# Patient Record
Sex: FEMALE | Race: WHITE | Employment: OTHER | URBAN - NONMETROPOLITAN AREA
[De-identification: names, ages, dates, MRNs, and addresses within clinical notes are randomized per-mention and may not be internally consistent; named-entity substitution may affect disease eponyms.]

---

## 2017-01-11 ENCOUNTER — TELEPHONE (OUTPATIENT)
Dept: INTERNAL MEDICINE | Age: 82
End: 2017-01-11

## 2017-03-07 RX ORDER — EZETIMIBE 10 MG/1
10 TABLET ORAL DAILY
Qty: 30 TABLET | Refills: 11 | Status: SHIPPED | OUTPATIENT
Start: 2017-03-07 | End: 2018-02-21 | Stop reason: SDUPTHER

## 2017-04-18 RX ORDER — HYDROCHLOROTHIAZIDE 25 MG/1
TABLET ORAL
Qty: 30 TABLET | Refills: 11 | Status: SHIPPED | OUTPATIENT
Start: 2017-04-18 | End: 2017-09-18 | Stop reason: ALTCHOICE

## 2017-05-26 ENCOUNTER — OFFICE VISIT (OUTPATIENT)
Dept: INTERNAL MEDICINE | Age: 82
End: 2017-05-26
Payer: MEDICARE

## 2017-05-26 VITALS
DIASTOLIC BLOOD PRESSURE: 60 MMHG | RESPIRATION RATE: 16 BRPM | SYSTOLIC BLOOD PRESSURE: 116 MMHG | HEIGHT: 59 IN | BODY MASS INDEX: 34.68 KG/M2 | WEIGHT: 172 LBS | HEART RATE: 68 BPM

## 2017-05-26 DIAGNOSIS — I10 ESSENTIAL HYPERTENSION: ICD-10-CM

## 2017-05-26 DIAGNOSIS — N18.30 CHRONIC KIDNEY DISEASE, STAGE III (MODERATE) (HCC): ICD-10-CM

## 2017-05-26 DIAGNOSIS — E11.22 CONTROLLED TYPE 2 DIABETES MELLITUS WITH STAGE 3 CHRONIC KIDNEY DISEASE, WITHOUT LONG-TERM CURRENT USE OF INSULIN (HCC): Primary | ICD-10-CM

## 2017-05-26 DIAGNOSIS — E78.2 MIXED HYPERLIPIDEMIA: ICD-10-CM

## 2017-05-26 DIAGNOSIS — L72.0 EPIDERMAL CYST OF NECK: ICD-10-CM

## 2017-05-26 DIAGNOSIS — F34.1 DYSTHYMIA: ICD-10-CM

## 2017-05-26 DIAGNOSIS — E03.4 HYPOTHYROIDISM DUE TO ACQUIRED ATROPHY OF THYROID: ICD-10-CM

## 2017-05-26 DIAGNOSIS — N18.30 CONTROLLED TYPE 2 DIABETES MELLITUS WITH STAGE 3 CHRONIC KIDNEY DISEASE, WITHOUT LONG-TERM CURRENT USE OF INSULIN (HCC): Primary | ICD-10-CM

## 2017-05-26 DIAGNOSIS — E66.9 NON MORBID OBESITY, UNSPECIFIED OBESITY TYPE: ICD-10-CM

## 2017-05-26 PROCEDURE — G8417 CALC BMI ABV UP PARAM F/U: HCPCS | Performed by: INTERNAL MEDICINE

## 2017-05-26 PROCEDURE — G8427 DOCREV CUR MEDS BY ELIG CLIN: HCPCS | Performed by: INTERNAL MEDICINE

## 2017-05-26 PROCEDURE — 1090F PRES/ABSN URINE INCON ASSESS: CPT | Performed by: INTERNAL MEDICINE

## 2017-05-26 PROCEDURE — G8399 PT W/DXA RESULTS DOCUMENT: HCPCS | Performed by: INTERNAL MEDICINE

## 2017-05-26 PROCEDURE — 4040F PNEUMOC VAC/ADMIN/RCVD: CPT | Performed by: INTERNAL MEDICINE

## 2017-05-26 PROCEDURE — 1036F TOBACCO NON-USER: CPT | Performed by: INTERNAL MEDICINE

## 2017-05-26 PROCEDURE — 99214 OFFICE O/P EST MOD 30 MIN: CPT | Performed by: INTERNAL MEDICINE

## 2017-05-26 PROCEDURE — 1123F ACP DISCUSS/DSCN MKR DOCD: CPT | Performed by: INTERNAL MEDICINE

## 2017-05-31 ENCOUNTER — TELEPHONE (OUTPATIENT)
Dept: INTERNAL MEDICINE | Age: 82
End: 2017-05-31

## 2017-06-01 ENCOUNTER — TELEPHONE (OUTPATIENT)
Dept: SURGERY | Age: 82
End: 2017-06-01

## 2017-06-06 ENCOUNTER — TELEPHONE (OUTPATIENT)
Dept: SURGERY | Age: 82
End: 2017-06-06

## 2017-06-22 ENCOUNTER — PROCEDURE VISIT (OUTPATIENT)
Dept: SURGERY | Age: 82
End: 2017-06-22
Payer: MEDICARE

## 2017-06-22 ENCOUNTER — HOSPITAL ENCOUNTER (OUTPATIENT)
Age: 82
Setting detail: SPECIMEN
Discharge: HOME | End: 2017-06-22
Payer: MEDICARE

## 2017-06-22 VITALS
DIASTOLIC BLOOD PRESSURE: 80 MMHG | HEIGHT: 59 IN | SYSTOLIC BLOOD PRESSURE: 120 MMHG | WEIGHT: 169 LBS | BODY MASS INDEX: 34.07 KG/M2 | TEMPERATURE: 98.1 F | HEART RATE: 60 BPM

## 2017-06-22 DIAGNOSIS — C44.40 CANCER OF SKIN OF NECK: Primary | ICD-10-CM

## 2017-06-22 PROCEDURE — 1036F TOBACCO NON-USER: CPT | Performed by: SURGERY

## 2017-06-22 PROCEDURE — 11624 EXC S/N/H/F/G MAL+MRG 3.1-4: CPT | Performed by: SURGERY

## 2017-06-26 LAB — DERMATOLOGY PATHOLOGY REPORT: NORMAL

## 2017-06-28 ENCOUNTER — NURSE ONLY (OUTPATIENT)
Dept: SURGERY | Age: 82
End: 2017-06-28

## 2017-06-28 DIAGNOSIS — C44.40 CANCER OF SKIN OF NECK: Primary | ICD-10-CM

## 2017-07-20 RX ORDER — DIPHENOXYLATE HYDROCHLORIDE AND ATROPINE SULFATE 2.5; .025 MG/1; MG/1
TABLET ORAL
Qty: 60 TABLET | Refills: 0 | Status: SHIPPED | OUTPATIENT
Start: 2017-07-20 | End: 2017-09-06 | Stop reason: SDUPTHER

## 2017-08-17 ENCOUNTER — HOSPITAL ENCOUNTER (OUTPATIENT)
Dept: LAB | Age: 82
Discharge: HOME | End: 2017-08-17
Payer: MEDICARE

## 2017-08-17 DIAGNOSIS — E78.2 MIXED HYPERLIPIDEMIA: ICD-10-CM

## 2017-08-17 DIAGNOSIS — I10 ESSENTIAL HYPERTENSION: ICD-10-CM

## 2017-08-17 DIAGNOSIS — N18.30 CONTROLLED TYPE 2 DIABETES MELLITUS WITH STAGE 3 CHRONIC KIDNEY DISEASE, WITHOUT LONG-TERM CURRENT USE OF INSULIN (HCC): ICD-10-CM

## 2017-08-17 DIAGNOSIS — E11.22 CONTROLLED TYPE 2 DIABETES MELLITUS WITH STAGE 3 CHRONIC KIDNEY DISEASE, WITHOUT LONG-TERM CURRENT USE OF INSULIN (HCC): ICD-10-CM

## 2017-08-17 DIAGNOSIS — E03.4 HYPOTHYROIDISM DUE TO ACQUIRED ATROPHY OF THYROID: ICD-10-CM

## 2017-08-17 LAB
-: ABNORMAL
ABSOLUTE EOS #: 0.2 K/UL (ref 0–0.4)
ABSOLUTE LYMPH #: 1.6 K/UL (ref 1–4.8)
ABSOLUTE MONO #: 0.6 K/UL (ref 0.1–1.2)
ALBUMIN SERPL-MCNC: 3.8 G/DL (ref 3.5–5.2)
ALBUMIN/GLOBULIN RATIO: 1.7 (ref 1–2.5)
ALP BLD-CCNC: 123 U/L (ref 35–104)
ALT SERPL-CCNC: 14 U/L (ref 5–33)
AMORPHOUS: ABNORMAL
ANION GAP SERPL CALCULATED.3IONS-SCNC: 14 MMOL/L (ref 9–17)
AST SERPL-CCNC: 16 U/L
BACTERIA: ABNORMAL
BASOPHILS # BLD: 1 %
BASOPHILS ABSOLUTE: 0.1 K/UL (ref 0–0.2)
BILIRUB SERPL-MCNC: 0.22 MG/DL (ref 0.3–1.2)
BILIRUBIN URINE: NEGATIVE
BUN BLDV-MCNC: 41 MG/DL (ref 8–23)
BUN/CREAT BLD: 35 (ref 9–20)
CALCIUM SERPL-MCNC: 9.3 MG/DL (ref 8.6–10.4)
CASTS UA: ABNORMAL /LPF (ref 0–2)
CHLORIDE BLD-SCNC: 107 MMOL/L (ref 98–107)
CHOLESTEROL/HDL RATIO: 3.2
CHOLESTEROL: 131 MG/DL
CO2: 19 MMOL/L (ref 20–31)
COLOR: ABNORMAL
COMMENT UA: ABNORMAL
CREAT SERPL-MCNC: 1.18 MG/DL (ref 0.5–0.9)
CREATININE URINE: 80.4 MG/DL (ref 28–217)
CRYSTALS, UA: ABNORMAL /HPF
DIFFERENTIAL TYPE: ABNORMAL
EOSINOPHILS RELATIVE PERCENT: 3 %
EPITHELIAL CELLS UA: ABNORMAL /HPF (ref 0–5)
ESTIMATED AVERAGE GLUCOSE: 114 MG/DL
GFR AFRICAN AMERICAN: 53 ML/MIN
GFR NON-AFRICAN AMERICAN: 44 ML/MIN
GFR SERPL CREATININE-BSD FRML MDRD: ABNORMAL ML/MIN/{1.73_M2}
GFR SERPL CREATININE-BSD FRML MDRD: ABNORMAL ML/MIN/{1.73_M2}
GLUCOSE BLD-MCNC: 132 MG/DL (ref 70–99)
GLUCOSE URINE: NEGATIVE
HBA1C MFR BLD: 5.6 % (ref 4.8–5.9)
HCT VFR BLD CALC: 33.9 % (ref 36–46)
HDLC SERPL-MCNC: 41 MG/DL
HEMOGLOBIN: 10.9 G/DL (ref 12–16)
KETONES, URINE: NEGATIVE
LDL CHOLESTEROL: 68 MG/DL (ref 0–130)
LEUKOCYTE ESTERASE, URINE: ABNORMAL
LYMPHOCYTES # BLD: 26 %
MCH RBC QN AUTO: 31.7 PG (ref 26–34)
MCHC RBC AUTO-ENTMCNC: 32.2 G/DL (ref 31–37)
MCV RBC AUTO: 98.4 FL (ref 80–100)
MICROALBUMIN/CREAT 24H UR: 69 MG/L
MICROALBUMIN/CREAT UR-RTO: 86 MCG/MG CREAT
MONOCYTES # BLD: 9 %
MUCUS: ABNORMAL
NITRITE, URINE: POSITIVE
OTHER OBSERVATIONS UA: ABNORMAL
PDW BLD-RTO: 13.3 % (ref 11–14.5)
PH UA: 5.5 (ref 5–6)
PLATELET # BLD: 224 K/UL (ref 140–450)
PLATELET ESTIMATE: ABNORMAL
PMV BLD AUTO: 9.4 FL (ref 6–12)
POTASSIUM SERPL-SCNC: 4.2 MMOL/L (ref 3.7–5.3)
PROTEIN UA: NEGATIVE
RBC # BLD: 3.45 M/UL (ref 4–5.2)
RBC # BLD: ABNORMAL 10*6/UL
RBC UA: ABNORMAL /HPF (ref 0–4)
RENAL EPITHELIAL, UA: ABNORMAL /HPF
SEG NEUTROPHILS: 61 %
SEGMENTED NEUTROPHILS ABSOLUTE COUNT: 3.8 K/UL (ref 1.8–7.7)
SODIUM BLD-SCNC: 140 MMOL/L (ref 135–144)
SPECIFIC GRAVITY UA: 1.02 (ref 1.01–1.02)
THYROXINE, FREE: 0.95 NG/DL (ref 0.93–1.7)
TOTAL PROTEIN: 6.1 G/DL (ref 6.4–8.3)
TRICHOMONAS: ABNORMAL
TRIGL SERPL-MCNC: 108 MG/DL
TSH SERPL DL<=0.05 MIU/L-ACNC: 5.86 MIU/L (ref 0.3–5)
TURBIDITY: ABNORMAL
URINE HGB: ABNORMAL
UROBILINOGEN, URINE: NORMAL
VLDLC SERPL CALC-MCNC: NORMAL MG/DL (ref 1–30)
WBC # BLD: 6.2 K/UL (ref 3.5–11)
WBC # BLD: ABNORMAL 10*3/UL
WBC UA: >50 /HPF (ref 0–4)
YEAST: ABNORMAL

## 2017-08-17 PROCEDURE — 83036 HEMOGLOBIN GLYCOSYLATED A1C: CPT

## 2017-08-17 PROCEDURE — 87186 SC STD MICRODIL/AGAR DIL: CPT

## 2017-08-17 PROCEDURE — 80053 COMPREHEN METABOLIC PANEL: CPT

## 2017-08-17 PROCEDURE — 84439 ASSAY OF FREE THYROXINE: CPT

## 2017-08-17 PROCEDURE — 87086 URINE CULTURE/COLONY COUNT: CPT

## 2017-08-17 PROCEDURE — 80061 LIPID PANEL: CPT

## 2017-08-17 PROCEDURE — 82043 UR ALBUMIN QUANTITATIVE: CPT

## 2017-08-17 PROCEDURE — 84443 ASSAY THYROID STIM HORMONE: CPT

## 2017-08-17 PROCEDURE — 87077 CULTURE AEROBIC IDENTIFY: CPT

## 2017-08-17 PROCEDURE — 36415 COLL VENOUS BLD VENIPUNCTURE: CPT

## 2017-08-17 PROCEDURE — 85025 COMPLETE CBC W/AUTO DIFF WBC: CPT

## 2017-08-17 PROCEDURE — 81001 URINALYSIS AUTO W/SCOPE: CPT

## 2017-08-17 PROCEDURE — 82570 ASSAY OF URINE CREATININE: CPT

## 2017-08-18 ENCOUNTER — TELEPHONE (OUTPATIENT)
Dept: INTERNAL MEDICINE | Age: 82
End: 2017-08-18

## 2017-08-18 RX ORDER — CIPROFLOXACIN 250 MG/1
250 TABLET, FILM COATED ORAL 2 TIMES DAILY
Qty: 20 TABLET | Refills: 0 | Status: SHIPPED | OUTPATIENT
Start: 2017-08-18 | End: 2017-08-28

## 2017-08-19 LAB
CULTURE: ABNORMAL
CULTURE: ABNORMAL
Lab: ABNORMAL
ORGANISM: ABNORMAL
SPECIMEN DESCRIPTION: ABNORMAL
SPECIMEN DESCRIPTION: ABNORMAL
STATUS: ABNORMAL

## 2017-08-25 ENCOUNTER — OFFICE VISIT (OUTPATIENT)
Dept: INTERNAL MEDICINE | Age: 82
End: 2017-08-25
Payer: MEDICARE

## 2017-08-25 VITALS
RESPIRATION RATE: 16 BRPM | HEIGHT: 59 IN | DIASTOLIC BLOOD PRESSURE: 58 MMHG | SYSTOLIC BLOOD PRESSURE: 104 MMHG | WEIGHT: 167.2 LBS | HEART RATE: 76 BPM | BODY MASS INDEX: 33.71 KG/M2

## 2017-08-25 DIAGNOSIS — N18.30 CHRONIC KIDNEY DISEASE, STAGE III (MODERATE) (HCC): ICD-10-CM

## 2017-08-25 DIAGNOSIS — N18.30 CONTROLLED TYPE 2 DIABETES MELLITUS WITH STAGE 3 CHRONIC KIDNEY DISEASE, WITHOUT LONG-TERM CURRENT USE OF INSULIN (HCC): Primary | ICD-10-CM

## 2017-08-25 DIAGNOSIS — E11.22 CONTROLLED TYPE 2 DIABETES MELLITUS WITH STAGE 3 CHRONIC KIDNEY DISEASE, WITHOUT LONG-TERM CURRENT USE OF INSULIN (HCC): Primary | ICD-10-CM

## 2017-08-25 DIAGNOSIS — E78.2 MIXED HYPERLIPIDEMIA: ICD-10-CM

## 2017-08-25 DIAGNOSIS — E66.9 NON MORBID OBESITY, UNSPECIFIED OBESITY TYPE: ICD-10-CM

## 2017-08-25 DIAGNOSIS — E03.4 HYPOTHYROIDISM DUE TO ACQUIRED ATROPHY OF THYROID: ICD-10-CM

## 2017-08-25 DIAGNOSIS — I10 ESSENTIAL HYPERTENSION: ICD-10-CM

## 2017-08-25 DIAGNOSIS — H35.30 MACULAR DEGENERATION: ICD-10-CM

## 2017-08-25 DIAGNOSIS — F34.1 DYSTHYMIA: ICD-10-CM

## 2017-08-25 PROCEDURE — 1090F PRES/ABSN URINE INCON ASSESS: CPT | Performed by: INTERNAL MEDICINE

## 2017-08-25 PROCEDURE — G8417 CALC BMI ABV UP PARAM F/U: HCPCS | Performed by: INTERNAL MEDICINE

## 2017-08-25 PROCEDURE — G8399 PT W/DXA RESULTS DOCUMENT: HCPCS | Performed by: INTERNAL MEDICINE

## 2017-08-25 PROCEDURE — G8427 DOCREV CUR MEDS BY ELIG CLIN: HCPCS | Performed by: INTERNAL MEDICINE

## 2017-08-25 PROCEDURE — 1123F ACP DISCUSS/DSCN MKR DOCD: CPT | Performed by: INTERNAL MEDICINE

## 2017-08-25 PROCEDURE — 1036F TOBACCO NON-USER: CPT | Performed by: INTERNAL MEDICINE

## 2017-08-25 PROCEDURE — 99214 OFFICE O/P EST MOD 30 MIN: CPT | Performed by: INTERNAL MEDICINE

## 2017-08-25 PROCEDURE — 4040F PNEUMOC VAC/ADMIN/RCVD: CPT | Performed by: INTERNAL MEDICINE

## 2017-08-25 RX ORDER — LEVOTHYROXINE SODIUM 0.07 MG/1
75 TABLET ORAL DAILY
Qty: 30 TABLET | Refills: 11 | Status: SHIPPED | OUTPATIENT
Start: 2017-08-25 | End: 2017-12-21 | Stop reason: DRUGHIGH

## 2017-09-06 RX ORDER — DIPHENOXYLATE HYDROCHLORIDE AND ATROPINE SULFATE 2.5; .025 MG/1; MG/1
1 TABLET ORAL 2 TIMES DAILY PRN
Qty: 60 TABLET | Refills: 0 | Status: ON HOLD | OUTPATIENT
Start: 2017-09-06 | End: 2018-01-11 | Stop reason: SDUPTHER

## 2017-09-18 ENCOUNTER — TELEPHONE (OUTPATIENT)
Dept: INTERNAL MEDICINE | Age: 82
End: 2017-09-18

## 2017-09-18 DIAGNOSIS — N30.00 ACUTE CYSTITIS WITHOUT HEMATURIA: Primary | ICD-10-CM

## 2017-09-19 ENCOUNTER — HOSPITAL ENCOUNTER (OUTPATIENT)
Dept: LAB | Age: 82
Setting detail: SPECIMEN
Discharge: HOME | End: 2017-09-19
Payer: MEDICARE

## 2017-09-19 DIAGNOSIS — N30.00 ACUTE CYSTITIS WITHOUT HEMATURIA: ICD-10-CM

## 2017-09-19 LAB
-: ABNORMAL
AMORPHOUS: ABNORMAL
BACTERIA: ABNORMAL
BILIRUBIN URINE: NEGATIVE
CASTS UA: ABNORMAL /LPF (ref 0–2)
COLOR: ABNORMAL
COMMENT UA: ABNORMAL
CRYSTALS, UA: ABNORMAL /HPF
EPITHELIAL CELLS UA: ABNORMAL /HPF (ref 0–5)
GLUCOSE URINE: NEGATIVE
KETONES, URINE: NEGATIVE
LEUKOCYTE ESTERASE, URINE: NEGATIVE
MUCUS: ABNORMAL
NITRITE, URINE: NEGATIVE
OTHER OBSERVATIONS UA: ABNORMAL
PH UA: 5.5 (ref 5–6)
PROTEIN UA: NEGATIVE
RBC UA: ABNORMAL /HPF (ref 0–4)
RENAL EPITHELIAL, UA: ABNORMAL /HPF
SPECIFIC GRAVITY UA: 1.02 (ref 1.01–1.02)
TRICHOMONAS: ABNORMAL
TURBIDITY: ABNORMAL
URINE HGB: NEGATIVE
UROBILINOGEN, URINE: NORMAL
WBC UA: ABNORMAL /HPF (ref 0–4)
YEAST: ABNORMAL

## 2017-09-19 PROCEDURE — 81001 URINALYSIS AUTO W/SCOPE: CPT

## 2017-10-18 ENCOUNTER — OFFICE VISIT (OUTPATIENT)
Dept: INTERNAL MEDICINE | Age: 82
End: 2017-10-18
Payer: MEDICARE

## 2017-10-18 VITALS
SYSTOLIC BLOOD PRESSURE: 128 MMHG | HEIGHT: 60 IN | WEIGHT: 167.11 LBS | DIASTOLIC BLOOD PRESSURE: 62 MMHG | RESPIRATION RATE: 16 BRPM | HEART RATE: 72 BPM | BODY MASS INDEX: 32.81 KG/M2

## 2017-10-18 DIAGNOSIS — H25.9 SENILE CATARACT OF LEFT EYE, UNSPECIFIED AGE-RELATED CATARACT TYPE: Primary | ICD-10-CM

## 2017-10-18 DIAGNOSIS — Z23 INFLUENZA VACCINE NEEDED: ICD-10-CM

## 2017-10-18 PROBLEM — H35.3131 EARLY STAGE NONEXUDATIVE AGE-RELATED MACULAR DEGENERATION OF BOTH EYES: Status: ACTIVE | Noted: 2017-10-16

## 2017-10-18 PROCEDURE — G8484 FLU IMMUNIZE NO ADMIN: HCPCS | Performed by: INTERNAL MEDICINE

## 2017-10-18 PROCEDURE — 1090F PRES/ABSN URINE INCON ASSESS: CPT | Performed by: INTERNAL MEDICINE

## 2017-10-18 PROCEDURE — G8417 CALC BMI ABV UP PARAM F/U: HCPCS | Performed by: INTERNAL MEDICINE

## 2017-10-18 PROCEDURE — 1036F TOBACCO NON-USER: CPT | Performed by: INTERNAL MEDICINE

## 2017-10-18 PROCEDURE — G8427 DOCREV CUR MEDS BY ELIG CLIN: HCPCS | Performed by: INTERNAL MEDICINE

## 2017-10-18 PROCEDURE — 4040F PNEUMOC VAC/ADMIN/RCVD: CPT | Performed by: INTERNAL MEDICINE

## 2017-10-18 PROCEDURE — G8399 PT W/DXA RESULTS DOCUMENT: HCPCS | Performed by: INTERNAL MEDICINE

## 2017-10-18 PROCEDURE — 99213 OFFICE O/P EST LOW 20 MIN: CPT | Performed by: INTERNAL MEDICINE

## 2017-10-18 PROCEDURE — 90662 IIV NO PRSV INCREASED AG IM: CPT | Performed by: INTERNAL MEDICINE

## 2017-10-18 PROCEDURE — 1123F ACP DISCUSS/DSCN MKR DOCD: CPT | Performed by: INTERNAL MEDICINE

## 2017-10-18 PROCEDURE — G0008 ADMIN INFLUENZA VIRUS VAC: HCPCS | Performed by: INTERNAL MEDICINE

## 2017-10-18 RX ORDER — YOHIMBE BARK 500 MG
CAPSULE ORAL
COMMUNITY
End: 2017-12-21 | Stop reason: SDUPTHER

## 2017-10-18 RX ORDER — ASCORBIC ACID 500 MG
1 TABLET ORAL
Status: ON HOLD | COMMUNITY
End: 2018-01-14 | Stop reason: HOSPADM

## 2017-10-18 NOTE — PROGRESS NOTES
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

## 2017-10-18 NOTE — PROGRESS NOTES
DR. Cullen Dunbar - PROGRESS NOTE    CHIEF COMPLAINT/HISTORY OF CHIEF COMPLAINT: This 80 y.o.  female comes in today for preoperative clearance for left cataract surgery to be done by Dr. Shana Gonzalez on November 2, 2017. She is doing well otherwise and denies any other complaints. She would like to get her flu shot today. ALLERGIES/INTOLERANCES:   Allergies   Allergen Reactions    Allopurinol      Patient unsure of reaction    Percocet [Oxycodone-Acetaminophen]      Hallucinations. ...sensitive to narcotics    Phenergan [Promethazine Hcl]      Very sensitive. ..given small doses    Polycitra-K [Potassium Citrate]      Patient unsure of reaction    Levaquin [Levofloxacin] Anxiety     Not able to sleep    Sulfa Antibiotics Rash    Tessalon [Benzonatate] Anxiety     Not able to sleep       MEDICATIONS:   Outpatient Prescriptions Marked as Taking for the 10/18/17 encounter (Office Visit) with Whit Whittington, DO   Medication Sig Dispense Refill    diphenoxylate-atropine (LOMOTIL) 2.5-0.025 MG per tablet Take 1 tablet by mouth 2 times daily as needed for Diarrhea 60 tablet 0    levothyroxine (SYNTHROID) 75 MCG tablet Take 1 tablet by mouth Daily 30 tablet 11    sertraline (ZOLOFT) 50 MG tablet TAKE 1 TABLET DAILY 90 tablet 3    ezetimibe (ZETIA) 10 MG tablet Take 1 tablet by mouth daily 30 tablet 11    lisinopril (PRINIVIL;ZESTRIL) 40 MG tablet TAKE 1 TABLET BY MOUTH DAILY 90 tablet 3    oxybutynin (DITROPAN XL) 5 MG CR tablet Take 1 tablet by mouth daily 30 tablet 11    Multiple Vitamins-Minerals (OCUVITE PRESERVISION PO) Take 1 tablet by mouth daily      Probiotic Product (PROBIOTIC DAILY PO) Take by mouth daily      guaiFENesin-codeine (CHERATUSSIN AC) 100-10 MG/5ML SYRP 1 to 2 teaspoons every 6 hours as needed for cough. 250 mL 0    cetirizine (ZYRTEC) 10 MG tablet Take 0.5 tablets by mouth daily as needed (sinus congestion).  30 tablet 0    aspirin 81 MG tablet Take 81 mg by mouth daily.      latanoprost (XALATAN) 0.005 % ophthalmic solution Place 1 drop into both eyes nightly.  timolol (TIMOPTIC) 0.5 % ophthalmic solution Place 1 drop into both eyes daily.  Ascorbic Acid (VITAMIN C) 500 MG tablet Take 500 mg by mouth 2 times daily.  Multiple Vitamins-Minerals (MULTIVITAMIN PO) Take  by mouth daily. IMMUNIZATIONS: Reviewed for influenza and pneumococcal status as indicated in electronic record. REVIEW OF SYSTEMS:     Please see history of chief complaint above; otherwise no new problems with respect to General, HEENT, Cardiovascular, Respiratory, Gastrointestinal, Genitourinary, Endocrinologic, Musculoskeletal, or Neuropsychiatric complaints. PHYSICAL EXAMINATION:    Wt Readings from Last 2 Encounters:   10/18/17 167 lb 1.7 oz (75.8 kg)   08/25/17 167 lb 3.2 oz (75.8 kg)       Vitals: /62 (Site: Right Arm, Position: Sitting, Cuff Size: Large Adult)   Pulse 72   Resp 16   Ht 4' 11.84\" (1.52 m)   Wt 167 lb 1.7 oz (75.8 kg)   BMI 32.81 kg/m²   General: This is a 80 y.o.  female who is alert and oriented to person, place and time. She appears to be her stated age and does not appear to be in any acute distress. HEENT/Neck: essentially unremarkable  Lungs: Normal - CTA without rales, rhonchi, or wheezing. Heart: regular rate and rhythm, S1, S2 normal, no murmur, click, rub or gallop No S3 or S4. Extremities: There is no clubbing, cyanosis, edema    ASSESSMENT/PLAN:    1. Senile cataract of left eye, unspecified age-related cataract type  - She is clear for surgery at the moderate risk level secondary to her chronic comorbid health conditions. We will let Dr. Malgorzata Veras office know    2. Influenza vaccine needed  - We gave her the flu shot that she requested.    - INFLUENZA, HIGH DOSE, 65 YRS +, IM, PF, PREFILL SYR, 0.5ML (FLUZONE HD)      Orders Placed This Encounter   Procedures    INFLUENZA, HIGH DOSE, 65 YRS +, IM, PF, PREFILL SYR, 0.5ML

## 2017-11-28 ENCOUNTER — HOSPITAL ENCOUNTER (OUTPATIENT)
Dept: LAB | Age: 82
Setting detail: SPECIMEN
Discharge: HOME | End: 2017-11-28
Payer: MEDICARE

## 2017-11-28 DIAGNOSIS — N18.30 CONTROLLED TYPE 2 DIABETES MELLITUS WITH STAGE 3 CHRONIC KIDNEY DISEASE, WITHOUT LONG-TERM CURRENT USE OF INSULIN (HCC): ICD-10-CM

## 2017-11-28 DIAGNOSIS — E03.4 HYPOTHYROIDISM DUE TO ACQUIRED ATROPHY OF THYROID: ICD-10-CM

## 2017-11-28 DIAGNOSIS — E11.22 CONTROLLED TYPE 2 DIABETES MELLITUS WITH STAGE 3 CHRONIC KIDNEY DISEASE, WITHOUT LONG-TERM CURRENT USE OF INSULIN (HCC): ICD-10-CM

## 2017-11-28 LAB
ANION GAP SERPL CALCULATED.3IONS-SCNC: 12 MMOL/L (ref 9–17)
BUN BLDV-MCNC: 35 MG/DL (ref 8–23)
BUN/CREAT BLD: 27 (ref 9–20)
CALCIUM SERPL-MCNC: 9.5 MG/DL (ref 8.6–10.4)
CHLORIDE BLD-SCNC: 108 MMOL/L (ref 98–107)
CO2: 24 MMOL/L (ref 20–31)
CREAT SERPL-MCNC: 1.31 MG/DL (ref 0.5–0.9)
ESTIMATED AVERAGE GLUCOSE: 120 MG/DL
GFR AFRICAN AMERICAN: 47 ML/MIN
GFR NON-AFRICAN AMERICAN: 39 ML/MIN
GFR SERPL CREATININE-BSD FRML MDRD: ABNORMAL ML/MIN/{1.73_M2}
GFR SERPL CREATININE-BSD FRML MDRD: ABNORMAL ML/MIN/{1.73_M2}
GLUCOSE BLD-MCNC: 146 MG/DL (ref 70–99)
HBA1C MFR BLD: 5.8 % (ref 4.8–5.9)
POTASSIUM SERPL-SCNC: 4.7 MMOL/L (ref 3.7–5.3)
SODIUM BLD-SCNC: 144 MMOL/L (ref 135–144)
THYROXINE, FREE: 1.01 NG/DL (ref 0.93–1.7)
TSH SERPL DL<=0.05 MIU/L-ACNC: 5.21 MIU/L (ref 0.3–5)

## 2017-11-28 PROCEDURE — 80048 BASIC METABOLIC PNL TOTAL CA: CPT

## 2017-11-28 PROCEDURE — 84439 ASSAY OF FREE THYROXINE: CPT

## 2017-11-28 PROCEDURE — 83036 HEMOGLOBIN GLYCOSYLATED A1C: CPT

## 2017-11-28 PROCEDURE — 36415 COLL VENOUS BLD VENIPUNCTURE: CPT

## 2017-11-28 PROCEDURE — 84443 ASSAY THYROID STIM HORMONE: CPT

## 2017-11-29 RX ORDER — LISINOPRIL 40 MG/1
TABLET ORAL
Qty: 90 TABLET | Refills: 3 | Status: SHIPPED | OUTPATIENT
Start: 2017-11-29 | End: 2018-09-26 | Stop reason: DRUGHIGH

## 2017-12-08 ENCOUNTER — TELEPHONE (OUTPATIENT)
Dept: INTERNAL MEDICINE | Age: 82
End: 2017-12-08

## 2017-12-08 ENCOUNTER — HOSPITAL ENCOUNTER (OUTPATIENT)
Dept: LAB | Age: 82
Setting detail: SPECIMEN
Discharge: HOME | End: 2017-12-08
Payer: MEDICARE

## 2017-12-08 DIAGNOSIS — N39.0 URINARY TRACT INFECTION WITHOUT HEMATURIA, SITE UNSPECIFIED: Primary | ICD-10-CM

## 2017-12-08 DIAGNOSIS — N39.0 URINARY TRACT INFECTION WITHOUT HEMATURIA, SITE UNSPECIFIED: ICD-10-CM

## 2017-12-08 LAB
-: ABNORMAL
AMORPHOUS: ABNORMAL
BACTERIA: ABNORMAL
BILIRUBIN URINE: NEGATIVE
CASTS UA: ABNORMAL /LPF (ref 0–2)
COLOR: ABNORMAL
COMMENT UA: ABNORMAL
CRYSTALS, UA: ABNORMAL /HPF
EPITHELIAL CELLS UA: ABNORMAL /HPF (ref 0–5)
GLUCOSE URINE: NEGATIVE
KETONES, URINE: NEGATIVE
LEUKOCYTE ESTERASE, URINE: ABNORMAL
MUCUS: ABNORMAL
NITRITE, URINE: POSITIVE
OTHER OBSERVATIONS UA: ABNORMAL
PH UA: 5 (ref 5–6)
PROTEIN UA: ABNORMAL
RBC UA: ABNORMAL /HPF (ref 0–4)
RENAL EPITHELIAL, UA: ABNORMAL /HPF
SPECIFIC GRAVITY UA: 1.02 (ref 1.01–1.02)
TRICHOMONAS: ABNORMAL
TURBIDITY: ABNORMAL
URINE HGB: ABNORMAL
UROBILINOGEN, URINE: NORMAL
WBC UA: ABNORMAL /HPF (ref 0–4)
YEAST: ABNORMAL

## 2017-12-08 PROCEDURE — 87086 URINE CULTURE/COLONY COUNT: CPT

## 2017-12-08 PROCEDURE — 87077 CULTURE AEROBIC IDENTIFY: CPT

## 2017-12-08 PROCEDURE — 81001 URINALYSIS AUTO W/SCOPE: CPT

## 2017-12-08 PROCEDURE — 87186 SC STD MICRODIL/AGAR DIL: CPT

## 2017-12-08 RX ORDER — NITROFURANTOIN 25; 75 MG/1; MG/1
100 CAPSULE ORAL 2 TIMES DAILY
Qty: 20 CAPSULE | Refills: 0 | Status: SHIPPED | OUTPATIENT
Start: 2017-12-08 | End: 2017-12-18

## 2017-12-11 LAB
CULTURE: ABNORMAL
Lab: ABNORMAL
ORGANISM: ABNORMAL
ORGANISM: ABNORMAL
SPECIMEN DESCRIPTION: ABNORMAL
SPECIMEN DESCRIPTION: ABNORMAL
STATUS: ABNORMAL

## 2017-12-21 ENCOUNTER — OFFICE VISIT (OUTPATIENT)
Dept: INTERNAL MEDICINE | Age: 82
End: 2017-12-21
Payer: MEDICARE

## 2017-12-21 VITALS
HEART RATE: 68 BPM | BODY MASS INDEX: 30.29 KG/M2 | DIASTOLIC BLOOD PRESSURE: 60 MMHG | HEIGHT: 61 IN | WEIGHT: 160.4 LBS | TEMPERATURE: 98.6 F | RESPIRATION RATE: 16 BRPM | SYSTOLIC BLOOD PRESSURE: 102 MMHG | OXYGEN SATURATION: 95 %

## 2017-12-21 DIAGNOSIS — E11.22 CONTROLLED TYPE 2 DIABETES MELLITUS WITH STAGE 3 CHRONIC KIDNEY DISEASE, WITHOUT LONG-TERM CURRENT USE OF INSULIN (HCC): Primary | ICD-10-CM

## 2017-12-21 DIAGNOSIS — H40.9 GLAUCOMA OF BOTH EYES, UNSPECIFIED GLAUCOMA TYPE: ICD-10-CM

## 2017-12-21 DIAGNOSIS — E66.9 CLASS 1 OBESITY WITHOUT SERIOUS COMORBIDITY WITH BODY MASS INDEX (BMI) OF 30.0 TO 30.9 IN ADULT, UNSPECIFIED OBESITY TYPE: ICD-10-CM

## 2017-12-21 DIAGNOSIS — Z13.1 ENCOUNTER FOR SCREENING FOR DIABETES MELLITUS: ICD-10-CM

## 2017-12-21 DIAGNOSIS — H35.3131 EARLY STAGE NONEXUDATIVE AGE-RELATED MACULAR DEGENERATION OF BOTH EYES: ICD-10-CM

## 2017-12-21 DIAGNOSIS — N18.30 CHRONIC KIDNEY DISEASE, STAGE III (MODERATE) (HCC): ICD-10-CM

## 2017-12-21 DIAGNOSIS — E03.4 HYPOTHYROIDISM DUE TO ACQUIRED ATROPHY OF THYROID: ICD-10-CM

## 2017-12-21 DIAGNOSIS — E78.2 MIXED HYPERLIPIDEMIA: ICD-10-CM

## 2017-12-21 DIAGNOSIS — N18.30 CONTROLLED TYPE 2 DIABETES MELLITUS WITH STAGE 3 CHRONIC KIDNEY DISEASE, WITHOUT LONG-TERM CURRENT USE OF INSULIN (HCC): Primary | ICD-10-CM

## 2017-12-21 DIAGNOSIS — I10 ESSENTIAL HYPERTENSION: ICD-10-CM

## 2017-12-21 PROCEDURE — G8399 PT W/DXA RESULTS DOCUMENT: HCPCS | Performed by: INTERNAL MEDICINE

## 2017-12-21 PROCEDURE — 1036F TOBACCO NON-USER: CPT | Performed by: INTERNAL MEDICINE

## 2017-12-21 PROCEDURE — 4040F PNEUMOC VAC/ADMIN/RCVD: CPT | Performed by: INTERNAL MEDICINE

## 2017-12-21 PROCEDURE — G8427 DOCREV CUR MEDS BY ELIG CLIN: HCPCS | Performed by: INTERNAL MEDICINE

## 2017-12-21 PROCEDURE — G8484 FLU IMMUNIZE NO ADMIN: HCPCS | Performed by: INTERNAL MEDICINE

## 2017-12-21 PROCEDURE — 1123F ACP DISCUSS/DSCN MKR DOCD: CPT | Performed by: INTERNAL MEDICINE

## 2017-12-21 PROCEDURE — 99214 OFFICE O/P EST MOD 30 MIN: CPT | Performed by: INTERNAL MEDICINE

## 2017-12-21 PROCEDURE — G8417 CALC BMI ABV UP PARAM F/U: HCPCS | Performed by: INTERNAL MEDICINE

## 2017-12-21 PROCEDURE — 1090F PRES/ABSN URINE INCON ASSESS: CPT | Performed by: INTERNAL MEDICINE

## 2017-12-21 RX ORDER — HYDROCHLOROTHIAZIDE 25 MG/1
25 TABLET ORAL DAILY
COMMUNITY
End: 2018-04-21 | Stop reason: SDUPTHER

## 2017-12-21 RX ORDER — LEVOTHYROXINE SODIUM 0.1 MG/1
100 TABLET ORAL DAILY
Qty: 30 TABLET | Refills: 11 | Status: SHIPPED | OUTPATIENT
Start: 2017-12-21 | End: 2018-03-23 | Stop reason: DRUGHIGH

## 2017-12-21 ASSESSMENT — PATIENT HEALTH QUESTIONNAIRE - PHQ9
1. LITTLE INTEREST OR PLEASURE IN DOING THINGS: 0
SUM OF ALL RESPONSES TO PHQ9 QUESTIONS 1 & 2: 0
2. FEELING DOWN, DEPRESSED OR HOPELESS: 0
SUM OF ALL RESPONSES TO PHQ QUESTIONS 1-9: 0

## 2017-12-21 NOTE — PATIENT INSTRUCTIONS
Patient Education        Learning About Diabetes Food Guidelines  Your Care Instructions    Meal planning is important to manage diabetes. It helps keep your blood sugar at a target level (which you set with your doctor). You don't have to eat special foods. You can eat what your family eats, including sweets once in a while. But you do have to pay attention to how often you eat and how much you eat of certain foods. You may want to work with a dietitian or a certified diabetes educator (CDE) to help you plan meals and snacks. A dietitian or CDE can also help you lose weight if that is one of your goals. What should you know about eating carbs? Managing the amount of carbohydrate (carbs) you eat is an important part of healthy meals when you have diabetes. Carbohydrate is found in many foods. · Learn which foods have carbs. And learn the amounts of carbs in different foods. ¨ Bread, cereal, pasta, and rice have about 15 grams of carbs in a serving. A serving is 1 slice of bread (1 ounce), ½ cup of cooked cereal, or 1/3 cup of cooked pasta or rice. ¨ Fruits have 15 grams of carbs in a serving. A serving is 1 small fresh fruit, such as an apple or orange; ½ of a banana; ½ cup of cooked or canned fruit; ½ cup of fruit juice; 1 cup of melon or raspberries; or 2 tablespoons of dried fruit. ¨ Milk and no-sugar-added yogurt have 15 grams of carbs in a serving. A serving is 1 cup of milk or 2/3 cup of no-sugar-added yogurt. ¨ Starchy vegetables have 15 grams of carbs in a serving. A serving is ½ cup of mashed potatoes or sweet potato; 1 cup winter squash; ½ of a small baked potato; ½ cup of cooked beans; or ½ cup cooked corn or green peas. · Learn how much carbs to eat each day and at each meal. A dietitian or CDE can teach you how to keep track of the amount of carbs you eat. This is called carbohydrate counting. · If you are not sure how to count carbohydrate grams, use the Plate Method to plan meals.  It is a good, quick way to make sure that you have a balanced meal. It also helps you spread carbs throughout the day. ¨ Divide your plate by types of foods. Put non-starchy vegetables on half the plate, meat or other protein food on one-quarter of the plate, and a grain or starchy vegetable in the final quarter of the plate. To this you can add a small piece of fruit and 1 cup of milk or yogurt, depending on how many carbs you are supposed to eat at a meal.  · Try to eat about the same amount of carbs at each meal. Do not \"save up\" your daily allowance of carbs to eat at one meal.  · Proteins have very little or no carbs per serving. Examples of proteins are beef, chicken, turkey, fish, eggs, tofu, cheese, cottage cheese, and peanut butter. A serving size of meat is 3 ounces, which is about the size of a deck of cards. Examples of meat substitute serving sizes (equal to 1 ounce of meat) are 1/4 cup of cottage cheese, 1 egg, 1 tablespoon of peanut butter, and ½ cup of tofu. How can you eat out and still eat healthy? · Learn to estimate the serving sizes of foods that have carbohydrate. If you measure food at home, it will be easier to estimate the amount in a serving of restaurant food. · If the meal you order has too much carbohydrate (such as potatoes, corn, or baked beans), ask to have a low-carbohydrate food instead. Ask for a salad or green vegetables. · If you use insulin, check your blood sugar before and after eating out to help you plan how much to eat in the future. · If you eat more carbohydrate at a meal than you had planned, take a walk or do other exercise. This will help lower your blood sugar. What else should you know? · Limit saturated fat, such as the fat from meat and dairy products. This is a healthy choice because people who have diabetes are at higher risk of heart disease. So choose lean cuts of meat and nonfat or low-fat dairy products.  Use olive or canola oil instead of butter or shortening when cooking. · Don't skip meals. Your blood sugar may drop too low if you skip meals and take insulin or certain medicines for diabetes. · Check with your doctor before you drink alcohol. Alcohol can cause your blood sugar to drop too low. Alcohol can also cause a bad reaction if you take certain diabetes medicines. Follow-up care is a key part of your treatment and safety. Be sure to make and go to all appointments, and call your doctor if you are having problems. It's also a good idea to know your test results and keep a list of the medicines you take. Where can you learn more? Go to [email or web address].[email or web address] org and sign in to your Andigilog account. Enter I594 in the Fision box to learn more about \"Learning About Diabetes Food Guidelines. \"     If you do not have an account, please click on the \"Sign Up Now\" link. Current as of: March 13, 2017  Content Version: 11.4  © 6575-6213 Healthwise, Incorporated. Care instructions adapted under license by [facility] ([facility]). If you have questions about a medical condition or this instruction, always ask your healthcare professional. [place] [address] any warranty or liability for your use of this information.

## 2017-12-21 NOTE — PROGRESS NOTES
Kathy Bean received counseling on the following healthy behaviors: nutrition  Reviewed prior labs and health maintenance  Continue current medications, diet and exercise. Discussed use, benefit, and side effects of prescribed medications. Barriers to medication compliance addressed. Patient given educational materials - see patient instructions  Was a self-tracking handout given in paper form or via [email or web address]? Yes    Requested Prescriptions     Signed Prescriptions Disp Refills    levothyroxine (SYNTHROID) 100 MCG tablet 30 tablet 11     Sig: Take 1 tablet by mouth Daily       All patient questions answered. Patient voiced understanding. Quality Measures    Body mass index is 30.68 kg/m². Elevated. Weight control plan discussed: Healthy diet and regular exercise. BP: 102/60. Blood pressure is normal. Treatment plan: See main progress note    Fall Risk 12/21/2017 12/12/2016 8/3/2015 5/23/2014   2 or more falls in past year? no no no no   Fall with injury in past year? no no yes no     The patient has a history of falls. I did not - not indicated , complete a risk assessment for falls. See progress note for plan, if risk assessment completed. Lab Results   Component Value Date    LDLCHOLESTEROL 68 08/17/2017    (goal LDL reduction with dx if diabetes is 50% LDL reduction)    PHQ Scores 12/21/2017 12/12/2016 5/13/2016   PHQ2 Score 0 0 0   PHQ9 Score 0 0 0     See progress note for plan, if depression exists. Interpretation of Total Score: Major depression if the answer to questions 1 or 2 and 5 or more of questions 1 to 9 are at least [facility] than half the days. \"  Other depressive syndrome if questions 1 or 2 and two, three, or four of questions 1 to 9 are at least [facility] than half the days\"   Depression Severity: 5-9 = Mild depression, 10-14 = Moderate depression, 15-19 = Moderately severe depression, 20-27 = Severe depression

## 2017-12-21 NOTE — PROGRESS NOTES
DR. Valverde Patient - PROGRESS NOTE    CHIEF COMPLAINT/HISTORY OF CHIEF COMPLAINT: This 80 y.o.  female comes in today for ongoing evaluation and management of her diabetes mellitus type 2 with nephropathy, hypertension, hyperlipidemia, hypothyroidism, chronic kidney disease, glaucoma, macular degeneration, and obesity. For the last week she has not been feeling very good. She has felt alternately hot and cold, and her sinuses are draining. She was vomiting earlier in the week, but that has stopped now. She feels tired. She denies any fever. She denies any coughing or stuffy nose. She has been trying to watch her diet and maintain regular physical activity in the form of walking to try to keep her diabetes under control. She controls her diabetes with diet and exercise. She takes Synthroid for hypothyroidism, which we just adjusted last time she was here. She is on Zetia for hyperlipidemia. She takes lisinopril hypertension. She is not having any chest pain or dizziness. She is on Xalatan and Timoptic for glaucoma. She also has macular degeneration, which has been stable. Otherwise she seems to be doing fairly well and denies any other complaints. ALLERGIES/INTOLERANCES:   Allergies   Allergen Reactions    Allopurinol      Patient unsure of reaction    Percocet [Oxycodone-Acetaminophen]      Hallucinations. ...sensitive to narcotics    Phenergan [Promethazine Hcl]      Very sensitive. ..given small doses    Polycitra-K [Potassium Citrate]      Patient unsure of reaction    Levaquin [Levofloxacin] Anxiety     Not able to sleep    Sulfa Antibiotics Rash    Tessalon [Benzonatate] Anxiety     Not able to sleep       MEDICATIONS:   Outpatient Prescriptions Marked as Taking for the 12/21/17 encounter (Office Visit) with Amanda Manning, DO   Medication Sig Dispense Refill    hydrochlorothiazide (HYDRODIURIL) 25 MG tablet Take 25 mg by mouth daily      lisinopril (PRINIVIL;ZESTRIL) 40 MG tablet TAKE 1 TABLET BY MOUTH DAILY 90 tablet 3    diphenoxylate-atropine (LOMOTIL) 2.5-0.025 MG per tablet Take 1 tablet by mouth 2 times daily as needed for Diarrhea 60 tablet 0    levothyroxine (SYNTHROID) 75 MCG tablet Take 1 tablet by mouth Daily 30 tablet 11    sertraline (ZOLOFT) 50 MG tablet TAKE 1 TABLET DAILY 90 tablet 3    ezetimibe (ZETIA) 10 MG tablet Take 1 tablet by mouth daily 30 tablet 11    Multiple Vitamins-Minerals (OCUVITE PRESERVISION PO) Take 1 tablet by mouth daily      Probiotic Product (PROBIOTIC DAILY PO) Take by mouth daily      guaiFENesin-codeine (CHERATUSSIN AC) 100-10 MG/5ML SYRP 1 to 2 teaspoons every 6 hours as needed for cough. 250 mL 0    cetirizine (ZYRTEC) 10 MG tablet Take 0.5 tablets by mouth daily as needed (sinus congestion). 30 tablet 0    aspirin 81 MG tablet Take 81 mg by mouth daily.  latanoprost (XALATAN) 0.005 % ophthalmic solution Place 1 drop into both eyes nightly.  timolol (TIMOPTIC) 0.5 % ophthalmic solution Place 1 drop into both eyes daily.  Ascorbic Acid (VITAMIN C) 500 MG tablet Take 500 mg by mouth 2 times daily.  Multiple Vitamins-Minerals (MULTIVITAMIN PO) Take  by mouth daily. IMMUNIZATIONS: Reviewed for influenza and pneumococcal status as indicated in electronic record.     REVIEW OF SYSTEMS:     General: negative for - chills, fever or night sweats  Skin: negative for - pruritus or rash  Head: Negative for: headache or recent history of head trauma  Ear, Nose, Throat: positive for - nasal discharge  negative for - epistaxis, nasal congestion, sore throat, tinnitus or vertigo  Cardiovascular: negative for - chest pain, dyspnea on exertion or shortness of breath  Respiratory: negative for - cough, hemoptysis or wheezing  Gastrointestinal: negative for - constipation, diarrhea or nausea/vomiting  Genitourinary: negative for - dysuria, hematuria or nocturia  Musculoskeletal: positive for - joint pain  negative for - muscle pain or with body mass index (BMI) of 30.0 to 30.9 in adult, unspecified obesity type, improved  - We discussed weight loss  - She will continue to watch her diet and exercise       Orders Placed This Encounter   Procedures    Glucose, Fasting     Standing Status:   Future     Standing Expiration Date:   12/21/2018    Hemoglobin A1C     Standing Status:   Future     Standing Expiration Date:   12/21/2018    TSH without Reflex     Standing Status:   Future     Standing Expiration Date:   12/21/2018    T4, Free     Standing Status:   Future     Standing Expiration Date:   12/21/2018       Requested Prescriptions     Signed Prescriptions Disp Refills    levothyroxine (SYNTHROID) 100 MCG tablet 30 tablet 11     Sig: Take 1 tablet by mouth Daily       Labs and medications as ordered above. Return in about 3 months (around 3/21/2018).         Electronically signed by Maddi Ennis DO on 12/21/2017 at 5:35 PM  Internal Medicine

## 2017-12-28 ENCOUNTER — TELEPHONE (OUTPATIENT)
Dept: INTERNAL MEDICINE | Age: 82
End: 2017-12-28

## 2017-12-28 DIAGNOSIS — R41.0 CONFUSION: Primary | ICD-10-CM

## 2017-12-28 NOTE — TELEPHONE ENCOUNTER
Last appt: 12/21/2017  Next appt: 3/23/2018    Patient daughter called in saying that patient is still having some confusion. Was wondering if patient could come in and do another UA.   She did finish that last antibiotic that was prescribed by

## 2017-12-29 ENCOUNTER — HOSPITAL ENCOUNTER (OUTPATIENT)
Dept: LAB | Age: 82
Setting detail: SPECIMEN
Discharge: HOME | End: 2017-12-29
Payer: MEDICARE

## 2017-12-29 DIAGNOSIS — R41.0 CONFUSION: ICD-10-CM

## 2017-12-29 LAB
-: ABNORMAL
AMORPHOUS: ABNORMAL
BACTERIA: ABNORMAL
BILIRUBIN URINE: NEGATIVE
CASTS UA: ABNORMAL /LPF (ref 0–2)
COLOR: ABNORMAL
COMMENT UA: ABNORMAL
CRYSTALS, UA: ABNORMAL /HPF
EPITHELIAL CELLS UA: ABNORMAL /HPF (ref 0–5)
GLUCOSE URINE: NEGATIVE
KETONES, URINE: NEGATIVE
LEUKOCYTE ESTERASE, URINE: ABNORMAL
MUCUS: ABNORMAL
NITRITE, URINE: NEGATIVE
OTHER OBSERVATIONS UA: ABNORMAL
PH UA: 6 (ref 5–6)
PROTEIN UA: NEGATIVE
RBC UA: ABNORMAL /HPF (ref 0–4)
RENAL EPITHELIAL, UA: ABNORMAL /HPF
SPECIFIC GRAVITY UA: 1.01 (ref 1.01–1.02)
TRICHOMONAS: ABNORMAL
TURBIDITY: ABNORMAL
URINE HGB: NEGATIVE
UROBILINOGEN, URINE: NORMAL
WBC UA: ABNORMAL /HPF (ref 0–4)
YEAST: ABNORMAL

## 2017-12-29 PROCEDURE — 87186 SC STD MICRODIL/AGAR DIL: CPT

## 2017-12-29 PROCEDURE — 87086 URINE CULTURE/COLONY COUNT: CPT

## 2017-12-29 PROCEDURE — 87077 CULTURE AEROBIC IDENTIFY: CPT

## 2017-12-29 PROCEDURE — 81001 URINALYSIS AUTO W/SCOPE: CPT

## 2018-01-03 ENCOUNTER — TELEPHONE (OUTPATIENT)
Dept: INTERNAL MEDICINE | Age: 83
End: 2018-01-03

## 2018-01-03 RX ORDER — CIPROFLOXACIN 250 MG/1
250 TABLET, FILM COATED ORAL 2 TIMES DAILY
Qty: 20 TABLET | Refills: 0 | Status: SHIPPED | OUTPATIENT
Start: 2018-01-03 | End: 2018-01-13

## 2018-01-03 NOTE — TELEPHONE ENCOUNTER
Spoke with patients daughter and they use [place] in [facility]

## 2018-01-03 NOTE — TELEPHONE ENCOUNTER
Last appt: 12/28/2017  Next appt: 3/23/2018    Patient daughter Dayo Arevalo called in wanting the result to the urinalysis that was done on Dec 29. Call the daughter back at work number [phone].

## 2018-01-09 ENCOUNTER — TELEPHONE (OUTPATIENT)
Dept: INTERNAL MEDICINE | Age: 83
End: 2018-01-09

## 2018-01-09 DIAGNOSIS — R05.9 COUGH: Primary | ICD-10-CM

## 2018-01-09 RX ORDER — GUAIFENESIN AND CODEINE PHOSPHATE 100; 10 MG/5ML; MG/5ML
5-10 SOLUTION ORAL EVERY 6 HOURS PRN
Qty: 250 ML | Refills: 0 | OUTPATIENT
Start: 2018-01-09 | End: 2018-01-16

## 2018-01-09 NOTE — TELEPHONE ENCOUNTER
Patient was seen by you recently. at that time she had a cough. Cough has since gotton worse. Low grade temp 100.9   Has been using tylenol cold and cough otc but not helping.   Daughter is asking if you could call RX for cough med to [facility].  Let Zari Gamezs know at [phone]

## 2018-01-10 ENCOUNTER — TELEPHONE (OUTPATIENT)
Dept: INTERNAL MEDICINE | Age: 83
End: 2018-01-10

## 2018-01-11 ENCOUNTER — HOSPITAL ENCOUNTER (INPATIENT)
Age: 83
LOS: 3 days | Discharge: HOME | DRG: 194 | End: 2018-01-14
Attending: SPECIALIST | Admitting: INTERNAL MEDICINE
Payer: MEDICARE

## 2018-01-11 ENCOUNTER — APPOINTMENT (OUTPATIENT)
Dept: GENERAL RADIOLOGY | Age: 83
DRG: 194 | End: 2018-01-11
Payer: MEDICARE

## 2018-01-11 ENCOUNTER — OFFICE VISIT (OUTPATIENT)
Dept: INTERNAL MEDICINE | Age: 83
End: 2018-01-11

## 2018-01-11 VITALS
DIASTOLIC BLOOD PRESSURE: 50 MMHG | WEIGHT: 160.5 LBS | HEIGHT: 61 IN | SYSTOLIC BLOOD PRESSURE: 104 MMHG | RESPIRATION RATE: 16 BRPM | BODY MASS INDEX: 30.3 KG/M2 | TEMPERATURE: 99.7 F | HEART RATE: 76 BPM

## 2018-01-11 DIAGNOSIS — R53.1 GENERAL WEAKNESS: Primary | ICD-10-CM

## 2018-01-11 DIAGNOSIS — E87.1 HYPONATREMIA: ICD-10-CM

## 2018-01-11 DIAGNOSIS — J10.1 INFLUENZA A: ICD-10-CM

## 2018-01-11 DIAGNOSIS — R05.9 COUGH: Primary | ICD-10-CM

## 2018-01-11 DIAGNOSIS — K52.9 CHRONIC DIARRHEA: Primary | ICD-10-CM

## 2018-01-11 DIAGNOSIS — E86.0 DEHYDRATION: ICD-10-CM

## 2018-01-11 LAB
-: ABNORMAL
ABSOLUTE EOS #: 0 K/UL (ref 0–0.4)
ABSOLUTE IMMATURE GRANULOCYTE: ABNORMAL K/UL (ref 0–0.3)
ABSOLUTE LYMPH #: 0.6 K/UL (ref 1–4.8)
ABSOLUTE MONO #: 0.6 K/UL (ref 0.1–1.2)
AMORPHOUS: ABNORMAL
ANION GAP SERPL CALCULATED.3IONS-SCNC: 15 MMOL/L (ref 9–17)
BACTERIA: ABNORMAL
BASOPHILS # BLD: 1 % (ref 0–1)
BASOPHILS ABSOLUTE: 0 K/UL (ref 0–0.2)
BILIRUBIN URINE: NEGATIVE
BUN BLDV-MCNC: 30 MG/DL (ref 8–23)
BUN/CREAT BLD: 22 (ref 9–20)
CALCIUM SERPL-MCNC: 8.9 MG/DL (ref 8.6–10.4)
CASTS UA: ABNORMAL /LPF (ref 0–2)
CHLORIDE BLD-SCNC: 94 MMOL/L (ref 98–107)
CO2: 22 MMOL/L (ref 20–31)
COLOR: ABNORMAL
COMMENT UA: ABNORMAL
CREAT SERPL-MCNC: 1.34 MG/DL (ref 0.5–0.9)
CRYSTALS, UA: ABNORMAL /HPF
DIFFERENTIAL TYPE: ABNORMAL
DIRECT EXAM: ABNORMAL
EOSINOPHILS RELATIVE PERCENT: 0 % (ref 1–7)
EPITHELIAL CELLS UA: ABNORMAL /HPF (ref 0–5)
GFR AFRICAN AMERICAN: 46 ML/MIN
GFR NON-AFRICAN AMERICAN: 38 ML/MIN
GFR SERPL CREATININE-BSD FRML MDRD: ABNORMAL ML/MIN/{1.73_M2}
GFR SERPL CREATININE-BSD FRML MDRD: ABNORMAL ML/MIN/{1.73_M2}
GLUCOSE BLD-MCNC: 138 MG/DL (ref 70–99)
GLUCOSE URINE: NEGATIVE
HCT VFR BLD CALC: 32.1 % (ref 36–46)
HEMOGLOBIN: 10.7 G/DL (ref 12–16)
IMMATURE GRANULOCYTES: ABNORMAL %
KETONES, URINE: ABNORMAL
LEUKOCYTE ESTERASE, URINE: NEGATIVE
LYMPHOCYTES # BLD: 16 % (ref 16–46)
Lab: ABNORMAL
MAGNESIUM: 1.9 MG/DL (ref 1.6–2.6)
MCH RBC QN AUTO: 32.3 PG (ref 26–34)
MCHC RBC AUTO-ENTMCNC: 33.2 G/DL (ref 31–37)
MCV RBC AUTO: 97.4 FL (ref 80–100)
MONOCYTES # BLD: 16 % (ref 4–11)
MUCUS: ABNORMAL
MYOGLOBIN: 169 NG/ML (ref 25–58)
NITRITE, URINE: NEGATIVE
OTHER OBSERVATIONS UA: ABNORMAL
PDW BLD-RTO: 13.1 % (ref 11–14.5)
PH UA: 5 (ref 5–6)
PLATELET # BLD: 161 K/UL (ref 140–450)
PLATELET ESTIMATE: ABNORMAL
PMV BLD AUTO: 10 FL (ref 6–12)
POTASSIUM SERPL-SCNC: 4.6 MMOL/L (ref 3.7–5.3)
PROTEIN UA: NEGATIVE
RBC # BLD: 3.3 M/UL (ref 4–5.2)
RBC # BLD: ABNORMAL 10*6/UL
RBC UA: ABNORMAL /HPF (ref 0–4)
RENAL EPITHELIAL, UA: ABNORMAL /HPF
SEG NEUTROPHILS: 67 % (ref 43–77)
SEGMENTED NEUTROPHILS ABSOLUTE COUNT: 2.7 K/UL (ref 1.8–7.7)
SODIUM BLD-SCNC: 131 MMOL/L (ref 135–144)
SPECIFIC GRAVITY UA: 1.03 (ref 1.01–1.02)
SPECIMEN DESCRIPTION: ABNORMAL
STATUS: ABNORMAL
TOTAL CK: 79 U/L (ref 26–192)
TRICHOMONAS: ABNORMAL
TROPONIN INTERP: NORMAL
TROPONIN T: <0.03 NG/ML
TURBIDITY: ABNORMAL
URINE HGB: ABNORMAL
UROBILINOGEN, URINE: NORMAL
WBC # BLD: 4 K/UL (ref 3.5–11)
WBC # BLD: ABNORMAL 10*3/UL
WBC UA: ABNORMAL /HPF (ref 0–4)
YEAST: ABNORMAL

## 2018-01-11 PROCEDURE — 6370000000 HC RX 637 (ALT 250 FOR IP): Performed by: INTERNAL MEDICINE

## 2018-01-11 PROCEDURE — 87804 INFLUENZA ASSAY W/OPTIC: CPT

## 2018-01-11 PROCEDURE — 99223 1ST HOSP IP/OBS HIGH 75: CPT | Performed by: INTERNAL MEDICINE

## 2018-01-11 PROCEDURE — 84484 ASSAY OF TROPONIN QUANT: CPT

## 2018-01-11 PROCEDURE — 6360000002 HC RX W HCPCS: Performed by: INTERNAL MEDICINE

## 2018-01-11 PROCEDURE — 82550 ASSAY OF CK (CPK): CPT

## 2018-01-11 PROCEDURE — 2580000003 HC RX 258: Performed by: SPECIALIST

## 2018-01-11 PROCEDURE — 36415 COLL VENOUS BLD VENIPUNCTURE: CPT

## 2018-01-11 PROCEDURE — 85025 COMPLETE CBC W/AUTO DIFF WBC: CPT

## 2018-01-11 PROCEDURE — 71046 X-RAY EXAM CHEST 2 VIEWS: CPT

## 2018-01-11 PROCEDURE — 6370000000 HC RX 637 (ALT 250 FOR IP): Performed by: SPECIALIST

## 2018-01-11 PROCEDURE — 99284 EMERGENCY DEPT VISIT MOD MDM: CPT

## 2018-01-11 PROCEDURE — 2060000000 HC ICU INTERMEDIATE R&B

## 2018-01-11 PROCEDURE — 6360000002 HC RX W HCPCS: Performed by: NURSE PRACTITIONER

## 2018-01-11 PROCEDURE — 83874 ASSAY OF MYOGLOBIN: CPT

## 2018-01-11 PROCEDURE — 83735 ASSAY OF MAGNESIUM: CPT

## 2018-01-11 PROCEDURE — 81001 URINALYSIS AUTO W/SCOPE: CPT

## 2018-01-11 PROCEDURE — 93005 ELECTROCARDIOGRAM TRACING: CPT

## 2018-01-11 PROCEDURE — 80048 BASIC METABOLIC PNL TOTAL CA: CPT

## 2018-01-11 PROCEDURE — 94760 N-INVAS EAR/PLS OXIMETRY 1: CPT

## 2018-01-11 PROCEDURE — 94640 AIRWAY INHALATION TREATMENT: CPT

## 2018-01-11 PROCEDURE — 94150 VITAL CAPACITY TEST: CPT

## 2018-01-11 RX ORDER — EZETIMIBE 10 MG/1
10 TABLET ORAL DAILY
Status: DISCONTINUED | OUTPATIENT
Start: 2018-01-11 | End: 2018-01-11 | Stop reason: RX

## 2018-01-11 RX ORDER — OSELTAMIVIR PHOSPHATE 75 MG/1
75 CAPSULE ORAL DAILY
Status: DISCONTINUED | OUTPATIENT
Start: 2018-01-11 | End: 2018-01-11 | Stop reason: DRUGHIGH

## 2018-01-11 RX ORDER — 0.9 % SODIUM CHLORIDE 0.9 %
250 INTRAVENOUS SOLUTION INTRAVENOUS ONCE
Status: COMPLETED | OUTPATIENT
Start: 2018-01-11 | End: 2018-01-11

## 2018-01-11 RX ORDER — DIPHENOXYLATE HYDROCHLORIDE AND ATROPINE SULFATE 2.5; .025 MG/1; MG/1
TABLET ORAL
Qty: 60 TABLET | Refills: 1 | OUTPATIENT
Start: 2018-01-11 | End: 2018-01-14 | Stop reason: HOSPADM

## 2018-01-11 RX ORDER — SODIUM CHLORIDE FOR INHALATION 0.9 %
3 VIAL, NEBULIZER (ML) INHALATION
Status: DISCONTINUED | OUTPATIENT
Start: 2018-01-11 | End: 2018-01-14 | Stop reason: HOSPADM

## 2018-01-11 RX ORDER — SODIUM CHLORIDE 0.9 % (FLUSH) 0.9 %
10 SYRINGE (ML) INJECTION EVERY 12 HOURS SCHEDULED
Status: DISCONTINUED | OUTPATIENT
Start: 2018-01-11 | End: 2018-01-11 | Stop reason: SDUPTHER

## 2018-01-11 RX ORDER — SODIUM CHLORIDE 0.9 % (FLUSH) 0.9 %
10 SYRINGE (ML) INJECTION EVERY 12 HOURS SCHEDULED
Status: DISCONTINUED | OUTPATIENT
Start: 2018-01-11 | End: 2018-01-14 | Stop reason: HOSPADM

## 2018-01-11 RX ORDER — SODIUM CHLORIDE 0.9 % (FLUSH) 0.9 %
10 SYRINGE (ML) INJECTION PRN
Status: DISCONTINUED | OUTPATIENT
Start: 2018-01-11 | End: 2018-01-14 | Stop reason: HOSPADM

## 2018-01-11 RX ORDER — ALBUTEROL SULFATE 2.5 MG/3ML
2.5 SOLUTION RESPIRATORY (INHALATION) 4 TIMES DAILY
Status: DISCONTINUED | OUTPATIENT
Start: 2018-01-11 | End: 2018-01-14 | Stop reason: HOSPADM

## 2018-01-11 RX ORDER — CIPROFLOXACIN 2 MG/ML
200 INJECTION, SOLUTION INTRAVENOUS EVERY 12 HOURS
Status: DISCONTINUED | OUTPATIENT
Start: 2018-01-11 | End: 2018-01-14 | Stop reason: HOSPADM

## 2018-01-11 RX ORDER — HEPARIN SODIUM 5000 [USP'U]/ML
5000 INJECTION, SOLUTION INTRAVENOUS; SUBCUTANEOUS EVERY 8 HOURS SCHEDULED
Status: DISCONTINUED | OUTPATIENT
Start: 2018-01-11 | End: 2018-01-14 | Stop reason: HOSPADM

## 2018-01-11 RX ORDER — LISINOPRIL 20 MG/1
40 TABLET ORAL DAILY
Status: DISCONTINUED | OUTPATIENT
Start: 2018-01-12 | End: 2018-01-14 | Stop reason: HOSPADM

## 2018-01-11 RX ORDER — OXYBUTYNIN CHLORIDE 5 MG/1
5 TABLET, EXTENDED RELEASE ORAL DAILY
Status: DISCONTINUED | OUTPATIENT
Start: 2018-01-12 | End: 2018-01-14 | Stop reason: HOSPADM

## 2018-01-11 RX ORDER — LEVOTHYROXINE SODIUM 0.1 MG/1
100 TABLET ORAL DAILY
Status: DISCONTINUED | OUTPATIENT
Start: 2018-01-11 | End: 2018-01-14 | Stop reason: HOSPADM

## 2018-01-11 RX ORDER — SODIUM CHLORIDE 0.9 % (FLUSH) 0.9 %
10 SYRINGE (ML) INJECTION PRN
Status: DISCONTINUED | OUTPATIENT
Start: 2018-01-11 | End: 2018-01-11 | Stop reason: SDUPTHER

## 2018-01-11 RX ORDER — TIMOLOL MALEATE 5 MG/ML
1 SOLUTION/ DROPS OPHTHALMIC NIGHTLY
Status: DISCONTINUED | OUTPATIENT
Start: 2018-01-11 | End: 2018-01-14 | Stop reason: HOSPADM

## 2018-01-11 RX ORDER — ASPIRIN 81 MG/1
81 TABLET ORAL DAILY
Status: DISCONTINUED | OUTPATIENT
Start: 2018-01-12 | End: 2018-01-14 | Stop reason: HOSPADM

## 2018-01-11 RX ORDER — ALBUTEROL SULFATE 2.5 MG/3ML
2.5 SOLUTION RESPIRATORY (INHALATION)
Status: DISCONTINUED | OUTPATIENT
Start: 2018-01-11 | End: 2018-01-14 | Stop reason: HOSPADM

## 2018-01-11 RX ORDER — ALBUTEROL SULFATE 2.5 MG/3ML
2.5 SOLUTION RESPIRATORY (INHALATION)
Status: DISCONTINUED | OUTPATIENT
Start: 2018-01-11 | End: 2018-01-11 | Stop reason: SDUPTHER

## 2018-01-11 RX ORDER — SODIUM CHLORIDE 9 MG/ML
INJECTION, SOLUTION INTRAVENOUS CONTINUOUS
Status: DISCONTINUED | OUTPATIENT
Start: 2018-01-11 | End: 2018-01-14 | Stop reason: HOSPADM

## 2018-01-11 RX ORDER — OSELTAMIVIR PHOSPHATE 75 MG/1
75 CAPSULE ORAL ONCE
Status: COMPLETED | OUTPATIENT
Start: 2018-01-11 | End: 2018-01-11

## 2018-01-11 RX ORDER — HYDROCHLOROTHIAZIDE 25 MG/1
25 TABLET ORAL DAILY
Status: DISCONTINUED | OUTPATIENT
Start: 2018-01-12 | End: 2018-01-14 | Stop reason: HOSPADM

## 2018-01-11 RX ORDER — LATANOPROST 50 UG/ML
1 SOLUTION/ DROPS OPHTHALMIC NIGHTLY
Status: DISCONTINUED | OUTPATIENT
Start: 2018-01-11 | End: 2018-01-14 | Stop reason: HOSPADM

## 2018-01-11 RX ORDER — OSELTAMIVIR PHOSPHATE 30 MG/1
30 CAPSULE ORAL 2 TIMES DAILY
Status: DISCONTINUED | OUTPATIENT
Start: 2018-01-12 | End: 2018-01-14 | Stop reason: HOSPADM

## 2018-01-11 RX ADMIN — OSELTAMIVIR PHOSPHATE 75 MG: 75 CAPSULE ORAL at 14:34

## 2018-01-11 RX ADMIN — LATANOPROST 1 DROP: 50 SOLUTION OPHTHALMIC at 21:48

## 2018-01-11 RX ADMIN — SODIUM CHLORIDE: 9 INJECTION, SOLUTION INTRAVENOUS at 16:26

## 2018-01-11 RX ADMIN — CIPROFLOXACIN 200 MG: 2 INJECTION, SOLUTION INTRAVENOUS at 21:47

## 2018-01-11 RX ADMIN — ALBUTEROL SULFATE 2.5 MG: 2.5 SOLUTION RESPIRATORY (INHALATION) at 20:29

## 2018-01-11 RX ADMIN — SODIUM CHLORIDE 250 ML: 9 INJECTION, SOLUTION INTRAVENOUS at 14:41

## 2018-01-11 RX ADMIN — HEPARIN SODIUM 5000 UNITS: 5000 INJECTION, SOLUTION INTRAVENOUS; SUBCUTANEOUS at 21:47

## 2018-01-11 ASSESSMENT — PAIN SCALES - GENERAL
PAINLEVEL_OUTOF10: 0
PAINLEVEL_OUTOF10: 0

## 2018-01-11 ASSESSMENT — ENCOUNTER SYMPTOMS
VOMITING: 0
ABDOMINAL PAIN: 0
COUGH: 1
SHORTNESS OF BREATH: 0
BLOOD IN STOOL: 0
NAUSEA: 0

## 2018-01-11 NOTE — PROGRESS NOTES
Given patient history and symptoms it was determined that the patient would be better served by being evaluated in the emergency room. We took her there by wheelchair.

## 2018-01-12 LAB
ABSOLUTE EOS #: 0 K/UL (ref 0–0.4)
ABSOLUTE IMMATURE GRANULOCYTE: ABNORMAL K/UL (ref 0–0.3)
ABSOLUTE LYMPH #: 1 K/UL (ref 1–4.8)
ABSOLUTE MONO #: 0.4 K/UL (ref 0.1–1.2)
ANION GAP SERPL CALCULATED.3IONS-SCNC: 14 MMOL/L (ref 9–17)
BASOPHILS # BLD: 1 % (ref 0–1)
BASOPHILS ABSOLUTE: 0 K/UL (ref 0–0.2)
BUN BLDV-MCNC: 28 MG/DL (ref 8–23)
BUN/CREAT BLD: 24 (ref 9–20)
CALCIUM SERPL-MCNC: 8.5 MG/DL (ref 8.6–10.4)
CHLORIDE BLD-SCNC: 101 MMOL/L (ref 98–107)
CO2: 21 MMOL/L (ref 20–31)
CREAT SERPL-MCNC: 1.16 MG/DL (ref 0.5–0.9)
DIFFERENTIAL TYPE: ABNORMAL
EKG ATRIAL RATE: 62 BPM
EKG P AXIS: 25 DEGREES
EKG P-R INTERVAL: 130 MS
EKG Q-T INTERVAL: 404 MS
EKG QRS DURATION: 68 MS
EKG QTC CALCULATION (BAZETT): 410 MS
EKG R AXIS: -23 DEGREES
EKG T AXIS: 19 DEGREES
EKG VENTRICULAR RATE: 62 BPM
EOSINOPHILS RELATIVE PERCENT: 1 % (ref 1–7)
GFR AFRICAN AMERICAN: 54 ML/MIN
GFR NON-AFRICAN AMERICAN: 44 ML/MIN
GFR SERPL CREATININE-BSD FRML MDRD: ABNORMAL ML/MIN/{1.73_M2}
GFR SERPL CREATININE-BSD FRML MDRD: ABNORMAL ML/MIN/{1.73_M2}
GLUCOSE BLD-MCNC: 115 MG/DL (ref 70–99)
HCT VFR BLD CALC: 30.7 % (ref 36–46)
HEMOGLOBIN: 10.1 G/DL (ref 12–16)
IMMATURE GRANULOCYTES: ABNORMAL %
LYMPHOCYTES # BLD: 40 % (ref 16–46)
MCH RBC QN AUTO: 31.8 PG (ref 26–34)
MCHC RBC AUTO-ENTMCNC: 32.9 G/DL (ref 31–37)
MCV RBC AUTO: 96.8 FL (ref 80–100)
MONOCYTES # BLD: 16 % (ref 4–11)
PDW BLD-RTO: 13.6 % (ref 11–14.5)
PLATELET # BLD: 141 K/UL (ref 140–450)
PLATELET ESTIMATE: ABNORMAL
PMV BLD AUTO: 10.1 FL (ref 6–12)
POTASSIUM SERPL-SCNC: 3.9 MMOL/L (ref 3.7–5.3)
RBC # BLD: 3.17 M/UL (ref 4–5.2)
RBC # BLD: ABNORMAL 10*6/UL
SEG NEUTROPHILS: 42 % (ref 43–77)
SEGMENTED NEUTROPHILS ABSOLUTE COUNT: 1.1 K/UL (ref 1.8–7.7)
SODIUM BLD-SCNC: 136 MMOL/L (ref 135–144)
WBC # BLD: 2.6 K/UL (ref 3.5–11)
WBC # BLD: ABNORMAL 10*3/UL

## 2018-01-12 PROCEDURE — 2580000003 HC RX 258: Performed by: SPECIALIST

## 2018-01-12 PROCEDURE — 94760 N-INVAS EAR/PLS OXIMETRY 1: CPT

## 2018-01-12 PROCEDURE — 6360000002 HC RX W HCPCS: Performed by: INTERNAL MEDICINE

## 2018-01-12 PROCEDURE — 94640 AIRWAY INHALATION TREATMENT: CPT

## 2018-01-12 PROCEDURE — 85025 COMPLETE CBC W/AUTO DIFF WBC: CPT

## 2018-01-12 PROCEDURE — 6360000002 HC RX W HCPCS: Performed by: NURSE PRACTITIONER

## 2018-01-12 PROCEDURE — 6370000000 HC RX 637 (ALT 250 FOR IP): Performed by: NURSE PRACTITIONER

## 2018-01-12 PROCEDURE — 99232 SBSQ HOSP IP/OBS MODERATE 35: CPT | Performed by: INTERNAL MEDICINE

## 2018-01-12 PROCEDURE — 36415 COLL VENOUS BLD VENIPUNCTURE: CPT

## 2018-01-12 PROCEDURE — 6370000000 HC RX 637 (ALT 250 FOR IP): Performed by: INTERNAL MEDICINE

## 2018-01-12 PROCEDURE — 80048 BASIC METABOLIC PNL TOTAL CA: CPT

## 2018-01-12 PROCEDURE — 2060000000 HC ICU INTERMEDIATE R&B

## 2018-01-12 RX ORDER — GUAIFENESIN 600 MG/1
600 TABLET, EXTENDED RELEASE ORAL 2 TIMES DAILY
Status: DISCONTINUED | OUTPATIENT
Start: 2018-01-12 | End: 2018-01-14 | Stop reason: HOSPADM

## 2018-01-12 RX ORDER — GUAIFENESIN/DEXTROMETHORPHAN 100-10MG/5
5 SYRUP ORAL EVERY 4 HOURS PRN
Status: DISCONTINUED | OUTPATIENT
Start: 2018-01-12 | End: 2018-01-14 | Stop reason: HOSPADM

## 2018-01-12 RX ADMIN — CIPROFLOXACIN 200 MG: 2 INJECTION, SOLUTION INTRAVENOUS at 09:15

## 2018-01-12 RX ADMIN — ALBUTEROL SULFATE 2.5 MG: 2.5 SOLUTION RESPIRATORY (INHALATION) at 08:16

## 2018-01-12 RX ADMIN — LEVOTHYROXINE SODIUM 100 MCG: 100 TABLET ORAL at 06:54

## 2018-01-12 RX ADMIN — SODIUM CHLORIDE: 9 INJECTION, SOLUTION INTRAVENOUS at 00:13

## 2018-01-12 RX ADMIN — OSELTAMIVIR PHOSPHATE 30 MG: 30 CAPSULE ORAL at 09:15

## 2018-01-12 RX ADMIN — ALBUTEROL SULFATE 2.5 MG: 2.5 SOLUTION RESPIRATORY (INHALATION) at 12:27

## 2018-01-12 RX ADMIN — ALBUTEROL SULFATE 2.5 MG: 2.5 SOLUTION RESPIRATORY (INHALATION) at 15:41

## 2018-01-12 RX ADMIN — LISINOPRIL 40 MG: 20 TABLET ORAL at 09:15

## 2018-01-12 RX ADMIN — GUAIFENESIN 600 MG: 600 TABLET, EXTENDED RELEASE ORAL at 20:01

## 2018-01-12 RX ADMIN — HEPARIN SODIUM 5000 UNITS: 5000 INJECTION, SOLUTION INTRAVENOUS; SUBCUTANEOUS at 21:42

## 2018-01-12 RX ADMIN — ALBUTEROL SULFATE 2.5 MG: 2.5 SOLUTION RESPIRATORY (INHALATION) at 20:21

## 2018-01-12 RX ADMIN — HYDROCHLOROTHIAZIDE 25 MG: 25 TABLET ORAL at 09:15

## 2018-01-12 RX ADMIN — SERTRALINE HYDROCHLORIDE 50 MG: 50 TABLET ORAL at 09:15

## 2018-01-12 RX ADMIN — GUAIFENESIN 600 MG: 600 TABLET, EXTENDED RELEASE ORAL at 13:43

## 2018-01-12 RX ADMIN — ASPIRIN 81 MG: 81 TABLET, COATED ORAL at 09:15

## 2018-01-12 RX ADMIN — HEPARIN SODIUM 5000 UNITS: 5000 INJECTION, SOLUTION INTRAVENOUS; SUBCUTANEOUS at 06:55

## 2018-01-12 RX ADMIN — OXYBUTYNIN CHLORIDE 5 MG: 5 TABLET, EXTENDED RELEASE ORAL at 09:15

## 2018-01-12 RX ADMIN — OSELTAMIVIR PHOSPHATE 30 MG: 30 CAPSULE ORAL at 20:01

## 2018-01-12 RX ADMIN — HEPARIN SODIUM 5000 UNITS: 5000 INJECTION, SOLUTION INTRAVENOUS; SUBCUTANEOUS at 13:43

## 2018-01-12 RX ADMIN — SODIUM CHLORIDE: 9 INJECTION, SOLUTION INTRAVENOUS at 20:09

## 2018-01-12 RX ADMIN — GUAIFENESIN AND DEXTROMETHORPHAN 5 ML: 100; 10 SYRUP ORAL at 04:35

## 2018-01-12 RX ADMIN — LATANOPROST 1 DROP: 50 SOLUTION OPHTHALMIC at 20:01

## 2018-01-12 RX ADMIN — CIPROFLOXACIN 200 MG: 2 INJECTION, SOLUTION INTRAVENOUS at 21:42

## 2018-01-12 ASSESSMENT — PAIN SCALES - GENERAL
PAINLEVEL_OUTOF10: 0
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
## 2018-01-12 NOTE — PROGRESS NOTES
Sounds  Cardiac: Regular Rate and Rhythm  GI/Abdomen: Bowel Sounds Present, Soft, Non-tender, without Guarding or Rebound Tenderness and No Masses  : Not examined  EXT/Skin: mild chronic non-pitting edema (L>R), No Cyanosis and No Clubbing  Neuro: generalized weakness and fatigue, Awake and Alert and No Localizing Signs/Symptoms      Assessment:    Active Problems:    Influenza A    Dehydration    Weakness          Plan:  1. Influenza A--tamiflu  2. Dehydration--IV fluids--monitor renal function  3. Hyponatremia--IV fluids--recheck in AM  4. UTI-POA--IV Cipro  5.  See orders    Electronically signed by Anisa ANAYAC, SHIVAMP-BC on 1/11/2018 at 9:08 PM    Hospitalist

## 2018-01-13 ENCOUNTER — APPOINTMENT (OUTPATIENT)
Dept: GENERAL RADIOLOGY | Age: 83
DRG: 194 | End: 2018-01-13
Payer: MEDICARE

## 2018-01-13 LAB
ABSOLUTE EOS #: 0.03 K/UL (ref 0–0.4)
ABSOLUTE IMMATURE GRANULOCYTE: ABNORMAL K/UL (ref 0–0.3)
ABSOLUTE LYMPH #: 1.3 K/UL (ref 1–4.8)
ABSOLUTE MONO #: 0.53 K/UL (ref 0.1–1.2)
ANION GAP SERPL CALCULATED.3IONS-SCNC: 14 MMOL/L (ref 9–17)
BASOPHILS # BLD: 1 % (ref 0–1)
BASOPHILS ABSOLUTE: 0.03 K/UL (ref 0–0.2)
BUN BLDV-MCNC: 21 MG/DL (ref 8–23)
BUN/CREAT BLD: 26 (ref 9–20)
CALCIUM SERPL-MCNC: 8.5 MG/DL (ref 8.6–10.4)
CHLORIDE BLD-SCNC: 106 MMOL/L (ref 98–107)
CO2: 20 MMOL/L (ref 20–31)
CREAT SERPL-MCNC: 0.81 MG/DL (ref 0.5–0.9)
DIFFERENTIAL TYPE: ABNORMAL
EOSINOPHILS RELATIVE PERCENT: 1 % (ref 1–7)
GFR AFRICAN AMERICAN: >60 ML/MIN
GFR NON-AFRICAN AMERICAN: >60 ML/MIN
GFR SERPL CREATININE-BSD FRML MDRD: ABNORMAL ML/MIN/{1.73_M2}
GFR SERPL CREATININE-BSD FRML MDRD: ABNORMAL ML/MIN/{1.73_M2}
GLUCOSE BLD-MCNC: 119 MG/DL (ref 70–99)
HCT VFR BLD CALC: 29 % (ref 36–46)
HEMOGLOBIN: 9.6 G/DL (ref 12–16)
IMMATURE GRANULOCYTES: ABNORMAL %
LYMPHOCYTES # BLD: 42 % (ref 16–46)
MCH RBC QN AUTO: 31.8 PG (ref 26–34)
MCHC RBC AUTO-ENTMCNC: 33 G/DL (ref 31–37)
MCV RBC AUTO: 96.2 FL (ref 80–100)
MONOCYTES # BLD: 17 % (ref 4–11)
MORPHOLOGY: ABNORMAL
PDW BLD-RTO: 13.5 % (ref 11–14.5)
PLATELET # BLD: 153 K/UL (ref 140–450)
PLATELET ESTIMATE: ABNORMAL
PMV BLD AUTO: 9.7 FL (ref 6–12)
POTASSIUM SERPL-SCNC: 4.3 MMOL/L (ref 3.7–5.3)
RBC # BLD: 3.01 M/UL (ref 4–5.2)
RBC # BLD: ABNORMAL 10*6/UL
SEG NEUTROPHILS: 39 % (ref 43–77)
SEGMENTED NEUTROPHILS ABSOLUTE COUNT: 1.21 K/UL (ref 1.8–7.7)
SODIUM BLD-SCNC: 140 MMOL/L (ref 135–144)
WBC # BLD: 3.1 K/UL (ref 3.5–11)
WBC # BLD: ABNORMAL 10*3/UL

## 2018-01-13 PROCEDURE — 6370000000 HC RX 637 (ALT 250 FOR IP): Performed by: INTERNAL MEDICINE

## 2018-01-13 PROCEDURE — G8979 MOBILITY GOAL STATUS: HCPCS | Performed by: PHYSICAL THERAPIST

## 2018-01-13 PROCEDURE — 80048 BASIC METABOLIC PNL TOTAL CA: CPT

## 2018-01-13 PROCEDURE — 6360000002 HC RX W HCPCS: Performed by: INTERNAL MEDICINE

## 2018-01-13 PROCEDURE — 97161 PT EVAL LOW COMPLEX 20 MIN: CPT | Performed by: PHYSICAL THERAPIST

## 2018-01-13 PROCEDURE — 94640 AIRWAY INHALATION TREATMENT: CPT

## 2018-01-13 PROCEDURE — 6360000002 HC RX W HCPCS: Performed by: NURSE PRACTITIONER

## 2018-01-13 PROCEDURE — 94760 N-INVAS EAR/PLS OXIMETRY 1: CPT

## 2018-01-13 PROCEDURE — G8978 MOBILITY CURRENT STATUS: HCPCS | Performed by: PHYSICAL THERAPIST

## 2018-01-13 PROCEDURE — 99232 SBSQ HOSP IP/OBS MODERATE 35: CPT | Performed by: FAMILY MEDICINE

## 2018-01-13 PROCEDURE — 71045 X-RAY EXAM CHEST 1 VIEW: CPT

## 2018-01-13 PROCEDURE — 36415 COLL VENOUS BLD VENIPUNCTURE: CPT

## 2018-01-13 PROCEDURE — 2060000000 HC ICU INTERMEDIATE R&B

## 2018-01-13 PROCEDURE — 97116 GAIT TRAINING THERAPY: CPT | Performed by: PHYSICAL THERAPIST

## 2018-01-13 PROCEDURE — 85025 COMPLETE CBC W/AUTO DIFF WBC: CPT

## 2018-01-13 PROCEDURE — 2580000003 HC RX 258: Performed by: SPECIALIST

## 2018-01-13 RX ADMIN — LISINOPRIL 40 MG: 20 TABLET ORAL at 09:35

## 2018-01-13 RX ADMIN — LATANOPROST 1 DROP: 50 SOLUTION OPHTHALMIC at 22:24

## 2018-01-13 RX ADMIN — CIPROFLOXACIN 200 MG: 2 INJECTION, SOLUTION INTRAVENOUS at 09:36

## 2018-01-13 RX ADMIN — HEPARIN SODIUM 5000 UNITS: 5000 INJECTION, SOLUTION INTRAVENOUS; SUBCUTANEOUS at 22:13

## 2018-01-13 RX ADMIN — SODIUM CHLORIDE: 9 INJECTION, SOLUTION INTRAVENOUS at 19:29

## 2018-01-13 RX ADMIN — HEPARIN SODIUM 5000 UNITS: 5000 INJECTION, SOLUTION INTRAVENOUS; SUBCUTANEOUS at 06:03

## 2018-01-13 RX ADMIN — OSELTAMIVIR PHOSPHATE 30 MG: 30 CAPSULE ORAL at 09:35

## 2018-01-13 RX ADMIN — GUAIFENESIN 600 MG: 600 TABLET, EXTENDED RELEASE ORAL at 09:35

## 2018-01-13 RX ADMIN — ALBUTEROL SULFATE 2.5 MG: 2.5 SOLUTION RESPIRATORY (INHALATION) at 15:29

## 2018-01-13 RX ADMIN — ALBUTEROL SULFATE 2.5 MG: 2.5 SOLUTION RESPIRATORY (INHALATION) at 08:07

## 2018-01-13 RX ADMIN — HEPARIN SODIUM 5000 UNITS: 5000 INJECTION, SOLUTION INTRAVENOUS; SUBCUTANEOUS at 15:05

## 2018-01-13 RX ADMIN — OSELTAMIVIR PHOSPHATE 30 MG: 30 CAPSULE ORAL at 22:13

## 2018-01-13 RX ADMIN — ALBUTEROL SULFATE 2.5 MG: 2.5 SOLUTION RESPIRATORY (INHALATION) at 11:33

## 2018-01-13 RX ADMIN — GUAIFENESIN 600 MG: 600 TABLET, EXTENDED RELEASE ORAL at 22:13

## 2018-01-13 RX ADMIN — SERTRALINE HYDROCHLORIDE 50 MG: 50 TABLET ORAL at 09:35

## 2018-01-13 RX ADMIN — ASPIRIN 81 MG: 81 TABLET, COATED ORAL at 09:36

## 2018-01-13 RX ADMIN — CIPROFLOXACIN 200 MG: 2 INJECTION, SOLUTION INTRAVENOUS at 22:13

## 2018-01-13 RX ADMIN — SODIUM CHLORIDE: 9 INJECTION, SOLUTION INTRAVENOUS at 06:17

## 2018-01-13 RX ADMIN — LEVOTHYROXINE SODIUM 100 MCG: 100 TABLET ORAL at 06:17

## 2018-01-13 RX ADMIN — OXYBUTYNIN CHLORIDE 5 MG: 5 TABLET, EXTENDED RELEASE ORAL at 09:36

## 2018-01-13 RX ADMIN — HYDROCHLOROTHIAZIDE 25 MG: 25 TABLET ORAL at 09:35

## 2018-01-13 ASSESSMENT — PAIN SCALES - GENERAL
PAINLEVEL_OUTOF10: 0
PAINLEVEL_OUTOF10: 0

## 2018-01-13 NOTE — PROGRESS NOTES
Hospitalist Progress Note    Patient:  Jenelle Mullins     YOB: 1932    MRN: [id number]   Admit date: 1/11/2018     Acct: [de-identified]     PCP: Shelly Edwards DO    CC--Interval History: Influenza A--dehydration--states she is feeling a little better but still c/o generalized weakness-----UTI-POA--IV Cipro    All other ROS negative except noted in HPI    Diet:  DIET CARDIAC;    Medications:  Scheduled Meds:   guaiFENesin  600 mg Oral BID    heparin (porcine)  5,000 Units Subcutaneous 3 times per day    aspirin  81 mg Oral Daily    hydrochlorothiazide  25 mg Oral Daily    latanoprost  1 drop Both Eyes Nightly    levothyroxine  100 mcg Oral Daily    lisinopril  40 mg Oral Daily    oxybutynin  5 mg Oral Daily    timolol  1 drop Both Eyes Nightly    sertraline  50 mg Oral Daily    sodium chloride flush  10 mL Intravenous 2 times per day    oseltamivir  30 mg Oral BID    albuterol  2.5 mg Nebulization 4x daily    ciprofloxacin  200 mg Intravenous Q12H     Continuous Infusions:   sodium chloride 100 mL/hr at 01/12/18 2009     PRN Meds:guaiFENesin-dextromethorphan, albuterol, sodium chloride nebulizer, sodium chloride flush    Objective:  Pertinent Labs:  Creatinine 1.34-->1.16  GFR 38-->44  Na 131-->136      Physical Exam:  Vitals: /60   Pulse 77   Temp 98.3 °F (36.8 °C) (Tympanic)   Resp 18   Ht 5' 1.42\" (1.56 m)   Wt 106 lb 6.4 oz (48.3 kg)   SpO2 94%   BMI 19.83 kg/m²   24 hour intake/output:    Intake/Output Summary (Last 24 hours) at 01/12/18 2315  Last data filed at 01/12/18 1732   Gross per 24 hour   Intake             2578 ml   Output                0 ml   Net             2578 ml       General: Awake, Alert and Cooperative  HEENT: Normocephalic and Atraumatic  Neck: Supple, Carotid Pulses Present, No Masses, Tenderness, Nodularity and No Lymphadenopathy  Chest/Lungs: Clear throughout except diminished bases bilaterally  Cardiac: Regular Rate and Rhythm  GI/Abdomen: Bowel Sounds Present, Soft, Non-tender, without Guarding or Rebound Tenderness and No Masses  : Not examined  EXT/Skin: mild chronic non-pitting edema (L>R), No Cyanosis and No Clubbing  Neuro: generalized weakness, unsteady gait, and No Localizing Signs/Symptoms      Assessment:    Active Problems:    Influenza A    Dehydration    General weakness        Plan:  1. Influenza A--tamiflu  2. Dehydration-improving--IV fluids--monitor renal function  3. UTI-POA--IV Cipro  4. PT/OT therapies  5.  See orders    Electronically signed by Mally GASPAR, FNP-BC on 1/12/2018 at 11:15 PM    Hospitalist

## 2018-01-13 NOTE — PROGRESS NOTES
Referral made to [address] [place] [address] for rehab placement per dtr's request.  No bed available at [facility] or [place] but bed availibility is at [address] [place] [address]. Dtr and pt ok with placement in [address] [place] [address].  Ruth Freeman for facility notified of referral; awaiting to get approval. Electronically signed by Sola Oliver RN on 1/13/2018 at 12:30 PM

## 2018-01-13 NOTE — PROGRESS NOTES
Hospitalist Progress Note  1/13/2018 1:13 PM  Subjective:   Admit Date: 1/11/2018  PCP: Vasiliy Ayala DO     Full Code    Interval History: pt doing better, wanting to go to rehab post d/c due to weakness and poor ADL    Diet: DIET CARDIAC;                                ip days:2  Medications:   Scheduled Meds:   guaiFENesin  600 mg Oral BID    heparin (porcine)  5,000 Units Subcutaneous 3 times per day    aspirin  81 mg Oral Daily    hydrochlorothiazide  25 mg Oral Daily    latanoprost  1 drop Both Eyes Nightly    levothyroxine  100 mcg Oral Daily    lisinopril  40 mg Oral Daily    oxybutynin  5 mg Oral Daily    timolol  1 drop Both Eyes Nightly    sertraline  50 mg Oral Daily    sodium chloride flush  10 mL Intravenous 2 times per day    oseltamivir  30 mg Oral BID    albuterol  2.5 mg Nebulization 4x daily    ciprofloxacin  200 mg Intravenous Q12H     Continuous Infusions:   sodium chloride 100 mL/hr at 01/13/18 0617     PRN Meds:.guaiFENesin-dextromethorphan, albuterol, sodium chloride nebulizer, sodium chloride flush     CBC:   Recent Labs      01/11/18   1302  01/12/18   0544  01/13/18   0551   WBC  4.0  2.6*  3.1*   HGB  10.7*  10.1*  9.6*   PLT  161  141  153     BMP:    Recent Labs      01/11/18   1302  01/12/18   0544  01/13/18   0551   NA  131*  136  140   K  4.6  3.9  4.3   CL  94*  101  106   CO2  22  21  20   BUN  30*  28*  21   CREATININE  1.34*  1.16*  0.81   GLUCOSE  138*  115*  119*     Hepatic: No results for input(s): AST, ALT, ALB, BILITOT, ALKPHOS in the last 72 hours. Troponin: No results for input(s): TROPONINI in the last 72 hours. BNP: No results for input(s): BNP in the last 72 hours. Lipids: No results for input(s): CHOL, HDL in the last 72 hours. Invalid input(s): LDLCALCU  INR: No results for input(s): INR in the last 72 hours.     Objective:   Vitals: BP (!) 143/60   Pulse 61   Temp 98.2 °F (36.8 °C) (Tympanic)   Resp 18   Ht 5' 1.42\" (1.56 m)   Wt

## 2018-01-13 NOTE — PROGRESS NOTES
Physical Therapy    Facility/Department: [facility]  PROGRESSIVE CARE  Initial Assessment    NAME: Saurabh Neumann  : 1932  MRN: [id number]    Date of Service: 2018    Patient Diagnosis(es): The primary encounter diagnosis was General weakness. Diagnoses of Influenza A, Hyponatremia, and Dehydration were also pertinent to this visit. has a past medical history of Adenocarcinoma of endometrium ([facility] [address] Cataract of left eye; Chest pain; Chronic diarrhea; Diabetes mellitus type II, controlled ([facility] [address] Diabetes mellitus type II, controlled ([facility] [address] Dysthymia; Gait abnormality; H/O renal calculi; H/O vein stripping; Hard of hearing; Hyperlipidemia; Hypertension; Hypothyroidism; Left elbow fracture; Macular degeneration; Obesity; Pericardial effusion; Pseudophakia of right eye; Traumatic injury of lower extremity; and Vertigo. has a past surgical history that includes jason and bso (cervix removed) (2002); Cholecystectomy, laparoscopic (1998); ERCP (1998); Colonoscopy (2003); Elbow fracture surgery (Left, 2010); and Cataract removal (Right, 2013).     Restrictions  Restrictions/Precautions  Restrictions/Precautions: General Precautions  Implants present? : Metal implants (L arm)  Vision/Hearing  [facility]  Hearing Exceptions: Hard of hearing/hearing concerns;Bilateral hearing aid     Subjective  General  Chart Reviewed: Yes  Patient assessed for rehabilitation services?: Yes  Response To Previous Treatment: Not applicable  Family / Caregiver Present: Yes (daughter present in room)  Referring Practitioner: Yesica Urbano  Referral Date : 18  Diagnosis: Influenza A  Follows Commands: Within Functional Limits  Subjective  Subjective: \"I am feeling better this afternoon\"  Pain Screening  Patient Currently in Pain: No  Vital Signs  Patient Currently in Pain: No       Orientation  Orientation  Overall Orientation Status: Within Normal Limits    Social/Functional History  Social/Functional History  Lives With: Alone  Type of Home: House  Home Layout: One level  Home Access: Stairs to enter with rails  Entrance Stairs - Number of Steps: 2  Entrance Stairs - Rails: Right  Bathroom Shower/Tub: Walk-in shower  Bathroom Toilet: Standard  Bathroom Equipment: Grab bars in shower  Bathroom Accessibility: Accessible  Home Equipment: 4 wheeled walker (uses outside of home or for longer distances)  Receives Help From: Family  ADL Assistance: [address] [place] [address] Avenue: Independent  Homemaking Responsibilities: Yes  Meal Prep Responsibility: Primary  Laundry Responsibility: Primary  Cleaning Responsibility: Primary  Shopping Responsibility: Primary  Ambulation Assistance: Independent  Transfer Assistance: Independent  Active : Yes  Mode of Transportation: Car  Occupation: Retired  Leisure & Hobbies: reading, watch tv, play cards, talk on phone  Objective     Observation/Palpation  Posture: Fair  Observation: pt rec'd in bed, call light within reach, agreeable to therapy    PROM RLE (degrees)  RLE PROM: WNL  AROM RLE (degrees)  RLE AROM: WNL  PROM LLE (degrees)  LLE PROM: WNL  AROM LLE (degrees)  LLE AROM : WNL  Strength RLE  Comment: grossly 4/5  Strength LLE  Comment: grossly 4/5        Bed mobility  Bridging: Independent  Rolling to Left: Independent  Rolling to Right: Independent  Supine to Sit: Independent  Sit to Supine: Independent  Scooting: Independent  Transfers  Sit to Stand: Stand by assistance  Stand to sit: Stand by assistance  Bed to Chair: Stand by assistance  Stand Pivot Transfers: Stand by assistance  Lateral Transfers: Stand by assistance  Ambulation  Ambulation?: Yes  Ambulation 1  Surface: level tile  Device: No Device  Assistance: Contact guard assistance  Quality of Gait: decreased step/stride length, decreased aydin, decreased endurance.  Pt became short of breath with ambulation in room  Distance: 20'     Balance  Posture: Good  Sitting - Static: Fair  Sitting - Dynamic: Fair  Standing - Static: Fair  Standing - Dynamic: Fair        Assessment   Body structures, Functions, Activity limitations: Decreased functional mobility ; Decreased ADL status; Decreased strength;Decreased endurance  Treatment Diagnosis: gen weakness  Prognosis: Good  Decision Making: Low Complexity  Patient Education: Yes  REQUIRES PT FOLLOW UP: Yes  Activity Tolerance  Activity Tolerance: Patient limited by fatigue     Discharge Recommendations:  Continue to assess pending progress, ECF with PT, Outpatient PT      Plan   Plan  Times per week: 7  Times per day: Daily  Current Treatment Recommendations: Strengthening, Balance Training, Functional Mobility Training, Transfer Training, ADL/Self-care Training, Gait Training, Home Exercise Program  Safety Devices  Type of devices: Call light within reach, Gait belt, Left in bed    G-Code  PT G-Codes  Functional Limitation: Mobility: Walking and moving around  Mobility: Walking and Moving Around Current Status (): At least 20 percent but less than 40 percent impaired, limited or restricted  Mobility: Walking and Moving Around Goal Status ():  At least 1 percent but less than 20 percent impaired, limited or restricted  OutComes Score                                           AM-PAC Score             Goals  Short term goals  Time Frame for Short term goals: 7 days  Short term goal 1: Assess functional ability, initiate HEP  Short term goal 2: Patient to perform functional transfers in room with mod I to prepare for D/C  Short term goal 3: Pt to amb 48' with least restrictive AD and mod I/SBA to prepare for D/C  Patient Goals   Patient goals : 'get stronger\"       Therapy Time   Individual Concurrent Group Co-treatment   Time In 1541         Time Out 1608         Minutes 27         Timed Code Treatment Minutes: [facility], PT, DPT

## 2018-01-13 NOTE — PROGRESS NOTES
Pt accepted to [facility] of [address]. Info faxed to [facility] per request for possible discharge 1/14.  Electronically signed by Shen Harrell RN on 1/13/2018 at 1:17 PM

## 2018-01-14 VITALS
TEMPERATURE: 97.3 F | WEIGHT: 167.5 LBS | HEIGHT: 61 IN | RESPIRATION RATE: 18 BRPM | OXYGEN SATURATION: 97 % | SYSTOLIC BLOOD PRESSURE: 185 MMHG | DIASTOLIC BLOOD PRESSURE: 77 MMHG | HEART RATE: 54 BPM | BODY MASS INDEX: 31.63 KG/M2

## 2018-01-14 LAB
ABSOLUTE EOS #: 0.08 K/UL (ref 0–0.4)
ABSOLUTE IMMATURE GRANULOCYTE: ABNORMAL K/UL (ref 0–0.3)
ABSOLUTE LYMPH #: 1.48 K/UL (ref 1–4.8)
ABSOLUTE MONO #: 0.59 K/UL (ref 0.1–1.2)
ANION GAP SERPL CALCULATED.3IONS-SCNC: 13 MMOL/L (ref 9–17)
BASOPHILS # BLD: 0 % (ref 0–1)
BASOPHILS ABSOLUTE: 0 K/UL (ref 0–0.2)
BUN BLDV-MCNC: 16 MG/DL (ref 8–23)
BUN/CREAT BLD: 21 (ref 9–20)
CALCIUM SERPL-MCNC: 8.3 MG/DL (ref 8.6–10.4)
CHLORIDE BLD-SCNC: 106 MMOL/L (ref 98–107)
CO2: 20 MMOL/L (ref 20–31)
CREAT SERPL-MCNC: 0.78 MG/DL (ref 0.5–0.9)
DIFFERENTIAL TYPE: ABNORMAL
EOSINOPHILS RELATIVE PERCENT: 2 % (ref 1–7)
GFR AFRICAN AMERICAN: >60 ML/MIN
GFR NON-AFRICAN AMERICAN: >60 ML/MIN
GFR SERPL CREATININE-BSD FRML MDRD: ABNORMAL ML/MIN/{1.73_M2}
GFR SERPL CREATININE-BSD FRML MDRD: ABNORMAL ML/MIN/{1.73_M2}
GLUCOSE BLD-MCNC: 117 MG/DL (ref 70–99)
HCT VFR BLD CALC: 28.7 % (ref 36–46)
HEMOGLOBIN: 9.4 G/DL (ref 12–16)
IMMATURE GRANULOCYTES: ABNORMAL %
LYMPHOCYTES # BLD: 38 % (ref 16–46)
MCH RBC QN AUTO: 31.6 PG (ref 26–34)
MCHC RBC AUTO-ENTMCNC: 32.6 G/DL (ref 31–37)
MCV RBC AUTO: 96.9 FL (ref 80–100)
MONOCYTES # BLD: 15 % (ref 4–11)
MORPHOLOGY: ABNORMAL
PDW BLD-RTO: 13.7 % (ref 11–14.5)
PLATELET # BLD: 145 K/UL (ref 140–450)
PLATELET ESTIMATE: ABNORMAL
PMV BLD AUTO: 10.4 FL (ref 6–12)
POTASSIUM SERPL-SCNC: 4.2 MMOL/L (ref 3.7–5.3)
RBC # BLD: 2.96 M/UL (ref 4–5.2)
RBC # BLD: ABNORMAL 10*6/UL
SEG NEUTROPHILS: 45 % (ref 43–77)
SEGMENTED NEUTROPHILS ABSOLUTE COUNT: 1.75 K/UL (ref 1.8–7.7)
SODIUM BLD-SCNC: 139 MMOL/L (ref 135–144)
WBC # BLD: 3.9 K/UL (ref 3.5–11)
WBC # BLD: ABNORMAL 10*3/UL

## 2018-01-14 PROCEDURE — 97110 THERAPEUTIC EXERCISES: CPT | Performed by: PHYSICAL THERAPY ASSISTANT

## 2018-01-14 PROCEDURE — 99238 HOSP IP/OBS DSCHRG MGMT 30/<: CPT | Performed by: FAMILY MEDICINE

## 2018-01-14 PROCEDURE — 2580000003 HC RX 258: Performed by: SPECIALIST

## 2018-01-14 PROCEDURE — 2580000003 HC RX 258: Performed by: INTERNAL MEDICINE

## 2018-01-14 PROCEDURE — 85025 COMPLETE CBC W/AUTO DIFF WBC: CPT

## 2018-01-14 PROCEDURE — 97116 GAIT TRAINING THERAPY: CPT | Performed by: PHYSICAL THERAPY ASSISTANT

## 2018-01-14 PROCEDURE — 6360000002 HC RX W HCPCS: Performed by: INTERNAL MEDICINE

## 2018-01-14 PROCEDURE — 94640 AIRWAY INHALATION TREATMENT: CPT

## 2018-01-14 PROCEDURE — 94760 N-INVAS EAR/PLS OXIMETRY 1: CPT

## 2018-01-14 PROCEDURE — 36415 COLL VENOUS BLD VENIPUNCTURE: CPT

## 2018-01-14 PROCEDURE — 80048 BASIC METABOLIC PNL TOTAL CA: CPT

## 2018-01-14 PROCEDURE — 6370000000 HC RX 637 (ALT 250 FOR IP): Performed by: INTERNAL MEDICINE

## 2018-01-14 PROCEDURE — 6360000002 HC RX W HCPCS: Performed by: NURSE PRACTITIONER

## 2018-01-14 RX ORDER — OSELTAMIVIR PHOSPHATE 30 MG/1
30 CAPSULE ORAL 2 TIMES DAILY
Qty: 8 CAPSULE | Refills: 0 | Status: SHIPPED | OUTPATIENT
Start: 2018-01-14 | End: 2018-01-18 | Stop reason: ALTCHOICE

## 2018-01-14 RX ADMIN — HEPARIN SODIUM 5000 UNITS: 5000 INJECTION, SOLUTION INTRAVENOUS; SUBCUTANEOUS at 06:32

## 2018-01-14 RX ADMIN — ALBUTEROL SULFATE 2.5 MG: 2.5 SOLUTION RESPIRATORY (INHALATION) at 12:36

## 2018-01-14 RX ADMIN — CIPROFLOXACIN 200 MG: 2 INJECTION, SOLUTION INTRAVENOUS at 10:45

## 2018-01-14 RX ADMIN — ASPIRIN 81 MG: 81 TABLET, COATED ORAL at 10:45

## 2018-01-14 RX ADMIN — Medication 10 ML: at 10:51

## 2018-01-14 RX ADMIN — SODIUM CHLORIDE: 9 INJECTION, SOLUTION INTRAVENOUS at 06:32

## 2018-01-14 RX ADMIN — LEVOTHYROXINE SODIUM 100 MCG: 100 TABLET ORAL at 06:32

## 2018-01-14 RX ADMIN — HEPARIN SODIUM 5000 UNITS: 5000 INJECTION, SOLUTION INTRAVENOUS; SUBCUTANEOUS at 14:53

## 2018-01-14 RX ADMIN — OSELTAMIVIR PHOSPHATE 30 MG: 30 CAPSULE ORAL at 10:44

## 2018-01-14 RX ADMIN — HYDROCHLOROTHIAZIDE 25 MG: 25 TABLET ORAL at 10:44

## 2018-01-14 RX ADMIN — GUAIFENESIN 600 MG: 600 TABLET, EXTENDED RELEASE ORAL at 10:45

## 2018-01-14 RX ADMIN — SERTRALINE HYDROCHLORIDE 50 MG: 50 TABLET ORAL at 10:45

## 2018-01-14 RX ADMIN — ALBUTEROL SULFATE 2.5 MG: 2.5 SOLUTION RESPIRATORY (INHALATION) at 08:10

## 2018-01-14 RX ADMIN — LISINOPRIL 40 MG: 20 TABLET ORAL at 10:45

## 2018-01-14 RX ADMIN — OSELTAMIVIR PHOSPHATE 30 MG: 30 CAPSULE ORAL at 15:42

## 2018-01-14 RX ADMIN — OXYBUTYNIN CHLORIDE 5 MG: 5 TABLET, EXTENDED RELEASE ORAL at 10:45

## 2018-01-14 ASSESSMENT — PAIN SCALES - GENERAL: PAINLEVEL_OUTOF10: 0

## 2018-01-14 NOTE — PROGRESS NOTES
adenocarcinoma, Dr. Nilson Bowers       Restrictions  Restrictions/Precautions  Restrictions/Precautions: General Precautions  Implants present? : Metal implants (L arm)  Subjective   General  Chart Reviewed: Yes  Response To Previous Treatment: Patient with no complaints from previous session. Family / Caregiver Present: No  Subjective  Subjective: Pt. relates having no c/o at this time. General Comment  Comments: Pt. in bed at initiation of session. Agreeable to therapy at this time. Pain Screening  Patient Currently in Pain: No  Pain Assessment  Pain Assessment: 0-10  Pain Level: 0  Pain Intervention(s): Medication (see eMar)  Vital Signs  Patient Currently in Pain: No  Oxygen Therapy  O2 Device: None (Room air)       Orientation  Orientation  Overall Orientation Status: Within Normal Limits  Objective   Bed mobility  Supine to Sit: Stand by assistance  Sit to Supine: Stand by assistance  Scooting: Stand by assistance  Transfers  Sit to Stand: Stand by assistance  Stand to sit: Stand by assistance  Ambulation  Ambulation?: Yes  WB Status: FULL  More Ambulation?: No  Ambulation 1  Surface: level tile  Device: No Device  Assistance: Contact guard assistance  Quality of Gait: Min fatigue noted at conclusion. Limited heel strike only. Distance: 50'x2  Stairs/Curb  Stairs?: No  Neuromuscular Education  NDT Treatment: Gait   Balance  Posture: Good  Sitting - Static: Good  Sitting - Dynamic: Good  Standing - Static: Good  Standing - Dynamic: Good  Exercises  Straight Leg Raise: 15x  Quad Sets: 15x  Heelslides: 15x  Gluteal Sets: 15x  Hip Flexion: 15x with seated marching. Hip Abduction: 15x in supine and sitting with manual resistance. Knee Long Arc Quad: 15x  Ankle Pumps: 20x in supine and sitting. Comments: Seated hip adduction into pillow. Other exercises  Other exercises?: No                        Assessment   Body structures, Functions, Activity limitations: Decreased functional mobility ; Decreased

## 2018-01-14 NOTE — PROGRESS NOTES
Hospitalist Progress Note  1/14/2018 2:21 PM  Subjective:   Admit Date: 1/11/2018  PCP: Sherlyn Mathews DO     Full Code    Interval History: doing well,planing for discharge later today to home    Diet: DIET CARDIAC;                                ip days:3  Medications:   Scheduled Meds:   guaiFENesin  600 mg Oral BID    heparin (porcine)  5,000 Units Subcutaneous 3 times per day    aspirin  81 mg Oral Daily    hydrochlorothiazide  25 mg Oral Daily    latanoprost  1 drop Both Eyes Nightly    levothyroxine  100 mcg Oral Daily    lisinopril  40 mg Oral Daily    oxybutynin  5 mg Oral Daily    timolol  1 drop Both Eyes Nightly    sertraline  50 mg Oral Daily    sodium chloride flush  10 mL Intravenous 2 times per day    oseltamivir  30 mg Oral BID    albuterol  2.5 mg Nebulization 4x daily    ciprofloxacin  200 mg Intravenous Q12H     Continuous Infusions:   sodium chloride 100 mL/hr at 01/14/18 0632     PRN Meds:.guaiFENesin-dextromethorphan, albuterol, sodium chloride nebulizer, sodium chloride flush     CBC:   Recent Labs      01/12/18   0544  01/13/18   0551  01/14/18   0514   WBC  2.6*  3.1*  3.9   HGB  10.1*  9.6*  9.4*   PLT  141  153  145     BMP:    Recent Labs      01/12/18   0544  01/13/18   0551  01/14/18   0514   NA  136  140  139   K  3.9  4.3  4.2   CL  101  106  106   CO2  21  20  20   BUN  28*  21  16   CREATININE  1.16*  0.81  0.78   GLUCOSE  115*  119*  117*     Hepatic: No results for input(s): AST, ALT, ALB, BILITOT, ALKPHOS in the last 72 hours. Troponin: No results for input(s): TROPONINI in the last 72 hours. BNP: No results for input(s): BNP in the last 72 hours. Lipids: No results for input(s): CHOL, HDL in the last 72 hours. Invalid input(s): LDLCALCU  INR: No results for input(s): INR in the last 72 hours.     Objective:   Vitals: BP (!) 185/77   Pulse 54   Temp 97.3 °F (36.3 °C) (Tympanic)   Resp 18   Ht 5' 1.42\" (1.56 m)   Wt 167 lb 8 oz (76 kg)   SpO2

## 2018-01-14 NOTE — PLAN OF CARE
Problem: Fluid Volume:  Goal: Maintenance of adequate hydration will improve  Maintenance of adequate hydration will improve   Outcome: Ongoing      Problem: Respiratory:  Goal: Respiratory status will improve  Respiratory status will improve   Outcome: Ongoing      Problem: Falls - Risk of:  Goal: Will remain free from falls  Will remain free from falls   Outcome: Ongoing      Problem: Risk for Impaired Skin Integrity  Goal: Tissue integrity - skin and mucous membranes  Structural intactness and normal physiological function of skin and  mucous membranes.    Outcome: Ongoing
Problem: IP MOBILITY  Goal: LTG - patient will demonstrate safe mobility requirements  Outcome: Ongoing
Risk for Impaired Skin Integrity  Goal: Tissue integrity - skin and mucous membranes  Structural intactness and normal physiological function of skin and  mucous membranes.    Outcome: Ongoing

## 2018-01-18 ENCOUNTER — HOSPITAL ENCOUNTER (OUTPATIENT)
Dept: LAB | Age: 83
Setting detail: SPECIMEN
Discharge: HOME | End: 2018-01-18
Payer: MEDICARE

## 2018-01-18 ENCOUNTER — OFFICE VISIT (OUTPATIENT)
Dept: INTERNAL MEDICINE | Age: 83
End: 2018-01-18
Payer: MEDICARE

## 2018-01-18 VITALS
DIASTOLIC BLOOD PRESSURE: 72 MMHG | HEART RATE: 60 BPM | TEMPERATURE: 98.2 F | WEIGHT: 159 LBS | OXYGEN SATURATION: 96 % | RESPIRATION RATE: 16 BRPM | SYSTOLIC BLOOD PRESSURE: 118 MMHG | BODY MASS INDEX: 29.26 KG/M2 | HEIGHT: 62 IN

## 2018-01-18 DIAGNOSIS — N18.30 CONTROLLED TYPE 2 DIABETES MELLITUS WITH STAGE 3 CHRONIC KIDNEY DISEASE, WITHOUT LONG-TERM CURRENT USE OF INSULIN (HCC): ICD-10-CM

## 2018-01-18 DIAGNOSIS — E86.0 DEHYDRATION: ICD-10-CM

## 2018-01-18 DIAGNOSIS — J10.1 INFLUENZA A: Primary | ICD-10-CM

## 2018-01-18 DIAGNOSIS — D64.9 ANEMIA, UNSPECIFIED TYPE: ICD-10-CM

## 2018-01-18 DIAGNOSIS — I10 ESSENTIAL HYPERTENSION: ICD-10-CM

## 2018-01-18 DIAGNOSIS — E11.22 CONTROLLED TYPE 2 DIABETES MELLITUS WITH STAGE 3 CHRONIC KIDNEY DISEASE, WITHOUT LONG-TERM CURRENT USE OF INSULIN (HCC): ICD-10-CM

## 2018-01-18 DIAGNOSIS — N18.30 CHRONIC KIDNEY DISEASE, STAGE III (MODERATE) (HCC): ICD-10-CM

## 2018-01-18 LAB
HCT VFR BLD CALC: 34.4 % (ref 36–46)
HEMOGLOBIN: 11.5 G/DL (ref 12–16)
MCH RBC QN AUTO: 32.1 PG (ref 26–34)
MCHC RBC AUTO-ENTMCNC: 33.3 G/DL (ref 31–37)
MCV RBC AUTO: 96.3 FL (ref 80–100)
NRBC AUTOMATED: ABNORMAL PER 100 WBC
PDW BLD-RTO: 13.5 % (ref 11–14.5)
PLATELET # BLD: 381 K/UL (ref 140–450)
PMV BLD AUTO: 9.7 FL (ref 6–12)
RBC # BLD: 3.57 M/UL (ref 4–5.2)
WBC # BLD: 10.7 K/UL (ref 3.5–11)

## 2018-01-18 PROCEDURE — 36415 COLL VENOUS BLD VENIPUNCTURE: CPT

## 2018-01-18 PROCEDURE — 85027 COMPLETE CBC AUTOMATED: CPT

## 2018-01-18 PROCEDURE — 1090F PRES/ABSN URINE INCON ASSESS: CPT | Performed by: NURSE PRACTITIONER

## 2018-01-18 PROCEDURE — G8399 PT W/DXA RESULTS DOCUMENT: HCPCS | Performed by: NURSE PRACTITIONER

## 2018-01-18 PROCEDURE — G8427 DOCREV CUR MEDS BY ELIG CLIN: HCPCS | Performed by: NURSE PRACTITIONER

## 2018-01-18 PROCEDURE — G8417 CALC BMI ABV UP PARAM F/U: HCPCS | Performed by: NURSE PRACTITIONER

## 2018-01-18 PROCEDURE — 1111F DSCHRG MED/CURRENT MED MERGE: CPT | Performed by: NURSE PRACTITIONER

## 2018-01-18 PROCEDURE — G8484 FLU IMMUNIZE NO ADMIN: HCPCS | Performed by: NURSE PRACTITIONER

## 2018-01-18 PROCEDURE — 1123F ACP DISCUSS/DSCN MKR DOCD: CPT | Performed by: NURSE PRACTITIONER

## 2018-01-18 PROCEDURE — 4040F PNEUMOC VAC/ADMIN/RCVD: CPT | Performed by: NURSE PRACTITIONER

## 2018-01-18 PROCEDURE — 99214 OFFICE O/P EST MOD 30 MIN: CPT | Performed by: NURSE PRACTITIONER

## 2018-01-18 PROCEDURE — 1036F TOBACCO NON-USER: CPT | Performed by: NURSE PRACTITIONER

## 2018-01-18 PROCEDURE — 3288F FALL RISK ASSESSMENT DOCD: CPT | Performed by: NURSE PRACTITIONER

## 2018-01-19 ASSESSMENT — ENCOUNTER SYMPTOMS
WHEEZING: 0
EYE PAIN: 0
STRIDOR: 0
DIARRHEA: 0
BACK PAIN: 0
EYE DISCHARGE: 0
SORE THROAT: 0
VOMITING: 0
CONSTIPATION: 0
SHORTNESS OF BREATH: 0
SPUTUM PRODUCTION: 0
ABDOMINAL PAIN: 0
EYE REDNESS: 0
COUGH: 0
BLOOD IN STOOL: 0
NAUSEA: 0
ORTHOPNEA: 0
SINUS PAIN: 0
HEMOPTYSIS: 0

## 2018-01-19 NOTE — PROGRESS NOTES
discharge. Neck: Neck supple. No tracheal deviation present. Cardiovascular: Normal rate and regular rhythm. Exam reveals no gallop and no friction rub. No murmur heard. Pulmonary/Chest: Effort normal and breath sounds normal. No respiratory distress. She has no wheezes. She has no rales. Abdominal: Soft. Bowel sounds are normal. She exhibits no distension. There is no tenderness. There is no rebound. Musculoskeletal: She exhibits no edema. Neurological: She is alert and oriented to person, place, and time. No cranial nerve deficit. Gait normal. Coordination normal.   Skin: Skin is warm and dry. No rash noted. She is not diaphoretic. No pallor. Psychiatric: Mood, affect and judgment normal.   Nursing note and vitals reviewed. Vitals:    01/18/18 1540   BP: 118/72   Site: Right Arm   Position: Sitting   Cuff Size: Large Adult   Pulse: 60   Resp: 16   Temp: 98.2 °F (36.8 °C)   TempSrc: Tympanic   SpO2: 96%   Weight: 159 lb (72.1 kg)   Height: 5' 1.5\" (1.562 m)       Assessment:  1. Influenza A  , With recent hospitalization, ongoing weakness. Slowly improving. Declines physical therapy    2. Dehydration  Continue to increase fluids, urinating without difficulty, monitor    3. Anemia, unspecified type  Repeat CBC  - CBC; Future    4. Controlled type 2 diabetes mellitus with stage 3 chronic kidney disease, without long-term current use of insulin (HCC)  Stable, continue to work on diet and increasing activity    5. Essential hypertension  Stable continue to monitor    6. Chronic kidney disease, stage III (moderate) ([address] likely related to diabetes  Stable, kidneys functions improve during hospital stay. Plan:  As noted above. Hospital records were reviewed. Follow up for routine visit. Call sooner with concerns prior.     Electronically signed by Baljit Edouard CNP on 1/19/2018 at 10:07 AM

## 2018-02-21 RX ORDER — EZETIMIBE 10 MG/1
10 TABLET ORAL DAILY
Qty: 30 TABLET | Refills: 11 | Status: SHIPPED | OUTPATIENT
Start: 2018-02-21 | End: 2019-01-30 | Stop reason: SDUPTHER

## 2018-03-13 ENCOUNTER — HOSPITAL ENCOUNTER (OUTPATIENT)
Dept: LAB | Age: 83
Setting detail: SPECIMEN
Discharge: HOME | End: 2018-03-13
Payer: MEDICARE

## 2018-03-13 ENCOUNTER — TELEPHONE (OUTPATIENT)
Dept: INTERNAL MEDICINE | Age: 83
End: 2018-03-13

## 2018-03-13 DIAGNOSIS — N39.0 URINARY TRACT INFECTION WITHOUT HEMATURIA, SITE UNSPECIFIED: Primary | ICD-10-CM

## 2018-03-13 DIAGNOSIS — Z13.1 ENCOUNTER FOR SCREENING FOR DIABETES MELLITUS: ICD-10-CM

## 2018-03-13 DIAGNOSIS — E03.4 HYPOTHYROIDISM DUE TO ACQUIRED ATROPHY OF THYROID: ICD-10-CM

## 2018-03-13 DIAGNOSIS — N39.0 URINARY TRACT INFECTION WITHOUT HEMATURIA, SITE UNSPECIFIED: ICD-10-CM

## 2018-03-13 DIAGNOSIS — E11.22 CONTROLLED TYPE 2 DIABETES MELLITUS WITH STAGE 3 CHRONIC KIDNEY DISEASE, WITHOUT LONG-TERM CURRENT USE OF INSULIN (HCC): ICD-10-CM

## 2018-03-13 DIAGNOSIS — N18.30 CONTROLLED TYPE 2 DIABETES MELLITUS WITH STAGE 3 CHRONIC KIDNEY DISEASE, WITHOUT LONG-TERM CURRENT USE OF INSULIN (HCC): ICD-10-CM

## 2018-03-13 LAB
-: ABNORMAL
AMORPHOUS: ABNORMAL
BACTERIA: ABNORMAL
BILIRUBIN URINE: NEGATIVE
CASTS UA: ABNORMAL /LPF (ref 0–2)
COLOR: ABNORMAL
COMMENT UA: ABNORMAL
CRYSTALS, UA: ABNORMAL /HPF
EPITHELIAL CELLS UA: ABNORMAL /HPF (ref 0–5)
GLUCOSE URINE: NEGATIVE
KETONES, URINE: ABNORMAL
LEUKOCYTE ESTERASE, URINE: ABNORMAL
MUCUS: ABNORMAL
NITRITE, URINE: NEGATIVE
OTHER OBSERVATIONS UA: ABNORMAL
PH UA: 5.5 (ref 5–6)
PROTEIN UA: NEGATIVE
RBC UA: ABNORMAL /HPF (ref 0–4)
RENAL EPITHELIAL, UA: ABNORMAL /HPF
SPECIFIC GRAVITY UA: 1.02 (ref 1.01–1.02)
TRICHOMONAS: ABNORMAL
TURBIDITY: ABNORMAL
URINE HGB: NEGATIVE
UROBILINOGEN, URINE: NORMAL
WBC UA: ABNORMAL /HPF (ref 0–4)
YEAST: ABNORMAL

## 2018-03-13 PROCEDURE — 81001 URINALYSIS AUTO W/SCOPE: CPT

## 2018-03-13 PROCEDURE — 87086 URINE CULTURE/COLONY COUNT: CPT

## 2018-03-13 NOTE — TELEPHONE ENCOUNTER
Patient's caregiver requesting an order for UA. Patient c/o increased urinary frequency x1 week. No other symptoms at this time. Please advise.

## 2018-03-14 DIAGNOSIS — N18.30 CONTROLLED TYPE 2 DIABETES MELLITUS WITH STAGE 3 CHRONIC KIDNEY DISEASE, WITHOUT LONG-TERM CURRENT USE OF INSULIN (HCC): Primary | ICD-10-CM

## 2018-03-14 DIAGNOSIS — E11.22 CONTROLLED TYPE 2 DIABETES MELLITUS WITH STAGE 3 CHRONIC KIDNEY DISEASE, WITHOUT LONG-TERM CURRENT USE OF INSULIN (HCC): Primary | ICD-10-CM

## 2018-03-14 DIAGNOSIS — E03.4 HYPOTHYROIDISM DUE TO ACQUIRED ATROPHY OF THYROID: ICD-10-CM

## 2018-03-14 DIAGNOSIS — N39.0 URINARY TRACT INFECTION WITHOUT HEMATURIA, SITE UNSPECIFIED: ICD-10-CM

## 2018-03-14 RX ORDER — NITROFURANTOIN 25; 75 MG/1; MG/1
100 CAPSULE ORAL 2 TIMES DAILY
Qty: 20 CAPSULE | Refills: 0 | Status: SHIPPED | OUTPATIENT
Start: 2018-03-14 | End: 2018-03-23 | Stop reason: ALTCHOICE

## 2018-03-15 LAB
CULTURE: NORMAL
CULTURE: NORMAL
Lab: NORMAL
SPECIMEN DESCRIPTION: NORMAL
SPECIMEN DESCRIPTION: NORMAL
STATUS: NORMAL

## 2018-03-16 ENCOUNTER — HOSPITAL ENCOUNTER (OUTPATIENT)
Age: 83
Setting detail: SPECIMEN
Discharge: HOME | End: 2018-03-16
Payer: MEDICARE

## 2018-03-16 ENCOUNTER — OFFICE VISIT (OUTPATIENT)
Dept: INTERNAL MEDICINE | Age: 83
End: 2018-03-16
Payer: MEDICARE

## 2018-03-16 VITALS
HEART RATE: 88 BPM | DIASTOLIC BLOOD PRESSURE: 58 MMHG | SYSTOLIC BLOOD PRESSURE: 124 MMHG | BODY MASS INDEX: 28.3 KG/M2 | HEIGHT: 62 IN | WEIGHT: 153.8 LBS | TEMPERATURE: 98.4 F

## 2018-03-16 DIAGNOSIS — J11.1 INFLUENZA-LIKE ILLNESS: Primary | ICD-10-CM

## 2018-03-16 DIAGNOSIS — J11.1 INFLUENZA-LIKE ILLNESS: ICD-10-CM

## 2018-03-16 LAB
DIRECT EXAM: NORMAL
Lab: NORMAL
SPECIMEN DESCRIPTION: NORMAL
STATUS: NORMAL

## 2018-03-16 PROCEDURE — 4040F PNEUMOC VAC/ADMIN/RCVD: CPT | Performed by: INTERNAL MEDICINE

## 2018-03-16 PROCEDURE — 1123F ACP DISCUSS/DSCN MKR DOCD: CPT | Performed by: INTERNAL MEDICINE

## 2018-03-16 PROCEDURE — 1090F PRES/ABSN URINE INCON ASSESS: CPT | Performed by: INTERNAL MEDICINE

## 2018-03-16 PROCEDURE — G8399 PT W/DXA RESULTS DOCUMENT: HCPCS | Performed by: INTERNAL MEDICINE

## 2018-03-16 PROCEDURE — G8417 CALC BMI ABV UP PARAM F/U: HCPCS | Performed by: INTERNAL MEDICINE

## 2018-03-16 PROCEDURE — G8427 DOCREV CUR MEDS BY ELIG CLIN: HCPCS | Performed by: INTERNAL MEDICINE

## 2018-03-16 PROCEDURE — 1036F TOBACCO NON-USER: CPT | Performed by: INTERNAL MEDICINE

## 2018-03-16 PROCEDURE — 99213 OFFICE O/P EST LOW 20 MIN: CPT | Performed by: INTERNAL MEDICINE

## 2018-03-16 PROCEDURE — G8482 FLU IMMUNIZE ORDER/ADMIN: HCPCS | Performed by: INTERNAL MEDICINE

## 2018-03-16 PROCEDURE — 87804 INFLUENZA ASSAY W/OPTIC: CPT

## 2018-03-16 PROCEDURE — 3288F FALL RISK ASSESSMENT DOCD: CPT | Performed by: INTERNAL MEDICINE

## 2018-03-16 RX ORDER — OSELTAMIVIR PHOSPHATE 75 MG/1
75 CAPSULE ORAL 2 TIMES DAILY
Qty: 10 CAPSULE | Refills: 0 | Status: SHIPPED | OUTPATIENT
Start: 2018-03-16 | End: 2018-03-21

## 2018-03-16 RX ORDER — ONDANSETRON 4 MG/1
4 TABLET, FILM COATED ORAL EVERY 8 HOURS PRN
Qty: 15 TABLET | Refills: 1 | Status: SHIPPED | OUTPATIENT
Start: 2018-03-16 | End: 2019-06-10 | Stop reason: ALTCHOICE

## 2018-03-16 NOTE — PATIENT INSTRUCTIONS
Patient Education        Influenza (Flu): Care Instructions  Your Care Instructions    Influenza (flu) is an infection in the lungs and breathing passages. It is caused by the influenza virus. There are different strains, or types, of the flu virus from year to year. Unlike the common cold, the flu comes on suddenly and the symptoms, such as a cough, congestion, fever, chills, fatigue, aches, and pains, are more severe. These symptoms may last up to 10 days. Although the flu can make you feel very sick, it usually doesn't cause serious health problems. Home treatment is usually all you need for flu symptoms. But your doctor may prescribe antiviral medicine to prevent other health problems, such as pneumonia, from developing. Older people and those who have a long-term health condition, such as lung disease, are most at risk for having pneumonia or other health problems. Follow-up care is a key part of your treatment and safety. Be sure to make and go to all appointments, and call your doctor if you are having problems. It's also a good idea to know your test results and keep a list of the medicines you take. How can you care for yourself at home? · Get plenty of rest.  · Drink plenty of fluids, enough so that your urine is light yellow or clear like water. If you have kidney, heart, or liver disease and have to limit fluids, talk with your doctor before you increase the amount of fluids you drink. · Take an over-the-counter pain medicine if needed, such as acetaminophen (Tylenol), ibuprofen (Advil, Motrin), or naproxen (Aleve), to relieve fever, headache, and muscle aches. Read and follow all instructions on the label. No one younger than 20 should take aspirin. It has been linked to Reye syndrome, a serious illness. · Do not smoke. Smoking can make the flu worse. If you need help quitting, talk to your doctor about stop-smoking programs and medicines.  These can increase your chances of quitting for your doctor if:  ? · You begin to get better and then get worse. ? · You are not getting better after 1 week. Where can you learn more? Go to https://[employer] [facility]iceweb[email or web address]. org and sign in to your [employer] account. Enter S865 in the [facility] box to learn more about \"Influenza (Flu): Care Instructions. \"     If you do not have an account, please click on the \"Sign Up Now\" link. Current as of: May 12, 2017  Content Version: 11.5  © 1947-1896 [employer]. Care instructions adapted under license by [employer]. If you have questions about a medical condition or this instruction, always ask your healthcare professional. [place] [address] any warranty or liability for your use of this information. Patient Education          oseltamivir  Pronunciation:  os el FOX ih veer  Brand:  Tamiflu  What is the most important information I should know about oseltamivir? Follow all directions on your medicine label and package. Tell each of your healthcare providers about all your medical conditions, allergies, and all medicines you use. What is oseltamivir? Oseltamivir is an antiviral medication that blocks the actions of influenza virus types A and B in your body. Oseltamivir is used to treat influenza in people 3weeks of age and older who have had flu symptoms for 2 days or less. Oseltamivir may also be given to prevent influenza in people who are at least 3year old, who may be exposed but do not yet have symptoms. Oseltamivir will not treat the common cold. It is dangerous to purchase oseltamivir on the Internet or from vendors outside of the [place]. Medications distributed from [facility] [facility]) may contain dangerous ingredients, or may not be distributed by a licensed pharmacy.  Samples of \"Tamiflu\" purchased on the Internet have been found to contain cloxacillin, a type of antibiotic that can have dangerous side effects in people who are allergic to tablet and place it in your mouth. · Do not swallow the tablet whole. Allow it to dissolve in your mouth without chewing. · Swallow several times as the tablet dissolves. To use ondansetron oral soluble film (strip) ([place]):  · Keep the strip in the foil pouch until you are ready to use the medicine. · Using dry hands, remove the strip and place it on your tongue. It will begin to dissolve right away. · Do not swallow the strip whole. Allow it to dissolve in your mouth without chewing. · Swallow several times after the strip dissolves. If desired, you may drink liquid to help swallow the dissolved strip. · Wash your hands after using [place]. Measure liquid medicine with the dosing syringe provided, or with a special dose-measuring spoon or medicine cup. If you do not have a dose-measuring device, ask your pharmacist for one. Store at room temperature away from moisture, heat, and light. Store liquid medicine in an upright position. What happens if I miss a dose? Take the missed dose as soon as you remember. Skip the missed dose if it is almost time for your next scheduled dose. Do not  take extra medicine to make up the missed dose. What happens if I overdose? Seek emergency medical attention or call the Poison Help line at 1-723.241.6301. Overdose symptoms may include sudden loss of vision, severe constipation, feeling light-headed, or fainting. What should I avoid while taking ondansetron? Ondansetron may impair your thinking or reactions. Be careful if you drive or do anything that requires you to be alert. What are the possible side effects of ondansetron? Get emergency medical help if you have signs of an allergic reaction: rash, hives; fever, chills, difficult breathing; swelling of your face, lips, tongue, or throat.   Call your doctor at once if you have:  · severe constipation, stomach pain, or bloating;  · headache with chest pain and severe dizziness, fainting, fast or pounding

## 2018-03-19 ENCOUNTER — TELEPHONE (OUTPATIENT)
Dept: INTERNAL MEDICINE | Age: 83
End: 2018-03-19

## 2018-03-19 ENCOUNTER — HOSPITAL ENCOUNTER (OUTPATIENT)
Dept: GENERAL RADIOLOGY | Age: 83
Discharge: HOME | End: 2018-03-21
Payer: MEDICARE

## 2018-03-19 ENCOUNTER — OFFICE VISIT (OUTPATIENT)
Dept: PRIMARY CARE CLINIC | Age: 83
End: 2018-03-19
Payer: MEDICARE

## 2018-03-19 ENCOUNTER — HOSPITAL ENCOUNTER (OUTPATIENT)
Age: 83
Discharge: HOME | End: 2018-03-21
Payer: MEDICARE

## 2018-03-19 VITALS
HEART RATE: 64 BPM | BODY MASS INDEX: 27.98 KG/M2 | TEMPERATURE: 99.1 F | OXYGEN SATURATION: 93 % | RESPIRATION RATE: 18 BRPM | WEIGHT: 154.4 LBS | DIASTOLIC BLOOD PRESSURE: 50 MMHG | SYSTOLIC BLOOD PRESSURE: 100 MMHG

## 2018-03-19 DIAGNOSIS — R05.9 COUGH: ICD-10-CM

## 2018-03-19 DIAGNOSIS — J40 BRONCHITIS: ICD-10-CM

## 2018-03-19 DIAGNOSIS — J90 PLEURAL EFFUSION, BILATERAL: Primary | ICD-10-CM

## 2018-03-19 PROCEDURE — 4040F PNEUMOC VAC/ADMIN/RCVD: CPT | Performed by: NURSE PRACTITIONER

## 2018-03-19 PROCEDURE — 1123F ACP DISCUSS/DSCN MKR DOCD: CPT | Performed by: NURSE PRACTITIONER

## 2018-03-19 PROCEDURE — G8399 PT W/DXA RESULTS DOCUMENT: HCPCS | Performed by: NURSE PRACTITIONER

## 2018-03-19 PROCEDURE — 1090F PRES/ABSN URINE INCON ASSESS: CPT | Performed by: NURSE PRACTITIONER

## 2018-03-19 PROCEDURE — 99214 OFFICE O/P EST MOD 30 MIN: CPT | Performed by: NURSE PRACTITIONER

## 2018-03-19 PROCEDURE — G8417 CALC BMI ABV UP PARAM F/U: HCPCS | Performed by: NURSE PRACTITIONER

## 2018-03-19 PROCEDURE — G8482 FLU IMMUNIZE ORDER/ADMIN: HCPCS | Performed by: NURSE PRACTITIONER

## 2018-03-19 PROCEDURE — 3288F FALL RISK ASSESSMENT DOCD: CPT | Performed by: NURSE PRACTITIONER

## 2018-03-19 PROCEDURE — 71046 X-RAY EXAM CHEST 2 VIEWS: CPT

## 2018-03-19 PROCEDURE — G8427 DOCREV CUR MEDS BY ELIG CLIN: HCPCS | Performed by: NURSE PRACTITIONER

## 2018-03-19 PROCEDURE — 1036F TOBACCO NON-USER: CPT | Performed by: NURSE PRACTITIONER

## 2018-03-19 RX ORDER — AZITHROMYCIN 250 MG/1
TABLET, FILM COATED ORAL
Qty: 1 PACKET | Refills: 0 | Status: SHIPPED | OUTPATIENT
Start: 2018-03-19 | End: 2018-03-23 | Stop reason: ALTCHOICE

## 2018-03-19 ASSESSMENT — ENCOUNTER SYMPTOMS
COUGH: 1
RHINORRHEA: 1

## 2018-03-19 NOTE — PROGRESS NOTES
Subjective:      Patient ID: Saurabh Neumann is a 80 y.o. female. Cough   This is a new problem. The current episode started in the past 7 days. The problem has been unchanged. The cough is productive of sputum and productive of purulent sputum. Associated symptoms include headaches, nasal congestion, postnasal drip and rhinorrhea. Treatments tried: Doree Mace; Tamiflu; Mucinex DM. The treatment provided mild relief. Her past medical history is significant for bronchitis. Review of Systems   Constitutional: Negative. HENT: Positive for postnasal drip and rhinorrhea. Respiratory: Positive for cough. Cardiovascular: Negative. Musculoskeletal: Negative. Skin: Negative. Neurological: Positive for headaches. Objective:   Physical Exam   Constitutional: She is oriented to person, place, and time. She appears well-developed. HENT:   Head: Normocephalic and atraumatic. Right Ear: Tympanic membrane, external ear and ear canal normal.   Left Ear: Tympanic membrane, external ear and ear canal normal.   Nose: Nose normal.   Mouth/Throat: Uvula is midline, oropharynx is clear and moist and mucous membranes are normal.   Eyes: Conjunctivae, EOM and lids are normal. Pupils are equal, round, and reactive to light. Neck: Trachea normal and normal range of motion. Cardiovascular: Normal rate, regular rhythm, normal heart sounds and normal pulses. Pulmonary/Chest: Effort normal. She has decreased breath sounds in the right upper field, the right middle field, the right lower field, the left upper field and the left lower field. She has wheezes in the right upper field, the right middle field and the left upper field. Musculoskeletal: Normal range of motion. Neurological: She is alert and oriented to person, place, and time. Skin: Skin is warm, dry and intact.      Vitals:    03/19/18 1924   BP: (!) 100/50   Pulse:    Resp:    Temp:    SpO2:      I have reviewed pertinent family and social history. Xr Chest Standard (2 Vw)    Result Date: 3/19/2018  EXAMINATION: TWO VIEWS OF THE CHEST 3/19/2018 8:06 pm COMPARISON: 1/13/2018 HISTORY: ORDERING SYSTEM PROVIDED HISTORY: Cough TECHNOLOGIST PROVIDED HISTORY: Reason for exam:->Productive cough. Fever. Diminished lung sounds with wheezing to bilateral lobes. Rule out pneumonia. Ordering Physician Provided Reason for Exam: Productive cough for about 2 days, fever, diminished lung sounds with wheezing to bilateral lungs, shortness of breath. Acuity: Acute Type of Exam: Initial FINDINGS: Chronic appearing interstitial lung changes are seen. No focal consolidation. Minimal pleural effusions are suspected. No cardiomegaly. No pulmonary edema. Moderate thoracic spine degenerative changes. Minimal pleural effusions. No focal consolidation. Assessment:      1. Pleural effusion, bilateral  azithromycin (ZITHROMAX) 250 MG tablet   2. Bronchitis  azithromycin (ZITHROMAX) 250 MG tablet   3. Cough  XR CHEST STANDARD (2 VW)    azithromycin (ZITHROMAX) 250 MG tablet           Plan:      Stay hydrated and drink plenty of fluids. Encouraged patient to complete full course of antibiotic and to return to clinic if no improvement in 3-4 days. May use OTC acetaminophen or ibuprofen prn pain/fever. Recommend continued use of Mucinex. Encouraged coughing and deep breathing to minimize pneumonia risk. Follow up with PCP as previously scheduled. Allergies   Allergen Reactions    Allopurinol      Patient unsure of reaction    Percocet [Oxycodone-Acetaminophen]      Hallucinations. ...sensitive to narcotics    Phenergan [Promethazine Hcl]      Very sensitive. ..given small doses    Polycitra-K [Potassium Citrate]      Patient unsure of reaction    Levaquin [Levofloxacin] Anxiety     Not able to sleep    Sulfa Antibiotics Rash    Tessalon [Benzonatate] Anxiety     Not able to sleep     Current Outpatient Prescriptions   Medication Sig Dispense Refill    azithromycin (ZITHROMAX) 250 MG tablet Take 2 tabs (500 mg) on Day 1, and take 1 tab (250 mg) on days 2 through 5. 1 packet 0    oseltamivir (TAMIFLU) 75 MG capsule Take 1 capsule by mouth 2 times daily for 5 days 10 capsule 0    ondansetron (ZOFRAN) 4 MG tablet Take 1 tablet by mouth every 8 hours as needed for Nausea or Vomiting 15 tablet 1    nitrofurantoin, macrocrystal-monohydrate, (MACROBID) 100 MG capsule Take 1 capsule by mouth 2 times daily for 10 days 20 capsule 0    ezetimibe (ZETIA) 10 MG tablet TAKE 1 TABLET BY MOUTH DAILY 30 tablet 11    CRANBERRY PO Take 1 capsule by mouth daily      hydrochlorothiazide (HYDRODIURIL) 25 MG tablet Take 25 mg by mouth daily      levothyroxine (SYNTHROID) 100 MCG tablet Take 1 tablet by mouth Daily 30 tablet 11    lisinopril (PRINIVIL;ZESTRIL) 40 MG tablet TAKE 1 TABLET BY MOUTH DAILY 90 tablet 3    sertraline (ZOLOFT) 50 MG tablet TAKE 1 TABLET DAILY 90 tablet 3    oxybutynin (DITROPAN XL) 5 MG CR tablet Take 1 tablet by mouth daily 30 tablet 11    Multiple Vitamins-Minerals (OCUVITE PRESERVISION PO) Take 1 tablet by mouth daily      Probiotic Product (PROBIOTIC DAILY PO) Take by mouth daily      cetirizine (ZYRTEC) 10 MG tablet Take 0.5 tablets by mouth daily as needed (sinus congestion). 30 tablet 0    aspirin 81 MG tablet Take 81 mg by mouth daily.  latanoprost (XALATAN) 0.005 % ophthalmic solution Place 1 drop into both eyes nightly.  Ascorbic Acid (VITAMIN C) 500 MG tablet Take 500 mg by mouth 2 times daily.  timolol (TIMOPTIC) 0.5 % ophthalmic solution Place 1 drop into both eyes daily. No current facility-administered medications for this visit.

## 2018-03-21 ENCOUNTER — HOSPITAL ENCOUNTER (OUTPATIENT)
Dept: LAB | Age: 83
Setting detail: SPECIMEN
Discharge: HOME | End: 2018-03-21
Payer: MEDICARE

## 2018-03-21 DIAGNOSIS — N18.30 CONTROLLED TYPE 2 DIABETES MELLITUS WITH STAGE 3 CHRONIC KIDNEY DISEASE, WITHOUT LONG-TERM CURRENT USE OF INSULIN (HCC): ICD-10-CM

## 2018-03-21 DIAGNOSIS — E11.22 CONTROLLED TYPE 2 DIABETES MELLITUS WITH STAGE 3 CHRONIC KIDNEY DISEASE, WITHOUT LONG-TERM CURRENT USE OF INSULIN (HCC): ICD-10-CM

## 2018-03-21 DIAGNOSIS — E03.4 HYPOTHYROIDISM DUE TO ACQUIRED ATROPHY OF THYROID: ICD-10-CM

## 2018-03-21 LAB
ANION GAP SERPL CALCULATED.3IONS-SCNC: 13 MMOL/L (ref 9–17)
BUN BLDV-MCNC: 31 MG/DL (ref 8–23)
BUN/CREAT BLD: 29 (ref 9–20)
CALCIUM SERPL-MCNC: 9.3 MG/DL (ref 8.6–10.4)
CHLORIDE BLD-SCNC: 99 MMOL/L (ref 98–107)
CO2: 26 MMOL/L (ref 20–31)
CREAT SERPL-MCNC: 1.08 MG/DL (ref 0.5–0.9)
ESTIMATED AVERAGE GLUCOSE: 131 MG/DL
GFR AFRICAN AMERICAN: 58 ML/MIN
GFR NON-AFRICAN AMERICAN: 48 ML/MIN
GFR SERPL CREATININE-BSD FRML MDRD: ABNORMAL ML/MIN/{1.73_M2}
GFR SERPL CREATININE-BSD FRML MDRD: ABNORMAL ML/MIN/{1.73_M2}
GLUCOSE BLD-MCNC: 149 MG/DL (ref 70–99)
HBA1C MFR BLD: 6.2 % (ref 4.8–5.9)
POTASSIUM SERPL-SCNC: 4.4 MMOL/L (ref 3.7–5.3)
SODIUM BLD-SCNC: 138 MMOL/L (ref 135–144)
THYROXINE, FREE: 1.48 NG/DL (ref 0.93–1.7)
TSH SERPL DL<=0.05 MIU/L-ACNC: 0.23 MIU/L (ref 0.3–5)

## 2018-03-21 PROCEDURE — 84439 ASSAY OF FREE THYROXINE: CPT

## 2018-03-21 PROCEDURE — 36415 COLL VENOUS BLD VENIPUNCTURE: CPT

## 2018-03-21 PROCEDURE — 84443 ASSAY THYROID STIM HORMONE: CPT

## 2018-03-21 PROCEDURE — 80048 BASIC METABOLIC PNL TOTAL CA: CPT

## 2018-03-21 PROCEDURE — 83036 HEMOGLOBIN GLYCOSYLATED A1C: CPT

## 2018-03-23 ENCOUNTER — OFFICE VISIT (OUTPATIENT)
Dept: INTERNAL MEDICINE | Age: 83
End: 2018-03-23
Payer: MEDICARE

## 2018-03-23 VITALS
HEART RATE: 72 BPM | DIASTOLIC BLOOD PRESSURE: 58 MMHG | SYSTOLIC BLOOD PRESSURE: 118 MMHG | HEIGHT: 63 IN | TEMPERATURE: 98.1 F | WEIGHT: 151.4 LBS | RESPIRATION RATE: 16 BRPM | BODY MASS INDEX: 26.82 KG/M2

## 2018-03-23 DIAGNOSIS — N32.81 OVERACTIVE BLADDER: ICD-10-CM

## 2018-03-23 DIAGNOSIS — E03.4 HYPOTHYROIDISM DUE TO ACQUIRED ATROPHY OF THYROID: ICD-10-CM

## 2018-03-23 DIAGNOSIS — E78.2 MIXED HYPERLIPIDEMIA: ICD-10-CM

## 2018-03-23 DIAGNOSIS — N39.3 STRESS INCONTINENCE: ICD-10-CM

## 2018-03-23 DIAGNOSIS — N18.30 CONTROLLED TYPE 2 DIABETES MELLITUS WITH STAGE 3 CHRONIC KIDNEY DISEASE, WITHOUT LONG-TERM CURRENT USE OF INSULIN (HCC): Primary | ICD-10-CM

## 2018-03-23 DIAGNOSIS — E66.3 OVERWEIGHT: ICD-10-CM

## 2018-03-23 DIAGNOSIS — N18.30 CHRONIC KIDNEY DISEASE, STAGE III (MODERATE) (HCC): ICD-10-CM

## 2018-03-23 DIAGNOSIS — H35.3131 EARLY STAGE NONEXUDATIVE AGE-RELATED MACULAR DEGENERATION OF BOTH EYES: ICD-10-CM

## 2018-03-23 DIAGNOSIS — H40.9 GLAUCOMA OF BOTH EYES, UNSPECIFIED GLAUCOMA TYPE: ICD-10-CM

## 2018-03-23 DIAGNOSIS — I10 ESSENTIAL HYPERTENSION: ICD-10-CM

## 2018-03-23 DIAGNOSIS — E11.22 CONTROLLED TYPE 2 DIABETES MELLITUS WITH STAGE 3 CHRONIC KIDNEY DISEASE, WITHOUT LONG-TERM CURRENT USE OF INSULIN (HCC): Primary | ICD-10-CM

## 2018-03-23 PROBLEM — R53.1 GENERAL WEAKNESS: Status: RESOLVED | Noted: 2018-01-11 | Resolved: 2018-03-23

## 2018-03-23 PROBLEM — J10.1 INFLUENZA A: Status: RESOLVED | Noted: 2018-01-11 | Resolved: 2018-03-23

## 2018-03-23 PROBLEM — E86.0 DEHYDRATION: Status: RESOLVED | Noted: 2018-01-11 | Resolved: 2018-03-23

## 2018-03-23 PROCEDURE — G8417 CALC BMI ABV UP PARAM F/U: HCPCS | Performed by: INTERNAL MEDICINE

## 2018-03-23 PROCEDURE — 99214 OFFICE O/P EST MOD 30 MIN: CPT | Performed by: INTERNAL MEDICINE

## 2018-03-23 PROCEDURE — G8482 FLU IMMUNIZE ORDER/ADMIN: HCPCS | Performed by: INTERNAL MEDICINE

## 2018-03-23 PROCEDURE — 1123F ACP DISCUSS/DSCN MKR DOCD: CPT | Performed by: INTERNAL MEDICINE

## 2018-03-23 PROCEDURE — G8427 DOCREV CUR MEDS BY ELIG CLIN: HCPCS | Performed by: INTERNAL MEDICINE

## 2018-03-23 PROCEDURE — 1036F TOBACCO NON-USER: CPT | Performed by: INTERNAL MEDICINE

## 2018-03-23 PROCEDURE — G8399 PT W/DXA RESULTS DOCUMENT: HCPCS | Performed by: INTERNAL MEDICINE

## 2018-03-23 PROCEDURE — 4040F PNEUMOC VAC/ADMIN/RCVD: CPT | Performed by: INTERNAL MEDICINE

## 2018-03-23 PROCEDURE — 1090F PRES/ABSN URINE INCON ASSESS: CPT | Performed by: INTERNAL MEDICINE

## 2018-03-23 RX ORDER — LEVOTHYROXINE SODIUM 88 UG/1
88 TABLET ORAL DAILY
Qty: 30 TABLET | Refills: 11 | Status: SHIPPED | OUTPATIENT
Start: 2018-03-23 | End: 2019-01-18 | Stop reason: SDUPTHER

## 2018-03-23 RX ORDER — GUAIFENESIN AND DEXTROMETHORPHAN HYDROBROMIDE 1200; 60 MG/1; MG/1
1 TABLET, EXTENDED RELEASE ORAL 2 TIMES DAILY PRN
COMMUNITY
End: 2018-03-23 | Stop reason: ALTCHOICE

## 2018-03-23 NOTE — PROGRESS NOTES
testing  03/21/2019    Potassium monitoring  03/21/2019    Creatinine monitoring  03/21/2019    DTaP/Tdap/Td vaccine (2 - Td) 07/05/2025    DEXA (modify frequency per FRAX score)  Completed    Flu vaccine  Completed    Pneumococcal low/med risk  Completed

## 2018-03-23 NOTE — PROGRESS NOTES
DR. Negra Tejeda - PROGRESS NOTE    CHIEF COMPLAINT/HISTORY OF CHIEF COMPLAINT: This 80 y.o.  female comes in today for ongoing evaluation and management of her diabetes mellitus type 2 with nephropathy, hypertension, hyperlipidemia, hypothyroidism, chronic kidney disease, glaucoma, macular degeneration, and obesity. We treated her last week for an influenza-like illness with Tamiflu and then a couple days later she went to Urgent Care with worsened symptoms and so they diagnosed her with bronchitis and pleural effusion and treated her with Zithromax. She is almost done with that. She is feeling a lot better and the fever is gone now. She is also having problems with overactive bladder getting worse. She used to take oxybutynin, and it helped, except when she walked. When she walked she would have a little bit of incontinence. She would like to have it looked at. She has been trying to watch her diet and maintain regular physical activity in the form of walking to try to keep her diabetes under control. She controls her diabetes with diet and exercise. She takes Synthroid for hypothyroidism, which has been stable. She is on Zetia for hyperlipidemia. She denies any muscle aches from the Zetia. She takes lisinopril hypertension. She is not having any chest pain or dizziness. She is on Xalatan and Timoptic for glaucoma. She also has macular degeneration, which has been stable. Otherwise she seems to be doing fairly well and denies any other complaints. ALLERGIES/INTOLERANCES:   Allergies   Allergen Reactions    Allopurinol      Patient unsure of reaction    Percocet [Oxycodone-Acetaminophen]      Hallucinations. ...sensitive to narcotics    Phenergan [Promethazine Hcl]      Very sensitive. ..given small doses    Polycitra-K [Potassium Citrate]      Patient unsure of reaction    Levaquin [Levofloxacin] Anxiety     Not able to sleep    Sulfa Antibiotics Rash    Tessalon [Benzonatate] Anxiety     Not for - hemoptysis or wheezing  Gastrointestinal: negative for - constipation, diarrhea or nausea/vomiting  Genitourinary: negative for - dysuria, hematuria or nocturia  Musculoskeletal: positive for - joint pain  negative for - muscle pain or muscular weakness  Neurologic: negative for - gait disturbance, numbness/tingling, seizures or tremors  Psychiatric: negative for - anxiety or depression      HEMOGLOBIN A1c FROM CURRENT AND PRIOR VISIT:    Hemoglobin A1C   Date Value Ref Range Status   03/21/2018 6.2 (H) 4.8 - 5.9 % Final   11/28/2017 5.8 4.8 - 5.9 % Final        PHYSICAL EXAMINATION:    Wt Readings from Last 2 Encounters:   03/23/18 151 lb 6.4 oz (68.7 kg)   03/19/18 154 lb 6.4 oz (70 kg)       Vitals: BP (!) 118/58 (Site: Right Arm, Position: Sitting, Cuff Size: Medium Adult)   Pulse 72   Temp 98.1 °F (36.7 °C) (Tympanic)   Resp 16   Ht 5' 3.07\" (1.602 m)   Wt 151 lb 6.4 oz (68.7 kg)   BMI 26.76 kg/m²   General: This is a 80 y.o.  female who is alert and oriented to person, place and time. She appears to be her stated age and does not appear to be in any acute distress. Skin: Skin color, texture, turgor normal. No rashes or lesions. HEENT/Neck: Head: Normal, normocephalic, atraumatic. Eye: Normal external eye, conjunctiva, lids cornea, CAMPBELL. Ears: Normal TM's bilaterally. Normal auditory canals and external ears. Non-tender. Nose: Normal external nose, mucus membranes and septum. Pharynx: Dental Hygiene adequate. Normal buccal mucosa. Normal pharynx. Neck / Thyroid: Supple, no masses, nodes, nodules or enlargement. Lungs: Normal - CTA without rales, rhonchi, or wheezing. Heart: regular rate and rhythm, S1, S2 normal, no murmur, click, rub or gallop No S3 or S4. Abdomen: Obese, soft, non-distended, non-tender, normal active bowel sounds, no masses palpated and no hepatosplenomegaly  Extremities:  There is no clubbing, cyanosis, edema  Neurologic:  cranial nerves II-XII are grossly will continue to monitor     6. Glaucoma of both eyes, unspecified glaucoma type, stable  - She will continue to use her eye drops    7. 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 Overactive bladder  9. Stress incontinence  - We will refer her to one of the urologists for evaluation  - Rachele Brizuela MD, [facility]    10. Overweight, stable  - We discussed weight loss  - She will continue to watch her diet and exercise         Orders Placed This Encounter   Procedures    Hemoglobin A1C     Standing Status:   Future     Standing Expiration Date:   3/23/2019    Basic Metabolic Panel     Standing Status:   Future     Standing Expiration Date:   3/23/2019    TSH without Reflex     Standing Status:   Future     Standing Expiration Date:   3/23/2019    T4, Free     Standing Status:   Future     Standing Expiration Date:   3/23/2019     DIABETES FOOT EXAM       Requested Prescriptions     Signed Prescriptions Disp Refills    levothyroxine (SYNTHROID) 88 MCG tablet 30 tablet 11     Sig: Take 1 tablet by mouth Daily       Labs and medications as ordered above. Return in about 3 months (around 6/23/2018).         Electronically signed by Ben Elias DO on 3/23/2018 at 5:26 PM  Internal Medicine

## 2018-04-23 ENCOUNTER — TELEPHONE (OUTPATIENT)
Dept: INTERNAL MEDICINE | Age: 83
End: 2018-04-23

## 2018-04-23 ENCOUNTER — OFFICE VISIT (OUTPATIENT)
Dept: UROLOGY | Age: 83
End: 2018-04-23
Payer: MEDICARE

## 2018-04-23 VITALS
SYSTOLIC BLOOD PRESSURE: 98 MMHG | BODY MASS INDEX: 25.16 KG/M2 | WEIGHT: 142 LBS | HEART RATE: 66 BPM | OXYGEN SATURATION: 98 % | HEIGHT: 63 IN | DIASTOLIC BLOOD PRESSURE: 60 MMHG

## 2018-04-23 DIAGNOSIS — R15.9 INCONTINENCE OF FECES, UNSPECIFIED FECAL INCONTINENCE TYPE: ICD-10-CM

## 2018-04-23 DIAGNOSIS — N39.41 URGE INCONTINENCE OF URINE: Primary | ICD-10-CM

## 2018-04-23 DIAGNOSIS — N39.0 RECURRENT UTI: ICD-10-CM

## 2018-04-23 PROCEDURE — G8417 CALC BMI ABV UP PARAM F/U: HCPCS | Performed by: UROLOGY

## 2018-04-23 PROCEDURE — 1090F PRES/ABSN URINE INCON ASSESS: CPT | Performed by: UROLOGY

## 2018-04-23 PROCEDURE — G8427 DOCREV CUR MEDS BY ELIG CLIN: HCPCS | Performed by: UROLOGY

## 2018-04-23 PROCEDURE — 1123F ACP DISCUSS/DSCN MKR DOCD: CPT | Performed by: UROLOGY

## 2018-04-23 PROCEDURE — 0509F URINE INCON PLAN DOCD: CPT | Performed by: UROLOGY

## 2018-04-23 PROCEDURE — 4040F PNEUMOC VAC/ADMIN/RCVD: CPT | Performed by: UROLOGY

## 2018-04-23 PROCEDURE — 99204 OFFICE O/P NEW MOD 45 MIN: CPT | Performed by: UROLOGY

## 2018-04-23 PROCEDURE — 1036F TOBACCO NON-USER: CPT | Performed by: UROLOGY

## 2018-04-23 RX ORDER — HYDROCHLOROTHIAZIDE 25 MG/1
TABLET ORAL
Qty: 30 TABLET | Refills: 11 | Status: SHIPPED | OUTPATIENT
Start: 2018-04-23 | End: 2018-05-14 | Stop reason: ALTCHOICE

## 2018-05-08 ENCOUNTER — APPOINTMENT (OUTPATIENT)
Dept: GENERAL RADIOLOGY | Age: 83
End: 2018-05-08
Payer: MEDICARE

## 2018-05-08 ENCOUNTER — APPOINTMENT (OUTPATIENT)
Dept: CT IMAGING | Age: 83
End: 2018-05-08
Payer: MEDICARE

## 2018-05-08 ENCOUNTER — HOSPITAL ENCOUNTER (EMERGENCY)
Age: 83
Discharge: HOME | End: 2018-05-08
Attending: SPECIALIST
Payer: MEDICARE

## 2018-05-08 VITALS
HEART RATE: 60 BPM | RESPIRATION RATE: 18 BRPM | DIASTOLIC BLOOD PRESSURE: 77 MMHG | TEMPERATURE: 97.7 F | WEIGHT: 142 LBS | BODY MASS INDEX: 25.16 KG/M2 | OXYGEN SATURATION: 96 % | HEIGHT: 63 IN | SYSTOLIC BLOOD PRESSURE: 169 MMHG

## 2018-05-08 DIAGNOSIS — R42 VERTIGO: ICD-10-CM

## 2018-05-08 DIAGNOSIS — R42 DIZZINESS: Primary | ICD-10-CM

## 2018-05-08 LAB
-: ABNORMAL
ABSOLUTE EOS #: 0.2 K/UL (ref 0–0.4)
ABSOLUTE IMMATURE GRANULOCYTE: ABNORMAL K/UL (ref 0–0.3)
ABSOLUTE LYMPH #: 1.2 K/UL (ref 1–4.8)
ABSOLUTE MONO #: 0.4 K/UL (ref 0.1–1.2)
AMORPHOUS: ABNORMAL
ANION GAP SERPL CALCULATED.3IONS-SCNC: 9 MMOL/L (ref 9–17)
BACTERIA: ABNORMAL
BASOPHILS # BLD: 1 % (ref 0–1)
BASOPHILS ABSOLUTE: 0 K/UL (ref 0–0.2)
BILIRUBIN URINE: NEGATIVE
BUN BLDV-MCNC: 24 MG/DL (ref 8–23)
BUN/CREAT BLD: 26 (ref 9–20)
CALCIUM SERPL-MCNC: 9.3 MG/DL (ref 8.6–10.4)
CASTS UA: ABNORMAL /LPF (ref 0–2)
CHLORIDE BLD-SCNC: 106 MMOL/L (ref 98–107)
CO2: 26 MMOL/L (ref 20–31)
COLOR: NORMAL
COMMENT UA: NORMAL
CREAT SERPL-MCNC: 0.92 MG/DL (ref 0.5–0.9)
CRYSTALS, UA: ABNORMAL /HPF
DIFFERENTIAL TYPE: ABNORMAL
EKG ATRIAL RATE: 55 BPM
EKG P AXIS: 55 DEGREES
EKG P-R INTERVAL: 130 MS
EKG Q-T INTERVAL: 416 MS
EKG QRS DURATION: 86 MS
EKG QTC CALCULATION (BAZETT): 397 MS
EKG R AXIS: -13 DEGREES
EKG T AXIS: 23 DEGREES
EKG VENTRICULAR RATE: 55 BPM
EOSINOPHILS RELATIVE PERCENT: 2 % (ref 1–7)
EPITHELIAL CELLS UA: ABNORMAL /HPF (ref 0–5)
GFR AFRICAN AMERICAN: >60 ML/MIN
GFR NON-AFRICAN AMERICAN: 58 ML/MIN
GFR SERPL CREATININE-BSD FRML MDRD: ABNORMAL ML/MIN/{1.73_M2}
GFR SERPL CREATININE-BSD FRML MDRD: ABNORMAL ML/MIN/{1.73_M2}
GLUCOSE BLD-MCNC: 144 MG/DL (ref 70–99)
GLUCOSE URINE: NEGATIVE
HCT VFR BLD CALC: 31.4 % (ref 36–46)
HEMOGLOBIN: 10.4 G/DL (ref 12–16)
IMMATURE GRANULOCYTES: ABNORMAL %
KETONES, URINE: NEGATIVE
LEUKOCYTE ESTERASE, URINE: NEGATIVE
LYMPHOCYTES # BLD: 19 % (ref 16–46)
MAGNESIUM: 1.9 MG/DL (ref 1.6–2.6)
MCH RBC QN AUTO: 31.7 PG (ref 26–34)
MCHC RBC AUTO-ENTMCNC: 33 G/DL (ref 31–37)
MCV RBC AUTO: 96.1 FL (ref 80–100)
MONOCYTES # BLD: 6 % (ref 4–11)
MUCUS: ABNORMAL
NITRITE, URINE: NEGATIVE
NRBC AUTOMATED: ABNORMAL PER 100 WBC
OTHER OBSERVATIONS UA: ABNORMAL
PDW BLD-RTO: 13.7 % (ref 11–14.5)
PH UA: 5.5 (ref 5–6)
PLATELET # BLD: 211 K/UL (ref 140–450)
PLATELET ESTIMATE: ABNORMAL
PMV BLD AUTO: 10 FL (ref 6–12)
POTASSIUM SERPL-SCNC: 4.4 MMOL/L (ref 3.7–5.3)
PROTEIN UA: NEGATIVE
RBC # BLD: 3.27 M/UL (ref 4–5.2)
RBC # BLD: ABNORMAL 10*6/UL
RBC UA: ABNORMAL /HPF (ref 0–4)
RENAL EPITHELIAL, UA: ABNORMAL /HPF
SEG NEUTROPHILS: 72 % (ref 43–77)
SEGMENTED NEUTROPHILS ABSOLUTE COUNT: 4.8 K/UL (ref 1.8–7.7)
SODIUM BLD-SCNC: 141 MMOL/L (ref 135–144)
SPECIFIC GRAVITY UA: 1.02 (ref 1.01–1.02)
TRICHOMONAS: ABNORMAL
TROPONIN INTERP: NORMAL
TROPONIN T: <0.03 NG/ML
TURBIDITY: NORMAL
URINE HGB: NEGATIVE
UROBILINOGEN, URINE: NORMAL
WBC # BLD: 6.6 K/UL (ref 3.5–11)
WBC # BLD: ABNORMAL 10*3/UL
WBC UA: ABNORMAL /HPF (ref 0–4)
YEAST: ABNORMAL

## 2018-05-08 PROCEDURE — 80048 BASIC METABOLIC PNL TOTAL CA: CPT

## 2018-05-08 PROCEDURE — 93005 ELECTROCARDIOGRAM TRACING: CPT

## 2018-05-08 PROCEDURE — 99284 EMERGENCY DEPT VISIT MOD MDM: CPT

## 2018-05-08 PROCEDURE — 96374 THER/PROPH/DIAG INJ IV PUSH: CPT

## 2018-05-08 PROCEDURE — 71046 X-RAY EXAM CHEST 2 VIEWS: CPT

## 2018-05-08 PROCEDURE — 36415 COLL VENOUS BLD VENIPUNCTURE: CPT

## 2018-05-08 PROCEDURE — 81001 URINALYSIS AUTO W/SCOPE: CPT

## 2018-05-08 PROCEDURE — 6370000000 HC RX 637 (ALT 250 FOR IP): Performed by: SPECIALIST

## 2018-05-08 PROCEDURE — 70450 CT HEAD/BRAIN W/O DYE: CPT

## 2018-05-08 PROCEDURE — 84484 ASSAY OF TROPONIN QUANT: CPT

## 2018-05-08 PROCEDURE — 85025 COMPLETE CBC W/AUTO DIFF WBC: CPT

## 2018-05-08 PROCEDURE — 6360000002 HC RX W HCPCS: Performed by: SPECIALIST

## 2018-05-08 PROCEDURE — 2580000003 HC RX 258: Performed by: SPECIALIST

## 2018-05-08 PROCEDURE — 83735 ASSAY OF MAGNESIUM: CPT

## 2018-05-08 RX ORDER — MECLIZINE HCL 12.5 MG/1
12.5 TABLET ORAL 3 TIMES DAILY PRN
Qty: 15 TABLET | Refills: 0 | Status: SHIPPED | OUTPATIENT
Start: 2018-05-08 | End: 2018-05-14 | Stop reason: ALTCHOICE

## 2018-05-08 RX ORDER — ONDANSETRON 2 MG/ML
INJECTION INTRAMUSCULAR; INTRAVENOUS
Status: DISCONTINUED
Start: 2018-05-08 | End: 2018-05-08 | Stop reason: HOSPADM

## 2018-05-08 RX ORDER — SODIUM CHLORIDE 9 MG/ML
INJECTION, SOLUTION INTRAVENOUS CONTINUOUS
Status: DISCONTINUED | OUTPATIENT
Start: 2018-05-08 | End: 2018-05-08 | Stop reason: HOSPADM

## 2018-05-08 RX ORDER — MECLIZINE HCL 12.5 MG/1
12.5 TABLET ORAL ONCE
Status: COMPLETED | OUTPATIENT
Start: 2018-05-08 | End: 2018-05-08

## 2018-05-08 RX ORDER — ONDANSETRON 2 MG/ML
4 INJECTION INTRAMUSCULAR; INTRAVENOUS ONCE
Status: COMPLETED | OUTPATIENT
Start: 2018-05-08 | End: 2018-05-08

## 2018-05-08 RX ADMIN — MECLIZINE 12.5 MG: 12.5 TABLET ORAL at 13:43

## 2018-05-08 RX ADMIN — ONDANSETRON 4 MG: 2 INJECTION INTRAMUSCULAR; INTRAVENOUS at 13:43

## 2018-05-08 RX ADMIN — SODIUM CHLORIDE: 9 INJECTION, SOLUTION INTRAVENOUS at 13:48

## 2018-05-08 ASSESSMENT — ENCOUNTER SYMPTOMS
ABDOMINAL PAIN: 0
SHORTNESS OF BREATH: 0

## 2018-05-14 ENCOUNTER — HOSPITAL ENCOUNTER (OUTPATIENT)
Dept: LAB | Age: 83
Setting detail: SPECIMEN
Discharge: HOME | End: 2018-05-14
Payer: MEDICARE

## 2018-05-14 ENCOUNTER — OFFICE VISIT (OUTPATIENT)
Dept: INTERNAL MEDICINE | Age: 83
End: 2018-05-14
Payer: MEDICARE

## 2018-05-14 VITALS
BODY MASS INDEX: 26.15 KG/M2 | RESPIRATION RATE: 17 BRPM | WEIGHT: 147.6 LBS | OXYGEN SATURATION: 96 % | DIASTOLIC BLOOD PRESSURE: 60 MMHG | HEART RATE: 60 BPM | SYSTOLIC BLOOD PRESSURE: 98 MMHG | HEIGHT: 63 IN

## 2018-05-14 DIAGNOSIS — N18.30 CONTROLLED TYPE 2 DIABETES MELLITUS WITH STAGE 3 CHRONIC KIDNEY DISEASE, WITHOUT LONG-TERM CURRENT USE OF INSULIN (HCC): ICD-10-CM

## 2018-05-14 DIAGNOSIS — D64.9 ANEMIA, UNSPECIFIED TYPE: ICD-10-CM

## 2018-05-14 DIAGNOSIS — R42 DIZZINESS: ICD-10-CM

## 2018-05-14 DIAGNOSIS — E03.4 HYPOTHYROIDISM DUE TO ACQUIRED ATROPHY OF THYROID: ICD-10-CM

## 2018-05-14 DIAGNOSIS — N18.30 CHRONIC KIDNEY DISEASE, STAGE III (MODERATE) (HCC): ICD-10-CM

## 2018-05-14 DIAGNOSIS — I10 ESSENTIAL HYPERTENSION: Primary | ICD-10-CM

## 2018-05-14 DIAGNOSIS — E11.22 CONTROLLED TYPE 2 DIABETES MELLITUS WITH STAGE 3 CHRONIC KIDNEY DISEASE, WITHOUT LONG-TERM CURRENT USE OF INSULIN (HCC): ICD-10-CM

## 2018-05-14 LAB
FERRITIN: 257 UG/L (ref 13–150)
HCT VFR BLD CALC: 32.8 % (ref 36–46)
HEMOGLOBIN: 10.9 G/DL (ref 12–16)
MCH RBC QN AUTO: 32 PG (ref 26–34)
MCHC RBC AUTO-ENTMCNC: 33.1 G/DL (ref 31–37)
MCV RBC AUTO: 96.5 FL (ref 80–100)
NRBC AUTOMATED: ABNORMAL PER 100 WBC
PDW BLD-RTO: 13.9 % (ref 11–14.5)
PLATELET # BLD: 250 K/UL (ref 140–450)
PMV BLD AUTO: 9.7 FL (ref 6–12)
RBC # BLD: 3.4 M/UL (ref 4–5.2)
WBC # BLD: 6.9 K/UL (ref 3.5–11)

## 2018-05-14 PROCEDURE — G8417 CALC BMI ABV UP PARAM F/U: HCPCS | Performed by: NURSE PRACTITIONER

## 2018-05-14 PROCEDURE — 36415 COLL VENOUS BLD VENIPUNCTURE: CPT

## 2018-05-14 PROCEDURE — 1036F TOBACCO NON-USER: CPT | Performed by: NURSE PRACTITIONER

## 2018-05-14 PROCEDURE — G8427 DOCREV CUR MEDS BY ELIG CLIN: HCPCS | Performed by: NURSE PRACTITIONER

## 2018-05-14 PROCEDURE — 85027 COMPLETE CBC AUTOMATED: CPT

## 2018-05-14 PROCEDURE — 4040F PNEUMOC VAC/ADMIN/RCVD: CPT | Performed by: NURSE PRACTITIONER

## 2018-05-14 PROCEDURE — 1090F PRES/ABSN URINE INCON ASSESS: CPT | Performed by: NURSE PRACTITIONER

## 2018-05-14 PROCEDURE — 82728 ASSAY OF FERRITIN: CPT

## 2018-05-14 PROCEDURE — 85045 AUTOMATED RETICULOCYTE COUNT: CPT

## 2018-05-14 PROCEDURE — 1123F ACP DISCUSS/DSCN MKR DOCD: CPT | Performed by: NURSE PRACTITIONER

## 2018-05-14 PROCEDURE — 99214 OFFICE O/P EST MOD 30 MIN: CPT | Performed by: NURSE PRACTITIONER

## 2018-05-15 ENCOUNTER — TELEPHONE (OUTPATIENT)
Dept: INTERNAL MEDICINE | Age: 83
End: 2018-05-15

## 2018-05-15 LAB
ABSOLUTE RETIC #: 0.06 M/UL (ref 0.03–0.08)
IMMATURE RETIC FRACT: 8.7 % (ref 2.7–18.3)
RETIC %: 1.7 % (ref 0.5–1.9)
RETIC HEMOGLOBIN: 34.3 PG (ref 28.2–35.7)

## 2018-05-16 ASSESSMENT — ENCOUNTER SYMPTOMS
BLOOD IN STOOL: 0
SORE THROAT: 0
COUGH: 0
SHORTNESS OF BREATH: 0
WHEEZING: 0
ORTHOPNEA: 0
NAUSEA: 0
EYE REDNESS: 0
VOMITING: 0
ABDOMINAL PAIN: 0
EYE DISCHARGE: 0
EYE PAIN: 0
DIARRHEA: 0
SPUTUM PRODUCTION: 0
CONSTIPATION: 0

## 2018-05-18 ENCOUNTER — TELEPHONE (OUTPATIENT)
Dept: INTERNAL MEDICINE | Age: 83
End: 2018-05-18

## 2018-05-22 DIAGNOSIS — D64.9 ANEMIA, UNSPECIFIED TYPE: Primary | ICD-10-CM

## 2018-05-23 ENCOUNTER — HOSPITAL ENCOUNTER (OUTPATIENT)
Age: 83
Setting detail: SPECIMEN
Discharge: HOME | End: 2018-05-23
Payer: MEDICARE

## 2018-05-23 DIAGNOSIS — D64.9 ANEMIA, UNSPECIFIED TYPE: ICD-10-CM

## 2018-05-23 LAB
DATE, STOOL #1: NORMAL
DATE, STOOL #2: NORMAL
DATE, STOOL #3: NORMAL
HEMOCCULT SP1 STL QL: NEGATIVE
HEMOCCULT SP2 STL QL: NORMAL
HEMOCCULT SP3 STL QL: NORMAL
TIME, STOOL #1: NORMAL
TIME, STOOL #2: NORMAL
TIME, STOOL #3: NORMAL

## 2018-05-23 PROCEDURE — 82274 ASSAY TEST FOR BLOOD FECAL: CPT

## 2018-05-24 ENCOUNTER — TELEPHONE (OUTPATIENT)
Dept: INTERNAL MEDICINE | Age: 83
End: 2018-05-24

## 2018-05-24 DIAGNOSIS — R53.1 WEAKNESS GENERALIZED: Primary | ICD-10-CM

## 2018-05-25 ENCOUNTER — HOSPITAL ENCOUNTER (OUTPATIENT)
Dept: LAB | Age: 83
Setting detail: SPECIMEN
Discharge: HOME | End: 2018-05-25
Payer: MEDICARE

## 2018-05-25 DIAGNOSIS — N18.30 CONTROLLED TYPE 2 DIABETES MELLITUS WITH STAGE 3 CHRONIC KIDNEY DISEASE, WITHOUT LONG-TERM CURRENT USE OF INSULIN (HCC): ICD-10-CM

## 2018-05-25 DIAGNOSIS — D64.9 ANEMIA, UNSPECIFIED TYPE: ICD-10-CM

## 2018-05-25 DIAGNOSIS — E03.4 HYPOTHYROIDISM DUE TO ACQUIRED ATROPHY OF THYROID: ICD-10-CM

## 2018-05-25 DIAGNOSIS — E11.22 CONTROLLED TYPE 2 DIABETES MELLITUS WITH STAGE 3 CHRONIC KIDNEY DISEASE, WITHOUT LONG-TERM CURRENT USE OF INSULIN (HCC): ICD-10-CM

## 2018-05-25 LAB
ANION GAP SERPL CALCULATED.3IONS-SCNC: 11 MMOL/L (ref 9–17)
BUN BLDV-MCNC: 21 MG/DL (ref 8–23)
BUN/CREAT BLD: 24 (ref 9–20)
CALCIUM SERPL-MCNC: 9.3 MG/DL (ref 8.6–10.4)
CHLORIDE BLD-SCNC: 102 MMOL/L (ref 98–107)
CO2: 24 MMOL/L (ref 20–31)
CREAT SERPL-MCNC: 0.87 MG/DL (ref 0.5–0.9)
ESTIMATED AVERAGE GLUCOSE: 117 MG/DL
GFR AFRICAN AMERICAN: >60 ML/MIN
GFR NON-AFRICAN AMERICAN: >60 ML/MIN
GFR SERPL CREATININE-BSD FRML MDRD: ABNORMAL ML/MIN/{1.73_M2}
GFR SERPL CREATININE-BSD FRML MDRD: ABNORMAL ML/MIN/{1.73_M2}
GLUCOSE BLD-MCNC: 108 MG/DL (ref 70–99)
HBA1C MFR BLD: 5.7 % (ref 4.8–5.9)
IRON SATURATION: 25 % (ref 20–55)
IRON: 58 UG/DL (ref 37–145)
POTASSIUM SERPL-SCNC: 4.6 MMOL/L (ref 3.7–5.3)
SODIUM BLD-SCNC: 137 MMOL/L (ref 135–144)
THYROXINE, FREE: 1.15 NG/DL (ref 0.93–1.7)
TOTAL IRON BINDING CAPACITY: 229 UG/DL (ref 250–450)
TSH SERPL DL<=0.05 MIU/L-ACNC: 0.77 MIU/L (ref 0.3–5)
UNSATURATED IRON BINDING CAPACITY: 171 UG/DL (ref 112–347)

## 2018-05-25 PROCEDURE — 83036 HEMOGLOBIN GLYCOSYLATED A1C: CPT

## 2018-05-25 PROCEDURE — 36415 COLL VENOUS BLD VENIPUNCTURE: CPT

## 2018-05-25 PROCEDURE — 84443 ASSAY THYROID STIM HORMONE: CPT

## 2018-05-25 PROCEDURE — 80048 BASIC METABOLIC PNL TOTAL CA: CPT

## 2018-05-25 PROCEDURE — 84439 ASSAY OF FREE THYROXINE: CPT

## 2018-05-25 PROCEDURE — 83550 IRON BINDING TEST: CPT

## 2018-05-25 PROCEDURE — 83540 ASSAY OF IRON: CPT

## 2018-05-31 ENCOUNTER — TELEPHONE (OUTPATIENT)
Dept: INTERNAL MEDICINE | Age: 83
End: 2018-05-31

## 2018-06-11 ENCOUNTER — PROCEDURE VISIT (OUTPATIENT)
Dept: UROLOGY | Age: 83
End: 2018-06-11
Payer: MEDICARE

## 2018-06-11 VITALS
SYSTOLIC BLOOD PRESSURE: 112 MMHG | HEART RATE: 68 BPM | BODY MASS INDEX: 26.82 KG/M2 | DIASTOLIC BLOOD PRESSURE: 72 MMHG | WEIGHT: 151.4 LBS | HEIGHT: 63 IN

## 2018-06-11 DIAGNOSIS — N32.81 OVERACTIVE BLADDER: Primary | ICD-10-CM

## 2018-06-11 DIAGNOSIS — R32 URINARY INCONTINENCE, UNSPECIFIED TYPE: ICD-10-CM

## 2018-06-11 DIAGNOSIS — N39.0 RECURRENT UTI: ICD-10-CM

## 2018-06-11 DIAGNOSIS — R15.9 INCONTINENCE OF FECES, UNSPECIFIED FECAL INCONTINENCE TYPE: ICD-10-CM

## 2018-06-11 PROCEDURE — G8417 CALC BMI ABV UP PARAM F/U: HCPCS | Performed by: UROLOGY

## 2018-06-11 PROCEDURE — 1090F PRES/ABSN URINE INCON ASSESS: CPT | Performed by: UROLOGY

## 2018-06-11 PROCEDURE — 52000 CYSTOURETHROSCOPY: CPT | Performed by: UROLOGY

## 2018-06-11 PROCEDURE — 51725 SIMPLE CYSTOMETROGRAM: CPT | Performed by: UROLOGY

## 2018-06-11 PROCEDURE — 99213 OFFICE O/P EST LOW 20 MIN: CPT | Performed by: UROLOGY

## 2018-06-11 PROCEDURE — 4040F PNEUMOC VAC/ADMIN/RCVD: CPT | Performed by: UROLOGY

## 2018-06-11 PROCEDURE — G8427 DOCREV CUR MEDS BY ELIG CLIN: HCPCS | Performed by: UROLOGY

## 2018-06-11 PROCEDURE — 1123F ACP DISCUSS/DSCN MKR DOCD: CPT | Performed by: UROLOGY

## 2018-06-11 PROCEDURE — 1036F TOBACCO NON-USER: CPT | Performed by: UROLOGY

## 2018-06-11 PROCEDURE — 0509F URINE INCON PLAN DOCD: CPT | Performed by: UROLOGY

## 2018-06-25 ENCOUNTER — TELEPHONE (OUTPATIENT)
Dept: INTERNAL MEDICINE | Age: 83
End: 2018-06-25

## 2018-07-09 ENCOUNTER — OFFICE VISIT (OUTPATIENT)
Dept: INTERNAL MEDICINE | Age: 83
End: 2018-07-09
Payer: MEDICARE

## 2018-07-09 VITALS
HEIGHT: 63 IN | BODY MASS INDEX: 25.87 KG/M2 | SYSTOLIC BLOOD PRESSURE: 128 MMHG | HEART RATE: 60 BPM | RESPIRATION RATE: 17 BRPM | DIASTOLIC BLOOD PRESSURE: 82 MMHG | WEIGHT: 146 LBS

## 2018-07-09 DIAGNOSIS — E11.22 CONTROLLED TYPE 2 DIABETES MELLITUS WITH STAGE 3 CHRONIC KIDNEY DISEASE, WITHOUT LONG-TERM CURRENT USE OF INSULIN (HCC): ICD-10-CM

## 2018-07-09 DIAGNOSIS — E03.4 HYPOTHYROIDISM DUE TO ACQUIRED ATROPHY OF THYROID: ICD-10-CM

## 2018-07-09 DIAGNOSIS — N18.30 CHRONIC KIDNEY DISEASE, STAGE III (MODERATE) (HCC): ICD-10-CM

## 2018-07-09 DIAGNOSIS — N18.30 CONTROLLED TYPE 2 DIABETES MELLITUS WITH STAGE 3 CHRONIC KIDNEY DISEASE, WITHOUT LONG-TERM CURRENT USE OF INSULIN (HCC): ICD-10-CM

## 2018-07-09 DIAGNOSIS — R32 URINARY INCONTINENCE, UNSPECIFIED TYPE: ICD-10-CM

## 2018-07-09 DIAGNOSIS — R15.9 INCONTINENCE OF FECES, UNSPECIFIED FECAL INCONTINENCE TYPE: ICD-10-CM

## 2018-07-09 DIAGNOSIS — E66.3 OVERWEIGHT: ICD-10-CM

## 2018-07-09 DIAGNOSIS — E78.5 HYPERLIPIDEMIA, UNSPECIFIED HYPERLIPIDEMIA TYPE: ICD-10-CM

## 2018-07-09 DIAGNOSIS — D64.9 ANEMIA, UNSPECIFIED TYPE: ICD-10-CM

## 2018-07-09 DIAGNOSIS — R26.9 GAIT ABNORMALITY: ICD-10-CM

## 2018-07-09 DIAGNOSIS — R42 DIZZINESS: Primary | ICD-10-CM

## 2018-07-09 DIAGNOSIS — I10 ESSENTIAL HYPERTENSION: ICD-10-CM

## 2018-07-09 PROCEDURE — 99214 OFFICE O/P EST MOD 30 MIN: CPT | Performed by: NURSE PRACTITIONER

## 2018-07-09 PROCEDURE — 1036F TOBACCO NON-USER: CPT | Performed by: NURSE PRACTITIONER

## 2018-07-09 PROCEDURE — 1123F ACP DISCUSS/DSCN MKR DOCD: CPT | Performed by: NURSE PRACTITIONER

## 2018-07-09 PROCEDURE — 4040F PNEUMOC VAC/ADMIN/RCVD: CPT | Performed by: NURSE PRACTITIONER

## 2018-07-09 PROCEDURE — G8427 DOCREV CUR MEDS BY ELIG CLIN: HCPCS | Performed by: NURSE PRACTITIONER

## 2018-07-09 PROCEDURE — 1090F PRES/ABSN URINE INCON ASSESS: CPT | Performed by: NURSE PRACTITIONER

## 2018-07-09 PROCEDURE — G8417 CALC BMI ABV UP PARAM F/U: HCPCS | Performed by: NURSE PRACTITIONER

## 2018-07-09 PROCEDURE — 0509F URINE INCON PLAN DOCD: CPT | Performed by: NURSE PRACTITIONER

## 2018-07-09 NOTE — PROGRESS NOTES
TABLET BY MOUTH DAILY 90 tablet 3    Multiple Vitamins-Minerals (OCUVITE PRESERVISION PO) Take 1 tablet by mouth daily      Probiotic Product (PROBIOTIC DAILY PO) Take by mouth daily      aspirin 81 MG tablet Take 81 mg by mouth daily.  latanoprost (XALATAN) 0.005 % ophthalmic solution Place 1 drop into both eyes nightly.  timolol (TIMOPTIC) 0.5 % ophthalmic solution Place 1 drop into both eyes daily.  Ascorbic Acid (VITAMIN C) 500 MG tablet Take 500 mg by mouth 2 times daily. No current facility-administered medications on file prior to visit. Social History     Social History    Marital status:      Spouse name: N/A    Number of children: N/A    Years of education: N/A     Occupational History    Not on file. Social History Main Topics    Smoking status: Never Smoker    Smokeless tobacco: Never Used      Comment: never smoked tito rrt 01/11/2018    Alcohol use No    Drug use: No    Sexual activity: Not on file     Other Topics Concern    Not on file     Social History Narrative    No narrative on file       Review of Systems   Constitutional: Negative for chills and fever. HENT:        Chronic loss of hearing left ear      Eyes: Negative for pain, discharge and redness. Respiratory: Negative for cough, shortness of breath and wheezing. Cardiovascular: Negative for chest pain, palpitations, orthopnea and leg swelling. Gastrointestinal: Negative for abdominal pain, blood in stool, constipation, diarrhea, nausea and vomiting. Genitourinary: Negative for dysuria and urgency. Musculoskeletal: Negative for myalgias and neck pain. Skin: Negative for rash. Neurological: Positive for dizziness. Negative for tremors, seizures and headaches. Psychiatric/Behavioral: Negative for depression and memory loss. The patient is not nervous/anxious.         Physical Exam   Constitutional: She is oriented to person, place, and time and well-developed,

## 2018-07-10 ASSESSMENT — ENCOUNTER SYMPTOMS
DIARRHEA: 0
EYE DISCHARGE: 0
COUGH: 0
EYE PAIN: 0
EYE REDNESS: 0
ABDOMINAL PAIN: 0
NAUSEA: 0
ORTHOPNEA: 0
SHORTNESS OF BREATH: 0
BLOOD IN STOOL: 0
WHEEZING: 0
VOMITING: 0
CONSTIPATION: 0

## 2018-07-18 ENCOUNTER — HOSPITAL ENCOUNTER (OUTPATIENT)
Dept: NON INVASIVE DIAGNOSTICS | Age: 83
Discharge: HOME | End: 2018-07-18
Payer: MEDICARE

## 2018-07-18 ENCOUNTER — HOSPITAL ENCOUNTER (OUTPATIENT)
Dept: INTERVENTIONAL RADIOLOGY/VASCULAR | Age: 83
Discharge: HOME | End: 2018-07-20
Payer: MEDICARE

## 2018-07-18 DIAGNOSIS — R42 DIZZINESS: ICD-10-CM

## 2018-07-18 PROCEDURE — 93225 XTRNL ECG REC<48 HRS REC: CPT

## 2018-07-18 PROCEDURE — 93226 XTRNL ECG REC<48 HR SCAN A/R: CPT

## 2018-07-18 PROCEDURE — 93880 EXTRACRANIAL BILAT STUDY: CPT

## 2018-07-19 DIAGNOSIS — I65.29 STENOSIS OF CAROTID ARTERY, UNSPECIFIED LATERALITY: Primary | ICD-10-CM

## 2018-07-20 ENCOUNTER — HOSPITAL ENCOUNTER (OUTPATIENT)
Dept: NON INVASIVE DIAGNOSTICS | Age: 83
Discharge: HOME | End: 2018-07-20
Payer: MEDICARE

## 2018-07-26 ENCOUNTER — TELEPHONE (OUTPATIENT)
Dept: UROLOGY | Age: 83
End: 2018-07-26

## 2018-07-26 ENCOUNTER — OFFICE VISIT (OUTPATIENT)
Dept: VASCULAR SURGERY | Age: 83
End: 2018-07-26
Payer: MEDICARE

## 2018-07-26 VITALS
SYSTOLIC BLOOD PRESSURE: 112 MMHG | HEIGHT: 62 IN | DIASTOLIC BLOOD PRESSURE: 62 MMHG | BODY MASS INDEX: 27.12 KG/M2 | WEIGHT: 147.4 LBS | HEART RATE: 60 BPM

## 2018-07-26 DIAGNOSIS — I65.23 CAROTID STENOSIS, BILATERAL: ICD-10-CM

## 2018-07-26 PROCEDURE — 1090F PRES/ABSN URINE INCON ASSESS: CPT | Performed by: SURGERY

## 2018-07-26 PROCEDURE — G8427 DOCREV CUR MEDS BY ELIG CLIN: HCPCS | Performed by: SURGERY

## 2018-07-26 PROCEDURE — 1123F ACP DISCUSS/DSCN MKR DOCD: CPT | Performed by: SURGERY

## 2018-07-26 PROCEDURE — G8417 CALC BMI ABV UP PARAM F/U: HCPCS | Performed by: SURGERY

## 2018-07-26 PROCEDURE — 99203 OFFICE O/P NEW LOW 30 MIN: CPT | Performed by: SURGERY

## 2018-07-26 PROCEDURE — 4040F PNEUMOC VAC/ADMIN/RCVD: CPT | Performed by: SURGERY

## 2018-07-26 PROCEDURE — G8598 ASA/ANTIPLAT THER USED: HCPCS | Performed by: SURGERY

## 2018-07-26 PROCEDURE — 1036F TOBACCO NON-USER: CPT | Performed by: SURGERY

## 2018-07-26 PROCEDURE — 1101F PT FALLS ASSESS-DOCD LE1/YR: CPT | Performed by: SURGERY

## 2018-07-26 NOTE — PROGRESS NOTES
[address] [facility] [address]  [facility] VASCULAR SURG  [id number] [phone]    [place]  4/11/1932  80 y. o.female       Chief Complaint:  Chief Complaint   Patient presents with    Other     Stenosis of carotid artery       History of present Illness:  Pt is here today for evaluation and treatment of carotid stenosis. This is an 80 yr old female with a recent history of dizziness which is likely related to adjustment of her blood pressure dosage. She had a carotid duplex showing <50% stenosis on the right and 50-69% stenosis on the left. We discussed that statins and aspirin are part of medical therapy. She has not tolerated statins in the past so she was placed on Zetia. The work up of her dizziness is also underway with a holter monitor    Past Medical History:  has a past medical history of Adenocarcinoma of endometrium ([address] Cataract of left eye; Chest pain; Chronic diarrhea; Diabetes mellitus type II, controlled ([address] Diabetes mellitus type II, controlled ([address] Dysthymia; Gait abnormality; H/O renal calculi; H/O vein stripping; Hard of hearing; Hyperlipidemia; Hypertension; Hypothyroidism; Influenza A; Left elbow fracture; Macular degeneration; Obesity; Pericardial effusion; Pseudophakia of right eye; Traumatic injury of lower extremity; and Vertigo. Past Surgical History:   Past Surgical History:   Procedure Laterality Date    CATARACT REMOVAL Right February 2013    Dr. Liza Luevano, LAPAROSCOPIC  5/28/1998    COLONOSCOPY  7/22/2003    Dr. Haydee Waldrop Left September 2010    ORIF, Dr. Harpal Harris ERCP  7/19/1998    retained stone, Suzy Elliott and Felicita Stuart MAGDA AND BSO  March 2002    endometrial adenocarcinoma, Dr. Preston Benitez       Social History:  reports that she has never smoked.  She has never used smokeless tobacco. She reports that she does not drink alcohol or use drugs. Family History: family history includes High Blood Pressure in an other family member. Review of Systems:   Constitutional:  negative for  fevers, chills, sweats, fatigue, and weight loss  HEENT:  negative for vision or hearing changes,   Respiratory:  negative for shortness of breath, cough, or congestion  Cardiovascular:  negative for  chest pain, palpitations  Gastrointestinal:  negative for nausea, vomiting, diarrhea, constipation, abdominal pain  Genitourinary:  negative for frequency, dysuria  Integument/Breast:  negative for rash, skin lesions  Musculoskeletal:  negative for muscle aches or joint pain  Neurological:  negative for headaches, ++dizziness, ++lightheadedness, numbness, pain and tingling extremities  Behavior/Psych:  negative for depression and anxiety    Allergies: Allopurinol; Percocet [oxycodone-acetaminophen]; Phenergan [promethazine hcl]; Polycitra-k [potassium citrate]; Levaquin [levofloxacin]; Sulfa antibiotics; and Tessalon [benzonatate]    Current Meds:  Current Outpatient Prescriptions:     sertraline (ZOLOFT) 50 MG tablet, TAKE 1 TABLET DAILY, Disp: 90 tablet, Rfl: 3    levothyroxine (SYNTHROID) 88 MCG tablet, Take 1 tablet by mouth Daily, Disp: 30 tablet, Rfl: 11    ondansetron (ZOFRAN) 4 MG tablet, Take 1 tablet by mouth every 8 hours as needed for Nausea or Vomiting, Disp: 15 tablet, Rfl: 1    ezetimibe (ZETIA) 10 MG tablet, TAKE 1 TABLET BY MOUTH DAILY, Disp: 30 tablet, Rfl: 11    CRANBERRY PO, Take 1 capsule by mouth daily, Disp: , Rfl:     lisinopril (PRINIVIL;ZESTRIL) 40 MG tablet, TAKE 1 TABLET BY MOUTH DAILY, Disp: 90 tablet, Rfl: 3    Multiple Vitamins-Minerals (OCUVITE PRESERVISION PO), Take 1 tablet by mouth daily, Disp: , Rfl:     Probiotic Product (PROBIOTIC DAILY PO), Take by mouth daily, Disp: , Rfl:     aspirin 81 MG tablet, Take 81 mg by mouth daily. , Disp: , Rfl:     latanoprost (XALATAN) 0.005 % ophthalmic solution, Place 1 drop into both eyes nightly., Disp: , Rfl:     timolol (TIMOPTIC) 0.5 % ophthalmic solution, Place 1 drop into both eyes daily. , Disp: , Rfl:     Ascorbic Acid (VITAMIN C) 500 MG tablet, Take 500 mg by mouth 2 times daily. , Disp: , Rfl:     Vital Signs:  Vitals:    07/26/18 1452   BP: 112/62   Pulse: 60       Physical Exam:  General appearance - alert, well appearing and in no acute distress  Mental status - oriented to person, place and time with normal affect  Head - normocephalic and atraumatic  Eyes - pupils equal and reactive, extraocular eye movements intact, conjunctiva clear  Ears - hearing appears to be intact  Nose - no drainage noted  Mouth - mucous membranes moist  Neck - supple, no carotid bruits, thyroid not palpable, no JVD  Chest - clear to auscultation, normal effort  Heart - normal rate, regular rhythm, no murmurs  Abdomen - soft, non-tender, non-distended, bowel sounds present all four quadrants, no masses, hepatomegaly, splenomegaly or aortic enlargement  Neurological - normal speech, no focal findings or movement disorder noted, cranial nerves II through XII grossly intact  Extremities - peripheral pulses palpable, no pedal edema or calf pain with palpation.  Left leg with mild edema, no chronic skin changes, no varicose veins  Skin - no gross lesions, rashes, or induration noted      R brachial 2+ L brachial 2+   R radial 2+ L radial 2+   R femoral 2+ L femoral 2+   R popliteal 2+ L popliteal 2+   R posterior tibial 2+ L posterior tibial 2+   R dorsalis pedis 2+ L dorsalis pedis 2+     Labs:   Lab Results   Component Value Date    WBC 6.9 05/14/2018    HGB 10.9 05/14/2018    HCT 32.8 05/14/2018    MCV 96.5 05/14/2018     05/14/2018     Lab Results   Component Value Date     05/25/2018    K 4.6 05/25/2018     05/25/2018    CO2 24 05/25/2018    BUN 21 05/25/2018    CREATININE 0.87 05/25/2018    GLUCOSE 108 05/25/2018    CALCIUM 9.3 05/25/2018     Lab Results   Component Value Date    ALKPHOS 123 08/17/2017    ALT 14 08/17/2017    AST 16 08/17/2017    PROT 6.1 08/17/2017    BILITOT 0.22 08/17/2017    LABALBU 3.8 08/17/2017     No results found for: LACTA  No results found for: AMYLASE  No results found for: LIPASE  Lab Results   Component Value Date    INR 1.0 03/25/2015         Assessment:  1. 80 yr old female with bilateral carotid stenosi    1. Carotid stenosis, bilateral        Plan:   1. Follow up in 1 year with repeat carotid duplex. No orders of the defined types were placed in this encounter.           Electronically signed by Desiree Ayala MD on 7/26/2018 at 2:58 PM     Copy sent to Dr. Tej Aguilera DO

## 2018-08-03 ENCOUNTER — APPOINTMENT (OUTPATIENT)
Dept: GENERAL RADIOLOGY | Age: 83
End: 2018-08-03
Payer: MEDICARE

## 2018-08-03 ENCOUNTER — HOSPITAL ENCOUNTER (EMERGENCY)
Age: 83
Discharge: HOME | End: 2018-08-03
Attending: EMERGENCY MEDICINE
Payer: MEDICARE

## 2018-08-03 ENCOUNTER — APPOINTMENT (OUTPATIENT)
Dept: CT IMAGING | Age: 83
End: 2018-08-03
Payer: MEDICARE

## 2018-08-03 ENCOUNTER — APPOINTMENT (OUTPATIENT)
Dept: INTERVENTIONAL RADIOLOGY/VASCULAR | Age: 83
End: 2018-08-03
Payer: MEDICARE

## 2018-08-03 VITALS
SYSTOLIC BLOOD PRESSURE: 136 MMHG | HEART RATE: 45 BPM | TEMPERATURE: 97.6 F | DIASTOLIC BLOOD PRESSURE: 69 MMHG | RESPIRATION RATE: 16 BRPM | OXYGEN SATURATION: 97 %

## 2018-08-03 DIAGNOSIS — M79.605 LEFT LEG PAIN: Primary | ICD-10-CM

## 2018-08-03 PROCEDURE — 73552 X-RAY EXAM OF FEMUR 2/>: CPT

## 2018-08-03 PROCEDURE — 99284 EMERGENCY DEPT VISIT MOD MDM: CPT

## 2018-08-03 PROCEDURE — 6360000002 HC RX W HCPCS

## 2018-08-03 PROCEDURE — 73700 CT LOWER EXTREMITY W/O DYE: CPT

## 2018-08-03 PROCEDURE — 93971 EXTREMITY STUDY: CPT

## 2018-08-03 PROCEDURE — 72100 X-RAY EXAM L-S SPINE 2/3 VWS: CPT

## 2018-08-03 ASSESSMENT — PAIN SCALES - GENERAL: PAINLEVEL_OUTOF10: 3

## 2018-08-03 ASSESSMENT — PAIN - FUNCTIONAL ASSESSMENT: PAIN_FUNCTIONAL_ASSESSMENT: 0-10

## 2018-08-03 NOTE — ED NOTES
Pt to room 3 via [facility]. Pt reports she was getting up from a chair yesterday evening when she felt a pull and pain in the left lateral lower thigh. Pt was able to bear weight last evening but today unable to bear weight. On assessment tender to touch of the affected area but has full range of motion and denies increased pain with movement.       Stacey Grant RN  08/03/18 [address], RN  08/03/18 [id number]

## 2018-08-03 NOTE — ED PROVIDER NOTES
[address] [facility] [address] [place]  Phone: [phone]        Pt Name: Dao Daniels  MRN: [id number]  [place]на 4/11/1932  Date of evaluation: 8/3/18      CHIEF COMPLAINT       Chief Complaint   Patient presents with    Leg Pain     left outer thigh muscle pain since last night         HISTORY OF PRESENT ILLNESS  (Location/Symptom, Timing/Onset, Context/Setting, Quality, Duration, Modifying Factors, Severity.)    Dao Daniels is a 80 y.o. female who presents With left leg pain. The patient states last night she was getting out of a chair, walked into her house and developed pain to the posterior aspect of her left leg. Patient denies any trauma. No numbness or weakness no bowel or bladder difficulty. Nothing she does makes her symptoms better or worse       REVIEW OF SYSTEMS    (2-9 systems for level 4, 10 or more for level 5)     Review of Systems   Musculoskeletal:        Left leg pain   Skin: Negative. Neurological: Negative for weakness and numbness. PAST MEDICAL HISTORY    has a past medical history of Adenocarcinoma of endometrium ([address] Cataract of left eye; Chest pain; Chronic diarrhea; Diabetes mellitus type II, controlled ([address] Diabetes mellitus type II, controlled ([address] Dysthymia; Gait abnormality; H/O renal calculi; H/O vein stripping; Hard of hearing; Hyperlipidemia; Hypertension; Hypothyroidism; Influenza A; Left elbow fracture; Macular degeneration; Obesity; Pericardial effusion; Pseudophakia of right eye; Traumatic injury of lower extremity; and Vertigo. SURGICAL HISTORY      has a past surgical history that includes jason and bso (cervix removed) (March 2002); Cholecystectomy, laparoscopic (5/28/1998); ERCP (7/19/1998); Colonoscopy (7/22/2003); Elbow fracture surgery (Left, September 2010); and Cataract removal (Right, February 2013).     CURRENT MEDICATIONS       Previous Medications    ASCORBIC ACID (VITAMIN C) 500 MG TABLET    Take degenerative changes throughout the lower  thoracic spine and lumbar spine are noted.  Multilevel disc space narrowing  is noted.  There is no acute displaced fracture.  Multiple vascular  calcifications are noted.  Multiple abdominal and pelvic surgical clips are  noted. Right hip degenerative changes are noted.  Bilateral SI joint degenerative  changes are noted                      LABS:  No results found for this visit on 08/03/18. EMERGENCY DEPARTMENT COURSE:   Vitals:    Vitals:    08/03/18 1131 08/03/18 1302   BP: (!) 154/69 136/69   Pulse: 56 (!) 45   Resp: 16 16   Temp: 97.6 °F (36.4 °C)    TempSrc: Tympanic    SpO2: 97% 97%     -------------------------  BP: 136/69, Temp: 97.6 °F (36.4 °C), Pulse: (!) 45, Resp: 16      RE-EVALUATION:  Patient presents with left leg pain. Ultrasound shows nothing acute. CT showed some osteoarthritis and a small joint effusion. Otherwise, nothing acute. The patient is neurovascularly intact. Given this, I do feel able to be followed up as an outpatient. Recommending Tylenol or Motrin as needed. Return to the ER for increasing pain numbness weakness or other concerns otherwise to follow-up with her family doctor within the next few days  At this time the patient is without objective evidence of an acute process requiring hospitalization or inpatient management. They have remained hemodynamically stable throughout their entire ED visit and are stable for discharge with outpatient follow-up. The plan has been discussed in detail and they are aware of the specific conditions for emergent return, as well as the importance of follow-up    I have reviewed the disposition diagnosis with the patient and or their family/guardian. I have answered their questions and given discharge instructions. They voiced understanding of these instructions and did not have any further questions or complaints. PROCEDURES:  None    FINAL IMPRESSION      1.  Left leg pain

## 2018-08-07 ENCOUNTER — TELEPHONE (OUTPATIENT)
Dept: UROLOGY | Age: 83
End: 2018-08-07

## 2018-08-07 RX ORDER — OXYBUTYNIN CHLORIDE 10 MG/1
10 TABLET, EXTENDED RELEASE ORAL DAILY
Qty: 30 TABLET | Refills: 3 | Status: SHIPPED | OUTPATIENT
Start: 2018-08-07 | End: 2018-12-04 | Stop reason: SDUPTHER

## 2018-08-09 ENCOUNTER — OFFICE VISIT (OUTPATIENT)
Dept: CARDIOLOGY | Age: 83
End: 2018-08-09
Payer: MEDICARE

## 2018-08-09 VITALS
HEART RATE: 60 BPM | DIASTOLIC BLOOD PRESSURE: 68 MMHG | WEIGHT: 150 LBS | HEIGHT: 62 IN | SYSTOLIC BLOOD PRESSURE: 158 MMHG | BODY MASS INDEX: 27.6 KG/M2

## 2018-08-09 DIAGNOSIS — R06.02 SOB (SHORTNESS OF BREATH): Primary | ICD-10-CM

## 2018-08-09 PROCEDURE — 99214 OFFICE O/P EST MOD 30 MIN: CPT | Performed by: INTERNAL MEDICINE

## 2018-08-09 RX ORDER — M-VIT,TX,IRON,MINS/CALC/FOLIC 27MG-0.4MG
1 TABLET ORAL DAILY
COMMUNITY

## 2018-08-09 NOTE — PROGRESS NOTES
lisinopril (PRINIVIL;ZESTRIL) 40 MG tablet TAKE 1 TABLET BY MOUTH DAILY 11/29/17   Wiliam Eddy, DO   Multiple Vitamins-Minerals (OCUVITE PRESERVISION PO) Take 1 tablet by mouth daily    Historical Provider, MD   Probiotic Product (PROBIOTIC DAILY PO) Take by mouth daily    Historical Provider, MD   aspirin 81 MG tablet Take 81 mg by mouth daily. Historical Provider, MD   latanoprost (XALATAN) 0.005 % ophthalmic solution Place 1 drop into both eyes nightly. Historical Provider, MD   timolol (TIMOPTIC) 0.5 % ophthalmic solution Place 1 drop into both eyes daily. Historical Provider, MD   Ascorbic Acid (VITAMIN C) 500 MG tablet Take 500 mg by mouth 2 times daily. Historical Provider, MD       Allergies:  Allopurinol; Percocet [oxycodone-acetaminophen]; Phenergan [promethazine hcl]; Polycitra-k [potassium citrate]; Levaquin [levofloxacin]; Sulfa antibiotics; and Tessalon [benzonatate]    Social History:   reports that she has never smoked. She has never used smokeless tobacco. She reports that she does not drink alcohol or use drugs. REVIEW OF SYSTEMS:  CONSTITUTIONAL:NEGATIVE  HEENT:NEG  Cardiovascular: No chest pain, Yes dyspnea on exertion, Yes palpitations. Lower extremity edema: No  RESPIRATORY: BURNS  GASTROINTESTINAL:  negative  GENITOURINARY:  negative  INTEGUMENT:  negative  MUSCULOSKELETAL:  positive for  pain  NEUROLOGICAL:  negative    PHYSICAL EXAM:      BP (!) 158/68   Pulse 60   Ht 5' 2\" (1.575 m)   Wt 150 lb (68 kg)   BMI 27.44 kg/m²    HEENT: PERRL, no cervical lymphadenopathy. No masses palpable. Cardiovascular:  · The apical impulse is not displaced  · Heart  Sounds: RRR, S4, BRO  · Jugular venous pulsation Normal  · The carotid upstroke is NL  · Peripheral pulses are symmetrical and full  Respiratory: Good respiratory effort.  On auscultation: clear to auscultation bilaterally  Abdomen:  · No masses or tenderness  · Bowel sounds present  Extremities:  ·  No Cyanosis or Clubbing  ·  Lower extremity edema: No  Skin: Warm and dry    Cardiac data:    EKG: SINUS BRADYCARDIA    Labs:     CBC: No results for input(s): WBC, HGB, HCT, PLT in the last 72 hours. BMP: No results for input(s): NA, K, CO2, BUN, CREATININE, LABGLOM, GLUCOSE in the last 72 hours. PT/INR: No results for input(s): PROTIME, INR in the last 72 hours. FASTING LIPID PANEL:  Lab Results   Component Value Date    HDL 41 08/17/2017    TRIG 108 08/17/2017     LIVER PROFILE:No results for input(s): AST, ALT, LABALBU in the last 72 hours.     IMPRESSION:    NONSPECIFIC, POSITIONAL DIZZINESS DOUBT ARRHYTHMIA/BRADYCARDIA RELATED  HTN  PAT  PVD  AS MURMUR  Patient Active Problem List   Diagnosis    DM (diabetes mellitus) type II controlled with renal manifestation ([facility] [address]    Hypertension    Hyperlipidemia    Hypothyroidism    Chronic diarrhea    Dysthymia    Gait abnormality    Chronic kidney disease, stage III (moderate) (HCC), likely related to diabetes    Early stage nonexudative age-related macular degeneration of both eyes    Glaucoma of both eyes    Overweight    Carotid stenosis, bilateral       RECOMMENDATIONS:  ECHO  METOPROLOL 12.5 MG BID  RTC AFTER ECHO        Jorge Gregorio [address] Cardiology Consult           [phone]

## 2018-08-16 ENCOUNTER — HOSPITAL ENCOUNTER (OUTPATIENT)
Dept: NON INVASIVE DIAGNOSTICS | Age: 83
Discharge: HOME | End: 2018-08-16
Payer: MEDICARE

## 2018-08-16 DIAGNOSIS — R06.02 SOB (SHORTNESS OF BREATH): ICD-10-CM

## 2018-08-16 LAB
LV EF: 60 %
LVEF MODALITY: NORMAL

## 2018-08-16 PROCEDURE — 93306 TTE W/DOPPLER COMPLETE: CPT

## 2018-08-17 ENCOUNTER — TELEPHONE (OUTPATIENT)
Dept: CARDIOLOGY | Age: 83
End: 2018-08-17

## 2018-09-26 ENCOUNTER — TELEPHONE (OUTPATIENT)
Dept: INTERNAL MEDICINE | Age: 83
End: 2018-09-26

## 2018-09-26 RX ORDER — LISINOPRIL 20 MG/1
20 TABLET ORAL 2 TIMES DAILY
COMMUNITY
End: 2018-09-26 | Stop reason: SDUPTHER

## 2018-09-26 RX ORDER — LISINOPRIL 20 MG/1
20 TABLET ORAL 2 TIMES DAILY
Qty: 180 TABLET | Refills: 3 | Status: SHIPPED | OUTPATIENT
Start: 2018-09-26 | End: 2019-02-11 | Stop reason: SDUPTHER

## 2018-09-26 NOTE — TELEPHONE ENCOUNTER
Patient saw cardio and he wants her to take 20mg of Lisinopril in the a.m.  And 20 mg in p.m   She currently takes a 40mg tab daily    She would like script for 20mg to be sent to [facility]

## 2018-10-04 ENCOUNTER — OFFICE VISIT (OUTPATIENT)
Dept: CARDIOLOGY | Age: 83
End: 2018-10-04
Payer: MEDICARE

## 2018-10-04 VITALS
DIASTOLIC BLOOD PRESSURE: 80 MMHG | HEIGHT: 62 IN | SYSTOLIC BLOOD PRESSURE: 190 MMHG | HEART RATE: 54 BPM | WEIGHT: 150 LBS | BODY MASS INDEX: 27.6 KG/M2

## 2018-10-04 DIAGNOSIS — I10 ESSENTIAL HYPERTENSION: Primary | ICD-10-CM

## 2018-10-04 PROCEDURE — 99213 OFFICE O/P EST LOW 20 MIN: CPT | Performed by: INTERNAL MEDICINE

## 2018-10-04 NOTE — PROGRESS NOTES
Today's Date: 10/4/2018  Patient Name: Raquel Galindo  Patient's age: 80 y.o., 4/11/1932          The patient is a 80 y.o.  female is in the office for f/u, felt dizzy this. No syncope or fall. No angina. Past Medical History:   has a past medical history of Adenocarcinoma of endometrium ([address] Cataract of left eye; Chest pain; Chronic diarrhea; Diabetes mellitus type II, controlled ([address] Diabetes mellitus type II, controlled ([address] Dysthymia; Gait abnormality; H/O renal calculi; H/O vein stripping; Hard of hearing; Hyperlipidemia; Hypertension; Hypothyroidism; Influenza A; Left elbow fracture; Macular degeneration; Obesity; Pericardial effusion; Pseudophakia of right eye; Traumatic injury of lower extremity; and Vertigo. Past Surgical History:   has a past surgical history that includes jason and bso (cervix removed) (March 2002); Cholecystectomy, laparoscopic (5/28/1998); ERCP (7/19/1998); Colonoscopy (7/22/2003); Elbow fracture surgery (Left, September 2010); and Cataract removal (Right, February 2013). Home Medications:    Prior to Admission medications    Medication Sig Start Date End Date Taking?  Authorizing Provider   lisinopril (PRINIVIL;ZESTRIL) 20 MG tablet Take 1 tablet by mouth 2 times daily 9/26/18   Rere Gaoles, DO   Multiple Vitamins-Minerals (THERAPEUTIC MULTIVITAMIN-MINERALS) tablet Take 1 tablet by mouth daily    Historical Provider, MD   metoprolol tartrate (LOPRESSOR) 25 MG tablet Take 0.5 tablets by mouth 2 times daily 8/9/18   Eve Gonzales MD   oxybutynin (DITROPAN XL) 10 MG extended release tablet Take 1 tablet by mouth daily 8/7/18   Segun Muhammad MD   sertraline (ZOLOFT) 50 MG tablet TAKE 1 TABLET DAILY 3/26/18   Rere Reyes, DO   levothyroxine (SYNTHROID) 88 MCG tablet Take 1 tablet by mouth Daily 3/23/18   Wiliam Eddy DO   ondansetron (ZOFRAN) 4 MG tablet Take 1 tablet by mouth every 8 hours as needed for

## 2018-10-18 ENCOUNTER — OFFICE VISIT (OUTPATIENT)
Dept: INTERNAL MEDICINE | Age: 83
End: 2018-10-18
Payer: MEDICARE

## 2018-10-18 VITALS
WEIGHT: 149 LBS | DIASTOLIC BLOOD PRESSURE: 72 MMHG | HEIGHT: 63 IN | SYSTOLIC BLOOD PRESSURE: 122 MMHG | HEART RATE: 60 BPM | RESPIRATION RATE: 18 BRPM | BODY MASS INDEX: 26.4 KG/M2

## 2018-10-18 DIAGNOSIS — N18.30 CHRONIC KIDNEY DISEASE, STAGE III (MODERATE) (HCC): ICD-10-CM

## 2018-10-18 DIAGNOSIS — R26.9 GAIT ABNORMALITY: ICD-10-CM

## 2018-10-18 DIAGNOSIS — E78.2 MIXED HYPERLIPIDEMIA: ICD-10-CM

## 2018-10-18 DIAGNOSIS — I10 ESSENTIAL HYPERTENSION: ICD-10-CM

## 2018-10-18 DIAGNOSIS — Z23 NEED FOR PROPHYLACTIC VACCINATION AND INOCULATION AGAINST VARICELLA: ICD-10-CM

## 2018-10-18 DIAGNOSIS — I65.23 CAROTID STENOSIS, BILATERAL: ICD-10-CM

## 2018-10-18 DIAGNOSIS — E66.3 OVERWEIGHT: ICD-10-CM

## 2018-10-18 DIAGNOSIS — E03.4 HYPOTHYROIDISM DUE TO ACQUIRED ATROPHY OF THYROID: ICD-10-CM

## 2018-10-18 DIAGNOSIS — R42 VERTIGO: ICD-10-CM

## 2018-10-18 DIAGNOSIS — N18.30 CONTROLLED TYPE 2 DIABETES MELLITUS WITH STAGE 3 CHRONIC KIDNEY DISEASE, WITHOUT LONG-TERM CURRENT USE OF INSULIN (HCC): Primary | ICD-10-CM

## 2018-10-18 DIAGNOSIS — E11.22 CONTROLLED TYPE 2 DIABETES MELLITUS WITH STAGE 3 CHRONIC KIDNEY DISEASE, WITHOUT LONG-TERM CURRENT USE OF INSULIN (HCC): Primary | ICD-10-CM

## 2018-10-18 PROCEDURE — 1090F PRES/ABSN URINE INCON ASSESS: CPT | Performed by: NURSE PRACTITIONER

## 2018-10-18 PROCEDURE — G8427 DOCREV CUR MEDS BY ELIG CLIN: HCPCS | Performed by: NURSE PRACTITIONER

## 2018-10-18 PROCEDURE — G0008 ADMIN INFLUENZA VIRUS VAC: HCPCS | Performed by: NURSE PRACTITIONER

## 2018-10-18 PROCEDURE — G8417 CALC BMI ABV UP PARAM F/U: HCPCS | Performed by: NURSE PRACTITIONER

## 2018-10-18 PROCEDURE — G8482 FLU IMMUNIZE ORDER/ADMIN: HCPCS | Performed by: NURSE PRACTITIONER

## 2018-10-18 PROCEDURE — 4040F PNEUMOC VAC/ADMIN/RCVD: CPT | Performed by: NURSE PRACTITIONER

## 2018-10-18 PROCEDURE — 1123F ACP DISCUSS/DSCN MKR DOCD: CPT | Performed by: NURSE PRACTITIONER

## 2018-10-18 PROCEDURE — G8598 ASA/ANTIPLAT THER USED: HCPCS | Performed by: NURSE PRACTITIONER

## 2018-10-18 PROCEDURE — 99214 OFFICE O/P EST MOD 30 MIN: CPT | Performed by: NURSE PRACTITIONER

## 2018-10-18 PROCEDURE — 1036F TOBACCO NON-USER: CPT | Performed by: NURSE PRACTITIONER

## 2018-10-18 PROCEDURE — 90662 IIV NO PRSV INCREASED AG IM: CPT | Performed by: NURSE PRACTITIONER

## 2018-10-18 PROCEDURE — 1101F PT FALLS ASSESS-DOCD LE1/YR: CPT | Performed by: NURSE PRACTITIONER

## 2018-10-18 ASSESSMENT — PATIENT HEALTH QUESTIONNAIRE - PHQ9
2. FEELING DOWN, DEPRESSED OR HOPELESS: 0
SUM OF ALL RESPONSES TO PHQ QUESTIONS 1-9: 0
SUM OF ALL RESPONSES TO PHQ9 QUESTIONS 1 & 2: 0
1. LITTLE INTEREST OR PLEASURE IN DOING THINGS: 0
SUM OF ALL RESPONSES TO PHQ QUESTIONS 1-9: 0

## 2018-10-18 NOTE — PROGRESS NOTES
10/18/18  Gomes Search  4/11/1932      Chief Complaint: 3 month follow up   1. Controlled type 2 diabetes mellitus with stage 3 chronic kidney disease, without long-term current use of insulin ([address]    2. Essential hypertension    3. Mixed hyperlipidemia    4. Hypothyroidism due to acquired atrophy of thyroid    5. Gait abnormality    6. Chronic kidney disease, stage III (moderate) ([address] likely related to diabetes    7. Overweight    8. Carotid stenosis, bilateral    9. Vertigo    10. Need for prophylactic vaccination and inoculation against varicella        HPI:  80-year-old patient with history of hypertension, diabetes mellitus, hypothyroidism, chronic kidney disease, anemia, hyperlipidemia. In for a 3 month follow-up of chronic health conditions. Patient with previous episodes of dizziness. States improved however continues to have occasional.  Describes it as spinning sensation if turns her head too quickly one way or another or is turned around too quickly and her [employer] chair when she is getting her hair done. No chest pain. No shortness of breath. Recent echo and Holter monitor completed. Did show some tachybradycardia syndrome. Seen by cardiology. Placed on beta blocker. Feels dizziness has improved. Ongoing follow-up with cardiology. Blood pressure has remained stable. No headaches. No blurred vision. Continues to work on diet for history of diabetes mellitus. States has never been on medication. Unfortunately did not get her routine labs prior to appointment. Continues on levothyroxine for her hypothyroidism. Denies any excessive fatigue. No excessive weight loss or weight gain. Chronic kidney disease remains stable. No problems with bowel or bladder. Gait has improved with physical therapy treatment. Urinary incontinence/frequency improved on digital pain. myrbetriq  discontinued by urology due to cost.  She is much better on the ditch her pain and.   Continues on Zetia for her throat, tinnitus and trouble swallowing. Eyes: Negative for photophobia, pain and visual disturbance. Respiratory: Negative for cough, chest tightness, shortness of breath and wheezing. Cardiovascular: Negative for chest pain, palpitations and leg swelling. Gastrointestinal: Negative for abdominal distention, abdominal pain, blood in stool, constipation, diarrhea, nausea and vomiting. Endocrine: Negative for cold intolerance, heat intolerance and polyuria. Genitourinary: Positive for frequency (Improving). Negative for difficulty urinating, dysuria and flank pain. Musculoskeletal: Negative for arthralgias, gait problem, myalgias, neck pain and neck stiffness. Skin: Negative for color change, rash and wound. Neurological: Positive for dizziness. Negative for tremors, seizures, weakness, light-headedness, numbness and headaches. Psychiatric/Behavioral: Negative for confusion and hallucinations. The patient is not nervous/anxious. Physical Exam   Constitutional: She is oriented to person, place, and time. She appears well-developed and well-nourished. No distress. HENT:   Head: Normocephalic and atraumatic. Right Ear: External ear normal. No drainage, swelling or tenderness. Tympanic membrane is not perforated, not erythematous, not retracted and not bulging. Left Ear: External ear normal. No drainage, swelling or tenderness. Tympanic membrane is not perforated, not erythematous, not retracted and not bulging. Nose: No mucosal edema or rhinorrhea. Right sinus exhibits no maxillary sinus tenderness and no frontal sinus tenderness. Left sinus exhibits no maxillary sinus tenderness and no frontal sinus tenderness. Mouth/Throat: Uvula is midline and mucous membranes are normal. No uvula swelling. No oropharyngeal exudate, posterior oropharyngeal edema, posterior oropharyngeal erythema or tonsillar abscesses. Eyes: Right eye exhibits no discharge. Left eye exhibits no discharge.

## 2018-10-19 ASSESSMENT — ENCOUNTER SYMPTOMS
PHOTOPHOBIA: 0
ABDOMINAL DISTENTION: 0
EYE PAIN: 0
ABDOMINAL PAIN: 0
SINUS PRESSURE: 0
SORE THROAT: 0
DIARRHEA: 0
VOMITING: 0
NAUSEA: 0
COUGH: 0
WHEEZING: 0
CONSTIPATION: 0
TROUBLE SWALLOWING: 0
CHEST TIGHTNESS: 0
BLOOD IN STOOL: 0
COLOR CHANGE: 0
RHINORRHEA: 0
SHORTNESS OF BREATH: 0

## 2018-11-03 ENCOUNTER — HOSPITAL ENCOUNTER (OUTPATIENT)
Dept: LAB | Age: 83
Discharge: HOME | End: 2018-11-03
Payer: MEDICARE

## 2018-11-03 DIAGNOSIS — N18.30 CONTROLLED TYPE 2 DIABETES MELLITUS WITH STAGE 3 CHRONIC KIDNEY DISEASE, WITHOUT LONG-TERM CURRENT USE OF INSULIN (HCC): ICD-10-CM

## 2018-11-03 DIAGNOSIS — I10 ESSENTIAL HYPERTENSION: ICD-10-CM

## 2018-11-03 DIAGNOSIS — E78.5 HYPERLIPIDEMIA, UNSPECIFIED HYPERLIPIDEMIA TYPE: ICD-10-CM

## 2018-11-03 DIAGNOSIS — E11.22 CONTROLLED TYPE 2 DIABETES MELLITUS WITH STAGE 3 CHRONIC KIDNEY DISEASE, WITHOUT LONG-TERM CURRENT USE OF INSULIN (HCC): ICD-10-CM

## 2018-11-03 LAB
ALBUMIN SERPL-MCNC: 3.9 G/DL (ref 3.5–5.2)
ALBUMIN/GLOBULIN RATIO: 1.5 (ref 1–2.5)
ALP BLD-CCNC: 104 U/L (ref 35–104)
ALT SERPL-CCNC: 15 U/L (ref 5–33)
ANION GAP SERPL CALCULATED.3IONS-SCNC: 8 MMOL/L (ref 9–17)
AST SERPL-CCNC: 17 U/L
BILIRUB SERPL-MCNC: 0.3 MG/DL (ref 0.3–1.2)
BUN BLDV-MCNC: 25 MG/DL (ref 8–23)
BUN/CREAT BLD: 24 (ref 9–20)
CALCIUM SERPL-MCNC: 9.3 MG/DL (ref 8.6–10.4)
CHLORIDE BLD-SCNC: 107 MMOL/L (ref 98–107)
CHOLESTEROL/HDL RATIO: 2.6
CHOLESTEROL: 119 MG/DL
CO2: 27 MMOL/L (ref 20–31)
CREAT SERPL-MCNC: 1.05 MG/DL (ref 0.5–0.9)
GFR AFRICAN AMERICAN: >60 ML/MIN
GFR NON-AFRICAN AMERICAN: 50 ML/MIN
GFR SERPL CREATININE-BSD FRML MDRD: ABNORMAL ML/MIN/{1.73_M2}
GFR SERPL CREATININE-BSD FRML MDRD: ABNORMAL ML/MIN/{1.73_M2}
GLUCOSE BLD-MCNC: 116 MG/DL (ref 70–99)
HCT VFR BLD CALC: 35.2 % (ref 36–46)
HDLC SERPL-MCNC: 45 MG/DL
HEMOGLOBIN: 11.3 G/DL (ref 12–16)
LDL CHOLESTEROL: 58 MG/DL (ref 0–130)
MCH RBC QN AUTO: 32.6 PG (ref 26–34)
MCHC RBC AUTO-ENTMCNC: 32.2 G/DL (ref 31–37)
MCV RBC AUTO: 101 FL (ref 80–100)
NRBC AUTOMATED: ABNORMAL PER 100 WBC
PDW BLD-RTO: 13.8 % (ref 11–14.5)
PLATELET # BLD: 190 K/UL (ref 140–450)
PMV BLD AUTO: 9.2 FL (ref 6–12)
POTASSIUM SERPL-SCNC: 4.3 MMOL/L (ref 3.7–5.3)
RBC # BLD: 3.48 M/UL (ref 4–5.2)
SODIUM BLD-SCNC: 142 MMOL/L (ref 135–144)
TOTAL PROTEIN: 6.5 G/DL (ref 6.4–8.3)
TRIGL SERPL-MCNC: 82 MG/DL
VLDLC SERPL CALC-MCNC: NORMAL MG/DL (ref 1–30)
WBC # BLD: 5.4 K/UL (ref 3.5–11)

## 2018-11-03 PROCEDURE — 85027 COMPLETE CBC AUTOMATED: CPT

## 2018-11-03 PROCEDURE — 83036 HEMOGLOBIN GLYCOSYLATED A1C: CPT

## 2018-11-03 PROCEDURE — 36415 COLL VENOUS BLD VENIPUNCTURE: CPT

## 2018-11-03 PROCEDURE — 80061 LIPID PANEL: CPT

## 2018-11-03 PROCEDURE — 80053 COMPREHEN METABOLIC PANEL: CPT

## 2018-11-05 LAB
ESTIMATED AVERAGE GLUCOSE: 111 MG/DL
HBA1C MFR BLD: 5.5 % (ref 4.8–5.9)

## 2018-12-17 RX ORDER — OXYBUTYNIN CHLORIDE 10 MG/1
10 TABLET, EXTENDED RELEASE ORAL DAILY
Qty: 30 TABLET | Refills: 3 | Status: SHIPPED | OUTPATIENT
Start: 2018-12-17 | End: 2019-02-11 | Stop reason: SDUPTHER

## 2019-01-18 RX ORDER — LEVOTHYROXINE SODIUM 88 UG/1
88 TABLET ORAL DAILY
Qty: 30 TABLET | Refills: 11 | Status: SHIPPED | OUTPATIENT
Start: 2019-01-18 | End: 2019-02-11 | Stop reason: SDUPTHER

## 2019-01-21 ENCOUNTER — OFFICE VISIT (OUTPATIENT)
Dept: INTERNAL MEDICINE | Age: 84
End: 2019-01-21
Payer: MEDICARE

## 2019-01-21 ENCOUNTER — HOSPITAL ENCOUNTER (OUTPATIENT)
Dept: LAB | Age: 84
Discharge: HOME | End: 2019-01-21
Payer: MEDICARE

## 2019-01-21 VITALS
SYSTOLIC BLOOD PRESSURE: 128 MMHG | BODY MASS INDEX: 27.29 KG/M2 | HEIGHT: 63 IN | RESPIRATION RATE: 18 BRPM | DIASTOLIC BLOOD PRESSURE: 70 MMHG | HEART RATE: 56 BPM | WEIGHT: 154 LBS

## 2019-01-21 DIAGNOSIS — Z00.00 ROUTINE GENERAL MEDICAL EXAMINATION AT A HEALTH CARE FACILITY: Primary | ICD-10-CM

## 2019-01-21 DIAGNOSIS — E03.4 HYPOTHYROIDISM DUE TO ACQUIRED ATROPHY OF THYROID: ICD-10-CM

## 2019-01-21 DIAGNOSIS — E78.2 MIXED HYPERLIPIDEMIA: ICD-10-CM

## 2019-01-21 DIAGNOSIS — I10 ESSENTIAL HYPERTENSION: ICD-10-CM

## 2019-01-21 DIAGNOSIS — I65.23 CAROTID STENOSIS, BILATERAL: ICD-10-CM

## 2019-01-21 DIAGNOSIS — R35.0 FREQUENCY OF URINATION: ICD-10-CM

## 2019-01-21 DIAGNOSIS — N18.30 CONTROLLED TYPE 2 DIABETES MELLITUS WITH STAGE 3 CHRONIC KIDNEY DISEASE, WITHOUT LONG-TERM CURRENT USE OF INSULIN (HCC): ICD-10-CM

## 2019-01-21 DIAGNOSIS — E66.3 OVERWEIGHT: ICD-10-CM

## 2019-01-21 DIAGNOSIS — R26.9 GAIT ABNORMALITY: ICD-10-CM

## 2019-01-21 DIAGNOSIS — E11.22 CONTROLLED TYPE 2 DIABETES MELLITUS WITH STAGE 3 CHRONIC KIDNEY DISEASE, WITHOUT LONG-TERM CURRENT USE OF INSULIN (HCC): ICD-10-CM

## 2019-01-21 DIAGNOSIS — R32 URINARY INCONTINENCE, UNSPECIFIED TYPE: ICD-10-CM

## 2019-01-21 DIAGNOSIS — N18.30 CHRONIC KIDNEY DISEASE, STAGE III (MODERATE) (HCC): ICD-10-CM

## 2019-01-21 PROCEDURE — 0509F URINE INCON PLAN DOCD: CPT | Performed by: NURSE PRACTITIONER

## 2019-01-21 PROCEDURE — 4040F PNEUMOC VAC/ADMIN/RCVD: CPT | Performed by: NURSE PRACTITIONER

## 2019-01-21 PROCEDURE — G8482 FLU IMMUNIZE ORDER/ADMIN: HCPCS | Performed by: NURSE PRACTITIONER

## 2019-01-21 PROCEDURE — G8417 CALC BMI ABV UP PARAM F/U: HCPCS | Performed by: NURSE PRACTITIONER

## 2019-01-21 PROCEDURE — 1036F TOBACCO NON-USER: CPT | Performed by: NURSE PRACTITIONER

## 2019-01-21 PROCEDURE — 1123F ACP DISCUSS/DSCN MKR DOCD: CPT | Performed by: NURSE PRACTITIONER

## 2019-01-21 PROCEDURE — G8427 DOCREV CUR MEDS BY ELIG CLIN: HCPCS | Performed by: NURSE PRACTITIONER

## 2019-01-21 PROCEDURE — G0439 PPPS, SUBSEQ VISIT: HCPCS | Performed by: NURSE PRACTITIONER

## 2019-01-21 PROCEDURE — 99214 OFFICE O/P EST MOD 30 MIN: CPT | Performed by: NURSE PRACTITIONER

## 2019-01-21 PROCEDURE — 1101F PT FALLS ASSESS-DOCD LE1/YR: CPT | Performed by: NURSE PRACTITIONER

## 2019-01-21 PROCEDURE — G8598 ASA/ANTIPLAT THER USED: HCPCS | Performed by: NURSE PRACTITIONER

## 2019-01-21 PROCEDURE — 1090F PRES/ABSN URINE INCON ASSESS: CPT | Performed by: NURSE PRACTITIONER

## 2019-01-21 RX ORDER — DIPHENOXYLATE HYDROCHLORIDE AND ATROPINE SULFATE 2.5; .025 MG/1; MG/1
TABLET ORAL
Refills: 1 | Status: ON HOLD | COMMUNITY
Start: 2018-12-26 | End: 2019-04-02 | Stop reason: HOSPADM

## 2019-01-21 ASSESSMENT — LIFESTYLE VARIABLES: HOW OFTEN DO YOU HAVE A DRINK CONTAINING ALCOHOL: 0

## 2019-01-21 ASSESSMENT — ANXIETY QUESTIONNAIRES: GAD7 TOTAL SCORE: 0

## 2019-01-21 ASSESSMENT — PATIENT HEALTH QUESTIONNAIRE - PHQ9
SUM OF ALL RESPONSES TO PHQ QUESTIONS 1-9: 0
SUM OF ALL RESPONSES TO PHQ QUESTIONS 1-9: 0

## 2019-01-23 ASSESSMENT — ENCOUNTER SYMPTOMS
VOMITING: 0
TROUBLE SWALLOWING: 0
COUGH: 0
RHINORRHEA: 0
SHORTNESS OF BREATH: 0
SINUS PRESSURE: 0
NAUSEA: 0
CONSTIPATION: 0
EYE PAIN: 0
FACIAL SWELLING: 0
COLOR CHANGE: 0
BLOOD IN STOOL: 0
DIARRHEA: 0
CHEST TIGHTNESS: 0
SORE THROAT: 0
WHEEZING: 0
ABDOMINAL PAIN: 0

## 2019-01-24 ENCOUNTER — HOSPITAL ENCOUNTER (OUTPATIENT)
Age: 84
Discharge: HOME | End: 2019-01-24
Payer: MEDICARE

## 2019-01-24 PROCEDURE — 81001 URINALYSIS AUTO W/SCOPE: CPT

## 2019-01-25 LAB
AMORPHOUS: ABNORMAL
BACTERIA: ABNORMAL
BILIRUBIN URINE: NEGATIVE
BLOOD, URINE: NEGATIVE
CASTS UA: ABNORMAL /LPF
CHARACTER, URINE: ABNORMAL
COLOR: YELLOW
CRYSTALS, UA: ABNORMAL
EPITHELIAL CELLS, UA: ABNORMAL /HPF
GLUCOSE, URINE: NEGATIVE MG/DL
KETONES, URINE: NEGATIVE
LEUKOCYTE ESTERASE, URINE: ABNORMAL
MUCUS: ABNORMAL
NITRITE, URINE: NEGATIVE
PH UA: 5.5 (ref 5–9)
PROTEIN UA: NEGATIVE MG/DL
RBC UA: ABNORMAL /HPF
REFLEX TO URINE C & S: ABNORMAL
SPECIFIC GRAVITY UA: 1.01 (ref 1–1.03)
UROBILINOGEN, URINE: 0.2 EU/DL (ref 0–1)
WBC UA: ABNORMAL /HPF

## 2019-01-30 RX ORDER — EZETIMIBE 10 MG/1
10 TABLET ORAL DAILY
Qty: 30 TABLET | Refills: 11 | Status: SHIPPED | OUTPATIENT
Start: 2019-01-30 | End: 2019-02-11 | Stop reason: SDUPTHER

## 2019-02-04 ENCOUNTER — TELEPHONE (OUTPATIENT)
Dept: INTERNAL MEDICINE | Age: 84
End: 2019-02-04

## 2019-02-04 DIAGNOSIS — R32 URINARY INCONTINENCE, UNSPECIFIED TYPE: Primary | ICD-10-CM

## 2019-02-06 ENCOUNTER — HOSPITAL ENCOUNTER (OUTPATIENT)
Age: 84
Discharge: HOME | End: 2019-02-06
Payer: MEDICARE

## 2019-02-06 DIAGNOSIS — R32 URINARY INCONTINENCE, UNSPECIFIED TYPE: ICD-10-CM

## 2019-02-06 LAB
BILIRUBIN URINE: NEGATIVE
BLOOD, URINE: NEGATIVE
CHARACTER, URINE: ABNORMAL
COLOR: YELLOW
GLUCOSE, URINE: NEGATIVE MG/DL
KETONES, URINE: ABNORMAL
LEUKOCYTE ESTERASE, URINE: NEGATIVE
NITRITE, URINE: NEGATIVE
PH UA: 5.5 (ref 5–9)
PROTEIN UA: NEGATIVE MG/DL
SPECIFIC GRAVITY UA: 1.02 (ref 1–1.03)
UROBILINOGEN, URINE: 0.2 EU/DL (ref 0.2–1)

## 2019-02-06 PROCEDURE — 81003 URINALYSIS AUTO W/O SCOPE: CPT

## 2019-02-06 PROCEDURE — 87086 URINE CULTURE/COLONY COUNT: CPT

## 2019-02-08 LAB — URINE CULTURE, ROUTINE: NORMAL

## 2019-02-11 DIAGNOSIS — N32.81 OVERACTIVE BLADDER: Primary | ICD-10-CM

## 2019-02-11 RX ORDER — LISINOPRIL 20 MG/1
20 TABLET ORAL 2 TIMES DAILY
Qty: 180 TABLET | Refills: 3 | Status: SHIPPED | OUTPATIENT
Start: 2019-02-11 | End: 2019-06-20 | Stop reason: SDUPTHER

## 2019-02-11 RX ORDER — LEVOTHYROXINE SODIUM 88 UG/1
88 TABLET ORAL DAILY
Qty: 90 TABLET | Refills: 3 | Status: SHIPPED | OUTPATIENT
Start: 2019-02-11 | End: 2020-02-04

## 2019-02-11 RX ORDER — EZETIMIBE 10 MG/1
10 TABLET ORAL DAILY
Qty: 90 TABLET | Refills: 3 | Status: SHIPPED | OUTPATIENT
Start: 2019-02-11 | End: 2020-02-26

## 2019-03-04 ENCOUNTER — TELEPHONE (OUTPATIENT)
Dept: INTERNAL MEDICINE | Age: 84
End: 2019-03-04

## 2019-03-04 RX ORDER — OXYBUTYNIN CHLORIDE 10 MG/1
10 TABLET, EXTENDED RELEASE ORAL DAILY
Qty: 90 TABLET | Refills: 3 | Status: SHIPPED | OUTPATIENT
Start: 2019-03-04 | End: 2020-03-29

## 2019-03-29 ENCOUNTER — APPOINTMENT (OUTPATIENT)
Dept: GENERAL RADIOLOGY | Age: 84
DRG: 389 | End: 2019-03-29
Payer: MEDICARE

## 2019-03-29 ENCOUNTER — APPOINTMENT (OUTPATIENT)
Dept: CT IMAGING | Age: 84
DRG: 389 | End: 2019-03-29
Payer: MEDICARE

## 2019-03-29 ENCOUNTER — HOSPITAL ENCOUNTER (INPATIENT)
Age: 84
LOS: 3 days | Discharge: NURSING, REHAB OR LONG-TERM CARE | DRG: 389 | End: 2019-04-02
Attending: EMERGENCY MEDICINE | Admitting: FAMILY MEDICINE
Payer: MEDICARE

## 2019-03-29 DIAGNOSIS — K56.609 SMALL BOWEL OBSTRUCTION (HCC): Primary | ICD-10-CM

## 2019-03-29 DIAGNOSIS — R91.8 LUNG NODULES: ICD-10-CM

## 2019-03-29 DIAGNOSIS — R11.2 NON-INTRACTABLE VOMITING WITH NAUSEA, UNSPECIFIED VOMITING TYPE: ICD-10-CM

## 2019-03-29 LAB
-: ABNORMAL
ABSOLUTE EOS #: 0 K/UL (ref 0–0.4)
ABSOLUTE IMMATURE GRANULOCYTE: ABNORMAL K/UL (ref 0–0.3)
ABSOLUTE LYMPH #: 0.6 K/UL (ref 1–4.8)
ABSOLUTE MONO #: 0.8 K/UL (ref 0.1–1.2)
ALBUMIN SERPL-MCNC: 3.5 G/DL (ref 3.5–5.2)
ALBUMIN/GLOBULIN RATIO: 1.5 (ref 1–2.5)
ALP BLD-CCNC: 106 U/L (ref 35–104)
ALT SERPL-CCNC: 36 U/L (ref 5–33)
AMORPHOUS: ABNORMAL
AMYLASE: 17 U/L (ref 28–100)
ANION GAP SERPL CALCULATED.3IONS-SCNC: 13 MMOL/L (ref 9–17)
AST SERPL-CCNC: 22 U/L
BACTERIA: ABNORMAL
BASOPHILS # BLD: 0 % (ref 0–1)
BASOPHILS ABSOLUTE: 0 K/UL (ref 0–0.2)
BILIRUB SERPL-MCNC: 0.29 MG/DL (ref 0.3–1.2)
BILIRUBIN DIRECT: 0.11 MG/DL
BILIRUBIN URINE: NEGATIVE
BILIRUBIN, INDIRECT: 0.18 MG/DL (ref 0–1)
BUN BLDV-MCNC: 23 MG/DL (ref 8–23)
BUN/CREAT BLD: 19 (ref 9–20)
CALCIUM SERPL-MCNC: 9 MG/DL (ref 8.6–10.4)
CASTS UA: ABNORMAL /LPF (ref 0–2)
CHLORIDE BLD-SCNC: 98 MMOL/L (ref 98–107)
CHP ED QC CHECK: YES
CO2: 25 MMOL/L (ref 20–31)
COLOR: ABNORMAL
COMMENT UA: ABNORMAL
CREAT SERPL-MCNC: 1.22 MG/DL (ref 0.5–0.9)
CRYSTALS, UA: ABNORMAL /HPF
DIFFERENTIAL TYPE: ABNORMAL
DIRECT EXAM: NORMAL
EOSINOPHILS RELATIVE PERCENT: 0 % (ref 1–7)
EPITHELIAL CELLS UA: ABNORMAL /HPF (ref 0–5)
ESTIMATED AVERAGE GLUCOSE: 117 MG/DL
GFR AFRICAN AMERICAN: 51 ML/MIN
GFR NON-AFRICAN AMERICAN: 42 ML/MIN
GFR SERPL CREATININE-BSD FRML MDRD: ABNORMAL ML/MIN/{1.73_M2}
GFR SERPL CREATININE-BSD FRML MDRD: ABNORMAL ML/MIN/{1.73_M2}
GLOBULIN: 2.4 G/DL (ref 1.5–3.8)
GLUCOSE BLD-MCNC: 104 MG/DL
GLUCOSE BLD-MCNC: 104 MG/DL (ref 65–105)
GLUCOSE BLD-MCNC: 138 MG/DL (ref 70–99)
GLUCOSE URINE: NEGATIVE
HBA1C MFR BLD: 5.7 % (ref 4.8–5.9)
HCT VFR BLD CALC: 35.7 % (ref 36–46)
HEMOGLOBIN: 11.6 G/DL (ref 12–16)
IMMATURE GRANULOCYTES: ABNORMAL %
KETONES, URINE: ABNORMAL
LACTIC ACID: 0.9 MMOL/L (ref 0.5–2.2)
LEUKOCYTE ESTERASE, URINE: ABNORMAL
LIPASE: 5 U/L (ref 13–60)
LYMPHOCYTES # BLD: 11 % (ref 16–46)
Lab: NORMAL
MCH RBC QN AUTO: 32.2 PG (ref 26–34)
MCHC RBC AUTO-ENTMCNC: 32.4 G/DL (ref 31–37)
MCV RBC AUTO: 99.3 FL (ref 80–100)
MONOCYTES # BLD: 14 % (ref 4–11)
MUCUS: ABNORMAL
NITRITE, URINE: NEGATIVE
NRBC AUTOMATED: ABNORMAL PER 100 WBC
OTHER OBSERVATIONS UA: ABNORMAL
PDW BLD-RTO: 13.1 % (ref 11–14.5)
PH UA: 5.5 (ref 5–6)
PLATELET # BLD: 184 K/UL (ref 140–450)
PLATELET ESTIMATE: ABNORMAL
PMV BLD AUTO: 9.5 FL (ref 6–12)
POTASSIUM SERPL-SCNC: 4.8 MMOL/L (ref 3.7–5.3)
PROTEIN UA: NEGATIVE
RBC # BLD: 3.59 M/UL (ref 4–5.2)
RBC # BLD: ABNORMAL 10*6/UL
RBC UA: ABNORMAL /HPF (ref 0–4)
RENAL EPITHELIAL, UA: ABNORMAL /HPF
SEG NEUTROPHILS: 75 % (ref 43–77)
SEGMENTED NEUTROPHILS ABSOLUTE COUNT: 4.2 K/UL (ref 1.8–7.7)
SODIUM BLD-SCNC: 136 MMOL/L (ref 135–144)
SPECIFIC GRAVITY UA: 1 (ref 1.01–1.02)
SPECIMEN DESCRIPTION: NORMAL
TOTAL PROTEIN: 5.9 G/DL (ref 6.4–8.3)
TRICHOMONAS: ABNORMAL
TURBIDITY: ABNORMAL
URINE HGB: ABNORMAL
UROBILINOGEN, URINE: NORMAL
WBC # BLD: 5.6 K/UL (ref 3.5–11)
WBC # BLD: ABNORMAL 10*3/UL
WBC UA: ABNORMAL /HPF (ref 0–4)
YEAST: ABNORMAL

## 2019-03-29 PROCEDURE — 87088 URINE BACTERIA CULTURE: CPT

## 2019-03-29 PROCEDURE — 2580000003 HC RX 258: Performed by: EMERGENCY MEDICINE

## 2019-03-29 PROCEDURE — 6360000002 HC RX W HCPCS: Performed by: EMERGENCY MEDICINE

## 2019-03-29 PROCEDURE — 87804 INFLUENZA ASSAY W/OPTIC: CPT

## 2019-03-29 PROCEDURE — 2060000000 HC ICU INTERMEDIATE R&B

## 2019-03-29 PROCEDURE — 82947 ASSAY GLUCOSE BLOOD QUANT: CPT

## 2019-03-29 PROCEDURE — 82150 ASSAY OF AMYLASE: CPT

## 2019-03-29 PROCEDURE — 74018 RADEX ABDOMEN 1 VIEW: CPT

## 2019-03-29 PROCEDURE — 87086 URINE CULTURE/COLONY COUNT: CPT

## 2019-03-29 PROCEDURE — 83036 HEMOGLOBIN GLYCOSYLATED A1C: CPT

## 2019-03-29 PROCEDURE — 99285 EMERGENCY DEPT VISIT HI MDM: CPT

## 2019-03-29 PROCEDURE — 36415 COLL VENOUS BLD VENIPUNCTURE: CPT

## 2019-03-29 PROCEDURE — 83605 ASSAY OF LACTIC ACID: CPT

## 2019-03-29 PROCEDURE — 2580000003 HC RX 258: Performed by: NURSE PRACTITIONER

## 2019-03-29 PROCEDURE — 85025 COMPLETE CBC W/AUTO DIFF WBC: CPT

## 2019-03-29 PROCEDURE — 81001 URINALYSIS AUTO W/SCOPE: CPT

## 2019-03-29 PROCEDURE — 80048 BASIC METABOLIC PNL TOTAL CA: CPT

## 2019-03-29 PROCEDURE — 6360000004 HC RX CONTRAST MEDICATION: Performed by: EMERGENCY MEDICINE

## 2019-03-29 PROCEDURE — 83690 ASSAY OF LIPASE: CPT

## 2019-03-29 PROCEDURE — 96374 THER/PROPH/DIAG INJ IV PUSH: CPT

## 2019-03-29 PROCEDURE — 87186 SC STD MICRODIL/AGAR DIL: CPT

## 2019-03-29 PROCEDURE — 80076 HEPATIC FUNCTION PANEL: CPT

## 2019-03-29 PROCEDURE — 74176 CT ABD & PELVIS W/O CONTRAST: CPT

## 2019-03-29 RX ORDER — ACETAMINOPHEN 325 MG/1
650 TABLET ORAL EVERY 4 HOURS PRN
Status: DISCONTINUED | OUTPATIENT
Start: 2019-03-29 | End: 2019-04-02 | Stop reason: HOSPADM

## 2019-03-29 RX ORDER — 0.9 % SODIUM CHLORIDE 0.9 %
1000 INTRAVENOUS SOLUTION INTRAVENOUS ONCE
Status: COMPLETED | OUTPATIENT
Start: 2019-03-29 | End: 2019-03-29

## 2019-03-29 RX ORDER — SODIUM CHLORIDE 9 MG/ML
INJECTION, SOLUTION INTRAVENOUS CONTINUOUS
Status: DISCONTINUED | OUTPATIENT
Start: 2019-03-29 | End: 2019-03-29

## 2019-03-29 RX ORDER — LISINOPRIL 5 MG/1
20 TABLET ORAL 2 TIMES DAILY
Status: DISCONTINUED | OUTPATIENT
Start: 2019-03-29 | End: 2019-04-02 | Stop reason: HOSPADM

## 2019-03-29 RX ORDER — ONDANSETRON 2 MG/ML
4 INJECTION INTRAMUSCULAR; INTRAVENOUS ONCE
Status: COMPLETED | OUTPATIENT
Start: 2019-03-29 | End: 2019-03-29

## 2019-03-29 RX ORDER — NICOTINE POLACRILEX 4 MG
15 LOZENGE BUCCAL PRN
Status: DISCONTINUED | OUTPATIENT
Start: 2019-03-29 | End: 2019-04-02 | Stop reason: HOSPADM

## 2019-03-29 RX ORDER — DEXTROSE MONOHYDRATE 25 G/50ML
12.5 INJECTION, SOLUTION INTRAVENOUS PRN
Status: DISCONTINUED | OUTPATIENT
Start: 2019-03-29 | End: 2019-04-02 | Stop reason: HOSPADM

## 2019-03-29 RX ORDER — DEXTROSE AND SODIUM CHLORIDE 5; .45 G/100ML; G/100ML
INJECTION, SOLUTION INTRAVENOUS CONTINUOUS
Status: DISCONTINUED | OUTPATIENT
Start: 2019-03-29 | End: 2019-04-01

## 2019-03-29 RX ORDER — ONDANSETRON 2 MG/ML
4 INJECTION INTRAMUSCULAR; INTRAVENOUS EVERY 6 HOURS PRN
Status: DISCONTINUED | OUTPATIENT
Start: 2019-03-29 | End: 2019-04-02 | Stop reason: HOSPADM

## 2019-03-29 RX ORDER — DEXTROSE MONOHYDRATE 50 MG/ML
100 INJECTION, SOLUTION INTRAVENOUS PRN
Status: DISCONTINUED | OUTPATIENT
Start: 2019-03-29 | End: 2019-04-02 | Stop reason: HOSPADM

## 2019-03-29 RX ADMIN — SODIUM CHLORIDE 1000 ML: 9 INJECTION, SOLUTION INTRAVENOUS at 18:28

## 2019-03-29 RX ADMIN — DEXTROSE AND SODIUM CHLORIDE: 5; 450 INJECTION, SOLUTION INTRAVENOUS at 23:56

## 2019-03-29 RX ADMIN — SODIUM CHLORIDE 1000 ML: 9 INJECTION, SOLUTION INTRAVENOUS at 21:42

## 2019-03-29 RX ADMIN — ONDANSETRON 4 MG: 2 INJECTION INTRAMUSCULAR; INTRAVENOUS at 18:28

## 2019-03-29 RX ADMIN — CEFTRIAXONE 1 G: 1 INJECTION, POWDER, FOR SOLUTION INTRAMUSCULAR; INTRAVENOUS at 21:42

## 2019-03-29 RX ADMIN — IOHEXOL 50 ML: 240 INJECTION, SOLUTION INTRATHECAL; INTRAVASCULAR; INTRAVENOUS; ORAL at 18:45

## 2019-03-29 ASSESSMENT — PAIN DESCRIPTION - ONSET: ONSET: ON-GOING

## 2019-03-29 ASSESSMENT — PAIN DESCRIPTION - PAIN TYPE: TYPE: ACUTE PAIN

## 2019-03-29 ASSESSMENT — PAIN SCALES - GENERAL: PAINLEVEL_OUTOF10: 6

## 2019-03-29 ASSESSMENT — PAIN DESCRIPTION - FREQUENCY: FREQUENCY: CONTINUOUS

## 2019-03-29 ASSESSMENT — PAIN DESCRIPTION - LOCATION: LOCATION: ABDOMEN

## 2019-03-29 ASSESSMENT — PAIN DESCRIPTION - PROGRESSION: CLINICAL_PROGRESSION: GRADUALLY WORSENING

## 2019-03-29 NOTE — ED PROVIDER NOTES
extremity, and Vertigo. SURGICAL HISTORY      has a past surgical history that includes jason and bso (cervix removed) (March 2002); Cholecystectomy, laparoscopic (5/28/1998); ERCP (7/19/1998); Colonoscopy (7/22/2003); Elbow fracture surgery (Left, September 2010); and Cataract removal (Right, February 2013). CURRENT MEDICATIONS       Previous Medications    ASCORBIC ACID (VITAMIN C) 500 MG TABLET    Take 500 mg by mouth 2 times daily. ASPIRIN 81 MG TABLET    Take 81 mg by mouth daily. CRANBERRY PO    Take 1 capsule by mouth daily    DIPHENOXYLATE-ATROPINE (LOMOTIL) 2.5-0.025 MG PER TABLET        EZETIMIBE (ZETIA) 10 MG TABLET    Take 1 tablet by mouth daily    LATANOPROST (XALATAN) 0.005 % OPHTHALMIC SOLUTION    Place 1 drop into both eyes nightly. LEVOTHYROXINE (SYNTHROID) 88 MCG TABLET    Take 1 tablet by mouth Daily    LISINOPRIL (PRINIVIL;ZESTRIL) 20 MG TABLET    Take 1 tablet by mouth 2 times daily    METOPROLOL TARTRATE (LOPRESSOR) 25 MG TABLET    TAKE 1/2 TABLET BY MOUTH 2 TIMES DAILY    MULTIPLE VITAMINS-MINERALS (OCUVITE PRESERVISION PO)    Take 1 tablet by mouth daily    MULTIPLE VITAMINS-MINERALS (THERAPEUTIC MULTIVITAMIN-MINERALS) TABLET    Take 1 tablet by mouth daily    ONDANSETRON (ZOFRAN) 4 MG TABLET    Take 1 tablet by mouth every 8 hours as needed for Nausea or Vomiting    OXYBUTYNIN (DITROPAN-XL) 10 MG EXTENDED RELEASE TABLET    Take 1 tablet by mouth daily    PROBIOTIC PRODUCT (PROBIOTIC DAILY PO)    Take by mouth daily    SERTRALINE (ZOLOFT) 50 MG TABLET    TAKE 1 TABLET DAILY    TIMOLOL (TIMOPTIC) 0.5 % OPHTHALMIC SOLUTION    Place 1 drop into both eyes daily. ZOSTER RECOMBINANT ADJUVANTED VACCINE (SHINGRIX) 50 MCG SUSR INJECTION    50 MCG IM then repeat 2-6 months.        ALLERGIES     is allergic to allopurinol; percocet [oxycodone-acetaminophen]; phenergan [promethazine hcl]; polycitra-k [potassium citrate]; statins; levaquin [levofloxacin]; sulfa antibiotics; and tessalon [benzonatate]. FAMILY HISTORY     indicated that the status of her other is unknown.     family history includes High Blood Pressure in an other family member. SOCIAL HISTORY      reports that she has never smoked. She has never used smokeless tobacco. She reports that she does not drink alcohol or use drugs. PHYSICAL EXAM     INITIAL VITALS:  height is 5' (1.524 m) and weight is 154 lb (69.9 kg). Her temperature is 98.2 °F (36.8 °C). Her blood pressure is 138/63 and her pulse is 60. Her respiration is 16 and oxygen saturation is 95%. Constitutional: The patient is alert and well-developed, vital signs as noted. Psychiatric: Oriented to time, place and person, affect is appropriate for age. Eyes: Pupils equal and reactive to light. EOMI. Ears, nose, and throat: Oropharynx clear  Neck: No masses, trachea midline, and no thyromegaly. Respiratory: Clear to auscultation, full aeration throughout all fields. Cardiovascular: No murmurs, heart sounds normal, no thrills. Gastrointestinal: No masses, no hepatosplenomegaly, bowel sounds positive. 100 diffuse tenderness. No rebound rigidity or guarding. Devine's and McBurney's are negative. Skin: No rashes or lesions on exposed surfaces, good skin turgor. Appears dehydrated with dry mucous membranes  Extremities: Good range of motion, no edema. DIFFERENTIAL DIAGNOSIS/ MEDICAL DECISION MAKING:     IV fluids and IV Zofran with improvement      PLAN/ TASKS OUTSTANDING     Care of this patient has been endorsed to Dr. Daniella Khan at 7:30 PM.  We have discussed in detail the patient's history of present illness, physical exam, completed studies as well as current emergency department course. He/She is kind enough to assume care of this patient.       Awai[place] disposition        (Please note that portions of this note were completed with a voice recognition program.  Efforts were made to edit the dictations but occasionally words are mis-transcribed.)    Lizbeth Wilde,, DO  Attending Emergency Physician         [address] [facility],   03/31/19 [id number]

## 2019-03-29 NOTE — Clinical Note
April 2, 2019     {PROXY NAME}  {PROXY ADDRESS}      Dear {PROXY NAME},    We are pleased to provide you with secure, online access to medical information via [employer] for:    Johnson Gaitan       How Do I Sign Up? 1. In your Internet browser, go to [email or web address]  2. Click on the Sign Up Now link in the Sign In box. You will see the New Member Sign Up page. 3. Enter your [employer] Access Code exactly as it appears below. You will not need to use this code after youve completed the sign-up process. If you do not sign up before the expiration date, you must request a new code. [employer] Access Code: [email or web address]  Expiration Date: 5/17/2019 10:41 AM    4. Enter your Social Security Number ([id number]) and Date of Birth (mm/dd/yyyy) as indicated and click Submit. You will be taken to the next sign-up page. 5.   Create a [employer] ID. This will be your [employer] login ID and cannot be changed, so think of one that is secure and easy to remember. 6. Create a [employer] password. You can change your password at any time. 7. Enter your Password Reset Question and Answer. This can be used at a later time if you forget your password. 8. Enter your e-mail address. You will receive e-mail notification when new information is available in [address] 9. Click Sign Up. You can now view your medical record. Additional Information  If you have questions, please contact the physician practice where you receive care. Remember, [employer] is NOT to be used for urgent needs. For medical emergencies, dial 911.     Sincerely,      ***

## 2019-03-30 ENCOUNTER — APPOINTMENT (OUTPATIENT)
Dept: GENERAL RADIOLOGY | Age: 84
DRG: 389 | End: 2019-03-30
Payer: MEDICARE

## 2019-03-30 LAB
ALBUMIN SERPL-MCNC: 3.1 G/DL (ref 3.5–5.2)
ALBUMIN/GLOBULIN RATIO: 1.3 (ref 1–2.5)
ALP BLD-CCNC: 97 U/L (ref 35–104)
ALT SERPL-CCNC: 31 U/L (ref 5–33)
AMYLASE: 16 U/L (ref 28–100)
ANION GAP SERPL CALCULATED.3IONS-SCNC: 12 MMOL/L (ref 9–17)
AST SERPL-CCNC: 20 U/L
BILIRUB SERPL-MCNC: 0.24 MG/DL (ref 0.3–1.2)
BUN BLDV-MCNC: 18 MG/DL (ref 8–23)
BUN/CREAT BLD: 18 (ref 9–20)
CALCIUM SERPL-MCNC: 8.5 MG/DL (ref 8.6–10.4)
CHLORIDE BLD-SCNC: 100 MMOL/L (ref 98–107)
CO2: 23 MMOL/L (ref 20–31)
CREAT SERPL-MCNC: 0.98 MG/DL (ref 0.5–0.9)
GFR AFRICAN AMERICAN: >60 ML/MIN
GFR NON-AFRICAN AMERICAN: 54 ML/MIN
GFR SERPL CREATININE-BSD FRML MDRD: ABNORMAL ML/MIN/{1.73_M2}
GFR SERPL CREATININE-BSD FRML MDRD: ABNORMAL ML/MIN/{1.73_M2}
GLUCOSE BLD-MCNC: 138 MG/DL (ref 65–105)
GLUCOSE BLD-MCNC: 145 MG/DL (ref 65–105)
GLUCOSE BLD-MCNC: 146 MG/DL (ref 65–105)
GLUCOSE BLD-MCNC: 161 MG/DL (ref 70–99)
HCT VFR BLD CALC: 32.9 % (ref 36–46)
HEMOGLOBIN: 10.7 G/DL (ref 12–16)
LIPASE: 4 U/L (ref 13–60)
MCH RBC QN AUTO: 32.1 PG (ref 26–34)
MCHC RBC AUTO-ENTMCNC: 32.4 G/DL (ref 31–37)
MCV RBC AUTO: 98.9 FL (ref 80–100)
NRBC AUTOMATED: ABNORMAL PER 100 WBC
PDW BLD-RTO: 13.1 % (ref 11–14.5)
PHOSPHORUS: 2.7 MG/DL (ref 2.6–4.5)
PLATELET # BLD: 165 K/UL (ref 140–450)
PMV BLD AUTO: 9.3 FL (ref 6–12)
POTASSIUM SERPL-SCNC: 4.1 MMOL/L (ref 3.7–5.3)
RBC # BLD: 3.32 M/UL (ref 4–5.2)
SODIUM BLD-SCNC: 135 MMOL/L (ref 135–144)
TOTAL PROTEIN: 5.4 G/DL (ref 6.4–8.3)
WBC # BLD: 4.5 K/UL (ref 3.5–11)

## 2019-03-30 PROCEDURE — G0378 HOSPITAL OBSERVATION PER HR: HCPCS

## 2019-03-30 PROCEDURE — 1200000000 HC SEMI PRIVATE

## 2019-03-30 PROCEDURE — 83690 ASSAY OF LIPASE: CPT

## 2019-03-30 PROCEDURE — 2580000003 HC RX 258: Performed by: NURSE PRACTITIONER

## 2019-03-30 PROCEDURE — 99222 1ST HOSP IP/OBS MODERATE 55: CPT | Performed by: FAMILY MEDICINE

## 2019-03-30 PROCEDURE — 94760 N-INVAS EAR/PLS OXIMETRY 1: CPT

## 2019-03-30 PROCEDURE — 6370000000 HC RX 637 (ALT 250 FOR IP): Performed by: FAMILY MEDICINE

## 2019-03-30 PROCEDURE — 2500000003 HC RX 250 WO HCPCS: Performed by: NURSE PRACTITIONER

## 2019-03-30 PROCEDURE — 36415 COLL VENOUS BLD VENIPUNCTURE: CPT

## 2019-03-30 PROCEDURE — 84100 ASSAY OF PHOSPHORUS: CPT

## 2019-03-30 PROCEDURE — 6360000002 HC RX W HCPCS: Performed by: NURSE PRACTITIONER

## 2019-03-30 PROCEDURE — 80053 COMPREHEN METABOLIC PANEL: CPT

## 2019-03-30 PROCEDURE — 82150 ASSAY OF AMYLASE: CPT

## 2019-03-30 PROCEDURE — 74019 RADEX ABDOMEN 2 VIEWS: CPT

## 2019-03-30 PROCEDURE — 82947 ASSAY GLUCOSE BLOOD QUANT: CPT

## 2019-03-30 PROCEDURE — 85027 COMPLETE CBC AUTOMATED: CPT

## 2019-03-30 RX ORDER — OXYBUTYNIN CHLORIDE 5 MG/1
10 TABLET, EXTENDED RELEASE ORAL DAILY
Status: DISCONTINUED | OUTPATIENT
Start: 2019-03-30 | End: 2019-04-02 | Stop reason: HOSPADM

## 2019-03-30 RX ORDER — ASPIRIN 81 MG/1
81 TABLET, CHEWABLE ORAL DAILY
Status: DISCONTINUED | OUTPATIENT
Start: 2019-03-30 | End: 2019-04-02 | Stop reason: HOSPADM

## 2019-03-30 RX ORDER — LATANOPROST 50 UG/ML
1 SOLUTION/ DROPS OPHTHALMIC NIGHTLY
Status: DISCONTINUED | OUTPATIENT
Start: 2019-03-30 | End: 2019-04-02 | Stop reason: HOSPADM

## 2019-03-30 RX ORDER — NICOTINE 21 MG/24HR
1 PATCH, TRANSDERMAL 24 HOURS TRANSDERMAL DAILY PRN
Status: DISCONTINUED | OUTPATIENT
Start: 2019-03-30 | End: 2019-04-02 | Stop reason: HOSPADM

## 2019-03-30 RX ORDER — TIMOLOL MALEATE 5 MG/ML
1 SOLUTION/ DROPS OPHTHALMIC DAILY
Status: DISCONTINUED | OUTPATIENT
Start: 2019-03-30 | End: 2019-04-02 | Stop reason: HOSPADM

## 2019-03-30 RX ORDER — SODIUM CHLORIDE 0.9 % (FLUSH) 0.9 %
10 SYRINGE (ML) INJECTION EVERY 12 HOURS SCHEDULED
Status: DISCONTINUED | OUTPATIENT
Start: 2019-03-30 | End: 2019-04-02 | Stop reason: HOSPADM

## 2019-03-30 RX ORDER — SODIUM CHLORIDE 0.9 % (FLUSH) 0.9 %
10 SYRINGE (ML) INJECTION PRN
Status: DISCONTINUED | OUTPATIENT
Start: 2019-03-30 | End: 2019-04-02 | Stop reason: HOSPADM

## 2019-03-30 RX ORDER — CEFTRIAXONE 1 G/1
1 INJECTION, POWDER, FOR SOLUTION INTRAMUSCULAR; INTRAVENOUS EVERY 24 HOURS
Status: DISCONTINUED | OUTPATIENT
Start: 2019-03-30 | End: 2019-03-30

## 2019-03-30 RX ORDER — LEVOTHYROXINE SODIUM 88 UG/1
88 TABLET ORAL DAILY
Status: DISCONTINUED | OUTPATIENT
Start: 2019-03-30 | End: 2019-04-02 | Stop reason: HOSPADM

## 2019-03-30 RX ORDER — EZETIMIBE 10 MG/1
10 TABLET ORAL DAILY
Status: DISCONTINUED | OUTPATIENT
Start: 2019-03-30 | End: 2019-04-02 | Stop reason: HOSPADM

## 2019-03-30 RX ORDER — MAGNESIUM SULFATE 1 G/100ML
1 INJECTION INTRAVENOUS PRN
Status: DISCONTINUED | OUTPATIENT
Start: 2019-03-30 | End: 2019-04-02 | Stop reason: HOSPADM

## 2019-03-30 RX ADMIN — DEXTROSE AND SODIUM CHLORIDE: 5; 450 INJECTION, SOLUTION INTRAVENOUS at 10:35

## 2019-03-30 RX ADMIN — LATANOPROST 1 DROP: 50 SOLUTION OPHTHALMIC at 21:07

## 2019-03-30 RX ADMIN — CEFTRIAXONE 1 G: 1 INJECTION, POWDER, FOR SOLUTION INTRAMUSCULAR; INTRAVENOUS at 21:06

## 2019-03-30 RX ADMIN — FAMOTIDINE 20 MG: 10 INJECTION, SOLUTION INTRAVENOUS at 13:59

## 2019-03-30 RX ADMIN — INSULIN LISPRO 2 UNITS: 100 INJECTION, SOLUTION INTRAVENOUS; SUBCUTANEOUS at 18:12

## 2019-03-30 RX ADMIN — ENOXAPARIN SODIUM 40 MG: 40 INJECTION SUBCUTANEOUS at 13:59

## 2019-03-30 RX ADMIN — DEXTROSE AND SODIUM CHLORIDE: 5; 450 INJECTION, SOLUTION INTRAVENOUS at 21:06

## 2019-03-30 ASSESSMENT — PAIN SCALES - GENERAL
PAINLEVEL_OUTOF10: 0
PAINLEVEL_OUTOF10: 0

## 2019-03-30 NOTE — ED NOTES
Patient was assisted to bathroom via wheelchair. Patient moves independently. Patient is given clean brief. She is returned to bed, positions self for comfort. She denies needs. NG continues to low intermittent wall suction. Call light in reach. Son at bedside.      Corazon Carrillo RN  03/30/19 [id number]

## 2019-03-30 NOTE — PROGRESS NOTES
Pharmacy Note     Renal Dose Adjustment    Stan De La Cruz is a 80 y.o. female. Pharmacist assessment of renally cleared medications. Recent Labs     03/29/19  1823 03/30/19  0520   BUN 23 18       Recent Labs     03/29/19  1823 03/30/19  0520   CREATININE 1.22* 0.98*       Estimated Creatinine Clearance: 37 mL/min (A) (based on SCr of 0.98 mg/dL (H)). Height:   Ht Readings from Last 1 Encounters:   03/30/19 5' 2\" (1.575 m)     Weight:  Wt Readings from Last 1 Encounters:   03/30/19 150 lb 1 oz (68.1 kg)       The following medication dose has been adjusted based upon renal function per P&T Guidelines:             Changed to Pepcid 20 mg DAILY. Kristy Davenport, PharmD  3/30/2019 1:33 PM

## 2019-03-30 NOTE — ED PROVIDER NOTES
25 MG TABLET    TAKE 1/2 TABLET BY MOUTH 2 TIMES DAILY    MULTIPLE VITAMINS-MINERALS (OCUVITE PRESERVISION PO)    Take 1 tablet by mouth daily    MULTIPLE VITAMINS-MINERALS (THERAPEUTIC MULTIVITAMIN-MINERALS) TABLET    Take 1 tablet by mouth daily    ONDANSETRON (ZOFRAN) 4 MG TABLET    Take 1 tablet by mouth every 8 hours as needed for Nausea or Vomiting    OXYBUTYNIN (DITROPAN-XL) 10 MG EXTENDED RELEASE TABLET    Take 1 tablet by mouth daily    PROBIOTIC PRODUCT (PROBIOTIC DAILY PO)    Take by mouth daily    SERTRALINE (ZOLOFT) 50 MG TABLET    TAKE 1 TABLET DAILY    TIMOLOL (TIMOPTIC) 0.5 % OPHTHALMIC SOLUTION    Place 1 drop into both eyes daily. ZOSTER RECOMBINANT ADJUVANTED VACCINE (SHINGRIX) 50 MCG SUSR INJECTION    50 MCG IM then repeat 2-6 months. ALLERGIES     is allergic to allopurinol; percocet [oxycodone-acetaminophen]; phenergan [promethazine hcl]; polycitra-k [potassium citrate]; statins; levaquin [levofloxacin]; sulfa antibiotics; and tessalon [benzonatate]. FAMILY HISTORY     indicated that the status of her other is unknown.     family history includes High Blood Pressure in an other family member. SOCIAL HISTORY      reports that she has never smoked. She has never used smokeless tobacco. She reports that she does not drink alcohol or use drugs. DIAGNOSTIC RESULTS     EKG: All EKG's are interpreted by the Emergency Department Physician who either signs or Co-signs this chart in the absence of a cardiologist.        RADIOLOGY:   Non-plain film images such as CT, Ultrasound and MRI are read by the radiologist. Plain radiographic images are visualized and the radiologist interpretations are reviewed as follows:   CT ABDOMEN PELVIS WO CONTRAST Additional Contrast? Oral   Final Result   Findings suggestive of small-bowel obstruction. No transition point   identified. 2.1 cm nodule versus mass right lower lobe. Recommend dedicated CT chest for   further characterization. Ct Abdomen Pelvis Wo Contrast Additional Contrast? Oral    Result Date: 3/29/2019  EXAMINATION: CT OF THE ABDOMEN AND PELVIS WITHOUT CONTRAST 3/29/2019 7:43 pm TECHNIQUE: CT of the abdomen and pelvis was performed without the administration of intravenous contrast. Multiplanar reformatted images are provided for review. Dose modulation, iterative reconstruction, and/or weight based adjustment of the mA/kV was utilized to reduce the radiation dose to as low as reasonably achievable. COMPARISON: None. HISTORY: ORDERING SYSTEM PROVIDED HISTORY: Abdominal pain and nausea vomiting and diarrhea TECHNOLOGIST PROVIDED HISTORY: Oral and IV Contrast Ordering Physician Provided Reason for Exam: Abdominal pain, nausea, vomiting; hx of hysterectomy, cholecystectomy; hx of uterine ca Acuity: Acute Type of Exam: Initial FINDINGS: 2.1 cm right lower lobe atelectasis versus nodule not completely visualized. This has spiculated margins. Motion artifact challenge evaluation. No free air or free fluid. Noncontrast of the liver, spleen, adrenal glands, and pancreas unremarkable. Left kidney is atrophic. Mild prominence right renal collecting system could be due to multiple parapelvic cysts not well characterized on current examination. No definite hydronephrosis or nephrolithiasis. Heart dilated fluid-filled loops of bowel identified within the left abdomen suggestive of bowel obstruction versus ileus. No transition point identified. Retained stool throughout the colon. Appendix not identified. The bladder is grossly unremarkable. Uterus appears absent. No adnexal mass. Severe degenerate change involving lumbar spine. Minimal grade 1 anterolisthesis L4-5 and L5-S1 with advanced facet arthropathy at those levels. Aortic vascular calcifications. Findings suggestive of small-bowel obstruction. No transition point identified. 2.1 cm nodule versus mass right lower lobe.   Recommend dedicated CT chest for further characterization. LABS:  Results for orders placed or performed during the hospital encounter of 03/29/19   Rapid influenza A/B antigens   Result Value Ref Range    Specimen Description . NASOPHARYNGEAL SWAB     Special Requests NOT REPORTED     Direct Exam       NEGATIVE FOR INFLUENZA A AND B ANTIGEN. * A negative result does not exclude Influenza infection. Negative results should be confirmed by PCR testing.    Amylase   Result Value Ref Range    Amylase 17 (L) 28 - 100 U/L   Basic Metabolic Panel   Result Value Ref Range    Glucose 138 (H) 70 - 99 mg/dL    BUN 23 8 - 23 mg/dL    CREATININE 1.22 (H) 0.50 - 0.90 mg/dL    Bun/Cre Ratio 19 9 - 20    Calcium 9.0 8.6 - 10.4 mg/dL    Sodium 136 135 - 144 mmol/L    Potassium 4.8 3.7 - 5.3 mmol/L    Chloride 98 98 - 107 mmol/L    CO2 25 20 - 31 mmol/L    Anion Gap 13 9 - 17 mmol/L    GFR Non-African American 42 (L) >60 mL/min    GFR  51 (L) >60 mL/min    GFR Comment          GFR Staging NOT REPORTED    CBC Auto Differential   Result Value Ref Range    WBC 5.6 3.5 - 11.0 k/uL    RBC 3.59 (L) 4.0 - 5.2 m/uL    Hemoglobin 11.6 (L) 12.0 - 16.0 g/dL    Hematocrit 35.7 (L) 36 - 46 %    MCV 99.3 80 - 100 fL    MCH 32.2 26 - 34 pg    MCHC 32.4 31 - 37 g/dL    RDW 13.1 11.0 - 14.5 %    Platelets 592 571 - 293 k/uL    MPV 9.5 6.0 - 12.0 fL    NRBC Automated NOT REPORTED per 100 WBC    Differential Type NOT REPORTED     Immature Granulocytes NOT REPORTED 0 %    Absolute Immature Granulocyte NOT REPORTED 0.00 - 0.30 k/uL    WBC Morphology NOT REPORTED     RBC Morphology NOT REPORTED     Platelet Estimate NOT REPORTED     Seg Neutrophils 75 43 - 77 %    Lymphocytes 11 (L) 16 - 46 %    Monocytes 14 (H) 4 - 11 %    Eosinophils % 0 (L) 1 - 7 %    Basophils 0 0 - 1 %    Segs Absolute 4.20 1.8 - 7.7 k/uL    Absolute Lymph # 0.60 (L) 1.0 - 4.8 k/uL    Absolute Mono # 0.80 0.1 - 1.2 k/uL    Absolute Eos # 0.00 0.0 - 0.4 k/uL    Basophils # 0.00 0.0 - 0.2 k/uL Hepatic Function Panel   Result Value Ref Range    Alb 3.5 3.5 - 5.2 g/dL    Alkaline Phosphatase 106 (H) 35 - 104 U/L    ALT 36 (H) 5 - 33 U/L    AST 22 <32 U/L    Total Bilirubin 0.29 (L) 0.3 - 1.2 mg/dL    Bilirubin, Direct 0.11 <0.31 mg/dL    Bilirubin, Indirect 0.18 0.00 - 1.00 mg/dL    Total Protein 5.9 (L) 6.4 - 8.3 g/dL    Globulin 2.4 1.5 - 3.8 g/dL    Albumin/Globulin Ratio 1.5 1.0 - 2.5   Lipase   Result Value Ref Range    Lipase 5 (L) 13 - 60 U/L   Urinalysis Reflex to Culture   Result Value Ref Range    Color, UA NOT REPORTED YELLOW    Turbidity UA NOT REPORTED CLEAR    Glucose, Ur NEGATIVE NEGATIVE    Bilirubin Urine NEGATIVE NEGATIVE    Ketones, Urine TRACE (A) NEGATIVE    Specific Gravity, UA 1.005 (L) 1.010 - 1.025    Urine Hgb TRACE (A) NEGATIVE    pH, UA 5.5 5.0 - 6.0    Protein, UA NEGATIVE NEGATIVE    Urobilinogen, Urine Normal Normal    Nitrite, Urine NEGATIVE NEGATIVE    Leukocyte Esterase, Urine 3+ (A) NEGATIVE    Urinalysis Comments NOT REPORTED    Microscopic Urinalysis   Result Value Ref Range    -          WBC, UA 25 TO 50 0 - 4 /HPF    RBC, UA 0 TO 4 0 - 4 /HPF    Casts UA NOT REPORTED 0 - 2 /LPF    Crystals UA NOT REPORTED None /HPF    Epithelial Cells UA 0 TO 4 0 - 5 /HPF    Renal Epithelial, Urine NOT REPORTED 0 /HPF    Bacteria, UA 3+ (A) None    Mucus, UA NOT REPORTED None    Trichomonas, UA NOT REPORTED None    Amorphous, UA NOT REPORTED None    Other Observations UA Specimen Cultured (A) NOT REQ.     Yeast, UA NOT REPORTED None         EMERGENCY DEPARTMENT COURSE:   Recent Vitals:    Vitals:    03/29/19 1754 03/29/19 1905 03/29/19 2000 03/29/19 2051   BP: 138/63 (!) 144/106 (!) 159/111 136/63   Pulse: 60 50 53 52   Resp: 16 16 16 16   Temp: 98.2 °F (36.8 °C)      SpO2: 95% 95% 96% 97%   Weight: 154 lb (69.9 kg)      Height: 5' (1.524 m)        -------------------------  BP: 136/63, Temp: 98.2 °F (36.8 °C), Pulse: 52, Resp: 16      The patient was given the following medications:  Orders Placed This Encounter   Medications    0.9 % sodium chloride bolus    ondansetron (ZOFRAN) injection 4 mg    DISCONTD: iohexol (OMNIPAQUE) injection 50 mL    iohexol (OMNIPAQUE 240) injection 50 mL           MEDICAL DECISION MAKING:     Patient presents with abdominal pain in addition to nausea and vomiting. His small bowel obstruction with a noncontrast CT scan. Clinically she appears very well and is nontoxic. Has not been vomiting since I began my shift at 7:30 PM.  I spoke with Dr. Layla Sanchez on call for surgery. He asked for an NG tube and will observe overnight. Hospitalist notified and agreeable to admission. Family updated. Agreeable to the admission. CONSULTS:    None    CRITICAL CARE:     None    PROCEDURES:    None    FINAL IMPRESSION      1. Small bowel obstruction ([address]    2. Non-intractable vomiting with nausea, unspecified vomiting type          DISPOSITION/PLAN   DISPOSITION Decision To Admit 03/29/2019 08:50:49 PM      Condition on Disposition    Improved    PATIENT REFERRED TO:  No follow-up provider specified. DISCHARGE MEDICATIONS:  New Prescriptions    No medications on file       (Please note that portions of this note were completed with a voice recognition program.  Efforts were made to edit the dictations but occasionally words are mis-transcribed.)        Read MIRANDA Bonilla.   Emergency Medicine Attending       Maria Luisa Poole DO  03/29/19 9983

## 2019-03-30 NOTE — ED NOTES
Patient resting in bed, eyes closed, appears to be sleeping. Respirations even and nonlabored.      Gina Strickland, RN  03/30/19 8306

## 2019-03-30 NOTE — H&P
Hospital Medicine  History and Physical                                                                                                                                                 Full Code    Patient:  Christophe June  MRN: [id number]                                                                                                                                                                     CHIEF COMPLAINT:  Abdominal pain    History Obtained From:  patient, father  PCP: Vero GARRIDO DO    HISTORY OF PRESENT ILLNESS:   The patient is a 80 y.o. female who presents with h/o of abdominal pain and some nausea and vomiting, pt was see. In ER and admitted for sbo. pt doing slightly better     Past Medical History:        Diagnosis Date    Adenocarcinoma of endometrium ([address]     Cataract of left eye     Chest pain 4/23/2014    Chronic diarrhea     Diabetes mellitus type II, controlled ([address]     diet controlled    Diabetes mellitus type II, controlled ([address]     diet controlled     Dysthymia     Gait abnormality 6/23/2014    H/O renal calculi     H/O vein stripping     Hard of hearing     Hyperlipidemia     Hypertension     Hypothyroidism     Influenza A 1/11/2018    Left elbow fracture     S/P surgical repair    Macular degeneration     Obesity     Pericardial effusion 4/23/2014    Pseudophakia of right eye     Traumatic injury of lower extremity childhood    bilateral trauma of lower extremities    Vertigo     resolved       Past Surgical History:        Procedure Laterality Date    CATARACT REMOVAL Right February 2013    Dr. Rajan Lee, LAPAROSCOPIC  5/28/1998    COLONOSCOPY  7/22/2003    Dr. Farooq Peña Left September 2010    ORIF, Dr. Wilmot Meckel ERCP  7/19/1998    retained stone, Suzy Love and Jerald Read MAGDA AND BSO  March 2002    endometrial adenocarcinoma, Dr. Blayne Tang       Medications Prior to Admission:    Prior to Admission medications    Medication Sig Start Date End Date Taking? Authorizing Provider   oxybutynin (DITROPAN-XL) 10 MG extended release tablet Take 1 tablet by mouth daily 3/4/19 6/2/19 Yes Juan Carlos Diez MD   sertraline (ZOLOFT) 50 MG tablet TAKE 1 TABLET DAILY 3/4/19  Yes Wiliam Eddy DO   metoprolol tartrate (LOPRESSOR) 25 MG tablet TAKE 1/2 TABLET BY MOUTH 2 TIMES DAILY 3/4/19  Yes Jazmine Shah, DO   ezetimibe (ZETIA) 10 MG tablet Take 1 tablet by mouth daily 2/11/19  Yes Wiliam Eddy DO   levothyroxine (SYNTHROID) 88 MCG tablet Take 1 tablet by mouth Daily 2/11/19  Yes Wiliam Eddy DO   lisinopril (PRINIVIL;ZESTRIL) 20 MG tablet Take 1 tablet by mouth 2 times daily 2/11/19  Yes Wiliam Eddy DO   diphenoxylate-atropine (LOMOTIL) 2.5-0.025 MG per tablet  12/26/18  Yes Historical Provider, MD   Multiple Vitamins-Minerals (THERAPEUTIC MULTIVITAMIN-MINERALS) tablet Take 1 tablet by mouth daily   Yes Historical Provider, MD   ondansetron (ZOFRAN) 4 MG tablet Take 1 tablet by mouth every 8 hours as needed for Nausea or Vomiting 3/16/18  Yes Wiliam Eddy DO   CRANBERRY PO Take 1 capsule by mouth daily   Yes Historical Provider, MD   Multiple Vitamins-Minerals (OCUVITE PRESERVISION PO) Take 1 tablet by mouth daily   Yes Historical Provider, MD   Probiotic Product (PROBIOTIC DAILY PO) Take by mouth daily   Yes Historical Provider, MD   aspirin 81 MG tablet Take 81 mg by mouth daily. Yes Historical Provider, MD   latanoprost (XALATAN) 0.005 % ophthalmic solution Place 1 drop into both eyes nightly. Yes Historical Provider, MD   timolol (TIMOPTIC) 0.5 % ophthalmic solution Place 1 drop into both eyes daily. Yes Historical Provider, MD   Ascorbic Acid (VITAMIN C) 500 MG tablet Take 500 mg by mouth 2 times daily. Yes Historical Provider, MD   zoster recombinant adjuvanted vaccine (SHINGRIX) 50 MCG SUSR injection 50 MCG IM then repeat 2-6 months.  10/18/18 10/18/19 Carolyn Kim, APRN - CNP       Current meds  Scheduled Meds:   aspirin  81 mg Oral Daily    ezetimibe  10 mg Oral Daily    levothyroxine  88 mcg Oral Daily    oxybutynin  10 mg Oral Daily    sertraline  50 mg Oral Daily    timolol  1 drop Both Eyes Daily    sodium chloride flush  10 mL Intravenous 2 times per day    enoxaparin  40 mg Subcutaneous Daily    famotidine (PEPCID) injection  20 mg Intravenous Daily    cefTRIAXone (ROCEPHIN) IV  1 g Intravenous Q24H    insulin lispro  0-12 Units Subcutaneous Q6H    lisinopril  20 mg Oral BID    metoprolol tartrate  25 mg Oral BID     Continuous Infusions:   dextrose 5 % and 0.45 % NaCl 100 mL/hr at 03/30/19 1035    dextrose       PRN Meds:.sodium chloride flush, magnesium sulfate, nicotine, magnesium hydroxide, ondansetron, acetaminophen, glucose, dextrose, glucagon (rDNA), dextrose    Allergies:  Allopurinol; Percocet [oxycodone-acetaminophen]; Phenergan [promethazine hcl]; Polycitra-k [potassium citrate]; Statins; Levaquin [levofloxacin]; Sulfa antibiotics; and Tessalon [benzonatate]    Social History:   TOBACCO:   reports that she has never smoked. She has never used smokeless tobacco.  ETOH:   reports that she does not drink alcohol.   OCCUPATION:      Family History:       Problem Relation Age of Onset    High Blood Pressure Other        REVIEW OF SYSTEMS:  Constitutional:  negative for  fevers, chills, sweats and weight loss  HEENT:  negative for  hearing loss, ear drainage, nasal congestion, snoring, hoarseness and voice change  Neck:  No swollen glands and No h/o goiter or thyroid disease  Cardiac:  negative for  chest pain, dyspnea, palpitations, orthopnea, PND, edema  Respiratory:  negative for  dry cough, cough with sputum, dyspnea, wheezing and hemoptysis  Gastrointestinal:  See hpi  :  negative for frequency, dysuria, urinary incontinence, hesitancy, decreased stream and hematuria  Musculoskeletal:  negative for  myalgias, arthralgias, joint swelling and stiff joints  Neuro:  negative for headaches, dizziness, seizures, memory problems, visual disturbance, weakness and syncope      Physical Exam:    Vitals: BP (!) 126/57   Pulse 60   Temp 98.9 °F (37.2 °C) (Tympanic)   Resp 14   Ht 5' 2\" (1.575 m)   Wt 150 lb 1 oz (68.1 kg)   SpO2 93%   BMI 27.45 kg/m²   General appearance: alert, appears stated age and cooperative  Skin: Skin color, texture, turgor normal. No rashes or lesions  HEENT: Head: Normocephalic, no lesions, without obvious abnormality. Eye: Normal external eye, conjunctiva, lids cornea, KIMBERLYN  Neck: no adenopathy, no carotid bruit, no JVD, supple, symmetrical, trachea midline and thyroid not enlarged, symmetric, no tenderness/mass/nodules  Lungs: clear to auscultation bilaterally  Heart: regular rate and rhythm, S1, S2 normal, no murmur, click, rub or gallop  Abdomen: soft, tender; bowel sounds slugish; no masses,  no organomegaly  Extremities: extremities normal, atraumatic, no cyanosis or edema  Neurologic: Mental status: Alert, oriented, thought content appropriate    CBC:   Recent Labs     03/29/19 1823 03/30/19  0520   WBC 5.6 4.5   HGB 11.6* 10.7*    165     BMP:    Recent Labs     03/29/19 1823 03/29/19 2152 03/30/19  0520     --  135   K 4.8  --  4.1   CL 98  --  100   CO2 25  --  23   BUN 23  --  18   CREATININE 1.22*  --  0.98*   GLUCOSE 138* 104 161*     Hepatic:   Recent Labs     03/29/19 1823 03/30/19  0520   AST 22 20   ALT 36* 31   BILITOT 0.29* 0.24*   ALKPHOS 106* 97     Troponin: No results for input(s): TROPONINI in the last 72 hours. BNP: No results for input(s): BNP in the last 72 hours. Lipids: No results for input(s): CHOL, HDL in the last 72 hours. Invalid input(s): LDLCALCU  ABGs: No results found for: PHART, PO2ART, [id number]  INR: No results for input(s): INR in the last 72 hours.   -----------------------------------------------------------------  PA/lat CXR: Ct Abdomen Pelvis Wo Contrast Additional Contrast? Oral    Result Date: 3/29/2019  EXAMINATION: CT OF THE ABDOMEN AND PELVIS WITHOUT CONTRAST 3/29/2019 7:43 pm TECHNIQUE: CT of the abdomen and pelvis was performed without the administration of intravenous contrast. Multiplanar reformatted images are provided for review. Dose modulation, iterative reconstruction, and/or weight based adjustment of the mA/kV was utilized to reduce the radiation dose to as low as reasonably achievable. COMPARISON: None. HISTORY: ORDERING SYSTEM PROVIDED HISTORY: Abdominal pain and nausea vomiting and diarrhea TECHNOLOGIST PROVIDED HISTORY: Oral and IV Contrast Ordering Physician Provided Reason for Exam: Abdominal pain, nausea, vomiting; hx of hysterectomy, cholecystectomy; hx of uterine ca Acuity: Acute Type of Exam: Initial FINDINGS: 2.1 cm right lower lobe atelectasis versus nodule not completely visualized. This has spiculated margins. Motion artifact challenge evaluation. No free air or free fluid. Noncontrast of the liver, spleen, adrenal glands, and pancreas unremarkable. Left kidney is atrophic. Mild prominence right renal collecting system could be due to multiple parapelvic cysts not well characterized on current examination. No definite hydronephrosis or nephrolithiasis. Heart dilated fluid-filled loops of bowel identified within the left abdomen suggestive of bowel obstruction versus ileus. No transition point identified. Retained stool throughout the colon. Appendix not identified. The bladder is grossly unremarkable. Uterus appears absent. No adnexal mass. Severe degenerate change involving lumbar spine. Minimal grade 1 anterolisthesis L4-5 and L5-S1 with advanced facet arthropathy at those levels. Aortic vascular calcifications. Findings suggestive of small-bowel obstruction. No transition point identified. 2.1 cm nodule versus mass right lower lobe. Recommend dedicated CT chest for further characterization.      Xr Abdomen (kub) (single Ap View)    Result Date: 3/29/2019  EXAMINATION: SINGLE SUPINE XRAY VIEW(S) OF THE ABDOMEN 3/29/2019 10:01 pm COMPARISON: None. HISTORY: ORDERING SYSTEM PROVIDED HISTORY: NG TUBE PLACEMENT TECHNOLOGIST PROVIDED HISTORY: NG TUBE PLACEMENT Ordering Physician Provided Reason for Exam: NG tube placement FINDINGS: Single-view the abdomen demonstrates the tip of the nasoenteric tube projecting in the left upper quadrant. The side port is at the GE junction level. Right upper quadrant clips are noted. Moderate bowel distention left of midline in the abdomen is noted. Multiple lower abdominal and pelvic surgical clips are noted. NG tube as described. Side port should be advanced into the fundus of the stomach Moderate bowel distention left of midline in the abdomen.        EKG: no acute changes     Prophylaxis:   DVT with  [x] lovenox        [] heparin        [] Scd        [] none:     Assessment and Plan   1. Sbo/ng tube in place  2. Npo/surgery seeing    Patient Active Problem List   Diagnosis Code    DM (diabetes mellitus) type II controlled with renal manifestation ([facility] E11.29    Hypertension I10    Hyperlipidemia E78.5    Hypothyroidism E03.9    Chronic diarrhea K52.9    Dysthymia F34.1    Gait abnormality R26.9    Chronic kidney disease, stage III (moderate) (HCC), likely related to diabetes N18.3    Early stage nonexudative age-related macular degeneration of both eyes H35.3131    Glaucoma of both eyes H40.9    Overweight E66.3    Carotid stenosis, bilateral I65.23    Urinary incontinence R32    SBO (small bowel obstruction) ([facility] [address] K56.609       Anticipated Disposition upon discharge: [] Home                                                                         [] Home with Home Health                                                                         [] [facility]                                                                         [] [address] [place] [address]

## 2019-03-31 ENCOUNTER — APPOINTMENT (OUTPATIENT)
Dept: GENERAL RADIOLOGY | Age: 84
DRG: 389 | End: 2019-03-31
Payer: MEDICARE

## 2019-03-31 LAB
ABSOLUTE EOS #: 0.1 K/UL (ref 0–0.4)
ABSOLUTE IMMATURE GRANULOCYTE: ABNORMAL K/UL (ref 0–0.3)
ABSOLUTE LYMPH #: 1.3 K/UL (ref 1–4.8)
ABSOLUTE MONO #: 0.6 K/UL (ref 0.1–1.2)
ANION GAP SERPL CALCULATED.3IONS-SCNC: 9 MMOL/L (ref 9–17)
BASOPHILS # BLD: 1 % (ref 0–1)
BASOPHILS ABSOLUTE: 0 K/UL (ref 0–0.2)
BUN BLDV-MCNC: 11 MG/DL (ref 8–23)
BUN/CREAT BLD: 12 (ref 9–20)
CALCIUM SERPL-MCNC: 8.5 MG/DL (ref 8.6–10.4)
CHLORIDE BLD-SCNC: 106 MMOL/L (ref 98–107)
CO2: 25 MMOL/L (ref 20–31)
CREAT SERPL-MCNC: 0.91 MG/DL (ref 0.5–0.9)
CULTURE: ABNORMAL
DIFFERENTIAL TYPE: ABNORMAL
EOSINOPHILS RELATIVE PERCENT: 3 % (ref 1–7)
GFR AFRICAN AMERICAN: >60 ML/MIN
GFR NON-AFRICAN AMERICAN: 59 ML/MIN
GFR SERPL CREATININE-BSD FRML MDRD: ABNORMAL ML/MIN/{1.73_M2}
GFR SERPL CREATININE-BSD FRML MDRD: ABNORMAL ML/MIN/{1.73_M2}
GLUCOSE BLD-MCNC: 110 MG/DL (ref 65–105)
GLUCOSE BLD-MCNC: 127 MG/DL (ref 65–105)
GLUCOSE BLD-MCNC: 131 MG/DL (ref 65–105)
GLUCOSE BLD-MCNC: 139 MG/DL (ref 65–105)
GLUCOSE BLD-MCNC: 166 MG/DL (ref 70–99)
HCT VFR BLD CALC: 31.3 % (ref 36–46)
HEMOGLOBIN: 10.2 G/DL (ref 12–16)
IMMATURE GRANULOCYTES: ABNORMAL %
LYMPHOCYTES # BLD: 29 % (ref 16–46)
Lab: ABNORMAL
MAGNESIUM: 1.9 MG/DL (ref 1.6–2.6)
MCH RBC QN AUTO: 32.3 PG (ref 26–34)
MCHC RBC AUTO-ENTMCNC: 32.4 G/DL (ref 31–37)
MCV RBC AUTO: 99.6 FL (ref 80–100)
MONOCYTES # BLD: 15 % (ref 4–11)
NRBC AUTOMATED: ABNORMAL PER 100 WBC
PDW BLD-RTO: 13.3 % (ref 11–14.5)
PLATELET # BLD: 161 K/UL (ref 140–450)
PLATELET ESTIMATE: ABNORMAL
PMV BLD AUTO: 9.7 FL (ref 6–12)
POTASSIUM SERPL-SCNC: 4.1 MMOL/L (ref 3.7–5.3)
RBC # BLD: 3.15 M/UL (ref 4–5.2)
RBC # BLD: ABNORMAL 10*6/UL
SEG NEUTROPHILS: 52 % (ref 43–77)
SEGMENTED NEUTROPHILS ABSOLUTE COUNT: 2.4 K/UL (ref 1.8–7.7)
SODIUM BLD-SCNC: 140 MMOL/L (ref 135–144)
SPECIMEN DESCRIPTION: ABNORMAL
WBC # BLD: 4.4 K/UL (ref 3.5–11)
WBC # BLD: ABNORMAL 10*3/UL

## 2019-03-31 PROCEDURE — 6370000000 HC RX 637 (ALT 250 FOR IP): Performed by: NURSE PRACTITIONER

## 2019-03-31 PROCEDURE — 36415 COLL VENOUS BLD VENIPUNCTURE: CPT

## 2019-03-31 PROCEDURE — 80048 BASIC METABOLIC PNL TOTAL CA: CPT

## 2019-03-31 PROCEDURE — 2580000003 HC RX 258: Performed by: NURSE PRACTITIONER

## 2019-03-31 PROCEDURE — 94760 N-INVAS EAR/PLS OXIMETRY 1: CPT

## 2019-03-31 PROCEDURE — 99221 1ST HOSP IP/OBS SF/LOW 40: CPT | Performed by: SURGERY

## 2019-03-31 PROCEDURE — 6370000000 HC RX 637 (ALT 250 FOR IP): Performed by: FAMILY MEDICINE

## 2019-03-31 PROCEDURE — 2500000003 HC RX 250 WO HCPCS: Performed by: NURSE PRACTITIONER

## 2019-03-31 PROCEDURE — 83735 ASSAY OF MAGNESIUM: CPT

## 2019-03-31 PROCEDURE — 85025 COMPLETE CBC W/AUTO DIFF WBC: CPT

## 2019-03-31 PROCEDURE — 6360000002 HC RX W HCPCS: Performed by: NURSE PRACTITIONER

## 2019-03-31 PROCEDURE — 74019 RADEX ABDOMEN 2 VIEWS: CPT

## 2019-03-31 PROCEDURE — 1200000000 HC SEMI PRIVATE

## 2019-03-31 PROCEDURE — 82947 ASSAY GLUCOSE BLOOD QUANT: CPT

## 2019-03-31 RX ADMIN — DEXTROSE AND SODIUM CHLORIDE: 5; 450 INJECTION, SOLUTION INTRAVENOUS at 16:06

## 2019-03-31 RX ADMIN — FAMOTIDINE 20 MG: 10 INJECTION, SOLUTION INTRAVENOUS at 08:16

## 2019-03-31 RX ADMIN — SERTRALINE HYDROCHLORIDE 50 MG: 50 TABLET ORAL at 08:13

## 2019-03-31 RX ADMIN — INSULIN LISPRO 2 UNITS: 100 INJECTION, SOLUTION INTRAVENOUS; SUBCUTANEOUS at 06:48

## 2019-03-31 RX ADMIN — TIMOLOL MALEATE 1 DROP: 5 SOLUTION/ DROPS OPHTHALMIC at 15:46

## 2019-03-31 RX ADMIN — LEVOTHYROXINE SODIUM 88 MCG: 88 TABLET ORAL at 08:18

## 2019-03-31 RX ADMIN — LISINOPRIL 20 MG: 5 TABLET ORAL at 08:15

## 2019-03-31 RX ADMIN — OXYBUTYNIN CHLORIDE 10 MG: 5 TABLET, EXTENDED RELEASE ORAL at 08:13

## 2019-03-31 RX ADMIN — LISINOPRIL 20 MG: 5 TABLET ORAL at 21:13

## 2019-03-31 RX ADMIN — DEXTROSE AND SODIUM CHLORIDE: 5; 450 INJECTION, SOLUTION INTRAVENOUS at 06:48

## 2019-03-31 RX ADMIN — ENOXAPARIN SODIUM 40 MG: 40 INJECTION SUBCUTANEOUS at 08:16

## 2019-03-31 RX ADMIN — CEFTRIAXONE 1 G: 1 INJECTION, POWDER, FOR SOLUTION INTRAMUSCULAR; INTRAVENOUS at 21:13

## 2019-03-31 RX ADMIN — LATANOPROST 1 DROP: 50 SOLUTION OPHTHALMIC at 21:14

## 2019-03-31 ASSESSMENT — PAIN SCALES - GENERAL
PAINLEVEL_OUTOF10: 0
PAINLEVEL_OUTOF10: 0

## 2019-03-31 NOTE — PLAN OF CARE
Problem: Falls - Risk of:  Goal: Will remain free from falls  Description  Will remain free from falls  3/31/2019 0046 by Markel Schaefer RN  Outcome: Ongoing  3/30/2019 1720 by Vivi Chávez RN  Outcome: Met This Shift  Goal: Absence of physical injury  Description  Absence of physical injury  3/31/2019 0046 by Markel Schaefer RN  Outcome: Ongoing  3/30/2019 1720 by Vivi Chávez RN  Outcome: Met This Shift     Problem: Activity:  Goal: Risk for activity intolerance will decrease  Description  Risk for activity intolerance will decrease  3/31/2019 0046 by Markel Schaefer RN  Outcome: Ongoing  3/30/2019 1720 by Vivi Chávez RN  Outcome: Met This Shift     Problem:  Bowel/Gastric:  Goal: Bowel function will improve  Description  Bowel function will improve  3/31/2019 0046 by Markel Schaefer RN  Outcome: Ongoing  3/30/2019 1720 by Vivi Chávez RN  Outcome: Ongoing  Goal: Diagnostic test results will improve  Description  Diagnostic test results will improve  3/31/2019 0046 by Markel Schaefer RN  Outcome: Ongoing  3/30/2019 1720 by Vivi Chávez RN  Outcome: Ongoing  Goal: Occurrences of nausea will decrease  Description  Occurrences of nausea will decrease  3/31/2019 0046 by Markel Schaefer RN  Outcome: Ongoing  3/30/2019 1720 by Vivi Chávez RN  Outcome: Ongoing  Goal: Occurrences of vomiting will decrease  Description  Occurrences of vomiting will decrease  3/31/2019 0046 by Markel Schaefer RN  Outcome: Ongoing  3/30/2019 1720 by Vivi Chávez RN  Outcome: Ongoing     Problem: Fluid Volume:  Goal: Maintenance of adequate hydration will improve  Description  Maintenance of adequate hydration will improve  3/31/2019 0046 by Markel Schaefer RN  Outcome: Ongoing  3/30/2019 1720 by Vivi Chávez RN  Outcome: Ongoing     Problem: Health Behavior:  Goal: Ability to state signs and symptoms to report to health care provider will improve  Description  Ability to state signs and symptoms to report to health care provider will improve  3/31/2019 0046 by Salomon Hawkins RN  Outcome: Ongoing  3/30/2019 1720 by Sita Parks RN  Outcome: Ongoing     Problem: Physical Regulation:  Goal: Complications related to the disease process, condition or treatment will be avoided or minimized  Description  Complications related to the disease process, condition or treatment will be avoided or minimized  3/31/2019 0046 by Salomon Hawkins RN  Outcome: Ongoing  3/30/2019 1720 by Sita Parks RN  Outcome: Ongoing  Goal: Ability to maintain clinical measurements within normal limits will improve  Description  Ability to maintain clinical measurements within normal limits will improve  3/31/2019 0046 by Salomon Hawkins RN  Outcome: Ongoing  3/30/2019 1720 by Sita Parks RN  Outcome: Ongoing     Problem: Sensory:  Goal: Ability to identify factors that increase the pain will improve  Description  Ability to identify factors that increase the pain will improve  3/31/2019 0046 by Salomon Hawkins RN  Outcome: Ongoing  3/30/2019 1720 by Sita Parks RN  Outcome: Met This Shift  Goal: Ability to notify healthcare provider of pain before it becomes unmanageable or unbearable will improve  Description  Ability to notify healthcare provider of pain before it becomes unmanageable or unbearable will improve  3/31/2019 0046 by Salomon Hawkins RN  Outcome: Ongoing  3/30/2019 1720 by Sita Parks RN  Outcome: Met This Shift  Goal: Pain level will decrease  Description  Pain level will decrease  3/31/2019 0046 by Salomon Hawkins RN  Outcome: Ongoing  3/30/2019 1720 by Sita Parks RN  Outcome: Met This Shift

## 2019-03-31 NOTE — PROGRESS NOTES
Hospitalist Progress Note  3/31/2019 1:58 PM  Subjective:   Admit Date: 3/29/2019  PCP: Bebo Lerma DO     Full Code      C/c:  Chief Complaint   Patient presents with    Emesis    Abdominal Pain         Interval History: slightly better,still has ng tube    Diet: Diet NPO Effective Now                                ip days:1  Medications:   Scheduled Meds:   aspirin  81 mg Oral Daily    ezetimibe  10 mg Oral Daily    levothyroxine  88 mcg Oral Daily    oxybutynin  10 mg Oral Daily    sertraline  50 mg Oral Daily    timolol  1 drop Both Eyes Daily    sodium chloride flush  10 mL Intravenous 2 times per day    enoxaparin  40 mg Subcutaneous Daily    famotidine (PEPCID) injection  20 mg Intravenous Daily    cefTRIAXone (ROCEPHIN) IV  1 g Intravenous Q24H    insulin lispro  0-12 Units Subcutaneous Q6H    latanoprost  1 drop Both Eyes Nightly    lisinopril  20 mg Oral BID    metoprolol tartrate  25 mg Oral BID     Continuous Infusions:   dextrose 5 % and 0.45 % NaCl 100 mL/hr at 03/31/19 0648    dextrose       PRN Meds:.sodium chloride flush, magnesium sulfate, nicotine, magnesium hydroxide, ondansetron, acetaminophen, glucose, dextrose, glucagon (rDNA), dextrose     CBC:   Recent Labs     03/29/19 1823 03/30/19  0520 03/31/19  0537   WBC 5.6 4.5 4.4   HGB 11.6* 10.7* 10.2*    165 161     BMP:    Recent Labs     03/29/19 1823 03/29/19  2152 03/30/19  0520 03/31/19  0537     --  135 140   K 4.8  --  4.1 4.1   CL 98  --  100 106   CO2 25  --  23 25   BUN 23  --  18 11   CREATININE 1.22*  --  0.98* 0.91*   GLUCOSE 138* 104 161* 166*     Hepatic:   Recent Labs     03/29/19 1823 03/30/19  0520   AST 22 20   ALT 36* 31   BILITOT 0.29* 0.24*   ALKPHOS 106* 97     Troponin: No results for input(s): TROPONINI in the last 72 hours. BNP: No results for input(s): BNP in the last 72 hours. Lipids: No results for input(s): CHOL, HDL in the last 72 hours.     Invalid input(s): LDLCALCU  INR: No results for input(s): INR in the last 72 hours. Objective:   Vitals: BP (!) 147/66   Pulse (!) 47   Temp 98.4 °F (36.9 °C) (Tympanic)   Resp 16   Ht 5' 2\" (1.575 m)   Wt 150 lb 8 oz (68.3 kg)   SpO2 98%   BMI 27.53 kg/m²   General appearance: alert, appears stated age and cooperative  Skin: Skin color, texture, turgor normal. No rashes or lesions  Lungs: clear to auscultation bilaterally  Heart: regular rate and rhythm, S1, S2 normal, no murmur, click, rub or gallop  Abdomen: soft, non-tender; bowel sounds normal; no masses,  no organomegaly  Extremities: extremities normal, atraumatic, no cyanosis or edema  Neurologic: Mental status: Alert, oriented, thought content appropriate    Prophylaxis:   DVT with  [x] lovenox        [] heparin        [] Scd        [] none:     Radiology:  Ct Abdomen Pelvis Wo Contrast Additional Contrast? Oral    Result Date: 3/29/2019  EXAMINATION: CT OF THE ABDOMEN AND PELVIS WITHOUT CONTRAST 3/29/2019 7:43 pm TECHNIQUE: CT of the abdomen and pelvis was performed without the administration of intravenous contrast. Multiplanar reformatted images are provided for review. Dose modulation, iterative reconstruction, and/or weight based adjustment of the mA/kV was utilized to reduce the radiation dose to as low as reasonably achievable. COMPARISON: None. HISTORY: ORDERING SYSTEM PROVIDED HISTORY: Abdominal pain and nausea vomiting and diarrhea TECHNOLOGIST PROVIDED HISTORY: Oral and IV Contrast Ordering Physician Provided Reason for Exam: Abdominal pain, nausea, vomiting; hx of hysterectomy, cholecystectomy; hx of uterine ca Acuity: Acute Type of Exam: Initial FINDINGS: 2.1 cm right lower lobe atelectasis versus nodule not completely visualized. This has spiculated margins. Motion artifact challenge evaluation. No free air or free fluid. Noncontrast of the liver, spleen, adrenal glands, and pancreas unremarkable. Left kidney is atrophic.   Mild prominence right renal collecting system could be due to multiple parapelvic cysts not well characterized on current examination. No definite hydronephrosis or nephrolithiasis. Heart dilated fluid-filled loops of bowel identified within the left abdomen suggestive of bowel obstruction versus ileus. No transition point identified. Retained stool throughout the colon. Appendix not identified. The bladder is grossly unremarkable. Uterus appears absent. No adnexal mass. Severe degenerate change involving lumbar spine. Minimal grade 1 anterolisthesis L4-5 and L5-S1 with advanced facet arthropathy at those levels. Aortic vascular calcifications. Findings suggestive of small-bowel obstruction. No transition point identified. 2.1 cm nodule versus mass right lower lobe. Recommend dedicated CT chest for further characterization. Xr Abdomen (kub) (single Ap View)    Result Date: 3/29/2019  EXAMINATION: SINGLE SUPINE XRAY VIEW(S) OF THE ABDOMEN 3/29/2019 10:01 pm COMPARISON: None. HISTORY: ORDERING SYSTEM PROVIDED HISTORY: NG TUBE PLACEMENT TECHNOLOGIST PROVIDED HISTORY: NG TUBE PLACEMENT Ordering Physician Provided Reason for Exam: NG tube placement FINDINGS: Single-view the abdomen demonstrates the tip of the nasoenteric tube projecting in the left upper quadrant. The side port is at the GE junction level. Right upper quadrant clips are noted. Moderate bowel distention left of midline in the abdomen is noted. Multiple lower abdominal and pelvic surgical clips are noted. NG tube as described. Side port should be advanced into the fundus of the stomach Moderate bowel distention left of midline in the abdomen.      Xr Abdomen (2 Views)    Result Date: 3/30/2019  EXAMINATION: TWO XRAY VIEWS OF THE ABDOMEN 3/30/2019 7:56 pm COMPARISON: March 29 HISTORY: ORDERING SYSTEM PROVIDED HISTORY: sbo TECHNOLOGIST PROVIDED HISTORY: sbo Ordering Physician Provided Reason for Exam: Follow up SBO, NG tube in place Acuity: Acute Type of Exam: Initial FINDINGS: Multifocal bowel distention is greatest in the left side of the abdomen. This is decreased from the prior. Contrast is noted in the lower abdomen and pelvis extending into the right side of the pelvis, likely in the proximal colon. Multiple surgical clips are noted. Enteric tube is noted projecting in the left upper quadrant however the side port projects over the lower esophageal level. This is stable in position. Optimally, the side port will be in the stomach as well. Surgical clips project over the pelvis. No portal gas or free air is noted     Decreasing bowel distention       Assessment :   1. Sbo/better/check ac abdomen  2. Ng present/surgery seeing     Plan:   1. 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 ([address]     Hypertension     Hyperlipidemia     Hypothyroidism     Chronic diarrhea     Dysthymia     Gait abnormality     Chronic kidney disease, stage III (moderate) ([address] likely related to diabetes     Early stage nonexudative age-related macular degeneration of both eyes     Glaucoma of both eyes     Overweight     Carotid stenosis, bilateral     Urinary incontinence     SBO (small bowel obstruction) ([address]      Anticipated Disposition upon discharge: [] Home                                                                         [] Home with Home Health                                                                         [] [facility]                                                                         [] [address] ,[address]      Patient is admitted as inpatient status because of co-morbidities listed above, severity of signs and symptoms as outlined, requirement for current medical therapies and most importantly because of direct risk to patient if care not provided in a hospital setting.           Peggy Serrato MD  Rounding Hospitalist

## 2019-04-01 ENCOUNTER — APPOINTMENT (OUTPATIENT)
Dept: GENERAL RADIOLOGY | Age: 84
DRG: 389 | End: 2019-04-01
Payer: MEDICARE

## 2019-04-01 LAB
ABSOLUTE EOS #: 0.2 K/UL (ref 0–0.4)
ABSOLUTE IMMATURE GRANULOCYTE: ABNORMAL K/UL (ref 0–0.3)
ABSOLUTE LYMPH #: 1.6 K/UL (ref 1–4.8)
ABSOLUTE MONO #: 0.5 K/UL (ref 0.1–1.2)
ANION GAP SERPL CALCULATED.3IONS-SCNC: 6 MMOL/L (ref 9–17)
BASOPHILS # BLD: 1 % (ref 0–1)
BASOPHILS ABSOLUTE: 0 K/UL (ref 0–0.2)
BUN BLDV-MCNC: 7 MG/DL (ref 8–23)
BUN/CREAT BLD: 8 (ref 9–20)
CALCIUM SERPL-MCNC: 8.5 MG/DL (ref 8.6–10.4)
CHLORIDE BLD-SCNC: 109 MMOL/L (ref 98–107)
CO2: 24 MMOL/L (ref 20–31)
CREAT SERPL-MCNC: 0.84 MG/DL (ref 0.5–0.9)
DIFFERENTIAL TYPE: ABNORMAL
EOSINOPHILS RELATIVE PERCENT: 4 % (ref 1–7)
GFR AFRICAN AMERICAN: >60 ML/MIN
GFR NON-AFRICAN AMERICAN: >60 ML/MIN
GFR SERPL CREATININE-BSD FRML MDRD: ABNORMAL ML/MIN/{1.73_M2}
GFR SERPL CREATININE-BSD FRML MDRD: ABNORMAL ML/MIN/{1.73_M2}
GLUCOSE BLD-MCNC: 114 MG/DL (ref 65–105)
GLUCOSE BLD-MCNC: 119 MG/DL (ref 65–105)
GLUCOSE BLD-MCNC: 125 MG/DL (ref 65–105)
GLUCOSE BLD-MCNC: 150 MG/DL (ref 70–99)
HCT VFR BLD CALC: 31.5 % (ref 36–46)
HEMOGLOBIN: 10.1 G/DL (ref 12–16)
IMMATURE GRANULOCYTES: ABNORMAL %
LYMPHOCYTES # BLD: 36 % (ref 16–46)
MCH RBC QN AUTO: 31.7 PG (ref 26–34)
MCHC RBC AUTO-ENTMCNC: 32.1 G/DL (ref 31–37)
MCV RBC AUTO: 98.9 FL (ref 80–100)
MONOCYTES # BLD: 13 % (ref 4–11)
NRBC AUTOMATED: ABNORMAL PER 100 WBC
PDW BLD-RTO: 13.2 % (ref 11–14.5)
PLATELET # BLD: 173 K/UL (ref 140–450)
PLATELET ESTIMATE: ABNORMAL
PMV BLD AUTO: 9.7 FL (ref 6–12)
POTASSIUM SERPL-SCNC: 4 MMOL/L (ref 3.7–5.3)
RBC # BLD: 3.19 M/UL (ref 4–5.2)
RBC # BLD: ABNORMAL 10*6/UL
SEG NEUTROPHILS: 46 % (ref 43–77)
SEGMENTED NEUTROPHILS ABSOLUTE COUNT: 2 K/UL (ref 1.8–7.7)
SODIUM BLD-SCNC: 139 MMOL/L (ref 135–144)
WBC # BLD: 4.3 K/UL (ref 3.5–11)
WBC # BLD: ABNORMAL 10*3/UL

## 2019-04-01 PROCEDURE — 80048 BASIC METABOLIC PNL TOTAL CA: CPT

## 2019-04-01 PROCEDURE — 6370000000 HC RX 637 (ALT 250 FOR IP): Performed by: FAMILY MEDICINE

## 2019-04-01 PROCEDURE — 2580000003 HC RX 258: Performed by: NURSE PRACTITIONER

## 2019-04-01 PROCEDURE — 74022 RADEX COMPL AQT ABD SERIES: CPT

## 2019-04-01 PROCEDURE — 99232 SBSQ HOSP IP/OBS MODERATE 35: CPT | Performed by: INTERNAL MEDICINE

## 2019-04-01 PROCEDURE — 1200000000 HC SEMI PRIVATE

## 2019-04-01 PROCEDURE — 82947 ASSAY GLUCOSE BLOOD QUANT: CPT

## 2019-04-01 PROCEDURE — 85025 COMPLETE CBC W/AUTO DIFF WBC: CPT

## 2019-04-01 PROCEDURE — 94760 N-INVAS EAR/PLS OXIMETRY 1: CPT

## 2019-04-01 PROCEDURE — 6360000002 HC RX W HCPCS: Performed by: NURSE PRACTITIONER

## 2019-04-01 PROCEDURE — 36415 COLL VENOUS BLD VENIPUNCTURE: CPT

## 2019-04-01 PROCEDURE — 6370000000 HC RX 637 (ALT 250 FOR IP): Performed by: INTERNAL MEDICINE

## 2019-04-01 PROCEDURE — 6370000000 HC RX 637 (ALT 250 FOR IP): Performed by: NURSE PRACTITIONER

## 2019-04-01 RX ORDER — INSULIN GLARGINE 100 [IU]/ML
5 INJECTION, SOLUTION SUBCUTANEOUS DAILY
Status: DISCONTINUED | OUTPATIENT
Start: 2019-04-01 | End: 2019-04-02 | Stop reason: HOSPADM

## 2019-04-01 RX ORDER — MECLIZINE HCL 12.5 MG/1
12.5 TABLET ORAL 3 TIMES DAILY PRN
Status: DISCONTINUED | OUTPATIENT
Start: 2019-04-01 | End: 2019-04-02 | Stop reason: HOSPADM

## 2019-04-01 RX ORDER — FAMOTIDINE 20 MG/1
20 TABLET, FILM COATED ORAL DAILY
Status: DISCONTINUED | OUTPATIENT
Start: 2019-04-01 | End: 2019-04-02 | Stop reason: HOSPADM

## 2019-04-01 RX ADMIN — LISINOPRIL 20 MG: 5 TABLET ORAL at 20:11

## 2019-04-01 RX ADMIN — LEVOTHYROXINE SODIUM 88 MCG: 88 TABLET ORAL at 06:43

## 2019-04-01 RX ADMIN — MECLIZINE 12.5 MG: 12.5 TABLET ORAL at 20:12

## 2019-04-01 RX ADMIN — LISINOPRIL 20 MG: 5 TABLET ORAL at 08:10

## 2019-04-01 RX ADMIN — INSULIN LISPRO 2 UNITS: 100 INJECTION, SOLUTION INTRAVENOUS; SUBCUTANEOUS at 08:11

## 2019-04-01 RX ADMIN — METOPROLOL TARTRATE 25 MG: 25 TABLET ORAL at 08:11

## 2019-04-01 RX ADMIN — INSULIN GLARGINE 5 UNITS: 100 INJECTION, SOLUTION SUBCUTANEOUS at 11:12

## 2019-04-01 RX ADMIN — ASPIRIN 81 MG 81 MG: 81 TABLET ORAL at 08:10

## 2019-04-01 RX ADMIN — OXYBUTYNIN CHLORIDE 10 MG: 5 TABLET, EXTENDED RELEASE ORAL at 08:10

## 2019-04-01 RX ADMIN — ENOXAPARIN SODIUM 40 MG: 40 INJECTION SUBCUTANEOUS at 08:11

## 2019-04-01 RX ADMIN — LATANOPROST 1 DROP: 50 SOLUTION OPHTHALMIC at 20:12

## 2019-04-01 RX ADMIN — TIMOLOL MALEATE 1 DROP: 5 SOLUTION/ DROPS OPHTHALMIC at 08:11

## 2019-04-01 RX ADMIN — FAMOTIDINE 20 MG: 20 TABLET ORAL at 11:12

## 2019-04-01 RX ADMIN — CEFTRIAXONE 1 G: 1 INJECTION, POWDER, FOR SOLUTION INTRAMUSCULAR; INTRAVENOUS at 20:10

## 2019-04-01 RX ADMIN — SERTRALINE HYDROCHLORIDE 50 MG: 50 TABLET ORAL at 08:11

## 2019-04-01 ASSESSMENT — PAIN SCALES - GENERAL: PAINLEVEL_OUTOF10: 0

## 2019-04-01 NOTE — PLAN OF CARE
Problem: Falls - Risk of:  Goal: Will remain free from falls  Description  Will remain free from falls  4/1/2019 0138 by Corinne Forrest RN  Outcome: Ongoing  3/31/2019 1758 by Magno Khalil RN  Outcome: Met This Shift  Goal: Absence of physical injury  Description  Absence of physical injury  4/1/2019 0138 by Corinne Forrest RN  Outcome: Ongoing  3/31/2019 1758 by Magno Khalil RN  Outcome: Met This Shift     Problem: Activity:  Goal: Risk for activity intolerance will decrease  Description  Risk for activity intolerance will decrease  4/1/2019 0138 by Corinne Forrest RN  Outcome: Ongoing  3/31/2019 1758 by Magno Khalil RN  Outcome: Ongoing     Problem:  Bowel/Gastric:  Goal: Bowel function will improve  Description  Bowel function will improve  4/1/2019 0138 by Corinne Forrest RN  Outcome: Ongoing  3/31/2019 1758 by Magno Khalil RN  Outcome: Ongoing  Goal: Diagnostic test results will improve  Description  Diagnostic test results will improve  4/1/2019 0138 by Corinne Forrest RN  Outcome: Ongoing  3/31/2019 1758 by Magno Khalil RN  Outcome: Ongoing  Goal: Occurrences of nausea will decrease  Description  Occurrences of nausea will decrease  4/1/2019 0138 by Corinne Forrest RN  Outcome: Ongoing  3/31/2019 1758 by Magno Khalil RN  Outcome: Met This Shift  Goal: Occurrences of vomiting will decrease  Description  Occurrences of vomiting will decrease  4/1/2019 0138 by Corinne Forrest RN  Outcome: Ongoing  3/31/2019 1758 by Magno Khalil RN  Outcome: Met This Shift     Problem: Fluid Volume:  Goal: Maintenance of adequate hydration will improve  Description  Maintenance of adequate hydration will improve  4/1/2019 0138 by Corinne Forrest RN  Outcome: Ongoing  3/31/2019 1758 by Magno Khalil RN  Outcome: Ongoing     Problem: Health Behavior:  Goal: Ability to state signs and symptoms to report to health care provider will improve  Description  Ability to state signs and symptoms to report to health care provider will improve  4/1/2019 0138 by Susan Escudero RN  Outcome: Ongoing  3/31/2019 1758 by Florencio Shah RN  Outcome: Met This Shift     Problem: Physical Regulation:  Goal: Complications related to the disease process, condition or treatment will be avoided or minimized  Description  Complications related to the disease process, condition or treatment will be avoided or minimized  4/1/2019 0138 by Susan Escudero RN  Outcome: Ongoing  3/31/2019 1758 by Florencio Shah RN  Outcome: Ongoing  Goal: Ability to maintain clinical measurements within normal limits will improve  Description  Ability to maintain clinical measurements within normal limits will improve  4/1/2019 0138 by Susan Escudero RN  Outcome: Ongoing  3/31/2019 1758 by Florencio Shah RN  Outcome: Ongoing     Problem: Sensory:  Goal: Ability to identify factors that increase the pain will improve  Description  Ability to identify factors that increase the pain will improve  4/1/2019 0138 by Susna Escudero RN  Outcome: Ongoing  3/31/2019 1758 by Florencio Shah RN  Outcome: Met This Shift  Goal: Ability to notify healthcare provider of pain before it becomes unmanageable or unbearable will improve  Description  Ability to notify healthcare provider of pain before it becomes unmanageable or unbearable will improve  4/1/2019 0138 by Susan Escudero RN  Outcome: Ongoing  3/31/2019 1758 by Florencio Shah RN  Outcome: Met This Shift  Goal: Pain level will decrease  Description  Pain level will decrease  4/1/2019 0138 by Susan Escudero RN  Outcome: Ongoing  3/31/2019 1758 by Florencio Shah RN  Outcome: Met This Shift

## 2019-04-01 NOTE — PLAN OF CARE
YULISSA Mondragon  Outcome: Ongoing  4/1/2019 0138 by Corinne Forrest RN  Outcome: Ongoing     Problem: Physical Regulation:  Goal: Complications related to the disease process, condition or treatment will be avoided or minimized  Description  Complications related to the disease process, condition or treatment will be avoided or minimized  4/1/2019 1318 by Christopher Peacock RN  Outcome: Ongoing  4/1/2019 0138 by Corinne Forrest RN  Outcome: Ongoing  Goal: Ability to maintain clinical measurements within normal limits will improve  Description  Ability to maintain clinical measurements within normal limits will improve  4/1/2019 1318 by Christopher Peacock RN  Outcome: Ongoing  4/1/2019 0138 by Corinne Forrest RN  Outcome: Ongoing     Problem: Sensory:  Goal: Ability to identify factors that increase the pain will improve  Description  Ability to identify factors that increase the pain will improve  4/1/2019 1318 by Christopher Peacock RN  Outcome: Ongoing  4/1/2019 0138 by Corinne Forrest RN  Outcome: Ongoing  Goal: Ability to notify healthcare provider of pain before it becomes unmanageable or unbearable will improve  Description  Ability to notify healthcare provider of pain before it becomes unmanageable or unbearable will improve  4/1/2019 1318 by Christopher Peacock RN  Outcome: Ongoing  4/1/2019 0138 by Corinne Forrest RN  Outcome: Ongoing  Goal: Pain level will decrease  Description  Pain level will decrease  4/1/2019 1318 by Christopher Peacock RN  Outcome: Ongoing  4/1/2019 0138 by Corinne Forrest RN  Outcome: Ongoing

## 2019-04-01 NOTE — FLOWSHEET NOTE
Assessment: Patient is well supported by family and well connected to her [employer]. She is currently unable to receive Communion due to diet restriction but will want to receive as soon as she is able. Intervention: Prayer of Spiritual Communion with her and a blessing. Notified [employer] by leaving a voice mail. Plan: Chaplains remain available. Recheck for Communion tomorrow.      04/01/19 1203   Encounter Summary   Services provided to: Patient and family together   Referral/Consult From: [facility] Family members   Place of Christianity   ([address])   Contact [place] Completed   Continue Visiting   (4/1/19)   Complexity of Encounter Moderate   Length of Encounter 15 minutes   Spiritual/Denominational   Type Spiritual support   Assessment Approachable   Intervention Active listening;Prayer   Outcome Expressed gratitude;Engaged in conversation   Sacraments   Communion Patient is currently unable

## 2019-04-01 NOTE — PROGRESS NOTES
Hospitalist Progress Note    Patient:  Catia Schneider     YOB: 1932    MRN: [id number]   Admit date: 3/29/2019     Acct: [de-identified]     PCP: DO SANTA Metz--Interval History:   SBO resolved--NGT out--3.31.2019---residual cramping--diet being advanced    Still weak and fatigued--no ready for home today    RLL nodule---vs-- mass---is to have a follow up dedicated CT chest when above cleared    See note below     All other ROS negative except noted in HPI    Diet:  DIET FULL LIQUID;    Medications:  Scheduled Meds:   famotidine  20 mg Oral Daily    insulin glargine  5 Units Subcutaneous Daily    insulin lispro  0-6 Units Subcutaneous TID WC    insulin lispro  0-3 Units Subcutaneous Nightly    aspirin  81 mg Oral Daily    ezetimibe  10 mg Oral Daily    levothyroxine  88 mcg Oral Daily    oxybutynin  10 mg Oral Daily    sertraline  50 mg Oral Daily    timolol  1 drop Both Eyes Daily    sodium chloride flush  10 mL Intravenous 2 times per day    enoxaparin  40 mg Subcutaneous Daily    cefTRIAXone (ROCEPHIN) IV  1 g Intravenous Q24H    latanoprost  1 drop Both Eyes Nightly    lisinopril  20 mg Oral BID    metoprolol tartrate  25 mg Oral BID     Continuous Infusions:   dextrose 5 % and 0.45 % NaCl 100 mL/hr at 03/31/19 1606    dextrose       PRN Meds:sodium chloride flush, magnesium sulfate, nicotine, magnesium hydroxide, ondansetron, acetaminophen, glucose, dextrose, glucagon (rDNA), dextrose    Objective:  Labs:  CBC with Differential:    Lab Results   Component Value Date    WBC 4.3 04/01/2019    RBC 3.19 04/01/2019    HGB 10.1 04/01/2019    HCT 31.5 04/01/2019     04/01/2019    MCV 98.9 04/01/2019    MCH 31.7 04/01/2019    MCHC 32.1 04/01/2019    RDW 13.2 04/01/2019    LYMPHOPCT 36 04/01/2019    MONOPCT 13 04/01/2019    BASOPCT 1 04/01/2019    MONOSABS 0.50 04/01/2019    LYMPHSABS 1.60 04/01/2019    EOSABS 0.20 04/01/2019    BASOSABS 0.00 04/01/2019    DIFFTYPE

## 2019-04-01 NOTE — DISCHARGE INSTR - DIET

## 2019-04-02 VITALS
BODY MASS INDEX: 27.99 KG/M2 | WEIGHT: 152.1 LBS | DIASTOLIC BLOOD PRESSURE: 72 MMHG | HEIGHT: 62 IN | SYSTOLIC BLOOD PRESSURE: 181 MMHG | OXYGEN SATURATION: 97 % | HEART RATE: 43 BPM | TEMPERATURE: 97.8 F | RESPIRATION RATE: 16 BRPM

## 2019-04-02 LAB
ABSOLUTE EOS #: 0.2 K/UL (ref 0–0.4)
ABSOLUTE IMMATURE GRANULOCYTE: ABNORMAL K/UL (ref 0–0.3)
ABSOLUTE LYMPH #: 1.9 K/UL (ref 1–4.8)
ABSOLUTE MONO #: 0.5 K/UL (ref 0.1–1.2)
ANION GAP SERPL CALCULATED.3IONS-SCNC: 7 MMOL/L (ref 9–17)
BASOPHILS # BLD: 1 % (ref 0–1)
BASOPHILS ABSOLUTE: 0 K/UL (ref 0–0.2)
BUN BLDV-MCNC: 9 MG/DL (ref 8–23)
BUN/CREAT BLD: 10 (ref 9–20)
CALCIUM SERPL-MCNC: 8.7 MG/DL (ref 8.6–10.4)
CHLORIDE BLD-SCNC: 106 MMOL/L (ref 98–107)
CO2: 27 MMOL/L (ref 20–31)
CREAT SERPL-MCNC: 0.87 MG/DL (ref 0.5–0.9)
DIFFERENTIAL TYPE: ABNORMAL
EOSINOPHILS RELATIVE PERCENT: 3 % (ref 1–7)
GFR AFRICAN AMERICAN: >60 ML/MIN
GFR NON-AFRICAN AMERICAN: >60 ML/MIN
GFR SERPL CREATININE-BSD FRML MDRD: ABNORMAL ML/MIN/{1.73_M2}
GFR SERPL CREATININE-BSD FRML MDRD: ABNORMAL ML/MIN/{1.73_M2}
GLUCOSE BLD-MCNC: 126 MG/DL (ref 70–99)
GLUCOSE BLD-MCNC: 155 MG/DL (ref 65–105)
HCT VFR BLD CALC: 31.3 % (ref 36–46)
HEMOGLOBIN: 10.1 G/DL (ref 12–16)
IMMATURE GRANULOCYTES: ABNORMAL %
LYMPHOCYTES # BLD: 40 % (ref 16–46)
MCH RBC QN AUTO: 32 PG (ref 26–34)
MCHC RBC AUTO-ENTMCNC: 32.3 G/DL (ref 31–37)
MCV RBC AUTO: 99 FL (ref 80–100)
MONOCYTES # BLD: 10 % (ref 4–11)
NRBC AUTOMATED: ABNORMAL PER 100 WBC
PDW BLD-RTO: 13.2 % (ref 11–14.5)
PLATELET # BLD: 189 K/UL (ref 140–450)
PLATELET ESTIMATE: ABNORMAL
PMV BLD AUTO: 9.6 FL (ref 6–12)
POTASSIUM SERPL-SCNC: 4.3 MMOL/L (ref 3.7–5.3)
RBC # BLD: 3.16 M/UL (ref 4–5.2)
RBC # BLD: ABNORMAL 10*6/UL
SEG NEUTROPHILS: 46 % (ref 43–77)
SEGMENTED NEUTROPHILS ABSOLUTE COUNT: 2.2 K/UL (ref 1.8–7.7)
SODIUM BLD-SCNC: 140 MMOL/L (ref 135–144)
WBC # BLD: 4.7 K/UL (ref 3.5–11)
WBC # BLD: ABNORMAL 10*3/UL

## 2019-04-02 PROCEDURE — 80048 BASIC METABOLIC PNL TOTAL CA: CPT

## 2019-04-02 PROCEDURE — 82947 ASSAY GLUCOSE BLOOD QUANT: CPT

## 2019-04-02 PROCEDURE — 99238 HOSP IP/OBS DSCHRG MGMT 30/<: CPT | Performed by: INTERNAL MEDICINE

## 2019-04-02 PROCEDURE — 6360000002 HC RX W HCPCS: Performed by: NURSE PRACTITIONER

## 2019-04-02 PROCEDURE — 6370000000 HC RX 637 (ALT 250 FOR IP): Performed by: INTERNAL MEDICINE

## 2019-04-02 PROCEDURE — 36415 COLL VENOUS BLD VENIPUNCTURE: CPT

## 2019-04-02 PROCEDURE — 94761 N-INVAS EAR/PLS OXIMETRY MLT: CPT

## 2019-04-02 PROCEDURE — 85025 COMPLETE CBC W/AUTO DIFF WBC: CPT

## 2019-04-02 PROCEDURE — 2580000003 HC RX 258: Performed by: NURSE PRACTITIONER

## 2019-04-02 PROCEDURE — 6370000000 HC RX 637 (ALT 250 FOR IP): Performed by: NURSE PRACTITIONER

## 2019-04-02 RX ORDER — CEFDINIR 300 MG/1
300 CAPSULE ORAL 2 TIMES DAILY
Qty: 8 CAPSULE | Refills: 0
Start: 2019-04-02 | End: 2019-04-06

## 2019-04-02 RX ORDER — HYDROCHLOROTHIAZIDE 25 MG/1
25 TABLET ORAL DAILY
Status: DISCONTINUED | OUTPATIENT
Start: 2019-04-02 | End: 2019-04-02 | Stop reason: HOSPADM

## 2019-04-02 RX ORDER — SACCHAROMYCES BOULARDII 250 MG
250 CAPSULE ORAL 2 TIMES DAILY
Qty: 20 CAPSULE | Refills: 0 | COMMUNITY
Start: 2019-04-02 | End: 2019-04-12

## 2019-04-02 RX ORDER — HYDROCHLOROTHIAZIDE 25 MG/1
25 TABLET ORAL DAILY
Qty: 30 TABLET | Refills: 0
Start: 2019-04-03 | End: 2019-06-20

## 2019-04-02 RX ORDER — ZINC OXIDE 13 %
CREAM (GRAM) TOPICAL
Refills: 0 | COMMUNITY
Start: 2019-04-02 | End: 2022-02-16

## 2019-04-02 RX ORDER — MECLIZINE HCL 12.5 MG/1
12.5 TABLET ORAL 3 TIMES DAILY PRN
Qty: 10 TABLET | Refills: 0
Start: 2019-04-02 | End: 2019-04-12

## 2019-04-02 RX ADMIN — MECLIZINE 12.5 MG: 12.5 TABLET ORAL at 09:04

## 2019-04-02 RX ADMIN — INSULIN LISPRO 1 UNITS: 100 INJECTION, SOLUTION INTRAVENOUS; SUBCUTANEOUS at 11:27

## 2019-04-02 RX ADMIN — INSULIN GLARGINE 5 UNITS: 100 INJECTION, SOLUTION SUBCUTANEOUS at 09:05

## 2019-04-02 RX ADMIN — LISINOPRIL 20 MG: 5 TABLET ORAL at 09:01

## 2019-04-02 RX ADMIN — SERTRALINE HYDROCHLORIDE 50 MG: 50 TABLET ORAL at 09:01

## 2019-04-02 RX ADMIN — LEVOTHYROXINE SODIUM 88 MCG: 88 TABLET ORAL at 05:45

## 2019-04-02 RX ADMIN — ASPIRIN 81 MG 81 MG: 81 TABLET ORAL at 09:01

## 2019-04-02 RX ADMIN — FAMOTIDINE 20 MG: 20 TABLET ORAL at 09:01

## 2019-04-02 RX ADMIN — METOPROLOL TARTRATE 12.5 MG: 25 TABLET ORAL at 09:01

## 2019-04-02 RX ADMIN — HYDROCHLOROTHIAZIDE 25 MG: 25 TABLET ORAL at 11:27

## 2019-04-02 RX ADMIN — ENOXAPARIN SODIUM 40 MG: 40 INJECTION SUBCUTANEOUS at 09:01

## 2019-04-02 RX ADMIN — TIMOLOL MALEATE 1 DROP: 5 SOLUTION/ DROPS OPHTHALMIC at 09:06

## 2019-04-02 RX ADMIN — OXYBUTYNIN CHLORIDE 10 MG: 5 TABLET, EXTENDED RELEASE ORAL at 09:01

## 2019-04-02 RX ADMIN — Medication 10 ML: at 09:02

## 2019-04-02 ASSESSMENT — PAIN SCALES - GENERAL
PAINLEVEL_OUTOF10: 0

## 2019-04-02 NOTE — PLAN OF CARE
Problem: Discharge Planning:  Goal: Discharged to appropriate level of care  Description  Discharged to appropriate level of care  Outcome: Ongoing  Note:   Discharge planning in process and discussed with patient/family.  will be aware of discharge needs, patient would like to be discharged to The [address] [place] [address] or [facility] Care plan reviewed with patient. Patient verbalize understanding of the plan of care and contribute to goal setting.

## 2019-04-02 NOTE — PROGRESS NOTES
Spoke with Pt and Family about discharge placement both would like [facility] to go to [facility] [place]

## 2019-04-02 NOTE — PROGRESS NOTES
Hospitalist Progress Note    Patient:  Shaggy Reyes     YOB: 1932    MRN: [id number]   Admit date: 3/29/2019     Acct: [de-identified]     PCP: Alysia Arrington DO    CC--Interval History:   SBO resolved---diet advanced and tolerated--to  Jaida Bass    RLL nodule--spiculated--to have dedicated CT chest outpatient    Hypertension--added HCTZ 25 to BP regimen    See note below    All other ROS negative except noted in HPI    Diet:  DIET CARB CONTROL; Carb Control: 4 carb choices (60 gms)/meal    Medications:  Scheduled Meds:   hydrochlorothiazide  25 mg Oral Daily    famotidine  20 mg Oral Daily    insulin glargine  5 Units Subcutaneous Daily    insulin lispro  0-6 Units Subcutaneous TID WC    insulin lispro  0-3 Units Subcutaneous Nightly    metoprolol tartrate  12.5 mg Oral BID    aspirin  81 mg Oral Daily    ezetimibe  10 mg Oral Daily    levothyroxine  88 mcg Oral Daily    oxybutynin  10 mg Oral Daily    sertraline  50 mg Oral Daily    timolol  1 drop Both Eyes Daily    sodium chloride flush  10 mL Intravenous 2 times per day    enoxaparin  40 mg Subcutaneous Daily    cefTRIAXone (ROCEPHIN) IV  1 g Intravenous Q24H    latanoprost  1 drop Both Eyes Nightly    lisinopril  20 mg Oral BID     Continuous Infusions:   dextrose       PRN Meds:meclizine, sodium chloride flush, magnesium sulfate, nicotine, magnesium hydroxide, ondansetron, acetaminophen, glucose, dextrose, glucagon (rDNA), dextrose    Objective:  Labs:  CBC with Differential:    Lab Results   Component Value Date    WBC 4.7 04/02/2019    RBC 3.16 04/02/2019    HGB 10.1 04/02/2019    HCT 31.3 04/02/2019     04/02/2019    MCV 99.0 04/02/2019    MCH 32.0 04/02/2019    MCHC 32.3 04/02/2019    RDW 13.2 04/02/2019    LYMPHOPCT 40 04/02/2019    MONOPCT 10 04/02/2019    BASOPCT 1 04/02/2019    MONOSABS 0.50 04/02/2019    LYMPHSABS 1.90 04/02/2019    EOSABS 0.20 04/02/2019    BASOSABS 0.00 04/02/2019    DIFFTYPE NOT REPORTED 04/02/2019     BMP:    Lab Results   Component Value Date     04/02/2019    K 4.3 04/02/2019     04/02/2019    CO2 27 04/02/2019    BUN 9 04/02/2019    LABALBU 3.1 03/30/2019    CREATININE 0.87 04/02/2019    CALCIUM 8.7 04/02/2019    GFRAA >60 04/02/2019    LABGLOM >60 04/02/2019    GLUCOSE 126 04/02/2019           Physical Exam:  Vitals: BP (!) 181/72   Pulse (!) 43   Temp 97.8 °F (36.6 °C) (Oral)   Resp 18   Ht 5' 2\" (1.575 m)   Wt 152 lb 1.6 oz (69 kg)   SpO2 96%   BMI 27.82 kg/m²   24 hour intake/output:No intake or output data in the 24 hours ending 04/02/19 0911  Last 3 weights: Wt Readings from Last 3 Encounters:   04/02/19 152 lb 1.6 oz (69 kg)   01/21/19 154 lb (69.9 kg)   10/18/18 149 lb (67.6 kg)     HEENT: Normocephalic and Atraumatic  Neck: Supple, No Masses, Tenderness, Nodularity and No Lymphadenopathy  Chest/Lungs: Clear to Auscultation without Rales, Rhonchi, or Wheezes  Cardiac: Regular Rate and Rhythm---II/VI BRO  GI/Abdomen: Bowel Sounds Present and Soft, Non-tender, without Guarding or Rebound Tenderness  : Not examined  EXT/Skin: No Edema, No Cyanosis and No Clubbing  Neuro: alert----dementia = baseline--hearing impairment--- and No Localizing Signs/Symptoms--subjective complaint of episodic dizziness--no nystagmus      Assessment:    Active Problems:    Small bowel obstruction (HCC)  Resolved Problems:    * No resolved hospital problems. *     DOUG ELIZABETH      AK   IM  85  WF   [sr NAYELI Eddy]  FULL CODE          Anti-infectives:  none       SBO--3.29.2019         AAS---4. 1.2019--contrast through non-dilated large bowel   RLL--nodule? ??--spiculated           CT abdomen-pelvis---3.29.2019--SBO--no transition point                         identified---2.1 cm nodule--vs--mass RLL--severe                         degenerative changes L4-5--L5-S1  Hypertension  Hyperlipidemia  Hypothyroidism  Diabetes Mellitus Type 2  UTI--POA--3.29.2019  CKD--Stage 3  Obesity  HEARING IMPAIRMENT   PMH:  anemia---chronic, bilateral cataracts,  dysthymia, chest pain,             pericardial effusion, gait abnormality, kidney stones,              macular degeneration---OU, glaucoma--OU, adenocarcinoma--             endometrium, vertigo, BLE trauma---childhood, Influenza A---2018,             UTI--- 2018, dehydration---1.11.2018, CRUZ---1.11.2018---dehydration--             fluoroquinolone,  hyponatremia, chronic diarrhea    PSH:  MAGDA--BSO---cervix absent--2002, laparoscopic cholecystectomy--            1998, ERCP--1998, colonoscopy---2003, left elbow ORIF--fracture--             2010, right cataract extraction----IOL--2013, vein-stripping    Allergies:        allopurinol, Percocet---oxycodone, Phenergan--promethazine,                         potassium citrate--Polycitra-K, sulfa--rash  Intolerances:  Tessalon---benzonatate---anxiety      Plan:  1. To [facility]. 2.2019  2. Home medications reviewed  3. HCTZ 25 mg PO daily  4. Vertigo--Antivert PRN  5. Probiotics   6. UTI---POA----Omnicef  7. Follow up Dr. Lovetta Soulier,   8. Follow up Dr. Malina Pollack,   9.   CT chest re: RLL possible mass--outpatient  10.   See orders    Electronically signed by Judi Heath on 4/2/2019 at 9:11 AM    Hospitalist

## 2019-04-02 NOTE — FLOWSHEET NOTE
Assessment: Elke Morton is being released to physical therapy today. Intervention: Patient was given regular diet. She received Communion today. Plan: Spiritual care will be provided by her home [employer].      04/02/19 1103   Encounter Summary   Services provided to: Patient and family together   Referral/Consult From: Carlos   Continue Visiting   (4/2/19)   Complexity of Encounter Low   Length of Encounter 15 minutes   Routine   Type Follow up   Spiritual/Congregational   Type Spiritual support   Assessment Approachable   Intervention Active listening;Prayer   Outcome Expressed gratitude;Engaged in conversation   Sacraments   Communion Patient received communion

## 2019-04-03 NOTE — DISCHARGE SUMMARY
[address]                 [address] [place] [address], 00 [facility]                               DISCHARGE SUMMARY    PATIENT NAME: Ronnell Shelton                  :        1932  MED REC NO:   [id number]                             ROOM:       0214  ACCOUNT NO:   [de-identified]                           ADMIT DATE: 2019  PROVIDER:     Himanshuangel Harperick                  DISCHARGE DATE: 2019    ATTENDING PHYSICIAN OF HOSPITALIZATION:  Cynthia Velazco MD    PERSONAL PHYSICIAN:  Cyndi Ray DO, Internal Medicine, [facility]. The patient was seen by General Surgery, [facility]. DIAGNOSES:  1.  Small bowel obstruction, 2019. Acute abdominal series,  2019, finally showing contrast through nondilated large bowel,  resolved. 2.  Left lower lobe nodule, spiculated. CT abdomen and pelvis,  2019, small bowel obstruction, no transition point identified, 2.1  cm nodule versus right lower lobe mass, severe degenerative changes,  L4-5, L5-S1. The patient will require outpatient dedicated CT scan once  the present events have dissipated. 3.  Hypertension. 4.  Hyperlipidemia. 5.  Hypothyroidism. 6.  Diabetes mellitus type 2.  7.  Chronic kidney disease, stage III. 8.  Obesity. 9.  Urinary tract infection, POA, 2019.  10.  Hearing impairment. Other medical problems set forth in the progress note of 2019,  incorporated for reference herein. HISTORY OF PRESENT ILLNESS AND HOSPITAL COURSE:  The patient is an  80-year-old white female, patient of Cyndi Ray DO, Internal  Medicine, [facility]. The patient presented with complaints  of abdominal pain, small bowel obstruction evident from films. The  patient was conservatively managed. Seen by Dr. Bora Rainey of General  Surgery during the course of her hospitalization.   On 2019,  contrast passed through nondilated large bowel.  Diet was advanced and  tolerated. The patient able to be discharged to [facility] on  04/02/2019. Noted above was spiculated right lower lobe nodule versus  mass. This will need to have a followup in the outpatient setting. CT  scan of the chest has been ordered. Hypertension. The patient had the addition of hydrochlorothiazide to  her regimen. Hypothyroidism, on current regimen. Diabetes mellitus type 2, current  regimen. See medications below. The patient stabilized and transferred on 04/02/2019. LABORATORY DATA AROUND THE TIME OF DISCHARGE:  White cell count 4.7,  hemoglobin 10.1, hematocrit 31.3, and platelets 898,146. Sodium 140,  potassium 4.3, chloride 106, CO2 27, BUN 9, creatinine 0.87, glucose  126, calcium 8.7, GFR greater than 60. DISCHARGE INSTRUCTIONS:  FOLLOWUP:  Discharged to [facility] on 04/02/2019. DIET:  Diabetic soft, advance as tolerated. ACTIVITY:  Physical and occupational therapies continued from the  hospital setting. The patient is to have a CT scan of the chest in 10 days to rule out  nodule versus mass, right lower lobe, contrast study. MEDICATIONS, NEW:  Omnicef (cefdinir) 300 mg p.o. b.i.d. 4 days urinary  tract infection; Saccharomyces boulardii (Florastor) 250 mg p.o. b.i.d.;  hydrochlorothiazide (HCTZ) 25 mg p.o. daily; episodic dizziness, no  nystagmus noted, meclizine (Antivert) 12.5 mg 3 times a day. FOLLOWING MEDICATIONS FOR WHICH CHANGES MAY HAVE OCCURRED:  Probiotic  acidophilus one p.o. 3 times a day. FOLLOWING MEDICATIONS CONTINUED, NO CHANGE:  Ditropan XL (oxybutynin) 10  mg extended release one p.o. daily; Zoloft (sertraline) 50 mg p.o.  daily; Lopressor (metoprolol tartrate) 25 mg tablet one-half tablet for  12.5 mg p.o. b.i.d.; Zetia (ezetimibe) 10 mg p.o. daily;  Synthroid  (levothyroxine) 88 mcg (0.088 mg) p.o. daily; lisinopril 20 mg p.o.  b.i.d.; multivitamin minerals one p.o. daily; Zofran (ondansetron) 4 mg  p.o. q.8h. p.r.n. nausea, vomiting; cranberry p.o. one daily;  multivitamin minerals (Ocuvite PreserVision) 1 tablet p.o. daily;  aspirin 81 mg p.o. daily; Xalatan (latanoprost) 0.005% ophthalmic  solution 1 drop both eyes nightly; Timoptic (timolol) 0.5 mg ophthalmic  solution 1 drop both eyes daily; vitamin C (ascorbic acid) 500 mg p.o.  b.i.d. Follow up with the patient's personal physician, Gary Shaffer DO, Internal Medicine, [facility]. The patient will be a  resident of [address] [place] [address] for rehab. Any aspect of the patient's care not discussed in the chart and/or  dictation will be addressed and treated as an outpatient. The patient's medications have been reviewed including, but not limited  to, pre-hospital, hospital and discharge medications. The patient  and/or the patient's personal representatives were specifically advised  the only medications to be taken are those set forth in the discharge  orders and no other medications should be taken. Any prior medications  not on the discharge orders are specifically discontinued. The patient has been advised of the financial relationship and ownership  interests between [facility] physicians and  employees of [facility] and [facility].         Mesfin Lomax    D: 04/02/2019 17:59:51       T: 04/02/2019 18:01:46     [email or web address]  Job#: [id number]     Doc#: [id number]    CC:

## 2019-04-04 ENCOUNTER — OFFICE VISIT (OUTPATIENT)
Dept: CARDIOLOGY | Age: 84
End: 2019-04-04
Payer: MEDICARE

## 2019-04-04 VITALS
HEIGHT: 62 IN | WEIGHT: 153 LBS | SYSTOLIC BLOOD PRESSURE: 132 MMHG | DIASTOLIC BLOOD PRESSURE: 74 MMHG | HEART RATE: 46 BPM | BODY MASS INDEX: 28.16 KG/M2

## 2019-04-04 DIAGNOSIS — R00.1 BRADYCARDIA: ICD-10-CM

## 2019-04-04 DIAGNOSIS — I10 ESSENTIAL HYPERTENSION: Primary | ICD-10-CM

## 2019-04-04 PROCEDURE — G8427 DOCREV CUR MEDS BY ELIG CLIN: HCPCS | Performed by: INTERNAL MEDICINE

## 2019-04-04 PROCEDURE — 1036F TOBACCO NON-USER: CPT | Performed by: INTERNAL MEDICINE

## 2019-04-04 PROCEDURE — 99213 OFFICE O/P EST LOW 20 MIN: CPT | Performed by: INTERNAL MEDICINE

## 2019-04-04 PROCEDURE — 93000 ELECTROCARDIOGRAM COMPLETE: CPT | Performed by: INTERNAL MEDICINE

## 2019-04-04 PROCEDURE — 1090F PRES/ABSN URINE INCON ASSESS: CPT | Performed by: INTERNAL MEDICINE

## 2019-04-04 PROCEDURE — 4040F PNEUMOC VAC/ADMIN/RCVD: CPT | Performed by: INTERNAL MEDICINE

## 2019-04-04 PROCEDURE — G8417 CALC BMI ABV UP PARAM F/U: HCPCS | Performed by: INTERNAL MEDICINE

## 2019-04-04 PROCEDURE — 1111F DSCHRG MED/CURRENT MED MERGE: CPT | Performed by: INTERNAL MEDICINE

## 2019-04-04 PROCEDURE — G8598 ASA/ANTIPLAT THER USED: HCPCS | Performed by: INTERNAL MEDICINE

## 2019-04-04 PROCEDURE — 1123F ACP DISCUSS/DSCN MKR DOCD: CPT | Performed by: INTERNAL MEDICINE

## 2019-04-04 NOTE — PROGRESS NOTES
Today's Date: 4/4/2019  Patient Name: Christophe June  Patient's age: 80 y.o., 4/11/1932          The patient is a 80 y.o.  female is in the office for f/u, had recent SBO treated conservativly. Patient has been doing well cardiac wise, no new cardiac complaints. She denies angina, PND/Orthopnea. SHE HAS OCCASIONAL DIZZINESS BUT NO SYNCOPE/PRESYNCOPE. Past Medical History:   has a past medical history of Adenocarcinoma of endometrium ([address] Cataract of left eye, Chest pain, Chronic diarrhea, Diabetes mellitus type II, controlled ([address] Diabetes mellitus type II, controlled ([address] Dysthymia, Gait abnormality, H/O renal calculi, H/O vein stripping, Hard of hearing, Hyperlipidemia, Hypertension, Hypothyroidism, Influenza A, Left elbow fracture, Macular degeneration, Obesity, Pericardial effusion, Pseudophakia of right eye, Traumatic injury of lower extremity, and Vertigo. Past Surgical History:   has a past surgical history that includes jason and bso (cervix removed) (March 2002); Cholecystectomy, laparoscopic (5/28/1998); ERCP (7/19/1998); Colonoscopy (7/22/2003); Elbow fracture surgery (Left, September 2010); and Cataract removal (Right, February 2013). Home Medications:    Prior to Admission medications    Medication Sig Start Date End Date Taking?  Authorizing Provider   cefdinir (OMNICEF) 300 MG capsule Take 1 capsule by mouth 2 times daily for 4 days 4/2/19 4/6/19  Alec Mancia   saccharomyces boulardii (FLORASTOR) 250 MG capsule Take 1 capsule by mouth 2 times daily for 10 days 4/2/19 4/12/19  [facility]   Probiotic Product (PROBIOTIC DAILY) CAPS One tablet 3 time a day times 10 days then once a day 4/2/19   [facility]   meclizine (ANTIVERT) 12.5 MG tablet Take 1 tablet by mouth 3 times daily as needed for Dizziness 4/2/19 4/12/19  [facility]   hydrochlorothiazide (HYDRODIURIL) 25 MG tablet Take 1 tablet by mouth daily 4/3/19   [facility]   oxybutynin (DITROPAN-XL) 10 MG extended release tablet Take 1 tablet by mouth daily 3/4/19 6/2/19  Wilber Howard MD   sertraline (ZOLOFT) 50 MG tablet TAKE 1 TABLET DAILY 3/4/19   Wiliam Eddy DO   metoprolol tartrate (LOPRESSOR) 25 MG tablet TAKE 1/2 TABLET BY MOUTH 2 TIMES DAILY 3/4/19   Jazmine Shah DO   ezetimibe (ZETIA) 10 MG tablet Take 1 tablet by mouth daily 2/11/19   Rivera Posey DO   levothyroxine (SYNTHROID) 88 MCG tablet Take 1 tablet by mouth Daily 2/11/19   Wiliam Eddy DO   lisinopril (PRINIVIL;ZESTRIL) 20 MG tablet Take 1 tablet by mouth 2 times daily 2/11/19   Lo Eddy,    zoster recombinant adjuvanted vaccine (SHINGRIX) 50 MCG SUSR injection 50 MCG IM then repeat 2-6 months. 10/18/18 10/18/19  Wallace Mott, APRN - CNP   Multiple Vitamins-Minerals (THERAPEUTIC MULTIVITAMIN-MINERALS) tablet Take 1 tablet by mouth daily    Historical Provider, MD   ondansetron (ZOFRAN) 4 MG tablet Take 1 tablet by mouth every 8 hours as needed for Nausea or Vomiting 3/16/18   Wiliam Eddy DO   CRANBERRY PO Take 1 capsule by mouth daily    Historical Provider, MD   Multiple Vitamins-Minerals (OCUVITE PRESERVISION PO) Take 1 tablet by mouth daily    Historical Provider, MD   aspirin 81 MG tablet Take 81 mg by mouth daily. Historical Provider, MD   latanoprost (XALATAN) 0.005 % ophthalmic solution Place 1 drop into both eyes nightly. Historical Provider, MD   timolol (TIMOPTIC) 0.5 % ophthalmic solution Place 1 drop into both eyes daily. Historical Provider, MD   Ascorbic Acid (VITAMIN C) 500 MG tablet Take 500 mg by mouth 2 times daily. Historical Provider, MD       Allergies:  Allopurinol; Percocet [oxycodone-acetaminophen]; Phenergan [promethazine hcl]; Polycitra-k [potassium citrate]; Statins; Levaquin [levofloxacin]; Sulfa antibiotics; and Tessalon [benzonatate]    Social History:   reports that she has never smoked.  She has never used smokeless tobacco. She reports that she does not drink alcohol or use drugs. REVIEW OF SYSTEMS:  CONSTITUTIONAL:NEGATIVE  HEENT:NEG  Cardiovascular: No chest pain, Yes dyspnea on exertion, No palpitations. Lower extremity edema: No  RESPIRATORY: BURNS  GASTROINTESTINAL:  positive for nausea  GENITOURINARY:  negative  INTEGUMENT:  negative  MUSCULOSKELETAL:  positive for  pain  NEUROLOGICAL:  negative    PHYSICAL EXAM:      /74   Pulse (!) 46   Ht 5' 2\" (1.575 m)   Wt 153 lb (69.4 kg)   BMI 27.98 kg/m²    HEENT: PERRL, no cervical lymphadenopathy. No masses palpable. Cardiovascular:  · The apical impulse is not displaced  · Heart  Sounds:REGULAR BRADYCARDIA, BRO. · Jugular venous pulsation Normal  · The carotid upstroke is NL  · Peripheral pulses are symmetrical and full  Respiratory: Good respiratory effort. On auscultation: clear to auscultation bilaterally  Abdomen:  · No masses or tenderness  · Bowel sounds present  Extremities:  ·  No Cyanosis or Clubbing  ·  Lower extremity edema: Yes  Skin: Warm and dry    Cardiac data:    EKG: Marked sinus  Bradycardia   Low voltage in precordial leads. ABNORMAL     ECHO 8/2018:  Summary  Global left ventricular systolic function is normal. Estimated ejection  fraction is 60 % . Mild left ventricular hypertrophy. Grade II (moderate) left ventricular diastolic dysfunction. Left atrium is mildly dilated. Right atrium is mildly dilated . Aortic valve sclerosis without stenosis. Mitral annular calcification is seen. Mild-moderate mitral regurgitation. Mild to moderate tricuspid regurgitation. Estimated right ventricular systolic pressure is 42 mmHg. Mild pulmonary hypertension. No significant pericardial effusion is seen.   Labs:     CBC:   Recent Labs     04/02/19  0459   WBC 4.7   HGB 10.1*   HCT 31.3*        BMP:   Recent Labs     04/02/19  0459      K 4.3   CO2 27   BUN 9   CREATININE 0.87   LABGLOM >60   GLUCOSE 126*     PT/INR: No results for input(s): PROTIME, INR in the last 72 hours. FASTING LIPID PANEL:  Lab Results   Component Value Date    HDL 45 11/03/2018    TRIG 82 11/03/2018     LIVER PROFILE:No results for input(s): AST, ALT, LABALBU in the last 72 hours.     IMPRESSION:    HFpEF  HTN  PAT  PVD  ASX   Aortic valve sclerosis  Mild-moderate mitral regurgitation  Mild to moderate tricuspid regurgitation  MILD LEG EDEMA  Patient Active Problem List   Diagnosis    DM (diabetes mellitus) type II controlled with renal manifestation ([address]    Hypertension    Hyperlipidemia    Hypothyroidism    Chronic diarrhea    Dysthymia    Gait abnormality    Chronic kidney disease, stage III (moderate) (HCC), likely related to diabetes    Early stage nonexudative age-related macular degeneration of both eyes    Glaucoma of both eyes    Overweight    Carotid stenosis, bilateral    Urinary incontinence    Small bowel obstruction (HCC)       RECOMMENDATIONS:  DECREASE LOPRESSOR TO 12.5 MG HS  AMBULATORY BP MONITORING   SUPPORT STOCKINGS  [facility] [address] Cardiology Consult           [phone]

## 2019-04-11 ENCOUNTER — OFFICE VISIT (OUTPATIENT)
Dept: SURGERY | Age: 84
End: 2019-04-11
Payer: MEDICARE

## 2019-04-11 VITALS
TEMPERATURE: 98.1 F | HEIGHT: 62 IN | WEIGHT: 154.4 LBS | SYSTOLIC BLOOD PRESSURE: 134 MMHG | BODY MASS INDEX: 28.41 KG/M2 | DIASTOLIC BLOOD PRESSURE: 68 MMHG | HEART RATE: 50 BPM

## 2019-04-11 DIAGNOSIS — K56.609 SMALL BOWEL OBSTRUCTION (HCC): Primary | ICD-10-CM

## 2019-04-11 DIAGNOSIS — K52.9 CHRONIC DIARRHEA: ICD-10-CM

## 2019-04-11 PROCEDURE — 1111F DSCHRG MED/CURRENT MED MERGE: CPT | Performed by: SURGERY

## 2019-04-11 PROCEDURE — 99212 OFFICE O/P EST SF 10 MIN: CPT | Performed by: SURGERY

## 2019-04-11 PROCEDURE — 1123F ACP DISCUSS/DSCN MKR DOCD: CPT | Performed by: SURGERY

## 2019-04-11 PROCEDURE — G8417 CALC BMI ABV UP PARAM F/U: HCPCS | Performed by: SURGERY

## 2019-04-11 PROCEDURE — 1090F PRES/ABSN URINE INCON ASSESS: CPT | Performed by: SURGERY

## 2019-04-11 PROCEDURE — 4040F PNEUMOC VAC/ADMIN/RCVD: CPT | Performed by: SURGERY

## 2019-04-11 PROCEDURE — G8427 DOCREV CUR MEDS BY ELIG CLIN: HCPCS | Performed by: SURGERY

## 2019-04-11 PROCEDURE — 1036F TOBACCO NON-USER: CPT | Performed by: SURGERY

## 2019-04-11 PROCEDURE — G8598 ASA/ANTIPLAT THER USED: HCPCS | Performed by: SURGERY

## 2019-04-11 RX ORDER — LOPERAMIDE HYDROCHLORIDE 2 MG/1
2 CAPSULE ORAL 4 TIMES DAILY PRN
COMMUNITY
Start: 2019-04-11 | End: 2019-04-21

## 2019-04-11 NOTE — PROGRESS NOTES
Patient here after hospitalization for small bowel obstruction. No abdomina pain, nausea or vomiting. She has had some diarrhea and loose stools, which is a chronic problem for her. Thought to be due to her previous pelvic radiotherapy. Has used Imodium and Lomotil in the past, and would like to go back on those. She looks good. I don't know that she needs both Lomotil and Imodium. Imodium ordered. If this does not control her symptoms, she can discuss this with her PCP.

## 2019-04-12 RX ORDER — AMLODIPINE BESYLATE 5 MG/1
5 TABLET ORAL DAILY
Qty: 90 TABLET | Refills: 1 | Status: SHIPPED | OUTPATIENT
Start: 2019-04-12 | End: 2019-04-24 | Stop reason: ALTCHOICE

## 2019-04-17 DIAGNOSIS — K52.9 CHRONIC DIARRHEA: Primary | ICD-10-CM

## 2019-04-18 ENCOUNTER — TELEPHONE (OUTPATIENT)
Dept: INTERNAL MEDICINE | Age: 84
End: 2019-04-18

## 2019-04-18 RX ORDER — DIPHENOXYLATE HYDROCHLORIDE AND ATROPINE SULFATE 2.5; .025 MG/1; MG/1
TABLET ORAL
Qty: 60 TABLET | Refills: 1 | Status: SHIPPED | OUTPATIENT
Start: 2019-04-18 | End: 2019-10-01 | Stop reason: SDUPTHER

## 2019-04-18 NOTE — TELEPHONE ENCOUNTER
Spoke with patients daughter and patient was discharged for [facility] on 4/17/19.     L/M for patient to return call

## 2019-04-19 ENCOUNTER — CARE COORDINATION (OUTPATIENT)
Dept: CARE COORDINATION | Age: 84
End: 2019-04-19

## 2019-04-19 NOTE — TELEPHONE ENCOUNTER
[address] Transitions Initial Follow Up Call    Outreach made within 2 business days of discharge: Yes    Patient: Rachel Flowers Patient : 1932   MRN: [id number]  Reason for Admission: Small bowel obstruction  Discharge Date: 19       Spoke with: Qing Houser    Discharge department/facility: [facility]    TCM Interactive Patient Contact:  Was patient able to fill all prescriptions: Yes  Was patient instructed to bring all medications to the follow-up visit: Yes  Is patient taking all medications as directed in the discharge summary?  Yes  Does patient understand their discharge instructions: Yes  Does patient have questions or concerns that need addressed prior to 7-14 day follow up office visit: no    Scheduled appointment with PCP within 7-14 days    Follow Up  Future Appointments   Date Time Provider Juanita Edwards   2019  3:30 PM MOHAN Sun - CNP [facility]   2019  2:15 PM MD TOÑITO Kelsey DP   2019  3:00 PM MD VASCULAR ULTRASOUND RM 1 MDHZ [facility] [place]   2019  3:00 PM Manoj Ackerman MD DVAS DP       Moon Castellon LPN

## 2019-04-24 ENCOUNTER — OFFICE VISIT (OUTPATIENT)
Dept: INTERNAL MEDICINE | Age: 84
End: 2019-04-24
Payer: MEDICARE

## 2019-04-24 ENCOUNTER — HOSPITAL ENCOUNTER (OUTPATIENT)
Dept: LAB | Age: 84
Discharge: HOME | End: 2019-04-24
Payer: MEDICARE

## 2019-04-24 VITALS
HEIGHT: 63 IN | WEIGHT: 149.2 LBS | BODY MASS INDEX: 26.44 KG/M2 | DIASTOLIC BLOOD PRESSURE: 58 MMHG | SYSTOLIC BLOOD PRESSURE: 100 MMHG | HEART RATE: 72 BPM

## 2019-04-24 DIAGNOSIS — R93.89 ABNORMAL CT SCAN: ICD-10-CM

## 2019-04-24 DIAGNOSIS — E03.4 HYPOTHYROIDISM DUE TO ACQUIRED ATROPHY OF THYROID: ICD-10-CM

## 2019-04-24 DIAGNOSIS — I65.23 CAROTID STENOSIS, BILATERAL: ICD-10-CM

## 2019-04-24 DIAGNOSIS — E78.2 MIXED HYPERLIPIDEMIA: ICD-10-CM

## 2019-04-24 DIAGNOSIS — R42 VERTIGO: ICD-10-CM

## 2019-04-24 DIAGNOSIS — I10 ESSENTIAL HYPERTENSION: ICD-10-CM

## 2019-04-24 DIAGNOSIS — N18.30 CHRONIC KIDNEY DISEASE, STAGE III (MODERATE) (HCC): ICD-10-CM

## 2019-04-24 DIAGNOSIS — K56.609 SMALL BOWEL OBSTRUCTION (HCC): Primary | ICD-10-CM

## 2019-04-24 LAB
ANION GAP SERPL CALCULATED.3IONS-SCNC: 11 MMOL/L (ref 9–17)
BUN BLDV-MCNC: 29 MG/DL (ref 8–23)
BUN/CREAT BLD: 28 (ref 9–20)
CALCIUM SERPL-MCNC: 9.6 MG/DL (ref 8.6–10.4)
CHLORIDE BLD-SCNC: 101 MMOL/L (ref 98–107)
CO2: 28 MMOL/L (ref 20–31)
CREAT SERPL-MCNC: 1.02 MG/DL (ref 0.5–0.9)
GFR AFRICAN AMERICAN: >60 ML/MIN
GFR NON-AFRICAN AMERICAN: 51 ML/MIN
GFR SERPL CREATININE-BSD FRML MDRD: ABNORMAL ML/MIN/{1.73_M2}
GFR SERPL CREATININE-BSD FRML MDRD: ABNORMAL ML/MIN/{1.73_M2}
GLUCOSE BLD-MCNC: 118 MG/DL (ref 70–99)
HCT VFR BLD CALC: 36.9 % (ref 36–46)
HEMOGLOBIN: 12 G/DL (ref 12–16)
MCH RBC QN AUTO: 32.5 PG (ref 26–34)
MCHC RBC AUTO-ENTMCNC: 32.6 G/DL (ref 31–37)
MCV RBC AUTO: 99.7 FL (ref 80–100)
NRBC AUTOMATED: ABNORMAL PER 100 WBC
PDW BLD-RTO: 14.1 % (ref 11–14.5)
PLATELET # BLD: 268 K/UL (ref 140–450)
PMV BLD AUTO: 9.8 FL (ref 6–12)
POTASSIUM SERPL-SCNC: 4 MMOL/L (ref 3.7–5.3)
RBC # BLD: 3.7 M/UL (ref 4–5.2)
SODIUM BLD-SCNC: 140 MMOL/L (ref 135–144)
WBC # BLD: 6.9 K/UL (ref 3.5–11)

## 2019-04-24 PROCEDURE — 36415 COLL VENOUS BLD VENIPUNCTURE: CPT

## 2019-04-24 PROCEDURE — 1111F DSCHRG MED/CURRENT MED MERGE: CPT | Performed by: NURSE PRACTITIONER

## 2019-04-24 PROCEDURE — 85027 COMPLETE CBC AUTOMATED: CPT

## 2019-04-24 PROCEDURE — 99495 TRANSJ CARE MGMT MOD F2F 14D: CPT | Performed by: NURSE PRACTITIONER

## 2019-04-24 PROCEDURE — 80048 BASIC METABOLIC PNL TOTAL CA: CPT

## 2019-04-24 RX ORDER — HYDROCHLOROTHIAZIDE 25 MG/1
25 TABLET ORAL DAILY
Qty: 90 TABLET | Refills: 3 | Status: CANCELLED | OUTPATIENT
Start: 2019-04-24

## 2019-04-24 RX ORDER — MECLIZINE HYDROCHLORIDE 25 MG/1
12.5 TABLET ORAL 3 TIMES DAILY PRN
COMMUNITY

## 2019-04-24 RX ORDER — AMLODIPINE BESYLATE 5 MG/1
5 TABLET ORAL DAILY
Qty: 90 TABLET | Refills: 3 | Status: CANCELLED | OUTPATIENT
Start: 2019-04-24

## 2019-04-24 NOTE — PROGRESS NOTES
Transition Care Office Visit    Date of Face-to-Face: 4/24/2019  Persons at visit: daughter   Date of interactive contact/ attempted contact with the patient and/or caregiver: 4/18/2019-See transitional care telephone note. HPI   51-year-old female being seen for post hospital follow-up from [facility] where she was admitted 3-20 9-19 through 4-2-19 with small bowel obstruction. Treated conservatively. NG tube was placed. Seen by general surgery. On 4-1-19 contrast passed through nondilated large bowel. Diet was advanced after NG removed. Tolerated fluids and food well. Due to ongoing weakness was subsequently sent to [facility] for rehabilitation. Is discharged home from [facility] on 4-17-19. Since has been seen by cardiology metoprolol was discontinued due to bradycardia. Was placed on Norvasc. Has had increased dizziness since being placed on Norvasc. Blood pressures has been low. Denies any abdominal pain. Passing gas. No difficulty with bowel or bladder. Is noted on CT of abdomen and pelvis had right lower lobe nodule versus mass. CT was ordered but not set up. Patient daughter asking about getting her set up today. Blood sugars have remained stable. Denies any hypoglycemic episodes. Activity: activity as tolerated  Any medication changes since post-hospitalization phone call? No  Any treatment changes since post-hospitalization phone call? No  Any further education needed on medications/treatment plan?  No    Current Medications   Outpatient Medications Marked as Taking for the 4/24/19 encounter (Office Visit) with MOHAN Kenyon - CNP   Medication Sig Dispense Refill    meclizine (ANTIVERT) 25 MG tablet Take 12.5 mg by mouth 3 times daily as needed      diphenoxylate-atropine (LOMOTIL) 2.5-0.025 MG per tablet TAKE 2 TABS BY MOUTH TWICE DAILY 60 tablet 1    Probiotic Product (PROBIOTIC DAILY) CAPS One tablet 3 time a day times 10 days then once a day  0  hydrochlorothiazide (HYDRODIURIL) 25 MG tablet Take 1 tablet by mouth daily 30 tablet 0    oxybutynin (DITROPAN-XL) 10 MG extended release tablet Take 1 tablet by mouth daily 90 tablet 3    sertraline (ZOLOFT) 50 MG tablet TAKE 1 TABLET DAILY 90 tablet 3    ezetimibe (ZETIA) 10 MG tablet Take 1 tablet by mouth daily 90 tablet 3    levothyroxine (SYNTHROID) 88 MCG tablet Take 1 tablet by mouth Daily 90 tablet 3    lisinopril (PRINIVIL;ZESTRIL) 20 MG tablet Take 1 tablet by mouth 2 times daily 180 tablet 3    Multiple Vitamins-Minerals (THERAPEUTIC MULTIVITAMIN-MINERALS) tablet Take 1 tablet by mouth daily      ondansetron (ZOFRAN) 4 MG tablet Take 1 tablet by mouth every 8 hours as needed for Nausea or Vomiting 15 tablet 1    CRANBERRY PO Take 1 capsule by mouth daily      Multiple Vitamins-Minerals (OCUVITE PRESERVISION PO) Take 1 tablet by mouth daily      aspirin 81 MG tablet Take 81 mg by mouth daily.  latanoprost (XALATAN) 0.005 % ophthalmic solution Place 1 drop into both eyes nightly.  timolol (TIMOPTIC) 0.5 % ophthalmic solution Place 1 drop into both eyes daily.  Ascorbic Acid (VITAMIN C) 500 MG tablet Take 500 mg by mouth 2 times daily. Medication Reconciliation  Provider has reviewed medication record and it has been modified as necessary to accurately depict current medications since hospitalization. Bridgett Love has been instructed on these changes. Social History     Socioeconomic History    Marital status:       Spouse name: None    Number of children: None    Years of education: None    Highest education level: None   Occupational History    None   Social Needs    Financial resource strain: None    Food insecurity:     Worry: None     Inability: None    Transportation needs:     Medical: None     Non-medical: None   Tobacco Use    Smoking status: Never Smoker    Smokeless tobacco: Never Used    Tobacco comment: never smoked yant rrt 01/11/2018 Substance and Sexual Activity    Alcohol use: No     Alcohol/week: 0.0 oz    Drug use: No    Sexual activity: None   Lifestyle    Physical activity:     Days per week: None     Minutes per session: None    Stress: None   Relationships    Social connections:     Talks on phone: None     Gets together: None     Attends Hindu service: None     Active member of club or organization: None     Attends meetings of clubs or organizations: None     Relationship status: None    Intimate partner violence:     Fear of current or ex partner: None     Emotionally abused: None     Physically abused: None     Forced sexual activity: None   Other Topics Concern    None   Social History Narrative    None       Past Medical History:   Diagnosis Date    Adenocarcinoma of endometrium ([address]     Cataract of left eye     Chest pain 4/23/2014    Chronic diarrhea     Diabetes mellitus type II, controlled ([address]     diet controlled    Diabetes mellitus type II, controlled ([address]     diet controlled     Dysthymia     Gait abnormality 6/23/2014    H/O renal calculi     H/O vein stripping     Hard of hearing     Hyperlipidemia     Hypertension     Hypothyroidism     Influenza A 1/11/2018    Left elbow fracture     S/P surgical repair    Macular degeneration     Obesity     Pericardial effusion 4/23/2014    Pseudophakia of right eye     Traumatic injury of lower extremity childhood    bilateral trauma of lower extremities    Vertigo     resolved       Family History   Problem Relation Age of Onset    High Blood Pressure Other        Updated and reviewed pertinent [facility]    Allergies   Allergen Reactions    Allopurinol      Patient unsure of reaction    Metoprolol Tartrate [Metoprolol]      bradycardia    Percocet [Oxycodone-Acetaminophen]      Hallucinations. ...sensitive to narcotics    Phenergan [Promethazine Hcl]      Very sensitive. ..given small doses    Polycitra-K [Potassium Citrate]      Patient unsure of reaction    Statins Other (See Comments)     ? Muscle aches     Levaquin [Levofloxacin] Anxiety     Not able to sleep    Sulfa Antibiotics Rash    Tessalon [Benzonatate] Anxiety     Not able to sleep       ROS   General: Denies fever, chills, night sweats, or recent weight changes. Skin: Denies any rashes/wounds. HEENT: Denies any headaches. Denies any blurred vision, double vision, or eye pain. Denies any tinnitus, vertigo, or dizziness. Denies discharge, stuffiness, obstruction, or epistaxis. Denies any hoarseness, dysphagia/ pain. Cardiovascular: Denies any chest pain, shortness of breath, dyspnea on exertion, orthopnea, or palpations. Respiratory: Denies cough, sputum production, hemoptysis, or wheezing. Gastrointestinal: Denies any nausea, vomiting, diarrhea, constipation, or melena. Genitourinary: Denies any dysuria, hematuria, frequency, urgency, or hesitancy. Endocrine:  Denies any heat or cold intolerances, polydipsia, polyuria, or polyphagia. Musculoskeletal: Denies any arthritis, arthralagias, myalgias, or muscle weakness. Neuropsychiatric: Denies any depression, syncope, tremors, seizures, anxiety or nervousness. Physical Exam:  Vitals BP (!) 100/58   Pulse 72   Ht 5' 3\" (1.6 m)   Wt 149 lb 3.2 oz (67.7 kg)   BMI 26.43 kg/m²   General Appearance: Alert, no distress; vital signs were reviewed today  Head: Normocephalic, atraumatic.   Eyes:  Sclera clear, no drainage  Ears:  External auditory canals patent, no drainage  Nose:  Septum midline, mucosa normal, no drainage   Back: Symmetric, no tenderness  Lungs: Chest rises and falls symmetrically, no use of accessory muscles, Lungs clear throughout  Chest wall: No tenderness  Heart[de-identified] Regular rate and rhythm, S1 and S2 normal, no murmur or gallop  Abdomen: Nontender, bowel sounds active in all 4 quadrants, no masses  Extremities: Extremities without clubbing,cyanosis or edema  Skin: Skin color normal, no rashes or

## 2019-04-25 ENCOUNTER — HOSPITAL ENCOUNTER (OUTPATIENT)
Age: 84
Setting detail: SPECIMEN
Discharge: HOME | End: 2019-04-25
Payer: MEDICARE

## 2019-04-25 ENCOUNTER — TELEPHONE (OUTPATIENT)
Dept: INTERNAL MEDICINE | Age: 84
End: 2019-04-25

## 2019-04-25 DIAGNOSIS — I10 ESSENTIAL HYPERTENSION: ICD-10-CM

## 2019-04-25 DIAGNOSIS — N39.0 URINARY TRACT INFECTION WITHOUT HEMATURIA, SITE UNSPECIFIED: ICD-10-CM

## 2019-04-25 DIAGNOSIS — R79.89 ELEVATED SERUM CREATININE: Primary | ICD-10-CM

## 2019-04-25 PROCEDURE — 81001 URINALYSIS AUTO W/SCOPE: CPT

## 2019-04-25 NOTE — TELEPHONE ENCOUNTER
----- Message from MOHAN Rios CNP sent at 4/25/2019 10:22 AM EDT -----  H/H improved  Increase water intake  Follow up BMP in 5 days   Also have them complete a UA- hx of UTI and  Mildly increased kidney functions

## 2019-04-25 NOTE — TELEPHONE ENCOUNTER
Daughter Paris Stark) notified of results and all instructions. Pt will do UA tomorrow. Daughter questioned if pt should hold HCTZ. Spoke with Gerardo Murillo CNP- ok to hold HCTZ (already holding Norvasc). They should monitor BP and watch for any swelling and notify office- she may need to restart HCTZ. Labs ordered.

## 2019-04-26 ENCOUNTER — HOSPITAL ENCOUNTER (OUTPATIENT)
Dept: CT IMAGING | Age: 84
Discharge: HOME | End: 2019-04-28
Payer: MEDICARE

## 2019-04-26 DIAGNOSIS — I10 ESSENTIAL HYPERTENSION: ICD-10-CM

## 2019-04-26 DIAGNOSIS — R79.89 ELEVATED SERUM CREATININE: ICD-10-CM

## 2019-04-26 DIAGNOSIS — N39.0 URINARY TRACT INFECTION WITHOUT HEMATURIA, SITE UNSPECIFIED: ICD-10-CM

## 2019-04-26 DIAGNOSIS — R91.8 LUNG NODULES: ICD-10-CM

## 2019-04-26 LAB
-: ABNORMAL
AMORPHOUS: ABNORMAL
BACTERIA: ABNORMAL
BILIRUBIN URINE: NEGATIVE
CASTS UA: ABNORMAL /LPF (ref 0–2)
COLOR: NORMAL
COMMENT UA: NORMAL
CRYSTALS, UA: ABNORMAL /HPF
EPITHELIAL CELLS UA: ABNORMAL /HPF (ref 0–5)
GLUCOSE URINE: NEGATIVE
KETONES, URINE: NEGATIVE
LEUKOCYTE ESTERASE, URINE: NEGATIVE
MUCUS: ABNORMAL
NITRITE, URINE: NEGATIVE
OTHER OBSERVATIONS UA: ABNORMAL
PH UA: 6 (ref 5–6)
PROTEIN UA: NEGATIVE
RBC UA: ABNORMAL /HPF (ref 0–4)
RENAL EPITHELIAL, UA: ABNORMAL /HPF
SPECIFIC GRAVITY UA: 1.01 (ref 1.01–1.02)
TRICHOMONAS: ABNORMAL
TURBIDITY: NORMAL
URINE HGB: NEGATIVE
UROBILINOGEN, URINE: NORMAL
WBC UA: ABNORMAL /HPF (ref 0–4)
YEAST: ABNORMAL

## 2019-04-26 PROCEDURE — 6360000004 HC RX CONTRAST MEDICATION: Performed by: INTERNAL MEDICINE

## 2019-04-26 PROCEDURE — 2709999900 CT CHEST W CONTRAST

## 2019-04-26 RX ADMIN — IOPAMIDOL 100 ML: 755 INJECTION, SOLUTION INTRAVENOUS at 14:43

## 2019-04-30 ENCOUNTER — TELEPHONE (OUTPATIENT)
Dept: INTERNAL MEDICINE | Age: 84
End: 2019-04-30

## 2019-04-30 DIAGNOSIS — R93.89 ABNORMAL CT OF THE CHEST: Primary | ICD-10-CM

## 2019-04-30 DIAGNOSIS — R91.8 PULMONARY NODULES: Primary | ICD-10-CM

## 2019-05-01 ENCOUNTER — OFFICE VISIT (OUTPATIENT)
Dept: PULMONOLOGY | Age: 84
End: 2019-05-01
Payer: MEDICARE

## 2019-05-01 ENCOUNTER — HOSPITAL ENCOUNTER (OUTPATIENT)
Dept: PULMONOLOGY | Age: 84
Discharge: HOME | End: 2019-05-01
Payer: MEDICARE

## 2019-05-01 ENCOUNTER — HOSPITAL ENCOUNTER (OUTPATIENT)
Dept: LAB | Age: 84
Discharge: HOME | End: 2019-05-01
Payer: MEDICARE

## 2019-05-01 ENCOUNTER — TELEPHONE (OUTPATIENT)
Dept: INTERNAL MEDICINE | Age: 84
End: 2019-05-01

## 2019-05-01 VITALS
HEIGHT: 62 IN | OXYGEN SATURATION: 97 % | DIASTOLIC BLOOD PRESSURE: 60 MMHG | SYSTOLIC BLOOD PRESSURE: 114 MMHG | HEART RATE: 59 BPM | BODY MASS INDEX: 27.79 KG/M2 | WEIGHT: 151 LBS

## 2019-05-01 DIAGNOSIS — I10 ESSENTIAL HYPERTENSION: ICD-10-CM

## 2019-05-01 DIAGNOSIS — R79.89 ELEVATED SERUM CREATININE: ICD-10-CM

## 2019-05-01 DIAGNOSIS — R93.89 ABNORMAL CT OF THE CHEST: ICD-10-CM

## 2019-05-01 DIAGNOSIS — R91.1 NODULE OF LOWER LOBE OF RIGHT LUNG: Primary | ICD-10-CM

## 2019-05-01 LAB
ANION GAP SERPL CALCULATED.3IONS-SCNC: 9 MMOL/L (ref 9–17)
BUN BLDV-MCNC: 31 MG/DL (ref 8–23)
BUN/CREAT BLD: 32 (ref 9–20)
CALCIUM SERPL-MCNC: 9.5 MG/DL (ref 8.6–10.4)
CHLORIDE BLD-SCNC: 103 MMOL/L (ref 98–107)
CO2: 27 MMOL/L (ref 20–31)
CREAT SERPL-MCNC: 0.97 MG/DL (ref 0.5–0.9)
GFR AFRICAN AMERICAN: >60 ML/MIN
GFR NON-AFRICAN AMERICAN: 54 ML/MIN
GFR SERPL CREATININE-BSD FRML MDRD: ABNORMAL ML/MIN/{1.73_M2}
GFR SERPL CREATININE-BSD FRML MDRD: ABNORMAL ML/MIN/{1.73_M2}
GLUCOSE BLD-MCNC: 143 MG/DL (ref 70–99)
POTASSIUM SERPL-SCNC: 4.9 MMOL/L (ref 3.7–5.3)
SODIUM BLD-SCNC: 139 MMOL/L (ref 135–144)

## 2019-05-01 PROCEDURE — 99204 OFFICE O/P NEW MOD 45 MIN: CPT | Performed by: INTERNAL MEDICINE

## 2019-05-01 PROCEDURE — 4040F PNEUMOC VAC/ADMIN/RCVD: CPT | Performed by: INTERNAL MEDICINE

## 2019-05-01 PROCEDURE — 1090F PRES/ABSN URINE INCON ASSESS: CPT | Performed by: INTERNAL MEDICINE

## 2019-05-01 PROCEDURE — 1111F DSCHRG MED/CURRENT MED MERGE: CPT | Performed by: INTERNAL MEDICINE

## 2019-05-01 PROCEDURE — 80048 BASIC METABOLIC PNL TOTAL CA: CPT

## 2019-05-01 PROCEDURE — 94726 PLETHYSMOGRAPHY LUNG VOLUMES: CPT

## 2019-05-01 PROCEDURE — 94729 DIFFUSING CAPACITY: CPT

## 2019-05-01 PROCEDURE — G8417 CALC BMI ABV UP PARAM F/U: HCPCS | Performed by: INTERNAL MEDICINE

## 2019-05-01 PROCEDURE — 36415 COLL VENOUS BLD VENIPUNCTURE: CPT

## 2019-05-01 PROCEDURE — 1036F TOBACCO NON-USER: CPT | Performed by: INTERNAL MEDICINE

## 2019-05-01 PROCEDURE — G8427 DOCREV CUR MEDS BY ELIG CLIN: HCPCS | Performed by: INTERNAL MEDICINE

## 2019-05-01 PROCEDURE — G8598 ASA/ANTIPLAT THER USED: HCPCS | Performed by: INTERNAL MEDICINE

## 2019-05-01 PROCEDURE — 94010 BREATHING CAPACITY TEST: CPT

## 2019-05-01 PROCEDURE — 1123F ACP DISCUSS/DSCN MKR DOCD: CPT | Performed by: INTERNAL MEDICINE

## 2019-05-01 NOTE — TELEPHONE ENCOUNTER
Patient will have BMP completed today while at our clinic. She reports dizziness has resolved.  Please advise if pt to continue BP checks and if so, how often per daughter's request

## 2019-05-01 NOTE — PROGRESS NOTES
abnormality 6/23/2014    H/O renal calculi     H/O vein stripping     Hard of hearing     Hyperlipidemia     Hypertension     Hypothyroidism     Influenza A 1/11/2018    Left elbow fracture     S/P surgical repair    Macular degeneration     Obesity     Pericardial effusion 4/23/2014    Pseudophakia of right eye     Traumatic injury of lower extremity childhood    bilateral trauma of lower extremities    Vertigo     resolved         Family History:       Problem Relation Age of Onset    High Blood Pressure Other        SURGICAL HISTORY:   Past Surgical History:   Procedure Laterality Date    CATARACT REMOVAL Right February 2013    Dr. Leo Fuchs, LAPAROSCOPIC  5/28/1998    COLONOSCOPY  7/22/2003    Dr. Swanson Knee Left September 2010    ORIF, Dr. Phoenix Sutton ERCP  7/19/1998    retained stone, Suzy Hollis and Jennifer Carlos MAGDA AND BSO  March 2002    endometrial adenocarcinoma, Dr. Estefanía Wolff:      There  no history of TB or TB exposure. There  no asbestos or silica dust exposure. The patient reports  no coal, foundry, quarry or [employer] exposure. Travel history reveals no. There  no history of recreational or IV drug use. There  no hot tube exposure. Pets  - none now but grew up on farm     Occupational history [employer]     TOBACCO:   reports that she has never smoked. She has never used smokeless tobacco.  ETOH:   reports that she does not drink alcohol. ALLERGIES:      Allergies   Allergen Reactions    Allopurinol      Patient unsure of reaction    Metoprolol Tartrate [Metoprolol]      bradycardia    Percocet [Oxycodone-Acetaminophen]      Hallucinations. ...sensitive to narcotics    Phenergan [Promethazine Hcl]      Very sensitive. ..given small doses    Polycitra-K [Potassium Citrate]      Patient unsure of reaction    Statins Other (See Comments)     ?  Muscle aches     Levaquin [Levofloxacin] Anxiety     Not able to sleep    Sulfa Antibiotics Rash    Tessalon [Benzonatate] Anxiety     Not able to sleep         Home Meds:   Prior to Admission medications    Medication Sig Start Date End Date Taking? Authorizing Provider   meclizine (ANTIVERT) 25 MG tablet Take 12.5 mg by mouth 3 times daily as needed   Yes Historical Provider, MD   diphenoxylate-atropine (LOMOTIL) 2.5-0.025 MG per tablet TAKE 2 TABS BY MOUTH TWICE DAILY 4/18/19 6/17/19 Yes Wiliam Eddy, DO   Probiotic Product (PROBIOTIC DAILY) CAPS One tablet 3 time a day times 10 days then once a day 4/2/19  Yes Ashely Innocent   oxybutynin (DITROPAN-XL) 10 MG extended release tablet Take 1 tablet by mouth daily 3/4/19 6/2/19 Yes Rocael Luna MD   sertraline (ZOLOFT) 50 MG tablet TAKE 1 TABLET DAILY 3/4/19  Yes Wiliam Eddy DO   ezetimibe (ZETIA) 10 MG tablet Take 1 tablet by mouth daily 2/11/19  Yes Wiliam Eddy DO   levothyroxine (SYNTHROID) 88 MCG tablet Take 1 tablet by mouth Daily 2/11/19  Yes Wiliam Eddy DO   lisinopril (PRINIVIL;ZESTRIL) 20 MG tablet Take 1 tablet by mouth 2 times daily 2/11/19  Yes Wiliam Eddy,    Multiple Vitamins-Minerals (THERAPEUTIC MULTIVITAMIN-MINERALS) tablet Take 1 tablet by mouth daily   Yes Historical Provider, MD   CRANBERRY PO Take 1 capsule by mouth daily   Yes Historical Provider, MD   Multiple Vitamins-Minerals (OCUVITE PRESERVISION PO) Take 1 tablet by mouth daily   Yes Historical Provider, MD   aspirin 81 MG tablet Take 81 mg by mouth daily. Yes Historical Provider, MD   latanoprost (XALATAN) 0.005 % ophthalmic solution Place 1 drop into both eyes nightly. Yes Historical Provider, MD   timolol (TIMOPTIC) 0.5 % ophthalmic solution Place 1 drop into both eyes daily. Yes Historical Provider, MD   Ascorbic Acid (VITAMIN C) 500 MG tablet Take 500 mg by mouth 2 times daily.    Yes Historical Provider, MD   hydrochlorothiazide (HYDRODIURIL) 25 MG tablet Take 1 tablet by mouth daily 4/3/19   Danita Canadaing   zoster recombinant adjuvanted vaccine [facility]) 50 MCG SUSR injection 50 MCG IM then repeat 2-6 months.  10/18/18 10/18/19  MOHAN Hernandez CNP   ondansetron (ZOFRAN) 4 MG tablet Take 1 tablet by mouth every 8 hours as needed for Nausea or Vomiting 3/16/18   Larissa Schaefer,               REVIEW OF SYSTEMS:    CONSTITUTIONAL:  negative for  fevers, chills, sweats, fatigue, malaise, anorexia and weight loss  EYES:  negative for  double vision, blurred vision, dry eyes, eye discharge and redness  HEENT:  negative for  hearing loss, tinnitus, ear drainage, earaches and nasal congestion  RESPIRATORY:  See hpi  CARDIOVASCULAR:  negative for  chest pain,, palpitations, orthopnea, PND  GASTROINTESTINAL:  negative for nausea, vomiting, change in bowel habits, diarrhea, constipation, abdominal pain, pruritus, abdominal mass and abdominal distention  GENITOURINARY:  negative for frequency, dysuria, nocturia, urinary incontinence and hesitancy  INTEGUMENT  negative for rash, skin lesion(s), dryness, skin color change, changes in lesion, pruritus and changes in hair  HEMATOLOGIC/LYMPHATIC:  negative for easy bruising, bleeding, lymphadenopathy, petechiae and swelling/edema  ALLERGIC/IMMUNOLOGIC:  negative for recurrent infections, urticaria and drug reactions  ENDOCRINE:  negative for heat intolerance, cold intolerance, tremor, weight changes and change in bowel habits  MUSCULOSKELETAL:  negative for  myalgias, arthralgias, pain, joint swelling, stiff joints and decreased range of motion  NEUROLOGICAL:  negative for headaches, dizziness, seizures, memory problems, speech problems, visual disturbance and coordination problems  BEHAVIOR/PSYCH:  negative for poor appetite, increased appetite, decreased sleep, increased sleep, decreased energy level, increased energy level and poor concentration  Skin no rash no dermatitis  Vitals:  /60   Pulse Differential of atelectasis versus neoplastic process and explained option of PET CT scan and followed by biopsy( If PET CT scan is positive). Versus pursuing CT of the chest in 3 months. Patient would like to pursue PET CT scan  If PET CT scan is positive, then she will need CT guided lung biopsy, but if PET CT scan is negative, then we can do a follow-up CT chest in 3 months. I spent more than 45 minutes examining, evaluating, reviewing data and counseling the patient. Greater than 50% of time was spent face-to-face with the patient     Requested Prescriptions      No prescriptions requested or ordered in this encounter       There are no discontinued medications. Amor Garcia received counseling on the following healthy behaviors: nutrition, exercise and medication adherence    Patient given educational materials : see patient instruction       Discussed use, benefit, and side effects of prescribed medications. Barriers to medication compliance addressed. All patient questions answered. Pt voiced understanding. I hope this updates you on my evaluation and clinical thinking. Thank you for allowing me to participate in his care. Sincerely,      Electronically signed by Thom Hughes MD on   5/1/19 at 1:30 PM       Please note that this chart was generated using voice recognition Dragon dictation software. Although every effort was made to ensure the accuracy of this automated transcription, some errors in transcription may have occurred.

## 2019-05-01 NOTE — TELEPHONE ENCOUNTER
Please have her check daily for one more week and report- if stable can change to checking weekly then

## 2019-05-03 NOTE — PROCEDURES
[address]                 [address] [place] [address]                               PULMONARY FUNCTION    PATIENT NAME: Landon Cuellar                  :        1932  MED REC NO:   [id number]                             ROOM:  ACCOUNT NO:   [de-identified]                           ADMIT DATE: 2019  PROVIDER:     Miguel Lynch    DATE OF PROCEDURE:  2019    The patient had good cooperation and effort, but we are looking at her  flow volume loop. It was a suboptimal expiratory maneuver. FEV1 110%  predicted, % predicted, ratio 81. Lung volume is normal.   Diffusion capacity 62% predicted, corrected for alveolar volume 75%  predicted. FINAL IMPRESSION:  Within the limitations of the effort, spirometry is  normal, lung volumes are normal, diffusion capacity is moderately  decreased. Clinical correlation advised.         Clint Cunningham    D: 2019 21:45:26       T: 2019 2:04:30     [email or web address]  Job#: [id number]     Doc#: [id number]    CC:  Preeti Mar

## 2019-05-07 ENCOUNTER — TELEPHONE (OUTPATIENT)
Dept: INTERNAL MEDICINE | Age: 84
End: 2019-05-07

## 2019-05-07 NOTE — TELEPHONE ENCOUNTER
Patient cancelled pet scan at this time and is going to contemplate the options you gave her.    ROD

## 2019-05-24 ENCOUNTER — TELEPHONE (OUTPATIENT)
Dept: INTERNAL MEDICINE | Age: 84
End: 2019-05-24

## 2019-05-28 NOTE — TELEPHONE ENCOUNTER
Daughter returned my call and was advised to have pt take Norvasc 2.5mg daily and call in a week with blood pressures.

## 2019-05-29 ENCOUNTER — TELEPHONE (OUTPATIENT)
Dept: INTERNAL MEDICINE | Age: 84
End: 2019-05-29

## 2019-05-29 NOTE — TELEPHONE ENCOUNTER
Patient had PET CT done at [facility]. Daughter calling for results. Report scanned under media. Images are in PACS. Thanks.

## 2019-06-03 NOTE — TELEPHONE ENCOUNTER
Patient's daughter calling office this morning. She has a few more questions she would like to ask you that I was unable to answer. States she will not be available to talk tonight but will be available tomorrow evening if you are available.

## 2019-06-06 DIAGNOSIS — R00.1 BRADYCARDIA: ICD-10-CM

## 2019-06-06 DIAGNOSIS — R42 DIZZINESS: Primary | ICD-10-CM

## 2019-06-06 NOTE — TELEPHONE ENCOUNTER
Called home and was given [phone] but went  To voice mail   Will try abby again   Frances Smyth

## 2019-06-07 ENCOUNTER — HOSPITAL ENCOUNTER (OUTPATIENT)
Dept: LAB | Age: 84
Discharge: HOME | End: 2019-06-07
Payer: MEDICARE

## 2019-06-07 ENCOUNTER — TELEPHONE (OUTPATIENT)
Dept: INTERNAL MEDICINE | Age: 84
End: 2019-06-07

## 2019-06-07 DIAGNOSIS — R42 DIZZINESS: ICD-10-CM

## 2019-06-07 DIAGNOSIS — E87.5 HYPERKALEMIA: Primary | ICD-10-CM

## 2019-06-07 DIAGNOSIS — R00.1 BRADYCARDIA: ICD-10-CM

## 2019-06-07 LAB
ABSOLUTE EOS #: 0.1 K/UL (ref 0–0.4)
ABSOLUTE IMMATURE GRANULOCYTE: ABNORMAL K/UL (ref 0–0.3)
ABSOLUTE LYMPH #: 1.3 K/UL (ref 1–4.8)
ABSOLUTE MONO #: 0.9 K/UL (ref 0.1–1.2)
ANION GAP SERPL CALCULATED.3IONS-SCNC: 8 MMOL/L (ref 9–17)
BASOPHILS # BLD: 1 % (ref 0–1)
BASOPHILS ABSOLUTE: 0 K/UL (ref 0–0.2)
BUN BLDV-MCNC: 33 MG/DL (ref 8–23)
BUN/CREAT BLD: 29 (ref 9–20)
CALCIUM SERPL-MCNC: 9.9 MG/DL (ref 8.6–10.4)
CHLORIDE BLD-SCNC: 103 MMOL/L (ref 98–107)
CO2: 25 MMOL/L (ref 20–31)
CREAT SERPL-MCNC: 1.13 MG/DL (ref 0.5–0.9)
DIFFERENTIAL TYPE: ABNORMAL
EOSINOPHILS RELATIVE PERCENT: 2 % (ref 1–7)
GFR AFRICAN AMERICAN: 55 ML/MIN
GFR NON-AFRICAN AMERICAN: 46 ML/MIN
GFR SERPL CREATININE-BSD FRML MDRD: ABNORMAL ML/MIN/{1.73_M2}
GFR SERPL CREATININE-BSD FRML MDRD: ABNORMAL ML/MIN/{1.73_M2}
GLUCOSE BLD-MCNC: 152 MG/DL (ref 70–99)
HCT VFR BLD CALC: 35.3 % (ref 36–46)
HEMOGLOBIN: 11.5 G/DL (ref 12–16)
IMMATURE GRANULOCYTES: ABNORMAL %
LYMPHOCYTES # BLD: 14 % (ref 16–46)
MCH RBC QN AUTO: 32.7 PG (ref 26–34)
MCHC RBC AUTO-ENTMCNC: 32.7 G/DL (ref 31–37)
MCV RBC AUTO: 100.1 FL (ref 80–100)
MONOCYTES # BLD: 10 % (ref 4–11)
NRBC AUTOMATED: ABNORMAL PER 100 WBC
PDW BLD-RTO: 13.3 % (ref 11–14.5)
PLATELET # BLD: 308 K/UL (ref 140–450)
PLATELET ESTIMATE: ABNORMAL
PMV BLD AUTO: 9 FL (ref 6–12)
POTASSIUM SERPL-SCNC: 5.4 MMOL/L (ref 3.7–5.3)
RBC # BLD: 3.53 M/UL (ref 4–5.2)
RBC # BLD: ABNORMAL 10*6/UL
SEG NEUTROPHILS: 73 % (ref 43–77)
SEGMENTED NEUTROPHILS ABSOLUTE COUNT: 6.8 K/UL (ref 1.8–7.7)
SODIUM BLD-SCNC: 136 MMOL/L (ref 135–144)
WBC # BLD: 9.2 K/UL (ref 3.5–11)
WBC # BLD: ABNORMAL 10*3/UL

## 2019-06-07 PROCEDURE — 80048 BASIC METABOLIC PNL TOTAL CA: CPT

## 2019-06-07 PROCEDURE — 36415 COLL VENOUS BLD VENIPUNCTURE: CPT

## 2019-06-07 PROCEDURE — 85025 COMPLETE CBC W/AUTO DIFF WBC: CPT

## 2019-06-10 ENCOUNTER — HOSPITAL ENCOUNTER (OUTPATIENT)
Dept: LAB | Age: 84
Discharge: HOME | End: 2019-06-10
Payer: MEDICARE

## 2019-06-10 ENCOUNTER — OFFICE VISIT (OUTPATIENT)
Dept: PULMONOLOGY | Age: 84
End: 2019-06-10
Payer: MEDICARE

## 2019-06-10 VITALS
DIASTOLIC BLOOD PRESSURE: 57 MMHG | RESPIRATION RATE: 14 BRPM | OXYGEN SATURATION: 98 % | TEMPERATURE: 97.8 F | WEIGHT: 148 LBS | HEIGHT: 60 IN | SYSTOLIC BLOOD PRESSURE: 114 MMHG | HEART RATE: 58 BPM | BODY MASS INDEX: 29.06 KG/M2

## 2019-06-10 DIAGNOSIS — R91.1 NODULE OF LOWER LOBE OF RIGHT LUNG: Primary | ICD-10-CM

## 2019-06-10 DIAGNOSIS — E87.5 HYPERKALEMIA: ICD-10-CM

## 2019-06-10 LAB
ANION GAP SERPL CALCULATED.3IONS-SCNC: 10 MMOL/L (ref 9–17)
BUN BLDV-MCNC: 31 MG/DL (ref 8–23)
BUN/CREAT BLD: 25 (ref 9–20)
CALCIUM SERPL-MCNC: 9.7 MG/DL (ref 8.6–10.4)
CHLORIDE BLD-SCNC: 100 MMOL/L (ref 98–107)
CO2: 27 MMOL/L (ref 20–31)
CREAT SERPL-MCNC: 1.22 MG/DL (ref 0.5–0.9)
GFR AFRICAN AMERICAN: 51 ML/MIN
GFR NON-AFRICAN AMERICAN: 42 ML/MIN
GFR SERPL CREATININE-BSD FRML MDRD: ABNORMAL ML/MIN/{1.73_M2}
GFR SERPL CREATININE-BSD FRML MDRD: ABNORMAL ML/MIN/{1.73_M2}
GLUCOSE BLD-MCNC: 142 MG/DL (ref 70–99)
POTASSIUM SERPL-SCNC: 5.1 MMOL/L (ref 3.7–5.3)
SODIUM BLD-SCNC: 137 MMOL/L (ref 135–144)

## 2019-06-10 PROCEDURE — 80048 BASIC METABOLIC PNL TOTAL CA: CPT

## 2019-06-10 PROCEDURE — 99214 OFFICE O/P EST MOD 30 MIN: CPT | Performed by: INTERNAL MEDICINE

## 2019-06-10 PROCEDURE — 36415 COLL VENOUS BLD VENIPUNCTURE: CPT

## 2019-06-10 PROCEDURE — 1090F PRES/ABSN URINE INCON ASSESS: CPT | Performed by: INTERNAL MEDICINE

## 2019-06-10 PROCEDURE — G8598 ASA/ANTIPLAT THER USED: HCPCS | Performed by: INTERNAL MEDICINE

## 2019-06-10 PROCEDURE — G8417 CALC BMI ABV UP PARAM F/U: HCPCS | Performed by: INTERNAL MEDICINE

## 2019-06-10 PROCEDURE — G8427 DOCREV CUR MEDS BY ELIG CLIN: HCPCS | Performed by: INTERNAL MEDICINE

## 2019-06-10 PROCEDURE — 1123F ACP DISCUSS/DSCN MKR DOCD: CPT | Performed by: INTERNAL MEDICINE

## 2019-06-10 PROCEDURE — 4040F PNEUMOC VAC/ADMIN/RCVD: CPT | Performed by: INTERNAL MEDICINE

## 2019-06-10 PROCEDURE — 1036F TOBACCO NON-USER: CPT | Performed by: INTERNAL MEDICINE

## 2019-06-10 NOTE — PROGRESS NOTES
type II, controlled ([facility] [address]     diet controlled     Dysthymia     Gait abnormality 6/23/2014    H/O renal calculi     H/O vein stripping     Hard of hearing     Hyperlipidemia     Hypertension     Hypothyroidism     Influenza A 1/11/2018    Left elbow fracture     S/P surgical repair    Macular degeneration     Obesity     Pericardial effusion 4/23/2014    Pseudophakia of right eye     Traumatic injury of lower extremity childhood    bilateral trauma of lower extremities    Vertigo     resolved         Family History:       Problem Relation Age of Onset    High Blood Pressure Other        SURGICAL HISTORY:   Past Surgical History:   Procedure Laterality Date    CATARACT REMOVAL Right February 2013    Dr. Kenneth Castro, LAPAROSCOPIC  5/28/1998    COLONOSCOPY  7/22/2003    Dr. Erik Lyman Left September 2010    ORIF, Dr. Chandni Cheney ERCP  7/19/1998    retained stone, Suzy Umaña and Nandini Ross MAGDA AND BSO  March 2002    endometrial adenocarcinoma, Dr. Nick Caban:      There  no history of TB or TB exposure. There  no asbestos or silica dust exposure. The patient reports  no coal, foundry, quarry or [employer] exposure. Travel history reveals no. There  no history of recreational or IV drug use. There  no hot tube exposure. Pets  - none now but grew up on farm     Occupational history [employer]     TOBACCO:   reports that she has never smoked. She has never used smokeless tobacco.  ETOH:   reports that she does not drink alcohol. ALLERGIES:      Allergies   Allergen Reactions    Allopurinol      Patient unsure of reaction    Metoprolol Tartrate [Metoprolol]      bradycardia    Percocet [Oxycodone-Acetaminophen]      Hallucinations. ...sensitive to narcotics    Phenergan [Promethazine Hcl]      Very sensitive. ..given small doses    Polycitra-K [Potassium Citrate]      Patient unsure of reaction 03/30/2019     Lipase:   Lab Results   Component Value Date    LIPASE 4 03/30/2019     Troponin: No results for input(s): TROPONINI in the last 72 hours. BNP: No results for input(s): BNP in the last 72 hours. Lipids: No results for input(s): CHOL, HDL in the last 72 hours. Invalid input(s): LDLCALCU  ABGs: No results found for: PHART, PO2ART, [id number]  INR: No results for input(s): INR in the last 72 hours. Thyroid:   Lab Results   Component Value Date    TSH 0.77 05/25/2018     Urinalysis: No results for input(s): BACTERIA, BLOODU, CLARITYU, COLORU, PHUR, PROTEINU, RBCUA, SPECGRAV, BILIRUBINUR, NITRU, WBCUA, LEUKOCYTESUR, GLUCOSEU in the last 72 hours. CT Scans   4/26/2019  No acute cardiopulmonary process.       Right lower lobe nodule medially measuring 2.4 x 1.5 cm with a small 8 mm   satellite nodule.  These are new compared to prior chest CT.  Recommendations   per the Fleischner Society criteria are given below.       A few subtle ground-glass nodules are seen in the right lung apex.  These are   stable compared to prior chest CT performed in April of 2014.           5/1/19    PFT shows normal spirometry, normal lung volumes. Moderately decreased diffusion capacity          IMPRESSION:     Diagnosis Orders   1. Nodule of lower lobe of right lung 2.4 cm x 1.5 cm and 8 mm  CT CHEST LOW DOSE (LDCT)        :                PLAN:        Reviewed CT chest PET/CT with patient, her daughter, son-in-law in detail differential of inflammatory versus round atelectasis versus neoplasm reviewed. It is intermediate SUV on PET CT scan and no change from April to May. Reasonable to proceed with CT chest in 3 months . Pros and cons of biopsy reviewed with patient and her family   patient agrees to proceed with CT chest    I spent more than 25minutes examining, evaluating, reviewing data and counseling the patient.   Greater than 50% of time was spent face-to-face with the patient     Requested Prescriptions      No prescriptions requested or ordered in this encounter       Medications Discontinued During This Encounter   Medication Reason    ondansetron (ZOFRAN) 4 MG tablet Therapy completed       Bossman Bazan received counseling on the following healthy behaviors: nutrition, exercise and medication adherence    Patient given educational materials : see patient instruction       Discussed use, benefit, and side effects of prescribed medications. Barriers to medication compliance addressed. All patient questions answered. Pt voiced understanding. I hope this updates you on my evaluation and clinical thinking. Thank you for allowing me to participate in his care. Sincerely,    Electronically signed by Enoc Batista MD on 6/10/2019 at 11:58 AM       Please note that this chart was generated using voice recognition Dragon dictation software. Although every effort was made to ensure the accuracy of this automated transcription, some errors in transcription may have occurred.

## 2019-06-12 ENCOUNTER — TELEPHONE (OUTPATIENT)
Dept: INTERNAL MEDICINE | Age: 84
End: 2019-06-12

## 2019-06-12 NOTE — TELEPHONE ENCOUNTER
Potassium level is normal. Kidney numbers are high, but they are running about the same as they usually do.

## 2019-06-12 NOTE — TELEPHONE ENCOUNTER
Patient had labs drawn on Monday (BMP). Please advise. Patient has been drinking and staying hydrated.

## 2019-06-20 ENCOUNTER — HOSPITAL ENCOUNTER (OUTPATIENT)
Dept: LAB | Age: 84
Discharge: HOME | End: 2019-06-20
Payer: MEDICARE

## 2019-06-20 ENCOUNTER — OFFICE VISIT (OUTPATIENT)
Dept: CARDIOLOGY | Age: 84
End: 2019-06-20
Payer: MEDICARE

## 2019-06-20 VITALS
HEART RATE: 48 BPM | DIASTOLIC BLOOD PRESSURE: 70 MMHG | WEIGHT: 146 LBS | HEIGHT: 60 IN | BODY MASS INDEX: 28.66 KG/M2 | SYSTOLIC BLOOD PRESSURE: 144 MMHG

## 2019-06-20 DIAGNOSIS — R00.1 BRADYCARDIA: ICD-10-CM

## 2019-06-20 DIAGNOSIS — I10 ESSENTIAL HYPERTENSION: Primary | ICD-10-CM

## 2019-06-20 LAB — TSH SERPL DL<=0.05 MIU/L-ACNC: 2.31 MIU/L (ref 0.3–5)

## 2019-06-20 PROCEDURE — 84443 ASSAY THYROID STIM HORMONE: CPT

## 2019-06-20 PROCEDURE — G8427 DOCREV CUR MEDS BY ELIG CLIN: HCPCS | Performed by: INTERNAL MEDICINE

## 2019-06-20 PROCEDURE — 36415 COLL VENOUS BLD VENIPUNCTURE: CPT

## 2019-06-20 PROCEDURE — G8598 ASA/ANTIPLAT THER USED: HCPCS | Performed by: INTERNAL MEDICINE

## 2019-06-20 PROCEDURE — G8417 CALC BMI ABV UP PARAM F/U: HCPCS | Performed by: INTERNAL MEDICINE

## 2019-06-20 PROCEDURE — 1036F TOBACCO NON-USER: CPT | Performed by: INTERNAL MEDICINE

## 2019-06-20 PROCEDURE — 1123F ACP DISCUSS/DSCN MKR DOCD: CPT | Performed by: INTERNAL MEDICINE

## 2019-06-20 PROCEDURE — 99214 OFFICE O/P EST MOD 30 MIN: CPT | Performed by: INTERNAL MEDICINE

## 2019-06-20 PROCEDURE — 4040F PNEUMOC VAC/ADMIN/RCVD: CPT | Performed by: INTERNAL MEDICINE

## 2019-06-20 PROCEDURE — 93000 ELECTROCARDIOGRAM COMPLETE: CPT | Performed by: INTERNAL MEDICINE

## 2019-06-20 PROCEDURE — 1090F PRES/ABSN URINE INCON ASSESS: CPT | Performed by: INTERNAL MEDICINE

## 2019-06-20 RX ORDER — LISINOPRIL 40 MG/1
40 TABLET ORAL NIGHTLY
Qty: 90 TABLET | Refills: 1 | Status: SHIPPED | OUTPATIENT
Start: 2019-06-20 | End: 2019-12-17 | Stop reason: SDUPTHER

## 2019-06-20 NOTE — PROGRESS NOTES
Today's Date: 6/20/2019  Patient Name: Yulisa Ferrer  Patient's age: 80 y.o., 4/11/1932          The patient is a 80 y.o.  female is in the office for f/u. Has been feeling tired, dizzy and fatigued. BP fluctuating quite a bit. Denies any cp, sob, orthopnea, pnd, le edema. Recently had norvasc 2.5 mg qd added. Recently taken off norvasc completely. Past Medical History:   has a past medical history of Adenocarcinoma of endometrium ([address] Cataract of left eye, Chest pain, Chronic diarrhea, Diabetes mellitus type II, controlled ([address] Diabetes mellitus type II, controlled ([address] Dysthymia, Gait abnormality, H/O renal calculi, H/O vein stripping, Hard of hearing, Hyperlipidemia, Hypertension, Hypothyroidism, Influenza A, Left elbow fracture, Macular degeneration, Obesity, Pericardial effusion, Pseudophakia of right eye, Traumatic injury of lower extremity, and Vertigo. Past Surgical History:   has a past surgical history that includes jason and bso (cervix removed) (March 2002); Cholecystectomy, laparoscopic (5/28/1998); ERCP (7/19/1998); Colonoscopy (7/22/2003); Elbow fracture surgery (Left, September 2010); and Cataract removal (Right, February 2013). Home Medications:    Prior to Admission medications    Medication Sig Start Date End Date Taking?  Authorizing Provider   meclizine (ANTIVERT) 25 MG tablet Take 12.5 mg by mouth 3 times daily as needed   Yes Historical Provider, MD   Probiotic Product (PROBIOTIC DAILY) CAPS One tablet 3 time a day times 10 days then once a day 4/2/19  Yes Ronnie Mancia   hydrochlorothiazide (HYDRODIURIL) 25 MG tablet Take 1 tablet by mouth daily 4/3/19  Yes Saqib Bowen   oxybutynin (DITROPAN-XL) 10 MG extended release tablet Take 1 tablet by mouth daily 3/4/19 6/20/19 Yes Abbey Rodriguez MD   sertraline (ZOLOFT) 50 MG tablet TAKE 1 TABLET DAILY 3/4/19  Yes Wiliam Eddy DO   ezetimibe (ZETIA) 10 MG tablet Take 1 tablet by mouth daily 2/11/19  Yes Wiliam Eddy DO   levothyroxine (SYNTHROID) 88 MCG tablet Take 1 tablet by mouth Daily 2/11/19  Yes Wiliam Eddy DO   lisinopril (PRINIVIL;ZESTRIL) 20 MG tablet Take 1 tablet by mouth 2 times daily 2/11/19  Yes Wiliam Eddy DO   zoster recombinant adjuvanted vaccine (SHINGRIX) 50 MCG SUSR injection 50 MCG IM then repeat 2-6 months. 10/18/18 10/18/19 Yes Cassandra Licona, APRN - CNP   Multiple Vitamins-Minerals (THERAPEUTIC MULTIVITAMIN-MINERALS) tablet Take 1 tablet by mouth daily   Yes Historical Provider, MD   CRANBERRY PO Take 1 capsule by mouth daily   Yes Historical Provider, MD   Multiple Vitamins-Minerals (OCUVITE PRESERVISION PO) Take 1 tablet by mouth daily   Yes Historical Provider, MD   aspirin 81 MG tablet Take 81 mg by mouth daily. Yes Historical Provider, MD   latanoprost (XALATAN) 0.005 % ophthalmic solution Place 1 drop into both eyes nightly. Yes Historical Provider, MD   timolol (TIMOPTIC) 0.5 % ophthalmic solution Place 1 drop into both eyes daily. Yes Historical Provider, MD   Ascorbic Acid (VITAMIN C) 500 MG tablet Take 500 mg by mouth 2 times daily. Yes Historical Provider, MD       Allergies:  Allopurinol; Metoprolol tartrate [metoprolol]; Percocet [oxycodone-acetaminophen]; Phenergan [promethazine hcl]; Polycitra-k [potassium citrate]; Statins; Levaquin [levofloxacin]; Sulfa antibiotics; and Tessalon [benzonatate]    Social History:   reports that she has never smoked. She has never used smokeless tobacco. She reports that she does not drink alcohol or use drugs. REVIEW OF SYSTEMS:  CONSTITUTIONAL:NEGATIVE  HEENT:NEG  Cardiovascular: No chest pain, no dyspnea on exertion, No palpitations.  Lower extremity edema: No  RESPIRATORY: BURNS  GASTROINTESTINAL:  positive for nausea  GENITOURINARY:  negative  INTEGUMENT:  negative  MUSCULOSKELETAL:  positive for  pain  NEUROLOGICAL:  negative    PHYSICAL EXAM:      BP (!) 144/70   Pulse (!) 48   Ht 5' (1.524 m)   Wt 146 lb (66.2 kg)   BMI 28.51 kg/m²    HEENT: PERRL, no cervical lymphadenopathy. No masses palpable. Cardiovascular:  · The apical impulse is not displaced  · Heart  Sounds:REGULAR BRADYCARDIA, BRO. · Jugular venous pulsation Normal  · The carotid upstroke is NL  · Peripheral pulses are symmetrical and full  Respiratory: Good respiratory effort. On auscultation: clear to auscultation bilaterally  Abdomen:  · No masses or tenderness  · Bowel sounds present  Extremities:  ·  No Cyanosis or Clubbing  ·  Lower extremity edema: Yes  Skin: Warm and dry    Cardiac data:    EKG: Marked sinus  Bradycardia   Voltage criteria for LVH     ECHO 8/2018:  Summary  Global left ventricular systolic function is normal. Estimated ejection  fraction is 60 % . Mild left ventricular hypertrophy. Grade II (moderate) left ventricular diastolic dysfunction. Left atrium is mildly dilated. Right atrium is mildly dilated . Aortic valve sclerosis without stenosis. Mitral annular calcification is seen. Mild-moderate mitral regurgitation. Mild to moderate tricuspid regurgitation. Estimated right ventricular systolic pressure is 42 mmHg. Mild pulmonary hypertension. No significant pericardial effusion is seen. Labs:     CBC:   No results for input(s): WBC, HGB, HCT, PLT in the last 72 hours. BMP:   No results for input(s): NA, K, CO2, BUN, CREATININE, LABGLOM, GLUCOSE in the last 72 hours. PT/INR: No results for input(s): PROTIME, INR in the last 72 hours. FASTING LIPID PANEL:  Lab Results   Component Value Date    HDL 45 11/03/2018    TRIG 82 11/03/2018     LIVER PROFILE:No results for input(s): AST, ALT, LABALBU in the last 72 hours.     IMPRESSION:    Symptomatic Sinus Bradycardia- even while off BB (still on timolol eye drops)  Dizziness after norvasc   HFpEF  HTN- labile   PAT  PVD  Aortic valve sclerosis  Mild-moderate mitral regurgitation  Mild to moderate tricuspid regurgitation    RECOMMENDATIONS:  - keep off lopressor  - stop norvasc   - change lisinopril to 40 mg qhs   - already scheduled for holter monitor soon  - stop timolol if ok with PCP  - check TSH  - If remains dori despite being off AVN blocking agents, then may need PPM.   - RTC in 2 months    Thank you for allowing me to participate in the care of this patient, please do not hesitate to call if you have any questions. Sherrie Gipson DO, [address] , [address] [facility]  [facility]  [phone]

## 2019-06-29 ENCOUNTER — HOSPITAL ENCOUNTER (OUTPATIENT)
Dept: LAB | Age: 84
Discharge: HOME | End: 2019-06-29
Payer: MEDICARE

## 2019-06-29 DIAGNOSIS — E78.2 MIXED HYPERLIPIDEMIA: ICD-10-CM

## 2019-06-29 DIAGNOSIS — E11.22 CONTROLLED TYPE 2 DIABETES MELLITUS WITH STAGE 3 CHRONIC KIDNEY DISEASE, WITHOUT LONG-TERM CURRENT USE OF INSULIN (HCC): ICD-10-CM

## 2019-06-29 DIAGNOSIS — E03.4 HYPOTHYROIDISM DUE TO ACQUIRED ATROPHY OF THYROID: ICD-10-CM

## 2019-06-29 DIAGNOSIS — N18.30 CONTROLLED TYPE 2 DIABETES MELLITUS WITH STAGE 3 CHRONIC KIDNEY DISEASE, WITHOUT LONG-TERM CURRENT USE OF INSULIN (HCC): ICD-10-CM

## 2019-06-29 LAB
ALBUMIN SERPL-MCNC: 4 G/DL (ref 3.5–5.2)
ALBUMIN/GLOBULIN RATIO: 1.5 (ref 1–2.5)
ALP BLD-CCNC: 119 U/L (ref 35–104)
ALT SERPL-CCNC: 15 U/L (ref 5–33)
ANION GAP SERPL CALCULATED.3IONS-SCNC: 12 MMOL/L (ref 9–17)
AST SERPL-CCNC: 19 U/L
BILIRUB SERPL-MCNC: 0.24 MG/DL (ref 0.3–1.2)
BUN BLDV-MCNC: 30 MG/DL (ref 8–23)
BUN/CREAT BLD: 30 (ref 9–20)
CALCIUM SERPL-MCNC: 10 MG/DL (ref 8.6–10.4)
CHLORIDE BLD-SCNC: 104 MMOL/L (ref 98–107)
CHOLESTEROL/HDL RATIO: 2.9
CHOLESTEROL: 132 MG/DL
CO2: 22 MMOL/L (ref 20–31)
CREAT SERPL-MCNC: 1.01 MG/DL (ref 0.5–0.9)
ESTIMATED AVERAGE GLUCOSE: 123 MG/DL
GFR AFRICAN AMERICAN: >60 ML/MIN
GFR NON-AFRICAN AMERICAN: 52 ML/MIN
GFR SERPL CREATININE-BSD FRML MDRD: ABNORMAL ML/MIN/{1.73_M2}
GFR SERPL CREATININE-BSD FRML MDRD: ABNORMAL ML/MIN/{1.73_M2}
GLUCOSE BLD-MCNC: 140 MG/DL (ref 70–99)
HBA1C MFR BLD: 5.9 % (ref 4.8–5.9)
HCT VFR BLD CALC: 37 % (ref 36–46)
HDLC SERPL-MCNC: 45 MG/DL
HEMOGLOBIN: 12.1 G/DL (ref 12–16)
LDL CHOLESTEROL: 63 MG/DL (ref 0–130)
MCH RBC QN AUTO: 32.2 PG (ref 26–34)
MCHC RBC AUTO-ENTMCNC: 32.6 G/DL (ref 31–37)
MCV RBC AUTO: 98.8 FL (ref 80–100)
NRBC AUTOMATED: ABNORMAL PER 100 WBC
PDW BLD-RTO: 13.6 % (ref 11–14.5)
PLATELET # BLD: 277 K/UL (ref 140–450)
PMV BLD AUTO: 9.6 FL (ref 6–12)
POTASSIUM SERPL-SCNC: 4.4 MMOL/L (ref 3.7–5.3)
RBC # BLD: 3.75 M/UL (ref 4–5.2)
SODIUM BLD-SCNC: 138 MMOL/L (ref 135–144)
TOTAL PROTEIN: 6.7 G/DL (ref 6.4–8.3)
TRIGL SERPL-MCNC: 118 MG/DL
TSH SERPL DL<=0.05 MIU/L-ACNC: 2.89 MIU/L (ref 0.3–5)
VLDLC SERPL CALC-MCNC: NORMAL MG/DL (ref 1–30)
WBC # BLD: 7 K/UL (ref 3.5–11)

## 2019-06-29 PROCEDURE — 80053 COMPREHEN METABOLIC PANEL: CPT

## 2019-06-29 PROCEDURE — 85027 COMPLETE CBC AUTOMATED: CPT

## 2019-06-29 PROCEDURE — 84443 ASSAY THYROID STIM HORMONE: CPT

## 2019-06-29 PROCEDURE — 36415 COLL VENOUS BLD VENIPUNCTURE: CPT

## 2019-06-29 PROCEDURE — 80061 LIPID PANEL: CPT

## 2019-06-29 PROCEDURE — 83036 HEMOGLOBIN GLYCOSYLATED A1C: CPT

## 2019-07-01 ENCOUNTER — OFFICE VISIT (OUTPATIENT)
Dept: INTERNAL MEDICINE | Age: 84
End: 2019-07-01
Payer: MEDICARE

## 2019-07-01 ENCOUNTER — HOSPITAL ENCOUNTER (OUTPATIENT)
Dept: NON INVASIVE DIAGNOSTICS | Age: 84
Discharge: HOME | End: 2019-07-01
Payer: MEDICARE

## 2019-07-01 VITALS
SYSTOLIC BLOOD PRESSURE: 130 MMHG | HEART RATE: 56 BPM | WEIGHT: 146 LBS | DIASTOLIC BLOOD PRESSURE: 82 MMHG | HEIGHT: 60 IN | BODY MASS INDEX: 28.66 KG/M2

## 2019-07-01 DIAGNOSIS — H91.93 DECREASED HEARING OF BOTH EARS: ICD-10-CM

## 2019-07-01 DIAGNOSIS — H61.23 BILATERAL IMPACTED CERUMEN: Primary | ICD-10-CM

## 2019-07-01 DIAGNOSIS — Z97.4 WEARS HEARING AID: ICD-10-CM

## 2019-07-01 DIAGNOSIS — R00.1 BRADYCARDIA: ICD-10-CM

## 2019-07-01 DIAGNOSIS — R42 DIZZINESS: ICD-10-CM

## 2019-07-01 PROCEDURE — 4040F PNEUMOC VAC/ADMIN/RCVD: CPT | Performed by: NURSE PRACTITIONER

## 2019-07-01 PROCEDURE — G8427 DOCREV CUR MEDS BY ELIG CLIN: HCPCS | Performed by: NURSE PRACTITIONER

## 2019-07-01 PROCEDURE — 1090F PRES/ABSN URINE INCON ASSESS: CPT | Performed by: NURSE PRACTITIONER

## 2019-07-01 PROCEDURE — 93225 XTRNL ECG REC<48 HRS REC: CPT

## 2019-07-01 PROCEDURE — G8417 CALC BMI ABV UP PARAM F/U: HCPCS | Performed by: NURSE PRACTITIONER

## 2019-07-01 PROCEDURE — 69210 REMOVE IMPACTED EAR WAX UNI: CPT | Performed by: NURSE PRACTITIONER

## 2019-07-01 PROCEDURE — G8598 ASA/ANTIPLAT THER USED: HCPCS | Performed by: NURSE PRACTITIONER

## 2019-07-01 PROCEDURE — 1123F ACP DISCUSS/DSCN MKR DOCD: CPT | Performed by: NURSE PRACTITIONER

## 2019-07-01 PROCEDURE — 93226 XTRNL ECG REC<48 HR SCAN A/R: CPT

## 2019-07-01 PROCEDURE — 1036F TOBACCO NON-USER: CPT | Performed by: NURSE PRACTITIONER

## 2019-07-01 PROCEDURE — 99213 OFFICE O/P EST LOW 20 MIN: CPT | Performed by: NURSE PRACTITIONER

## 2019-07-01 RX ORDER — BRIMONIDINE TARTRATE 0.15 %
1 DROPS OPHTHALMIC (EYE) 2 TIMES DAILY
COMMUNITY
Start: 2019-06-20 | End: 2019-10-31 | Stop reason: ALTCHOICE

## 2019-07-01 NOTE — PROGRESS NOTES
7/22/2003    Dr. Esa Castillo Left September 2010    OG, Dr. Washington Woods ERCP  7/19/1998    retained stone, Suzy Riggins and Molly Mark MAGDA AND BSO  March 2002    endometrial adenocarcinoma, Dr. Nettie Max       Current Outpatient Medications on File Prior to Visit   Medication Sig Dispense Refill    brimonidine (ALPHAGAN P) 0.15 % ophthalmic solution Apply 1 drop to eye 2 times daily      lisinopril (PRINIVIL;ZESTRIL) 40 MG tablet Take 1 tablet by mouth nightly 90 tablet 1    meclizine (ANTIVERT) 25 MG tablet Take 12.5 mg by mouth 3 times daily as needed      Probiotic Product (PROBIOTIC DAILY) CAPS One tablet 3 time a day times 10 days then once a day  0    sertraline (ZOLOFT) 50 MG tablet TAKE 1 TABLET DAILY 90 tablet 3    ezetimibe (ZETIA) 10 MG tablet Take 1 tablet by mouth daily 90 tablet 3    levothyroxine (SYNTHROID) 88 MCG tablet Take 1 tablet by mouth Daily 90 tablet 3    Multiple Vitamins-Minerals (THERAPEUTIC MULTIVITAMIN-MINERALS) tablet Take 1 tablet by mouth daily      CRANBERRY PO Take 1 capsule by mouth daily      Multiple Vitamins-Minerals (OCUVITE PRESERVISION PO) Take 1 tablet by mouth daily      aspirin 81 MG tablet Take 81 mg by mouth daily.  latanoprost (XALATAN) 0.005 % ophthalmic solution Place 1 drop into both eyes nightly.  timolol (TIMOPTIC) 0.5 % ophthalmic solution Place 1 drop into both eyes daily.  Ascorbic Acid (VITAMIN C) 500 MG tablet Take 500 mg by mouth 2 times daily.  oxybutynin (DITROPAN-XL) 10 MG extended release tablet Take 1 tablet by mouth daily 90 tablet 3    zoster recombinant adjuvanted vaccine (SHINGRIX) 50 MCG SUSR injection 50 MCG IM then repeat 2-6 months. 0.5 mL 1     No current facility-administered medications on file prior to visit. Social History     Socioeconomic History    Marital status:       Spouse name: Not on file    Number of children: Not on file    Years of education: Not on file   

## 2019-07-02 ENCOUNTER — HOSPITAL ENCOUNTER (OUTPATIENT)
Dept: NON INVASIVE DIAGNOSTICS | Age: 84
Discharge: HOME | End: 2019-07-02
Payer: MEDICARE

## 2019-07-03 ENCOUNTER — TELEPHONE (OUTPATIENT)
Dept: INTERNAL MEDICINE | Age: 84
End: 2019-07-03

## 2019-07-22 ENCOUNTER — HOSPITAL ENCOUNTER (OUTPATIENT)
Dept: INTERVENTIONAL RADIOLOGY/VASCULAR | Age: 84
Discharge: HOME | End: 2019-07-24
Payer: MEDICARE

## 2019-07-22 DIAGNOSIS — I65.23 CAROTID STENOSIS, BILATERAL: ICD-10-CM

## 2019-07-22 PROCEDURE — 93880 EXTRACRANIAL BILAT STUDY: CPT

## 2019-07-25 ENCOUNTER — OFFICE VISIT (OUTPATIENT)
Dept: CARDIOLOGY | Age: 84
End: 2019-07-25
Payer: MEDICARE

## 2019-07-25 ENCOUNTER — OFFICE VISIT (OUTPATIENT)
Dept: INTERNAL MEDICINE | Age: 84
End: 2019-07-25
Payer: MEDICARE

## 2019-07-25 ENCOUNTER — OFFICE VISIT (OUTPATIENT)
Dept: VASCULAR SURGERY | Age: 84
End: 2019-07-25
Payer: MEDICARE

## 2019-07-25 VITALS
SYSTOLIC BLOOD PRESSURE: 134 MMHG | WEIGHT: 145.94 LBS | DIASTOLIC BLOOD PRESSURE: 64 MMHG | HEIGHT: 64 IN | HEART RATE: 52 BPM | BODY MASS INDEX: 24.92 KG/M2

## 2019-07-25 VITALS
HEIGHT: 60 IN | BODY MASS INDEX: 28.66 KG/M2 | SYSTOLIC BLOOD PRESSURE: 132 MMHG | WEIGHT: 146 LBS | RESPIRATION RATE: 16 BRPM | DIASTOLIC BLOOD PRESSURE: 70 MMHG | OXYGEN SATURATION: 99 % | HEART RATE: 60 BPM

## 2019-07-25 VITALS
SYSTOLIC BLOOD PRESSURE: 134 MMHG | HEART RATE: 52 BPM | WEIGHT: 146 LBS | HEIGHT: 64 IN | BODY MASS INDEX: 24.92 KG/M2 | DIASTOLIC BLOOD PRESSURE: 64 MMHG

## 2019-07-25 DIAGNOSIS — E78.2 MIXED HYPERLIPIDEMIA: ICD-10-CM

## 2019-07-25 DIAGNOSIS — N32.81 OAB (OVERACTIVE BLADDER): ICD-10-CM

## 2019-07-25 DIAGNOSIS — R91.8 PULMONARY NODULES: ICD-10-CM

## 2019-07-25 DIAGNOSIS — N18.30 CONTROLLED TYPE 2 DIABETES MELLITUS WITH STAGE 3 CHRONIC KIDNEY DISEASE, WITHOUT LONG-TERM CURRENT USE OF INSULIN (HCC): Primary | ICD-10-CM

## 2019-07-25 DIAGNOSIS — E11.22 CONTROLLED TYPE 2 DIABETES MELLITUS WITH STAGE 3 CHRONIC KIDNEY DISEASE, WITHOUT LONG-TERM CURRENT USE OF INSULIN (HCC): Primary | ICD-10-CM

## 2019-07-25 DIAGNOSIS — F41.9 ANXIETY: ICD-10-CM

## 2019-07-25 DIAGNOSIS — I65.23 CAROTID STENOSIS, BILATERAL: ICD-10-CM

## 2019-07-25 DIAGNOSIS — N18.30 CHRONIC KIDNEY DISEASE, STAGE III (MODERATE) (HCC): ICD-10-CM

## 2019-07-25 DIAGNOSIS — R00.1 BRADYCARDIA: ICD-10-CM

## 2019-07-25 DIAGNOSIS — R00.1 BRADYCARDIA: Primary | ICD-10-CM

## 2019-07-25 DIAGNOSIS — E03.4 HYPOTHYROIDISM DUE TO ACQUIRED ATROPHY OF THYROID: ICD-10-CM

## 2019-07-25 DIAGNOSIS — I10 ESSENTIAL HYPERTENSION: ICD-10-CM

## 2019-07-25 DIAGNOSIS — I65.23 CAROTID STENOSIS, BILATERAL: Primary | ICD-10-CM

## 2019-07-25 PROCEDURE — G8417 CALC BMI ABV UP PARAM F/U: HCPCS | Performed by: INTERNAL MEDICINE

## 2019-07-25 PROCEDURE — 99214 OFFICE O/P EST MOD 30 MIN: CPT | Performed by: INTERNAL MEDICINE

## 2019-07-25 PROCEDURE — 1036F TOBACCO NON-USER: CPT | Performed by: INTERNAL MEDICINE

## 2019-07-25 PROCEDURE — 1090F PRES/ABSN URINE INCON ASSESS: CPT | Performed by: INTERNAL MEDICINE

## 2019-07-25 PROCEDURE — G8598 ASA/ANTIPLAT THER USED: HCPCS | Performed by: SURGERY

## 2019-07-25 PROCEDURE — 1123F ACP DISCUSS/DSCN MKR DOCD: CPT | Performed by: INTERNAL MEDICINE

## 2019-07-25 PROCEDURE — G8417 CALC BMI ABV UP PARAM F/U: HCPCS | Performed by: NURSE PRACTITIONER

## 2019-07-25 PROCEDURE — G8427 DOCREV CUR MEDS BY ELIG CLIN: HCPCS | Performed by: SURGERY

## 2019-07-25 PROCEDURE — 1036F TOBACCO NON-USER: CPT | Performed by: SURGERY

## 2019-07-25 PROCEDURE — G8417 CALC BMI ABV UP PARAM F/U: HCPCS | Performed by: SURGERY

## 2019-07-25 PROCEDURE — G8427 DOCREV CUR MEDS BY ELIG CLIN: HCPCS | Performed by: NURSE PRACTITIONER

## 2019-07-25 PROCEDURE — 4040F PNEUMOC VAC/ADMIN/RCVD: CPT | Performed by: INTERNAL MEDICINE

## 2019-07-25 PROCEDURE — 1090F PRES/ABSN URINE INCON ASSESS: CPT | Performed by: SURGERY

## 2019-07-25 PROCEDURE — G8598 ASA/ANTIPLAT THER USED: HCPCS | Performed by: INTERNAL MEDICINE

## 2019-07-25 PROCEDURE — 4040F PNEUMOC VAC/ADMIN/RCVD: CPT | Performed by: SURGERY

## 2019-07-25 PROCEDURE — 1036F TOBACCO NON-USER: CPT | Performed by: NURSE PRACTITIONER

## 2019-07-25 PROCEDURE — 1123F ACP DISCUSS/DSCN MKR DOCD: CPT | Performed by: SURGERY

## 2019-07-25 PROCEDURE — 1090F PRES/ABSN URINE INCON ASSESS: CPT | Performed by: NURSE PRACTITIONER

## 2019-07-25 PROCEDURE — 99213 OFFICE O/P EST LOW 20 MIN: CPT | Performed by: SURGERY

## 2019-07-25 PROCEDURE — G8427 DOCREV CUR MEDS BY ELIG CLIN: HCPCS | Performed by: INTERNAL MEDICINE

## 2019-07-25 PROCEDURE — 4040F PNEUMOC VAC/ADMIN/RCVD: CPT | Performed by: NURSE PRACTITIONER

## 2019-07-25 PROCEDURE — 99214 OFFICE O/P EST MOD 30 MIN: CPT | Performed by: NURSE PRACTITIONER

## 2019-07-25 PROCEDURE — 1123F ACP DISCUSS/DSCN MKR DOCD: CPT | Performed by: NURSE PRACTITIONER

## 2019-07-25 PROCEDURE — G8598 ASA/ANTIPLAT THER USED: HCPCS | Performed by: NURSE PRACTITIONER

## 2019-07-25 NOTE — PROGRESS NOTES
[id number] [facility] [address]  [address]  VASCULAR SURG  [address] [facility] [address] [phone]    Taryn Alvarado  4/11/1932  80 y. o.female       Chief Complaint:  Chief Complaint   Patient presents with    Follow-up     1 yearly with carotid       History of present Illness:  Pt is here today for evaluation and treatment of carotid stenosis. I reviewed her most recent carotid ultrasound. She had a carotid duplex showing <50% stenosis on the right and 50-69% stenosis on the left. There is been no progression of her disease over the past year. She can safely follow-up next year with a repeat ultrasound. Past Medical History:  has a past medical history of Adenocarcinoma of endometrium ([address] Cataract of left eye, Chest pain, Chronic diarrhea, Diabetes mellitus type II, controlled ([address] Diabetes mellitus type II, controlled ([address] Dysthymia, Gait abnormality, H/O renal calculi, H/O vein stripping, Hard of hearing, Hyperlipidemia, Hypertension, Hypothyroidism, Influenza A, Left elbow fracture, Macular degeneration, Obesity, Pericardial effusion, Pseudophakia of right eye, Traumatic injury of lower extremity, and Vertigo. Past Surgical History:   Past Surgical History:   Procedure Laterality Date    CATARACT REMOVAL Right February 2013    Dr. Patricia Heck, LAPAROSCOPIC  5/28/1998    COLONOSCOPY  7/22/2003    Dr. Leigha Campbell Left September 2010    ORIF, Dr. Rukhsana Mark ERCP  7/19/1998    retained stone, Suzy Larios and Miley MAN AND BSO  March 2002    endometrial adenocarcinoma, Dr. Jhoan Nolasco       Social History:  reports that she has never smoked. She has never used smokeless tobacco. She reports that she does not drink alcohol or use drugs. Family History: family history includes High Blood Pressure in an other family member.     Review of Systems:   Constitutional:  negative for  fevers, chills,

## 2019-07-26 NOTE — PROGRESS NOTES
tablet 3    ezetimibe (ZETIA) 10 MG tablet Take 1 tablet by mouth daily 90 tablet 3    levothyroxine (SYNTHROID) 88 MCG tablet Take 1 tablet by mouth Daily 90 tablet 3    zoster recombinant adjuvanted vaccine (SHINGRIX) 50 MCG SUSR injection 50 MCG IM then repeat 2-6 months. 0.5 mL 1    Multiple Vitamins-Minerals (THERAPEUTIC MULTIVITAMIN-MINERALS) tablet Take 1 tablet by mouth daily      CRANBERRY PO Take 1 capsule by mouth daily      Multiple Vitamins-Minerals (OCUVITE PRESERVISION PO) Take 1 tablet by mouth daily      aspirin 81 MG tablet Take 81 mg by mouth daily.  latanoprost (XALATAN) 0.005 % ophthalmic solution Place 1 drop into both eyes nightly.  Ascorbic Acid (VITAMIN C) 500 MG tablet Take 500 mg by mouth 2 times daily.  oxybutynin (DITROPAN-XL) 10 MG extended release tablet Take 1 tablet by mouth daily 90 tablet 3     No current facility-administered medications on file prior to visit. Social History     Socioeconomic History    Marital status:       Spouse name: Not on file    Number of children: Not on file    Years of education: Not on file    Highest education level: Not on file   Occupational History    Not on file   Social Needs    Financial resource strain: Not on file    Food insecurity:     Worry: Not on file     Inability: Not on file    Transportation needs:     Medical: Not on file     Non-medical: Not on file   Tobacco Use    Smoking status: Never Smoker    Smokeless tobacco: Never Used    Tobacco comment: never smoked tito rrt 01/11/2018   Substance and Sexual Activity    Alcohol use: No     Alcohol/week: 0.0 standard drinks    Drug use: No    Sexual activity: Not on file   Lifestyle    Physical activity:     Days per week: Not on file     Minutes per session: Not on file    Stress: Not on file   Relationships    Social connections:     Talks on phone: Not on file     Gets together: Not on file     Attends Church service: Not on file

## 2019-07-28 ASSESSMENT — ENCOUNTER SYMPTOMS
DIARRHEA: 0
RHINORRHEA: 0
SORE THROAT: 0
SINUS PRESSURE: 0
ABDOMINAL PAIN: 0
VOMITING: 0
EYE PAIN: 0
WHEEZING: 0
NAUSEA: 0
BLOOD IN STOOL: 0
SHORTNESS OF BREATH: 0
CHEST TIGHTNESS: 0
CONSTIPATION: 0
COLOR CHANGE: 0
TROUBLE SWALLOWING: 0
FACIAL SWELLING: 0
COUGH: 0

## 2019-07-29 ENCOUNTER — HOSPITAL ENCOUNTER (OUTPATIENT)
Dept: CT IMAGING | Age: 84
Discharge: HOME | End: 2019-07-31
Payer: MEDICARE

## 2019-07-29 DIAGNOSIS — R91.1 NODULE OF LOWER LOBE OF RIGHT LUNG: ICD-10-CM

## 2019-07-29 PROCEDURE — 71250 CT THORAX DX C-: CPT

## 2019-08-07 ENCOUNTER — OFFICE VISIT (OUTPATIENT)
Dept: PULMONOLOGY | Age: 84
End: 2019-08-07
Payer: MEDICARE

## 2019-08-07 ENCOUNTER — TELEPHONE (OUTPATIENT)
Dept: GENERAL RADIOLOGY | Age: 84
End: 2019-08-07

## 2019-08-07 VITALS
SYSTOLIC BLOOD PRESSURE: 134 MMHG | DIASTOLIC BLOOD PRESSURE: 58 MMHG | HEART RATE: 50 BPM | BODY MASS INDEX: 29.25 KG/M2 | HEIGHT: 60 IN | WEIGHT: 149 LBS | OXYGEN SATURATION: 96 %

## 2019-08-07 DIAGNOSIS — R91.1 NODULE OF LOWER LOBE OF RIGHT LUNG: Primary | ICD-10-CM

## 2019-08-07 PROCEDURE — 99214 OFFICE O/P EST MOD 30 MIN: CPT | Performed by: INTERNAL MEDICINE

## 2019-08-07 PROCEDURE — G8598 ASA/ANTIPLAT THER USED: HCPCS | Performed by: INTERNAL MEDICINE

## 2019-08-07 PROCEDURE — 4040F PNEUMOC VAC/ADMIN/RCVD: CPT | Performed by: INTERNAL MEDICINE

## 2019-08-07 PROCEDURE — 1090F PRES/ABSN URINE INCON ASSESS: CPT | Performed by: INTERNAL MEDICINE

## 2019-08-07 PROCEDURE — 1036F TOBACCO NON-USER: CPT | Performed by: INTERNAL MEDICINE

## 2019-08-07 PROCEDURE — 1123F ACP DISCUSS/DSCN MKR DOCD: CPT | Performed by: INTERNAL MEDICINE

## 2019-08-07 PROCEDURE — G8417 CALC BMI ABV UP PARAM F/U: HCPCS | Performed by: INTERNAL MEDICINE

## 2019-08-07 PROCEDURE — G8427 DOCREV CUR MEDS BY ELIG CLIN: HCPCS | Performed by: INTERNAL MEDICINE

## 2019-08-07 NOTE — PROGRESS NOTES
REASON FOR THE CONSULTATION:  Lung nodule   HISTORY OF PRESENT ILLNESS:    Lexx Webster is a 80y.o. year old female   No cough , no fever , no hemoptysis , clear sputum . No chest pain , no orthopnea , no PND   No nausea or abdominal pain . Ct chest reviewed   Last visit   here for evaluation of abnormal CT of the chest.  Patient had small bowel obstruction in March had CT abdomen pelvis which showed right lower lobe lung nodule. This was followed by CT of the chest which confirmed lung nodule and also a satellite nodule of 8 mm. She denies any cough, hemoptysis. She is a lifelong nonsmoker. Had history of endometrial cancer. ambulates with the help of a walker. Denies any shortness of breath             LUNG CANCER SCREENING     1. CRITERIA MET    []     CT ORDERED  []      2. CRITERIA NOT MET   [x]      3. REFUSED                    []        REASON CRITERIA NOT MET     1. SMOKING LESS THAN 30 PY  []      2. AGE LESS THAN 55 or GREATER 77 YEARS  []      3. QUIT SMOKING 15 YEARS OR GREATER   []      4. RECENT CT WITH IN 11 MONTHS    []      5. LIFE EXPECTANCY < 5 YEARS   []      6.  SIGNS  AND SYMPTOMS OF LUNG CANCER   []         Immunization   Immunization History   Administered Date(s) Administered    Influenza Virus Vaccine 10/09/2009, 10/07/2011, 09/19/2012    Influenza, High Dose (Fluzone 65 yrs and older) 09/16/2013, 11/07/2014, 09/28/2015, 10/17/2016, 10/18/2017, 10/18/2018    Pneumococcal Conjugate 13-valent ([id number]) 10/22/2015    Pneumococcal Polysaccharide ([id number]) 01/25/2012, 04/14/2014    Tdap (Boostrix, Adacel) 07/05/2015        Pneumococcal Vaccine     [x] Up to date    [] Indicated   [] Refused  [] Contraindicated       Influenza Vaccine   [] Up to date    [x] Indicated   [] Refused  [] Contraindicated        PAST MEDICAL HISTORY:       Diagnosis Date    Adenocarcinoma of endometrium ([address]     Cataract of left eye     Chest pain 4/23/2014    Chronic diarrhea     Diabetes mellitus type II, controlled ([facility]     diet controlled    Diabetes mellitus type II, controlled ([facility]     diet controlled     Dysthymia     Gait abnormality 6/23/2014    H/O renal calculi     H/O vein stripping     Hard of hearing     Hyperlipidemia     Hypertension     Hypothyroidism     Influenza A 1/11/2018    Left elbow fracture     S/P surgical repair    Macular degeneration     Obesity     Pericardial effusion 4/23/2014    Pseudophakia of right eye     Traumatic injury of lower extremity childhood    bilateral trauma of lower extremities    Vertigo     resolved         Family History:       Problem Relation Age of Onset    High Blood Pressure Other        SURGICAL HISTORY:   Past Surgical History:   Procedure Laterality Date    CATARACT REMOVAL Right February 2013    Dr. Mayank Gill, LAPAROSCOPIC  5/28/1998    COLONOSCOPY  7/22/2003    Dr. Brisa Estrada Left September 2010    ORIF, Dr. Gina Campoverde ERCP  7/19/1998    retained stone, Suzy Keyes and Naldo Mccann MAGDA AND BSO  March 2002    endometrial adenocarcinoma, Dr. Kimberly Duran:      There  no history of TB or TB exposure. There  no asbestos or silica dust exposure. The patient reports  no coal, foundry, quarry or [employer] exposure. Travel history reveals no. There  no history of recreational or IV drug use. There  no hot tube exposure. Pets  - none now but grew up on farm     Occupational history [employer]     TOBACCO:   reports that she has never smoked. She has never used smokeless tobacco.  ETOH:   reports that she does not drink alcohol. ALLERGIES:      Allergies   Allergen Reactions    Allopurinol      Patient unsure of reaction    Metoprolol Tartrate [Metoprolol]      bradycardia    Percocet [Oxycodone-Acetaminophen]      Hallucinations. ...sensitive to narcotics    Phenergan [Promethazine Hcl]      Very 4/2/19   Roberto Carlos Cohen   zoster recombinant adjuvanted vaccine [facility]) 50 MCG SUSR injection 50 MCG IM then repeat 2-6 months. Patient not taking: Reported on 8/7/2019 10/18/18 10/18/19  MOHAN Brito - CNP        Review of Systems -  General ROS: negative for - chills, fatigue, fever or weight loss  ENT ROS: negative for - headaches, oral lesions or sore throat  Cardiovascular ROS: no chest pain , orthopnea or pnd   Gastrointestinal ROS: no abdominal pain, change in bowel habits, or black or bloody stools  Skin - no rash   Neuro - no blurry vision , no loc . No focal weakness      Vitals:  BP (!) 134/58   Pulse 50   Ht 5' (1.524 m)   Wt 149 lb (67.6 kg)   SpO2 96%   BMI 29.10 kg/m²     PHYSICAL EXAM:  Head and neck atraumatic, normocephalic    Lymph nodes-no cervical, supraclavicular lymphadenopathy    Neck-no JVP elevation    Lungs - Ventilating all lobes without any rales, rhonchi, wheezes or dullness. No bronchial breath sounds. Chest expansion equal bilaterally    CVS- S1, S2 regular. No S3 no S4, no murmurs    Abdomen-nontender, nondistended. Bowel sounds are present. No organomegaly    Lower extremity-no edema    Upper extremity-no edema    Neurological-grossly normal cranial nerves. No overt motor deficit     CBC: No results for input(s): WBC, HGB, PLT in the last 72 hours. BMP:  No results for input(s): NA, K, CL, CO2, BUN, CREATININE, GLUCOSE in the last 72 hours. Hepatic: No results for input(s): AST, ALT, ALB, BILITOT, ALKPHOS in the last 72 hours. Amylase:   Lab Results   Component Value Date    AMYLASE 16 03/30/2019     Lipase:   Lab Results   Component Value Date    LIPASE 4 03/30/2019     Troponin: No results for input(s): TROPONINI in the last 72 hours. BNP: No results for input(s): BNP in the last 72 hours. Lipids: No results for input(s): CHOL, HDL in the last 72 hours.     Invalid input(s): LDLCALCU  ABGs: No results found for: PHART, PO2ART, [id number]  INR: No results

## 2019-08-23 RX ORDER — SODIUM CHLORIDE 9 MG/ML
INJECTION, SOLUTION INTRAVENOUS CONTINUOUS
Status: CANCELLED | OUTPATIENT
Start: 2019-08-23

## 2019-08-26 ENCOUNTER — HOSPITAL ENCOUNTER (OUTPATIENT)
Dept: GENERAL RADIOLOGY | Age: 84
Discharge: HOME | End: 2019-08-28
Payer: MEDICARE

## 2019-08-26 ENCOUNTER — HOSPITAL ENCOUNTER (OUTPATIENT)
Dept: CT IMAGING | Age: 84
Discharge: HOME | End: 2019-08-28
Payer: MEDICARE

## 2019-08-26 VITALS
SYSTOLIC BLOOD PRESSURE: 157 MMHG | OXYGEN SATURATION: 97 % | HEART RATE: 57 BPM | RESPIRATION RATE: 14 BRPM | DIASTOLIC BLOOD PRESSURE: 52 MMHG

## 2019-08-26 DIAGNOSIS — R91.1 NODULE OF LOWER LOBE OF RIGHT LUNG: ICD-10-CM

## 2019-08-26 LAB
INR BLD: 0.9
PARTIAL THROMBOPLASTIN TIME: 23.8 SEC (ref 20.5–30.5)
PLATELET # BLD: 221 K/UL (ref 138–453)
PROTHROMBIN TIME: 10 SEC (ref 9–12)

## 2019-08-26 PROCEDURE — 2580000003 HC RX 258: Performed by: RADIOLOGY

## 2019-08-26 PROCEDURE — 32405 CT NEEDLE BIOPSY LUNG PERCUTANEOUS: CPT

## 2019-08-26 PROCEDURE — 77012 CT SCAN FOR NEEDLE BIOPSY: CPT

## 2019-08-26 PROCEDURE — 85049 AUTOMATED PLATELET COUNT: CPT

## 2019-08-26 PROCEDURE — 71045 X-RAY EXAM CHEST 1 VIEW: CPT

## 2019-08-26 PROCEDURE — 88341 IMHCHEM/IMCYTCHM EA ADD ANTB: CPT

## 2019-08-26 PROCEDURE — 85610 PROTHROMBIN TIME: CPT

## 2019-08-26 PROCEDURE — 6360000002 HC RX W HCPCS: Performed by: RADIOLOGY

## 2019-08-26 PROCEDURE — 88342 IMHCHEM/IMCYTCHM 1ST ANTB: CPT

## 2019-08-26 PROCEDURE — 85730 THROMBOPLASTIN TIME PARTIAL: CPT

## 2019-08-26 PROCEDURE — 88305 TISSUE EXAM BY PATHOLOGIST: CPT

## 2019-08-26 PROCEDURE — 2709999900 CT NEEDLE BIOPSY LUNG PERCUTANEOUS

## 2019-08-26 PROCEDURE — 7100000011 HC PHASE II RECOVERY - ADDTL 15 MIN

## 2019-08-26 PROCEDURE — 88333 PATH CONSLTJ SURG CYTO XM 1: CPT

## 2019-08-26 PROCEDURE — 2709999900 HC NON-CHARGEABLE SUPPLY

## 2019-08-26 PROCEDURE — 7100000010 HC PHASE II RECOVERY - FIRST 15 MIN

## 2019-08-26 RX ORDER — FENTANYL CITRATE 50 UG/ML
INJECTION, SOLUTION INTRAMUSCULAR; INTRAVENOUS
Status: COMPLETED | OUTPATIENT
Start: 2019-08-26 | End: 2019-08-26

## 2019-08-26 RX ORDER — MIDAZOLAM HYDROCHLORIDE 1 MG/ML
INJECTION INTRAMUSCULAR; INTRAVENOUS
Status: COMPLETED | OUTPATIENT
Start: 2019-08-26 | End: 2019-08-26

## 2019-08-26 RX ORDER — SODIUM CHLORIDE 9 MG/ML
INJECTION, SOLUTION INTRAVENOUS CONTINUOUS
Status: DISCONTINUED | OUTPATIENT
Start: 2019-08-26 | End: 2019-08-29 | Stop reason: HOSPADM

## 2019-08-26 RX ADMIN — MIDAZOLAM HYDROCHLORIDE 0.5 MG: 1 INJECTION, SOLUTION INTRAMUSCULAR; INTRAVENOUS at 11:47

## 2019-08-26 RX ADMIN — MIDAZOLAM HYDROCHLORIDE 0.5 MG: 1 INJECTION, SOLUTION INTRAMUSCULAR; INTRAVENOUS at 11:51

## 2019-08-26 RX ADMIN — SODIUM CHLORIDE: 9 INJECTION, SOLUTION INTRAVENOUS at 09:26

## 2019-08-26 RX ADMIN — FENTANYL CITRATE 25 MCG: 50 INJECTION INTRAMUSCULAR; INTRAVENOUS at 11:51

## 2019-08-26 RX ADMIN — FENTANYL CITRATE 25 MCG: 50 INJECTION INTRAMUSCULAR; INTRAVENOUS at 11:47

## 2019-08-26 ASSESSMENT — PAIN SCALES - GENERAL
PAINLEVEL_OUTOF10: 0
PAINLEVEL_OUTOF10: 0

## 2019-08-26 NOTE — H&P
Lung nodule     Macular degeneration     Obesity     Pericardial effusion 4/23/2014    Pseudophakia of right eye     PVD (peripheral vascular disease) ([facility]     Traumatic injury of lower extremity childhood    bilateral trauma of lower extremities    Vertigo     resolved    Wears glasses     Wears partial dentures     upper      PLANS:   1. CT lung biopsy     MAJO ESCOBEDO CNP  Electronically signed 8/26/2019 at 9:59 AM

## 2019-08-27 ENCOUNTER — TELEPHONE (OUTPATIENT)
Dept: PULMONOLOGY | Age: 84
End: 2019-08-27

## 2019-08-27 ENCOUNTER — HOSPITAL ENCOUNTER (OUTPATIENT)
Dept: CT IMAGING | Age: 84
Discharge: HOME | End: 2019-08-29
Payer: MEDICARE

## 2019-08-27 DIAGNOSIS — R91.1 NODULE OF LOWER LOBE OF RIGHT LUNG: ICD-10-CM

## 2019-08-27 LAB — SURGICAL PATHOLOGY REPORT: NORMAL

## 2019-08-27 PROCEDURE — 77012 CT SCAN FOR NEEDLE BIOPSY: CPT

## 2019-08-28 ENCOUNTER — OFFICE VISIT (OUTPATIENT)
Dept: ONCOLOGY | Age: 84
End: 2019-08-28
Payer: MEDICARE

## 2019-08-28 ENCOUNTER — OFFICE VISIT (OUTPATIENT)
Dept: PULMONOLOGY | Age: 84
End: 2019-08-28
Payer: MEDICARE

## 2019-08-28 VITALS
WEIGHT: 151 LBS | OXYGEN SATURATION: 96 % | DIASTOLIC BLOOD PRESSURE: 68 MMHG | HEART RATE: 60 BPM | HEIGHT: 60 IN | BODY MASS INDEX: 29.64 KG/M2 | SYSTOLIC BLOOD PRESSURE: 138 MMHG

## 2019-08-28 VITALS
BODY MASS INDEX: 29.64 KG/M2 | HEIGHT: 60 IN | OXYGEN SATURATION: 96 % | HEART RATE: 60 BPM | DIASTOLIC BLOOD PRESSURE: 68 MMHG | WEIGHT: 151 LBS | SYSTOLIC BLOOD PRESSURE: 138 MMHG

## 2019-08-28 DIAGNOSIS — C34.91 ADENOCARCINOMA, LUNG, RIGHT (HCC): Primary | ICD-10-CM

## 2019-08-28 DIAGNOSIS — C34.81 MALIGNANT NEOPLASM OF OVERLAPPING SITES OF RIGHT BRONCHUS AND LUNG (HCC): ICD-10-CM

## 2019-08-28 PROCEDURE — G8427 DOCREV CUR MEDS BY ELIG CLIN: HCPCS | Performed by: INTERNAL MEDICINE

## 2019-08-28 PROCEDURE — 99214 OFFICE O/P EST MOD 30 MIN: CPT | Performed by: INTERNAL MEDICINE

## 2019-08-28 PROCEDURE — 4040F PNEUMOC VAC/ADMIN/RCVD: CPT | Performed by: INTERNAL MEDICINE

## 2019-08-28 PROCEDURE — 1036F TOBACCO NON-USER: CPT | Performed by: INTERNAL MEDICINE

## 2019-08-28 PROCEDURE — 1090F PRES/ABSN URINE INCON ASSESS: CPT | Performed by: INTERNAL MEDICINE

## 2019-08-28 PROCEDURE — G8598 ASA/ANTIPLAT THER USED: HCPCS | Performed by: INTERNAL MEDICINE

## 2019-08-28 PROCEDURE — G8417 CALC BMI ABV UP PARAM F/U: HCPCS | Performed by: INTERNAL MEDICINE

## 2019-08-28 PROCEDURE — 99204 OFFICE O/P NEW MOD 45 MIN: CPT | Performed by: INTERNAL MEDICINE

## 2019-08-28 PROCEDURE — 1123F ACP DISCUSS/DSCN MKR DOCD: CPT | Performed by: INTERNAL MEDICINE

## 2019-08-28 NOTE — PROGRESS NOTES
REASON FOR follow-up  Lung nodule   HISTORY OF PRESENT ILLNESS:    Santino Coates is a 80y.o. year old female   Post CT-guided lung biopsy for right lower lobe 2.2 cm lung nodule. Post biopsy doing well denies any hemoptysis no shortness of breath. Report reviewed with patient and her daughter. Last visit   here for evaluation of abnormal CT of the chest.  Patient had small bowel obstruction in March had CT abdomen pelvis which showed right lower lobe lung nodule. This was followed by CT of the chest which confirmed lung nodule and also a satellite nodule of 8 mm. She denies any cough, hemoptysis. She is a lifelong nonsmoker. Had history of endometrial cancer. ambulates with the help of a walker. Denies any shortness of breath             LUNG CANCER SCREENING     1. CRITERIA MET    []     CT ORDERED  []      2. CRITERIA NOT MET   [x]      3. REFUSED                    []        REASON CRITERIA NOT MET     1. SMOKING LESS THAN 30 PY  []      2. AGE LESS THAN 55 or GREATER 77 YEARS  []      3. QUIT SMOKING 15 YEARS OR GREATER   []      4. RECENT CT WITH IN 11 MONTHS    []      5. LIFE EXPECTANCY < 5 YEARS   []      6.  SIGNS  AND SYMPTOMS OF LUNG CANCER   []         Immunization   Immunization History   Administered Date(s) Administered    Influenza Virus Vaccine 10/09/2009, 10/07/2011, 09/19/2012    Influenza, High Dose (Fluzone 65 yrs and older) 09/16/2013, 11/07/2014, 09/28/2015, 10/17/2016, 10/18/2017, 10/18/2018    Pneumococcal Conjugate 13-valent ([id number]) 10/22/2015    Pneumococcal Polysaccharide ([id number]) 01/25/2012, 04/14/2014    Tdap (Boostrix, Adacel) 07/05/2015        Pneumococcal Vaccine     [x] Up to date    [] Indicated   [] Refused  [] Contraindicated       Influenza Vaccine   [] Up to date    [x] Indicated   [] Refused  [] Contraindicated        PAST MEDICAL HISTORY:       Diagnosis Date    Adenocarcinoma of endometrium ([address]     Aortic valve sclerosis     Bradycardia

## 2019-08-28 NOTE — PROGRESS NOTES
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 History of uterine cancer status post surgery and radiation therapy, diagnosed at age of 79    Summary of Case/History:    Kiara Cornejo a 80 y. o.female who presents to the office to establish care and for further evaluation treatment recommendations. Patient has history of uterine cancer diagnosed about 15 years ago at age of 79. According to the patient she underwent surgery followed by radiation therapy at that time. She did not receive any chemotherapy. Patient lives independently but does require assistance by her daughter on and off. Patient developed small bowel obstruction back in March or 2019. Patient was hospitalized and managed conservatively. CT scan of abdomen pelvis done at that time time showed a mass in the right lung and also a satellite nodule of 8 mm. Patient has never smoked cigarettes. Her medical problems include hypertension hypothyroidism. Patient subsequently underwent further work-up for the lung mass including CT PET which showed FDG uptake with SUV of 2.2 at the right lung mass otherwise the CT PET was unremarkable. 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 Patient was referred to our office for further evaluation and recommendations.   Patient's strain: None    Food insecurity:     Worry: None     Inability: None    Transportation needs:     Medical: None     Non-medical: None   Tobacco Use    Smoking status: Never Smoker    Smokeless tobacco: Never Used    Tobacco comment: never smoked tito rrt 01/11/2018   Substance and Sexual Activity    Alcohol use: No     Alcohol/week: 0.0 standard drinks    Drug use: No    Sexual activity: None   Lifestyle    Physical activity:     Days per week: None     Minutes per session: None    Stress: None   Relationships    Social connections:     Talks on phone: None     Gets together: None     Attends Yazdanism service: None     Active member of club or organization: None     Attends meetings of clubs or organizations: None     Relationship status: None    Intimate partner violence:     Fear of current or ex partner: None     Emotionally abused: None     Physically abused: None     Forced sexual activity: None   Other Topics Concern    None   Social History Narrative    None       Family History   Problem Relation Age of Onset    High Blood Pressure Other     Diabetes type 2  Mother         developed later in life       Current Medications:     Current Outpatient Medications   Medication Sig Dispense Refill    brimonidine (ALPHAGAN P) 0.15 % ophthalmic solution Apply 1 drop to eye 2 times daily      lisinopril (PRINIVIL;ZESTRIL) 40 MG tablet Take 1 tablet by mouth nightly 90 tablet 1    meclizine (ANTIVERT) 25 MG tablet Take 12.5 mg by mouth 3 times daily as needed      Probiotic Product (PROBIOTIC DAILY) CAPS One tablet 3 time a day times 10 days then once a day  0    oxybutynin (DITROPAN-XL) 10 MG extended release tablet Take 1 tablet by mouth daily 90 tablet 3    sertraline (ZOLOFT) 50 MG tablet TAKE 1 TABLET DAILY 90 tablet 3    ezetimibe (ZETIA) 10 MG tablet Take 1 tablet by mouth daily 90 tablet 3    levothyroxine (SYNTHROID) 88 MCG tablet Take 1 tablet by mouth Daily 90 tablet 3    zoster [place], [address]  [phone]  Fax: [phone]    [facility]     Patient Name: Tamiko Cho Med Rec: [id number]  Path Number: [id number]  Collected: 8/26/2019  Received: 8/26/2019  Reported: 8/27/2019 15:54    -- Diagnosis --  RIGHT LUNG, CT-GUIDED CORE NEEDLE BIOPSIES:  - INVASIVE, WELL DIFFERENTIATED MUCINOUS ADENOCARCINOMA. - SEE MICROSCOPIC DESCRIPTION. Marialuisa Talbot. Emily Ferris,  **Electronically Signed Out**         sls/8/27/2019       Clinical Information  Pre-op Diagnosis:  HX OF ENDOMETRIAL CA   Operative Findings:  RT LUNG BX    Source of Specimen  1: RT LUNG BX    Gross Description  \"KEKE ELIZABETH, RT LUNG BX\" Six pink-tan needle cores, 0.3, 0.2,  0.5, 0.6, 0.4 and 0.2 cm in length, all < 0.1 cm in diameter. Entirely 1cs. po tm    Intraoperative Diagnosis  Immediate Study of Core Needle Biopsies:  Adequate material.  (SLS )    Microscopic Description  Portions of the needle biopsies are involved by invasive, well  differentiated mucinous adenocarcinoma with associated reactive  fibrosis and mild chronic inflammation. A small amount of  nonneoplastic lung tissue is also present in the sample. With  immunostains (controls appropriate), the neoplastic cells express  cytokeratin 7 and are negative for TTF-1. These features are  nonspecific, but are consistent with a primary pulmonary mucinous  adenocarcinoma. CT Scans   4/26/2019  No acute cardiopulmonary process.       Right lower lobe nodule medially measuring 2.4 x 1.5 cm with a small 8 mm   satellite nodule.  These are new compared to prior chest CT.  Recommendations   per the Fleischner Society criteria are given below.       A few subtle ground-glass nodules are seen in the right lung apex.  These are   stable compared to prior chest CT performed in April of 2014.             5/1/19     PFT shows normal spirometry, normal lung volumes.   Moderately decreased diffusion capacity     7/29/19  Interval enlargement in right lower lobe pulmonary nodules, with gradual increase in size concerning for neoplasia.  No change in ground-glass nodules in the right apex and scattered in the left lung.   RECOMMENDATIONS: PET-CT imaging may be considered in this patient.  Additional follow-up CT in 3 months may be entertained as an alternate.  Tissue sampling would provide definitive determination. Impression:  Invasive well-differentiated mucinous adenocarcinoma of right lower lobe  Right lower lobe pulmonary nodules. History of uterine cancer status post surgery and radiation therapy  Elderly patient  Hypothyroidism, hypertension  ECOG performance status 2      Plan:  I had a detailed discussion with the patient and personally went over results of lab work-up imaging studies and pathology report. I also personally went over images of the CT chest.  I also discussed case with patient's pulmonologist.  I discussed natural history of lung cancer. Discussed NCCN guidelines for staging. Will obtain CT PET to stage the disease. I do not believe patient is a good candidate for surgery. Given patient's frail condition I am not sure whether she would be able to tolerate chemotherapy either. If patient is noted to have localized disease radiation therapy I believe would be a valid option. However before discussing treatment options with the patient we will see her back in the office to discuss results of CT PET. We will hold off on ordering MRI for now. NCCN guidelines were reviewed and discussed with the patient. The diagnosis and care plan were discussed with the patient in detail. I discussed the natural history of the disease, prognosis, risks and goals of therapy and answered all the patients questions to the best of my ability. Patient expressed understanding and was in agreement.         Joanne Stearns      This note is created with the assistance of a speech recognition program.  While intending to

## 2019-09-07 ENCOUNTER — HOSPITAL ENCOUNTER (OUTPATIENT)
Dept: CT IMAGING | Age: 84
Discharge: HOME | End: 2019-09-09
Payer: MEDICARE

## 2019-09-07 DIAGNOSIS — C34.91 ADENOCARCINOMA, LUNG, RIGHT (HCC): ICD-10-CM

## 2019-09-07 DIAGNOSIS — C34.81 MALIGNANT NEOPLASM OF OVERLAPPING SITES OF RIGHT BRONCHUS AND LUNG (HCC): ICD-10-CM

## 2019-09-07 PROCEDURE — A9552 F18 FDG: HCPCS | Performed by: INTERNAL MEDICINE

## 2019-09-07 PROCEDURE — 3430000000 HC RX DIAGNOSTIC RADIOPHARMACEUTICAL: Performed by: INTERNAL MEDICINE

## 2019-09-07 PROCEDURE — 78815 PET IMAGE W/CT SKULL-THIGH: CPT

## 2019-09-07 RX ORDER — FLUDEOXYGLUCOSE F 18 200 MCI/ML
14.4 INJECTION, SOLUTION INTRAVENOUS
Status: COMPLETED | OUTPATIENT
Start: 2019-09-07 | End: 2019-09-07

## 2019-09-07 RX ADMIN — FLUDEOXYGLUCOSE F 18 14.4 MILLICURIE: 200 INJECTION, SOLUTION INTRAVENOUS at 15:15

## 2019-09-11 ENCOUNTER — OFFICE VISIT (OUTPATIENT)
Dept: ONCOLOGY | Age: 84
End: 2019-09-11
Payer: MEDICARE

## 2019-09-11 VITALS
DIASTOLIC BLOOD PRESSURE: 61 MMHG | OXYGEN SATURATION: 97 % | TEMPERATURE: 97.7 F | HEIGHT: 60 IN | HEART RATE: 61 BPM | SYSTOLIC BLOOD PRESSURE: 128 MMHG | WEIGHT: 149 LBS | BODY MASS INDEX: 29.25 KG/M2

## 2019-09-11 DIAGNOSIS — C34.91 ADENOCARCINOMA, LUNG, RIGHT (HCC): Primary | ICD-10-CM

## 2019-09-11 PROCEDURE — 1090F PRES/ABSN URINE INCON ASSESS: CPT | Performed by: INTERNAL MEDICINE

## 2019-09-11 PROCEDURE — 4040F PNEUMOC VAC/ADMIN/RCVD: CPT | Performed by: INTERNAL MEDICINE

## 2019-09-11 PROCEDURE — 1036F TOBACCO NON-USER: CPT | Performed by: INTERNAL MEDICINE

## 2019-09-11 PROCEDURE — G8598 ASA/ANTIPLAT THER USED: HCPCS | Performed by: INTERNAL MEDICINE

## 2019-09-11 PROCEDURE — 1123F ACP DISCUSS/DSCN MKR DOCD: CPT | Performed by: INTERNAL MEDICINE

## 2019-09-11 PROCEDURE — G8417 CALC BMI ABV UP PARAM F/U: HCPCS | Performed by: INTERNAL MEDICINE

## 2019-09-11 PROCEDURE — 99214 OFFICE O/P EST MOD 30 MIN: CPT | Performed by: INTERNAL MEDICINE

## 2019-09-11 PROCEDURE — G8427 DOCREV CUR MEDS BY ELIG CLIN: HCPCS | Performed by: INTERNAL MEDICINE

## 2019-09-11 NOTE — PROGRESS NOTES
(PRINIVIL;ZESTRIL) 40 MG tablet Take 1 tablet by mouth nightly 90 tablet 1    meclizine (ANTIVERT) 25 MG tablet Take 12.5 mg by mouth 3 times daily as needed      Probiotic Product (PROBIOTIC DAILY) CAPS One tablet 3 time a day times 10 days then once a day  0    sertraline (ZOLOFT) 50 MG tablet TAKE 1 TABLET DAILY 90 tablet 3    ezetimibe (ZETIA) 10 MG tablet Take 1 tablet by mouth daily 90 tablet 3    levothyroxine (SYNTHROID) 88 MCG tablet Take 1 tablet by mouth Daily 90 tablet 3    zoster recombinant adjuvanted vaccine (SHINGRIX) 50 MCG SUSR injection 50 MCG IM then repeat 2-6 months. 0.5 mL 1    Multiple Vitamins-Minerals (THERAPEUTIC MULTIVITAMIN-MINERALS) tablet Take 1 tablet by mouth daily      CRANBERRY PO Take 1 capsule by mouth daily      Multiple Vitamins-Minerals (OCUVITE PRESERVISION PO) Take 1 tablet by mouth daily      aspirin 81 MG tablet Take 81 mg by mouth daily.  latanoprost (XALATAN) 0.005 % ophthalmic solution Place 1 drop into both eyes nightly.  Ascorbic Acid (VITAMIN C) 500 MG tablet Take 500 mg by mouth 2 times daily.  oxybutynin (DITROPAN-XL) 10 MG extended release tablet Take 1 tablet by mouth daily 90 tablet 3     No current facility-administered medications for this visit. Allergies:   Allopurinol; Metoprolol tartrate [metoprolol]; Percocet [oxycodone-acetaminophen]; Phenergan [promethazine hcl]; Polycitra-k [potassium citrate]; Statins; Levaquin [levofloxacin]; Sulfa antibiotics; and Tessalon [benzonatate]    Review of Systems:    Constitutional: No fever or chills.  No night sweats, no weight loss   Eyes: No eye discharge, double vision, or eye pain   HEENT: negative for sore mouth, sore throat, hoarseness and voice change   Respiratory: negative for cough , sputum, dyspnea, wheezing, hemoptysis, chest pain   Cardiovascular: negative for chest pain, dyspnea, palpitations, orthopnea, PND   Gastrointestinal: negative for nausea, vomiting, diarrhea, constipation, abdominal pain, Dysphagia, hematemesis and hematochezia   Genitourinary: negative for frequency, dysuria, nocturia, urinary incontinence, and hematuria   Integument: negative for rash, skin lesions, bruises. Hematologic/Lymphatic: negative for easy bruising, bleeding, lymphadenopathy, or petechiae   Endocrine: negative for heat or cold intolerance,weight changes, change in bowel habits and hair loss   Musculoskeletal: negative for myalgias, arthralgias, pain, joint swelling,and bone pain   Neurological: negative for headaches, dizziness, seizures, weakness, numbness        Physical Exam:    Vitals: /61 (Site: Right Upper Arm, Position: Sitting, Cuff Size: Small Adult)   Pulse 61   Temp 97.7 °F (36.5 °C) (Tympanic)   Ht 5' (1.524 m)   Wt 149 lb (67.6 kg)   SpO2 97%   BMI 29.10 kg/m²   General appearance -frail appearing, no in pain or distress  Mental status - AAO X3  Eyes - pupils equal and reactive, extraocular eye movements intact  Mouth - mucous membranes moist, pharynx normal without lesions  Neck - supple, no significant adenopathy  Lymphatics - no palpable lymphadenopathy, no hepatosplenomegaly  Chest -decreased breathing sounds  Heart - normal rate, regular rhythm, normal S1, S2, no murmurs  Abdomen - soft, nontender, nondistended, no masses or organomegaly  Neurological - alert, oriented, normal speech, no focal findings or movement disorder noted  Extremities - peripheral pulses normal, no pedal edema, no clubbing or cyanosis  Skin - normal coloration and turgor, no rashes, no suspicious skin lesions noted       DATA:    CBC:   No results for input(s): WBC, HGB, PLT in the last 72 hours. BMP:  No results for input(s): NA, K, CL, CO2, BUN, CREATININE, GLUCOSE in the last 72 hours. Hepatic: No results for input(s): AST, ALT, ALB, BILITOT, ALKPHOS in the last 72 hours. INR:   No results for input(s): INR in the last 72 hours.   PTT:No results for input(s): PTT in the last 72 Biopsy Lung Percutaneous    Result Date: 8/28/2019  EXAMINATION: CT-guided side lung core biopsy   8/26/2019 11:29 am TECHNIQUE: Dose modulation, iterative reconstruction, and/or weight based adjustment of the mA/kV was utilized to reduce the radiation dose to as low as reasonably achievable. After informed consent patient was brought to CT scanner and placed prone on the table. Scanning through the lungs localize the right lung mass. The patient was prepped and draped in sterile fashion. 1% lidocaine was infiltrated for local anesthesia. A 20-gauge coaxial needle was advanced under CT guidance into the mass. Multiple 20-gauge core biopsies were obtained. Samples were submitted to pathology at bedside for analysis. Post biopsy scanning shows no immediate pneumothorax. Dressing was applied. Patient was returned to holding in stable condition. HISTORY: ORDERING SYSTEM PROVIDED HISTORY: Nodule of lower lobe of right lung TECHNOLOGIST PROVIDED HISTORY:  right lower lobe lung nodule SEDATION: Doses not recorded on the request, please refer to nursing notes. IMPRESSION: Successful CT-guided core biopsy of right lung mass. Sample submitted to pathology. Ct Needle Biopsy Lung Percutaneous    Result Date: 8/26/2019  EXAMINATION: CT-guided side lung core biopsy  8/26/2019 11:29 am TECHNIQUE: Dose modulation, iterative reconstruction, and/or weight based adjustment of the mA/kV was utilized to reduce the radiation dose to as low as reasonably achievable. After informed consent patient was brought to CT scanner and placed prone on the table. Scanning through the lungs localize the right lung mass. The patient was prepped and draped in sterile fashion. 1% lidocaine was infiltrated for local anesthesia. A 20-gauge coaxial needle was advanced under CT guidance into the mass. Multiple 20-gauge core biopsies were obtained. Samples were submitted to pathology at bedside for analysis.   Post biopsy scanning shows no

## 2019-09-19 ENCOUNTER — OFFICE VISIT (OUTPATIENT)
Dept: CARDIOLOGY | Age: 84
End: 2019-09-19
Payer: MEDICARE

## 2019-09-19 VITALS
HEART RATE: 50 BPM | HEIGHT: 62 IN | DIASTOLIC BLOOD PRESSURE: 70 MMHG | WEIGHT: 151 LBS | BODY MASS INDEX: 27.79 KG/M2 | SYSTOLIC BLOOD PRESSURE: 156 MMHG

## 2019-09-19 DIAGNOSIS — R00.1 BRADYCARDIA: Primary | ICD-10-CM

## 2019-09-19 PROCEDURE — G8598 ASA/ANTIPLAT THER USED: HCPCS | Performed by: INTERNAL MEDICINE

## 2019-09-19 PROCEDURE — 1036F TOBACCO NON-USER: CPT | Performed by: INTERNAL MEDICINE

## 2019-09-19 PROCEDURE — 1090F PRES/ABSN URINE INCON ASSESS: CPT | Performed by: INTERNAL MEDICINE

## 2019-09-19 PROCEDURE — 1123F ACP DISCUSS/DSCN MKR DOCD: CPT | Performed by: INTERNAL MEDICINE

## 2019-09-19 PROCEDURE — 99214 OFFICE O/P EST MOD 30 MIN: CPT | Performed by: INTERNAL MEDICINE

## 2019-09-19 PROCEDURE — 4040F PNEUMOC VAC/ADMIN/RCVD: CPT | Performed by: INTERNAL MEDICINE

## 2019-09-19 PROCEDURE — G8417 CALC BMI ABV UP PARAM F/U: HCPCS | Performed by: INTERNAL MEDICINE

## 2019-09-19 PROCEDURE — G8427 DOCREV CUR MEDS BY ELIG CLIN: HCPCS | Performed by: INTERNAL MEDICINE

## 2019-09-19 NOTE — PROGRESS NOTES
Today's Date: 9/19/2019  Patient Name: Lianne Hylton  Patient's age: 80 y.o., 4/11/1932          The patient is a 80 y.o.  female is in the office for f/u, she had no falls/syncope. She had log of BP and HR with BP<140/90 and HR in 50s. Past Medical History:   has a past medical history of Adenocarcinoma of endometrium ([facility] [address] Aortic valve sclerosis, Bradycardia, Cardiac murmur, Cataract of left eye, Chronic diarrhea, Diabetes mellitus type II, controlled ([address] Dysthymia, Gait abnormality, Glaucoma, H/O renal calculi, H/O vein stripping, Hard of hearing, History of anemia, History of dizziness, History of radiation therapy, History of small bowel obstruction, Hyperlipidemia, Hypertension, Hypothyroidism, Influenza A, Left elbow fracture, Lung nodule, Macular degeneration, Obesity, Pericardial effusion, Pseudophakia of right eye, PVD (peripheral vascular disease) ([facility] [address] Traumatic injury of lower extremity, Vertigo, Wears glasses, and Wears partial dentures. Past Surgical History:   has a past surgical history that includes jason and bso (cervix removed) (March 2002); Cholecystectomy, laparoscopic (5/28/1998); ERCP (7/19/1998); Colonoscopy (7/22/2003); Elbow fracture surgery (Left, September 2010); and Cataract removal (Right, February 2013). Home Medications:    Prior to Admission medications    Medication Sig Start Date End Date Taking?  Authorizing Provider   brimonidine (ALPHAGAN P) 0.15 % ophthalmic solution Apply 1 drop to eye 2 times daily 6/20/19 6/19/20 Yes Historical Provider, MD   lisinopril (PRINIVIL;ZESTRIL) 40 MG tablet Take 1 tablet by mouth nightly 6/20/19  Yes Otf Shah DO   meclizine (ANTIVERT) 25 MG tablet Take 12.5 mg by mouth 3 times daily as needed   Yes Historical Provider, MD   Probiotic Product (PROBIOTIC DAILY) CAPS One tablet 3 time a day times 10 days then once a day 4/2/19  Yes Yesica Yan   oxybutynin (DITROPAN-XL) 10 MG extended release side effects. Recommend increase physical activity as tolerated. Discussed salt and diet. Answered all questions and concerns. Refill medications as needed until next visit. RTC appointment is scheduled.           [facility] [address] [facility] Cardiology Consult           [phone]

## 2019-10-01 DIAGNOSIS — K52.9 CHRONIC DIARRHEA: ICD-10-CM

## 2019-10-02 RX ORDER — DIPHENOXYLATE HYDROCHLORIDE AND ATROPINE SULFATE 2.5; .025 MG/1; MG/1
TABLET ORAL
Qty: 180 TABLET | Refills: 1 | Status: SHIPPED | OUTPATIENT
Start: 2019-10-02 | End: 2020-01-01

## 2019-10-08 ENCOUNTER — HOSPITAL ENCOUNTER (OUTPATIENT)
Dept: LAB | Age: 84
Discharge: HOME | End: 2019-10-08
Payer: MEDICARE

## 2019-10-08 LAB
ABSOLUTE EOS #: 0.1 K/UL (ref 0–0.4)
ABSOLUTE IMMATURE GRANULOCYTE: ABNORMAL K/UL (ref 0–0.3)
ABSOLUTE LYMPH #: 1.4 K/UL (ref 1–4.8)
ABSOLUTE MONO #: 0.5 K/UL (ref 0.1–1.2)
BASOPHILS # BLD: 1 % (ref 0–1)
BASOPHILS ABSOLUTE: 0 K/UL (ref 0–0.2)
DIFFERENTIAL TYPE: ABNORMAL
EOSINOPHILS RELATIVE PERCENT: 2 % (ref 1–7)
HCT VFR BLD CALC: 37.2 % (ref 36–46)
HEMOGLOBIN: 12.3 G/DL (ref 12–16)
IMMATURE GRANULOCYTES: ABNORMAL %
LYMPHOCYTES # BLD: 22 % (ref 16–46)
MCH RBC QN AUTO: 33.2 PG (ref 26–34)
MCHC RBC AUTO-ENTMCNC: 33.1 G/DL (ref 31–37)
MCV RBC AUTO: 100.2 FL (ref 80–100)
MONOCYTES # BLD: 7 % (ref 4–11)
NRBC AUTOMATED: ABNORMAL PER 100 WBC
PDW BLD-RTO: 13.4 % (ref 11–14.5)
PLATELET # BLD: 249 K/UL (ref 140–450)
PLATELET ESTIMATE: ABNORMAL
PMV BLD AUTO: 9.1 FL (ref 6–12)
RBC # BLD: 3.71 M/UL (ref 4–5.2)
RBC # BLD: ABNORMAL 10*6/UL
SEG NEUTROPHILS: 68 % (ref 43–77)
SEGMENTED NEUTROPHILS ABSOLUTE COUNT: 4.6 K/UL (ref 1.8–7.7)
WBC # BLD: 6.7 K/UL (ref 3.5–11)
WBC # BLD: ABNORMAL 10*3/UL

## 2019-10-08 PROCEDURE — 85025 COMPLETE CBC W/AUTO DIFF WBC: CPT

## 2019-10-08 PROCEDURE — 36415 COLL VENOUS BLD VENIPUNCTURE: CPT

## 2019-10-23 ENCOUNTER — OFFICE VISIT (OUTPATIENT)
Dept: ONCOLOGY | Age: 84
End: 2019-10-23
Payer: MEDICARE

## 2019-10-23 VITALS
WEIGHT: 151 LBS | TEMPERATURE: 98.3 F | BODY MASS INDEX: 27.79 KG/M2 | OXYGEN SATURATION: 94 % | SYSTOLIC BLOOD PRESSURE: 148 MMHG | HEART RATE: 82 BPM | DIASTOLIC BLOOD PRESSURE: 70 MMHG | HEIGHT: 62 IN

## 2019-10-23 DIAGNOSIS — C34.91 ADENOCARCINOMA, LUNG, RIGHT (HCC): Primary | ICD-10-CM

## 2019-10-23 PROCEDURE — G8598 ASA/ANTIPLAT THER USED: HCPCS | Performed by: INTERNAL MEDICINE

## 2019-10-23 PROCEDURE — G8417 CALC BMI ABV UP PARAM F/U: HCPCS | Performed by: INTERNAL MEDICINE

## 2019-10-23 PROCEDURE — 99214 OFFICE O/P EST MOD 30 MIN: CPT | Performed by: INTERNAL MEDICINE

## 2019-10-23 PROCEDURE — 1123F ACP DISCUSS/DSCN MKR DOCD: CPT | Performed by: INTERNAL MEDICINE

## 2019-10-23 PROCEDURE — G8427 DOCREV CUR MEDS BY ELIG CLIN: HCPCS | Performed by: INTERNAL MEDICINE

## 2019-10-23 PROCEDURE — 1036F TOBACCO NON-USER: CPT | Performed by: INTERNAL MEDICINE

## 2019-10-23 PROCEDURE — 1090F PRES/ABSN URINE INCON ASSESS: CPT | Performed by: INTERNAL MEDICINE

## 2019-10-23 PROCEDURE — 4040F PNEUMOC VAC/ADMIN/RCVD: CPT | Performed by: INTERNAL MEDICINE

## 2019-10-23 PROCEDURE — G8484 FLU IMMUNIZE NO ADMIN: HCPCS | Performed by: INTERNAL MEDICINE

## 2019-10-28 ENCOUNTER — CARE COORDINATION (OUTPATIENT)
Dept: CARE COORDINATION | Age: 84
End: 2019-10-28

## 2019-10-31 ENCOUNTER — OFFICE VISIT (OUTPATIENT)
Dept: INTERNAL MEDICINE | Age: 84
End: 2019-10-31
Payer: MEDICARE

## 2019-10-31 VITALS
HEART RATE: 60 BPM | HEIGHT: 60 IN | SYSTOLIC BLOOD PRESSURE: 130 MMHG | BODY MASS INDEX: 29.13 KG/M2 | WEIGHT: 148.4 LBS | DIASTOLIC BLOOD PRESSURE: 72 MMHG | RESPIRATION RATE: 16 BRPM

## 2019-10-31 DIAGNOSIS — N32.81 OAB (OVERACTIVE BLADDER): ICD-10-CM

## 2019-10-31 DIAGNOSIS — E11.22 CONTROLLED TYPE 2 DIABETES MELLITUS WITH STAGE 3 CHRONIC KIDNEY DISEASE, WITHOUT LONG-TERM CURRENT USE OF INSULIN (HCC): Primary | ICD-10-CM

## 2019-10-31 DIAGNOSIS — R00.1 BRADYCARDIA: ICD-10-CM

## 2019-10-31 DIAGNOSIS — N18.30 CHRONIC KIDNEY DISEASE, STAGE III (MODERATE) (HCC): ICD-10-CM

## 2019-10-31 DIAGNOSIS — I10 ESSENTIAL HYPERTENSION: ICD-10-CM

## 2019-10-31 DIAGNOSIS — N18.30 CONTROLLED TYPE 2 DIABETES MELLITUS WITH STAGE 3 CHRONIC KIDNEY DISEASE, WITHOUT LONG-TERM CURRENT USE OF INSULIN (HCC): Primary | ICD-10-CM

## 2019-10-31 DIAGNOSIS — I65.23 CAROTID STENOSIS, BILATERAL: ICD-10-CM

## 2019-10-31 DIAGNOSIS — C34.90 MALIGNANT NEOPLASM OF LUNG, UNSPECIFIED LATERALITY, UNSPECIFIED PART OF LUNG (HCC): ICD-10-CM

## 2019-10-31 DIAGNOSIS — F41.9 ANXIETY: ICD-10-CM

## 2019-10-31 DIAGNOSIS — E78.2 MIXED HYPERLIPIDEMIA: ICD-10-CM

## 2019-10-31 DIAGNOSIS — Z23 NEED FOR PROPHYLACTIC VACCINATION AND INOCULATION AGAINST VARICELLA: ICD-10-CM

## 2019-10-31 DIAGNOSIS — R91.8 PULMONARY NODULES: ICD-10-CM

## 2019-10-31 DIAGNOSIS — E03.4 HYPOTHYROIDISM DUE TO ACQUIRED ATROPHY OF THYROID: ICD-10-CM

## 2019-10-31 PROCEDURE — 1123F ACP DISCUSS/DSCN MKR DOCD: CPT | Performed by: NURSE PRACTITIONER

## 2019-10-31 PROCEDURE — 90653 IIV ADJUVANT VACCINE IM: CPT | Performed by: NURSE PRACTITIONER

## 2019-10-31 PROCEDURE — G8417 CALC BMI ABV UP PARAM F/U: HCPCS | Performed by: NURSE PRACTITIONER

## 2019-10-31 PROCEDURE — G0008 ADMIN INFLUENZA VIRUS VAC: HCPCS | Performed by: NURSE PRACTITIONER

## 2019-10-31 PROCEDURE — 4040F PNEUMOC VAC/ADMIN/RCVD: CPT | Performed by: NURSE PRACTITIONER

## 2019-10-31 PROCEDURE — 1036F TOBACCO NON-USER: CPT | Performed by: NURSE PRACTITIONER

## 2019-10-31 PROCEDURE — 1090F PRES/ABSN URINE INCON ASSESS: CPT | Performed by: NURSE PRACTITIONER

## 2019-10-31 PROCEDURE — G8598 ASA/ANTIPLAT THER USED: HCPCS | Performed by: NURSE PRACTITIONER

## 2019-10-31 PROCEDURE — G8427 DOCREV CUR MEDS BY ELIG CLIN: HCPCS | Performed by: NURSE PRACTITIONER

## 2019-10-31 PROCEDURE — 99214 OFFICE O/P EST MOD 30 MIN: CPT | Performed by: NURSE PRACTITIONER

## 2019-10-31 PROCEDURE — G8482 FLU IMMUNIZE ORDER/ADMIN: HCPCS | Performed by: NURSE PRACTITIONER

## 2019-11-09 ASSESSMENT — ENCOUNTER SYMPTOMS
COUGH: 0
WHEEZING: 0
COLOR CHANGE: 0
RHINORRHEA: 0
EYE PAIN: 0
SHORTNESS OF BREATH: 0
CONSTIPATION: 0
CHEST TIGHTNESS: 0
TROUBLE SWALLOWING: 0
NAUSEA: 0
FACIAL SWELLING: 0
SINUS PRESSURE: 0
BLOOD IN STOOL: 0
ABDOMINAL PAIN: 0
VOMITING: 0
DIARRHEA: 0
SORE THROAT: 0

## 2019-12-17 RX ORDER — LISINOPRIL 40 MG/1
TABLET ORAL
Qty: 90 TABLET | Refills: 4 | Status: SHIPPED | OUTPATIENT
Start: 2019-12-17

## 2020-01-10 ENCOUNTER — TELEPHONE (OUTPATIENT)
Dept: INTERNAL MEDICINE | Age: 85
End: 2020-01-10

## 2020-01-14 ENCOUNTER — HOSPITAL ENCOUNTER (OUTPATIENT)
Dept: CT IMAGING | Age: 85
Discharge: HOME | End: 2020-01-16
Payer: MEDICARE

## 2020-01-14 PROCEDURE — 71250 CT THORAX DX C-: CPT

## 2020-01-15 ENCOUNTER — HOSPITAL ENCOUNTER (OUTPATIENT)
Age: 85
Discharge: HOME | End: 2020-01-15
Payer: MEDICARE

## 2020-01-15 DIAGNOSIS — N18.30 CONTROLLED TYPE 2 DIABETES MELLITUS WITH STAGE 3 CHRONIC KIDNEY DISEASE, WITHOUT LONG-TERM CURRENT USE OF INSULIN (HCC): ICD-10-CM

## 2020-01-15 DIAGNOSIS — E11.22 CONTROLLED TYPE 2 DIABETES MELLITUS WITH STAGE 3 CHRONIC KIDNEY DISEASE, WITHOUT LONG-TERM CURRENT USE OF INSULIN (HCC): ICD-10-CM

## 2020-01-15 DIAGNOSIS — C34.91 ADENOCARCINOMA, LUNG, RIGHT (HCC): ICD-10-CM

## 2020-01-15 DIAGNOSIS — R35.0 INCREASED URINARY FREQUENCY: ICD-10-CM

## 2020-01-15 LAB
ALBUMIN SERPL-MCNC: 4 G/DL (ref 3.5–5.1)
ALP BLD-CCNC: 158 U/L (ref 38–126)
ALT SERPL-CCNC: 23 U/L (ref 11–66)
AMORPHOUS: ABNORMAL
ANION GAP SERPL CALCULATED.3IONS-SCNC: 9 MEQ/L (ref 8–16)
AST SERPL-CCNC: 28 U/L (ref 5–40)
AVERAGE GLUCOSE: 117 MG/DL (ref 70–126)
BACTERIA: ABNORMAL
BASOPHILS # BLD: 0.5 %
BASOPHILS ABSOLUTE: 0 THOU/MM3 (ref 0–0.1)
BILIRUB SERPL-MCNC: 0.3 MG/DL (ref 0.3–1.2)
BILIRUBIN URINE: NEGATIVE
BLOOD, URINE: ABNORMAL
BUN BLDV-MCNC: 21 MG/DL (ref 7–22)
CALCIUM SERPL-MCNC: 9.5 MG/DL (ref 8.5–10.5)
CASTS UA: ABNORMAL /LPF
CHARACTER, URINE: ABNORMAL
CHLORIDE BLD-SCNC: 101 MEQ/L (ref 98–111)
CO2: 24 MEQ/L (ref 23–33)
COLOR: YELLOW
CREAT SERPL-MCNC: 0.9 MG/DL (ref 0.4–1.2)
CRYSTALS, UA: ABNORMAL
EOSINOPHIL # BLD: 2.3 %
EOSINOPHILS ABSOLUTE: 0.1 THOU/MM3 (ref 0–0.4)
EPITHELIAL CELLS, UA: ABNORMAL /HPF
ERYTHROCYTE [DISTWIDTH] IN BLOOD BY AUTOMATED COUNT: 13 % (ref 11.5–14.5)
ERYTHROCYTE [DISTWIDTH] IN BLOOD BY AUTOMATED COUNT: 50.4 FL (ref 35–45)
GFR SERPL CREATININE-BSD FRML MDRD: 59 ML/MIN/1.73M2
GLUCOSE BLD-MCNC: 124 MG/DL (ref 70–108)
GLUCOSE, URINE: NEGATIVE MG/DL
HBA1C MFR BLD: 5.9 % (ref 4.4–6.4)
HCT VFR BLD CALC: 39.2 % (ref 37–47)
HEMOGLOBIN: 12.1 GM/DL (ref 12–16)
IMMATURE GRANS (ABS): 0.03 THOU/MM3 (ref 0–0.07)
IMMATURE GRANULOCYTES: 0.5 %
KETONES, URINE: NEGATIVE
LEUKOCYTE ESTERASE, URINE: ABNORMAL
LYMPHOCYTES # BLD: 20.5 %
LYMPHOCYTES ABSOLUTE: 1.3 THOU/MM3 (ref 1–4.8)
MCH RBC QN AUTO: 32.6 PG (ref 26–33)
MCHC RBC AUTO-ENTMCNC: 30.9 GM/DL (ref 32.2–35.5)
MCV RBC AUTO: 105.7 FL (ref 81–99)
MONOCYTES # BLD: 10.3 %
MONOCYTES ABSOLUTE: 0.6 THOU/MM3 (ref 0.4–1.3)
MUCUS: ABNORMAL
NITRITE, URINE: NEGATIVE
NUCLEATED RED BLOOD CELLS: 0 /100 WBC
PH UA: 5 (ref 5–9)
PLATELET # BLD: 250 THOU/MM3 (ref 130–400)
PMV BLD AUTO: 11.4 FL (ref 9.4–12.4)
POTASSIUM SERPL-SCNC: 3.6 MEQ/L (ref 3.5–5.2)
PROTEIN UA: NEGATIVE MG/DL
RBC # BLD: 3.71 MILL/MM3 (ref 4.2–5.4)
RBC UA: ABNORMAL /HPF
REFLEX TO URINE C & S: ABNORMAL
SEG NEUTROPHILS: 65.9 %
SEGMENTED NEUTROPHILS ABSOLUTE COUNT: 4 THOU/MM3 (ref 1.8–7.7)
SODIUM BLD-SCNC: 134 MEQ/L (ref 135–145)
SPECIFIC GRAVITY UA: 1.01 (ref 1–1.03)
TOTAL PROTEIN: 6.5 G/DL (ref 6.1–8)
UROBILINOGEN, URINE: 0.2 EU/DL (ref 0–1)
WBC # BLD: 6.1 THOU/MM3 (ref 4.8–10.8)
WBC UA: ABNORMAL /HPF

## 2020-01-15 PROCEDURE — 81001 URINALYSIS AUTO W/SCOPE: CPT

## 2020-01-15 PROCEDURE — 87186 SC STD MICRODIL/AGAR DIL: CPT

## 2020-01-15 PROCEDURE — 36415 COLL VENOUS BLD VENIPUNCTURE: CPT

## 2020-01-15 PROCEDURE — 85025 COMPLETE CBC W/AUTO DIFF WBC: CPT

## 2020-01-15 PROCEDURE — 87086 URINE CULTURE/COLONY COUNT: CPT

## 2020-01-15 PROCEDURE — 80053 COMPREHEN METABOLIC PANEL: CPT

## 2020-01-15 PROCEDURE — 87077 CULTURE AEROBIC IDENTIFY: CPT

## 2020-01-15 PROCEDURE — 83036 HEMOGLOBIN GLYCOSYLATED A1C: CPT

## 2020-01-18 LAB
ORGANISM: ABNORMAL
URINE CULTURE REFLEX: ABNORMAL

## 2020-01-21 ENCOUNTER — OFFICE VISIT (OUTPATIENT)
Dept: ONCOLOGY | Age: 85
End: 2020-01-21
Payer: MEDICARE

## 2020-01-21 VITALS
DIASTOLIC BLOOD PRESSURE: 62 MMHG | HEART RATE: 60 BPM | HEIGHT: 60 IN | RESPIRATION RATE: 16 BRPM | OXYGEN SATURATION: 95 % | BODY MASS INDEX: 29.92 KG/M2 | WEIGHT: 152.4 LBS | SYSTOLIC BLOOD PRESSURE: 138 MMHG

## 2020-01-21 PROCEDURE — G8427 DOCREV CUR MEDS BY ELIG CLIN: HCPCS | Performed by: INTERNAL MEDICINE

## 2020-01-21 PROCEDURE — 99214 OFFICE O/P EST MOD 30 MIN: CPT

## 2020-01-21 PROCEDURE — G8417 CALC BMI ABV UP PARAM F/U: HCPCS | Performed by: INTERNAL MEDICINE

## 2020-01-21 PROCEDURE — 1036F TOBACCO NON-USER: CPT | Performed by: INTERNAL MEDICINE

## 2020-01-21 PROCEDURE — 1123F ACP DISCUSS/DSCN MKR DOCD: CPT | Performed by: INTERNAL MEDICINE

## 2020-01-21 PROCEDURE — 1090F PRES/ABSN URINE INCON ASSESS: CPT | Performed by: INTERNAL MEDICINE

## 2020-01-21 PROCEDURE — 99214 OFFICE O/P EST MOD 30 MIN: CPT | Performed by: INTERNAL MEDICINE

## 2020-01-21 PROCEDURE — G8482 FLU IMMUNIZE ORDER/ADMIN: HCPCS | Performed by: INTERNAL MEDICINE

## 2020-01-21 PROCEDURE — 4040F PNEUMOC VAC/ADMIN/RCVD: CPT | Performed by: INTERNAL MEDICINE

## 2020-01-21 RX ORDER — CIPROFLOXACIN 500 MG/1
500 TABLET, FILM COATED ORAL 2 TIMES DAILY
Qty: 10 TABLET | Refills: 0 | Status: SHIPPED | OUTPATIENT
Start: 2020-01-21 | End: 2020-01-26

## 2020-01-21 NOTE — PROGRESS NOTES
in March or 2019. Patient was hospitalized and managed conservatively. CT scan of abdomen pelvis done at that time time showed a mass in the right lung and also a satellite nodule of 8 mm. Patient has never smoked cigarettes. Her medical problems include hypertension hypothyroidism. Patient subsequently underwent further work-up for the lung mass including CT PET which showed FDG uptake with SUV of 2.2 at the right lung mass otherwise the CT PET was unremarkable. 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 Patient was referred to our office for further evaluation and recommendations. Patient's performance ECOG status is 2.     Past Medical History:   Past Medical History:   Diagnosis Date    Adenocarcinoma of endometrium ([address]     Aortic valve sclerosis     Bradycardia     follows with cardiology- Dr. Erasmo Hummel, possible SSS    Cardiac murmur     Cataract of left eye     Chronic diarrhea     s/p radiation    Diabetes mellitus type II, controlled ([address]     diet controlled     Dysthymia     Gait abnormality 6/23/2014    Glaucoma     H/O renal calculi     H/O vein stripping     Hard of hearing     History of anemia     History of dizziness     follows with cardiology, bradycardia, possible SSS    History of radiation therapy     for uterine cancer    History of small bowel obstruction 03/2019    no surgery required    Hyperlipidemia     Hypertension     Hypothyroidism     Influenza A 1/11/2018    Left elbow fracture     S/P surgical repair    Lung cancer ([address]     Lung nodule     Macular degeneration     Obesity     Pericardial effusion 4/23/2014    Pseudophakia of right eye     PVD (peripheral vascular disease) ([address]     Traumatic injury of lower extremity childhood    bilateral trauma of lower extremities    Vertigo     resolved    Wears glasses     Wears partial dentures     upper        Past Surgical History:     Past Surgical History:   Procedure Laterality Date    CATARACT REMOVAL Right February 2013    Dr. Radha Tomas, LAPAROSCOPIC  5/28/1998    COLONOSCOPY  7/22/2003    Dr. Alfonso Garay Left September 2010    ORIF, Dr. Chaney Brain ERCP  7/19/1998    retained stone, Suzy Mulligan January and [place] MAGDA AND BSO  March 2002    endometrial adenocarcinoma, Dr. Isaac Pearce       Patient Family Social History:     Social History     Socioeconomic History    Marital status:       Spouse name: None    Number of children: None    Years of education: None    Highest education level: None   Occupational History    None   Social Needs    Financial resource strain: None    Food insecurity:     Worry: None     Inability: None    Transportation needs:     Medical: None     Non-medical: None   Tobacco Use    Smoking status: Never Smoker    Smokeless tobacco: Never Used    Tobacco comment: never smoked yant rrt 01/11/2018   Substance and Sexual Activity    Alcohol use: No     Alcohol/week: 0.0 standard drinks    Drug use: No    Sexual activity: None   Lifestyle    Physical activity:     Days per week: None     Minutes per session: None    Stress: None   Relationships    Social connections:     Talks on phone: None     Gets together: None     Attends [employer] service: None     Active member of club or organization: None     Attends meetings of clubs or organizations: None     Relationship status: None    Intimate partner violence:     Fear of current or ex partner: None     Emotionally abused: None     Physically abused: None     Forced sexual activity: None   Other Topics Concern    None   Social History Narrative    None       Family History   Problem Relation Age of Onset    High Blood Pressure Other  Diabetes type 2  Mother         developed later in life       Current Medications:     Current Outpatient Medications   Medication Sig Dispense Refill    lisinopril (PRINIVIL;ZESTRIL) 40 MG tablet TAKE 1 TABLET NIGHTLY 90 tablet 4    meclizine (ANTIVERT) 25 MG tablet Take 12.5 mg by mouth 3 times daily as needed      Probiotic Product (PROBIOTIC DAILY) CAPS One tablet 3 time a day times 10 days then once a day  0    sertraline (ZOLOFT) 50 MG tablet TAKE 1 TABLET DAILY 90 tablet 3    ezetimibe (ZETIA) 10 MG tablet Take 1 tablet by mouth daily 90 tablet 3    levothyroxine (SYNTHROID) 88 MCG tablet Take 1 tablet by mouth Daily 90 tablet 3    Multiple Vitamins-Minerals (THERAPEUTIC MULTIVITAMIN-MINERALS) tablet Take 1 tablet by mouth daily      CRANBERRY PO Take 1 capsule by mouth daily      Multiple Vitamins-Minerals (OCUVITE PRESERVISION PO) Take 1 tablet by mouth daily      aspirin 81 MG tablet Take 81 mg by mouth daily.  latanoprost (XALATAN) 0.005 % ophthalmic solution Place 1 drop into both eyes nightly.  Ascorbic Acid (VITAMIN C) 500 MG tablet Take 500 mg by mouth 2 times daily.  oxybutynin (DITROPAN-XL) 10 MG extended release tablet Take 1 tablet by mouth daily 90 tablet 3     No current facility-administered medications for this visit. Allergies:   Allopurinol; Metoprolol tartrate [metoprolol]; Percocet [oxycodone-acetaminophen]; Phenergan [promethazine hcl]; Polycitra-k [potassium citrate]; Statins; Levaquin [levofloxacin]; Sulfa antibiotics; and Tessalon [benzonatate]    Review of Systems:    Constitutional: No fever or chills.  No night sweats, no weight loss   Eyes: No eye discharge, double vision, or eye pain   HEENT: negative for sore mouth, sore throat, hoarseness and voice change   Respiratory: negative for cough , sputum, dyspnea, wheezing, hemoptysis, chest pain   Cardiovascular: negative for chest pain, dyspnea, palpitations, orthopnea, PND   Gastrointestinal: negative for nausea, vomiting, diarrhea, constipation, abdominal pain, Dysphagia, hematemesis and hematochezia   Genitourinary: negative for frequency, dysuria, nocturia, urinary incontinence, and hematuria   Integument: negative for rash, skin lesions, bruises. Hematologic/Lymphatic: negative for easy bruising, bleeding, lymphadenopathy, or petechiae   Endocrine: negative for heat or cold intolerance,weight changes, change in bowel habits and hair loss   Musculoskeletal: negative for myalgias, arthralgias, pain, joint swelling,and bone pain   Neurological: negative for headaches, dizziness, seizures, weakness, numbness        Physical Exam:    Vitals: /62 (Site: Right Upper Arm, Position: Sitting, Cuff Size: Medium Adult)   Pulse 60   Resp 16   Ht 5' (1.524 m)   Wt 152 lb 6.4 oz (69.1 kg)   SpO2 95%   BMI 29.76 kg/m²   General appearance -frail appearing, no in pain or distress  Mental status - AAO X3  Eyes - pupils equal and reactive, extraocular eye movements intact  Mouth - mucous membranes moist, pharynx normal without lesions  Neck - supple, no significant adenopathy  Lymphatics - no palpable lymphadenopathy, no hepatosplenomegaly  Chest -decreased breathing sounds  Heart - normal rate, regular rhythm, normal S1, S2, no murmurs  Abdomen - soft, nontender, nondistended, no masses or organomegaly  Neurological - alert, oriented, normal speech, no focal findings or movement disorder noted  Extremities - peripheral pulses normal, no pedal edema, no clubbing or cyanosis  Skin - normal coloration and turgor, no rashes, no suspicious skin lesions noted       DATA:    CBC:   No results for input(s): WBC, HGB, PLT in the last 72 hours. BMP:  No results for input(s): NA, K, CL, CO2, BUN, CREATININE, GLUCOSE in the last 72 hours. Hepatic: No results for input(s): AST, ALT, ALB, BILITOT, ALKPHOS in the last 72 hours. INR:   No results for input(s): INR in the last 72 hours.   PTT:No results for input(s): PTT in the last 72 hours.      Results for orders placed or performed during the hospital encounter of 01/15/20   Culture, Reflexed, Urine   Result Value Ref Range    Organism Escherichia coli (A)     Urine Culture Reflex [place] count: >100,000 CFU/mL         Susceptibility    Escherichia coli - BACTERIAL SUSCEPTIBILITY PANEL BY TIFFANIE     amoxicillin-clavulanate <=2 Sensitive mcg/mL     ceFAZolin <=4 Sensitive mcg/mL     cefOXitin <=4 Sensitive mcg/mL     cefTRIAXone <=1 Sensitive mcg/mL     ampicillin <=2 Sensitive mcg/mL     gentamicin <=1 Sensitive mcg/mL     trimethoprim-sulfamethoxazole <=20 Sensitive mcg/mL     ciprofloxacin <=0.25 Sensitive mcg/mL     tetracycline <=1 Sensitive mcg/mL     nitrofurantoin <=16 Sensitive mcg/mL   Hemoglobin A1C   Result Value Ref Range    Hemoglobin A1C 5.9 4.4 - 6.4 %    AVERAGE GLUCOSE 117 70 - 126 mg/dL   Comprehensive Metabolic Panel   Result Value Ref Range    Glucose 124 (H) 70 - 108 mg/dL    CREATININE 0.9 0.4 - 1.2 mg/dL    BUN 21 7 - 22 mg/dL    Sodium 134 (L) 135 - 145 meq/L    Potassium 3.6 3.5 - 5.2 meq/L    Chloride 101 98 - 111 meq/L    CO2 24 23 - 33 meq/L    Calcium 9.5 8.5 - 10.5 mg/dL    AST 28 5 - 40 U/L    Alkaline Phosphatase 158 (H) 38 - 126 U/L    Total Protein 6.5 6.1 - 8.0 g/dL    Alb 4.0 3.5 - 5.1 g/dL    Total Bilirubin 0.3 0.3 - 1.2 mg/dL    ALT 23 11 - 66 U/L   CBC Auto Differential   Result Value Ref Range    WBC 6.1 4.8 - 10.8 thou/mm3    RBC 3.71 (L) 4.20 - 5.40 mill/mm3    Hemoglobin 12.1 12.0 - 16.0 gm/dl    Hematocrit 39.2 37.0 - 47.0 %    .7 (H) 81.0 - 99.0 fL    MCH 32.6 26.0 - 33.0 pg    MCHC 30.9 (L) 32.2 - 35.5 gm/dl    RDW-CV 13.0 11.5 - 14.5 %    RDW-SD 50.4 (H) 35.0 - 45.0 fL    Platelets 018 132 - 928 thou/mm3    MPV 11.4 9.4 - 12.4 fL    Seg Neutrophils 65.9 %    Lymphocytes 20.5 %    Monocytes 10.3 %    Eosinophils 2.3 %    Basophils 0.5 %    Immature Granulocytes 0.5 %    Segs Absolute 4.0 1.8 - 7.7 thou/mm3    Lymphocytes Samples were submitted to pathology at bedside for analysis. Post biopsy scanning shows no immediate pneumothorax. Dressing was applied. Patient was returned to holding in stable condition. HISTORY: ORDERING SYSTEM PROVIDED HISTORY: Nodule of lower lobe of right lung TECHNOLOGIST PROVIDED HISTORY:  right lower lobe lung nodule SEDATION: Doses not recorded on the request, please refer to nursing notes. IMPRESSION: Successful CT-guided core biopsy of right lung mass. Sample submitted to pathology. Ct Needle Biopsy Lung Percutaneous    Result Date: 8/26/2019  EXAMINATION: CT-guided side lung core biopsy  8/26/2019 11:29 am TECHNIQUE: Dose modulation, iterative reconstruction, and/or weight based adjustment of the mA/kV was utilized to reduce the radiation dose to as low as reasonably achievable. After informed consent patient was brought to CT scanner and placed prone on the table. Scanning through the lungs localize the right lung mass. The patient was prepped and draped in sterile fashion. 1% lidocaine was infiltrated for local anesthesia. A 20-gauge coaxial needle was advanced under CT guidance into the mass. Multiple 20-gauge core biopsies were obtained. Samples were submitted to pathology at bedside for analysis. Post biopsy scanning shows no immediate pneumothorax. Dressing was applied. Patient was returned to holding in stable condition. HISTORY: ORDERING SYSTEM PROVIDED HISTORY: Nodule of lower lobe of right lung TECHNOLOGIST PROVIDED HISTORY: right lower lobe lung nodule SEDATION: Doses not recorded on the request, please refer to nursing notes. IMPRESSION: Successful CT-guided core biopsy of right lung mass. Sample submitted to pathology. Pet Ct Skull Base To Mid Thigh : 9/9/2019    No change in a right lower lobe nodule of 11 mm with only mildly elevated FDG uptake. No mediastinal abnormal activity.      Ct Chest Wo Contrast: 1/14/2020    Interval decrease in size of the dominant nodule in the medial aspect of the right lower lobe. There is ill-defined parenchymal density in the surrounding parenchyma suggesting atelectasis or scarring. Numerous additional punctate nodular densities noted bilaterally and diffusely. Continued follow-up is recommended. 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 Stage IA 2  Right lower lobe pulmonary nodules. History of uterine cancer status post surgery and radiation therapy  Elderly patient  UTI  Hypothyroidism, hypertension  ECOG performance status 2      Plan:  I had a detailed discussion with the patient and her family members. I personally went over results of her lab work-up imaging studies and other relevant clinical data. Reviewed images of CT scan and compared with the previous 1. There is an interval decrease in the size of the dominant nodule which was radiated. Recommend continued surveillance. There are numerous other nodules noted. Obtain CT chest without contrast in 6 months  UA and urine culture shows infection with E. coli. I will call in ciprofloxacin 500 mg twice daily  Discussed natural history of lung cancer. Encouraged patient to continue to follow-up with radiation oncologist as well as pulmonologist as scheduled. NCCN guidelines were reviewed and discussed with the patient. The diagnosis and care plan were discussed with the patient in detail. I discussed the natural history of the disease, prognosis, risks and goals of therapy and answered all the patients questions to the best of my ability. Patient expressed understanding and was in agreement. Calleen Hazard      I spent more than 25 minutes examining, evaluating, reviewing data, counseling the patient and coordinating care.   Greater than 50% of time was spent face-to-face with the patient this note is created with the assistance of a speech recognition program.  While intending to generate a document that actually reflects the content of the visit, the document can still have some errors including those of syntax and sound a like substitutions which may escape proof reading. It such instances, actual meaning can be extrapolated by contextual diversion.

## 2020-01-23 ENCOUNTER — TELEPHONE (OUTPATIENT)
Dept: INTERNAL MEDICINE | Age: 85
End: 2020-01-23

## 2020-01-23 RX ORDER — ONDANSETRON 4 MG/1
4 TABLET, FILM COATED ORAL EVERY 8 HOURS PRN
Qty: 45 TABLET | Refills: 1 | Status: SHIPPED | OUTPATIENT
Start: 2020-01-23 | End: 2022-04-01 | Stop reason: SDUPTHER

## 2020-01-23 NOTE — TELEPHONE ENCOUNTER
Patient saw Dr Karson Chaves yesterday. In her labs he noticed she had a UTI. Started her on Cipro yesterday and today she is vomiting and nauseated. Daughter, Yancy Pickett, just wants you to review and make sure Cipro is not one of the meds she has problems with and would ask what you advise?   Call Pat back at [phone]

## 2020-01-30 ENCOUNTER — HOSPITAL ENCOUNTER (OUTPATIENT)
Dept: LAB | Age: 85
Discharge: HOME | End: 2020-01-30
Payer: MEDICARE

## 2020-01-30 ENCOUNTER — OFFICE VISIT (OUTPATIENT)
Dept: INTERNAL MEDICINE | Age: 85
End: 2020-01-30
Payer: MEDICARE

## 2020-01-30 VITALS
WEIGHT: 153 LBS | HEIGHT: 62 IN | DIASTOLIC BLOOD PRESSURE: 82 MMHG | SYSTOLIC BLOOD PRESSURE: 110 MMHG | BODY MASS INDEX: 28.16 KG/M2 | HEART RATE: 60 BPM | RESPIRATION RATE: 16 BRPM

## 2020-01-30 LAB
-: ABNORMAL
AMORPHOUS: ABNORMAL
BACTERIA: ABNORMAL
BILIRUBIN URINE: NEGATIVE
CASTS UA: ABNORMAL /LPF (ref 0–2)
COLOR: ABNORMAL
COMMENT UA: ABNORMAL
CRYSTALS, UA: ABNORMAL /HPF
EPITHELIAL CELLS UA: ABNORMAL /HPF (ref 0–5)
GLUCOSE URINE: NEGATIVE
KETONES, URINE: NEGATIVE
LEUKOCYTE ESTERASE, URINE: NEGATIVE
MUCUS: ABNORMAL
NITRITE, URINE: NEGATIVE
OTHER OBSERVATIONS UA: ABNORMAL
PH UA: 5.5 (ref 5–6)
PROTEIN UA: NEGATIVE
RBC UA: ABNORMAL /HPF (ref 0–4)
RENAL EPITHELIAL, UA: ABNORMAL /HPF
SPECIFIC GRAVITY UA: 1.03 (ref 1.01–1.02)
TRICHOMONAS: ABNORMAL
TURBIDITY: ABNORMAL
URINE HGB: NEGATIVE
UROBILINOGEN, URINE: NORMAL
WBC UA: ABNORMAL /HPF (ref 0–4)
YEAST: ABNORMAL

## 2020-01-30 PROCEDURE — G8482 FLU IMMUNIZE ORDER/ADMIN: HCPCS | Performed by: NURSE PRACTITIONER

## 2020-01-30 PROCEDURE — 1123F ACP DISCUSS/DSCN MKR DOCD: CPT | Performed by: NURSE PRACTITIONER

## 2020-01-30 PROCEDURE — 99214 OFFICE O/P EST MOD 30 MIN: CPT | Performed by: NURSE PRACTITIONER

## 2020-01-30 PROCEDURE — 81001 URINALYSIS AUTO W/SCOPE: CPT

## 2020-01-30 PROCEDURE — G8427 DOCREV CUR MEDS BY ELIG CLIN: HCPCS | Performed by: NURSE PRACTITIONER

## 2020-01-30 PROCEDURE — 99213 OFFICE O/P EST LOW 20 MIN: CPT | Performed by: NURSE PRACTITIONER

## 2020-01-30 PROCEDURE — 1090F PRES/ABSN URINE INCON ASSESS: CPT | Performed by: NURSE PRACTITIONER

## 2020-01-30 PROCEDURE — 4040F PNEUMOC VAC/ADMIN/RCVD: CPT | Performed by: NURSE PRACTITIONER

## 2020-01-30 PROCEDURE — 1036F TOBACCO NON-USER: CPT | Performed by: NURSE PRACTITIONER

## 2020-01-30 PROCEDURE — G8417 CALC BMI ABV UP PARAM F/U: HCPCS | Performed by: NURSE PRACTITIONER

## 2020-01-30 ASSESSMENT — ENCOUNTER SYMPTOMS
CHEST TIGHTNESS: 0
DIARRHEA: 0
COLOR CHANGE: 0
SORE THROAT: 0
NAUSEA: 0
VOMITING: 0
SHORTNESS OF BREATH: 0
RHINORRHEA: 0
SINUS PRESSURE: 0
BLOOD IN STOOL: 0
EYE PAIN: 0
WHEEZING: 0
FACIAL SWELLING: 0
TROUBLE SWALLOWING: 0
COUGH: 0
CONSTIPATION: 0
ABDOMINAL PAIN: 0

## 2020-01-30 ASSESSMENT — PATIENT HEALTH QUESTIONNAIRE - PHQ9
SUM OF ALL RESPONSES TO PHQ QUESTIONS 1-9: 0
SUM OF ALL RESPONSES TO PHQ9 QUESTIONS 1 & 2: 0
2. FEELING DOWN, DEPRESSED OR HOPELESS: 0
1. LITTLE INTEREST OR PLEASURE IN DOING THINGS: 0
SUM OF ALL RESPONSES TO PHQ QUESTIONS 1-9: 0

## 2020-01-30 NOTE — PROGRESS NOTES
[place]    ERCP  7/19/1998    retained Suzy khan and Oni Danielle MAGDA AND BSO  March 2002    endometrial adenocarcinoma, Dr. Sue Caban       Current Outpatient Medications on File Prior to Visit   Medication Sig Dispense Refill    ondansetron (ZOFRAN) 4 MG tablet Take 1 tablet by mouth every 8 hours as needed for Nausea or Vomiting 45 tablet 1    lisinopril (PRINIVIL;ZESTRIL) 40 MG tablet TAKE 1 TABLET NIGHTLY 90 tablet 4    meclizine (ANTIVERT) 25 MG tablet Take 12.5 mg by mouth 3 times daily as needed      Probiotic Product (PROBIOTIC DAILY) CAPS One tablet 3 time a day times 10 days then once a day  0    oxybutynin (DITROPAN-XL) 10 MG extended release tablet Take 1 tablet by mouth daily 90 tablet 3    sertraline (ZOLOFT) 50 MG tablet TAKE 1 TABLET DAILY 90 tablet 3    ezetimibe (ZETIA) 10 MG tablet Take 1 tablet by mouth daily 90 tablet 3    levothyroxine (SYNTHROID) 88 MCG tablet Take 1 tablet by mouth Daily 90 tablet 3    Multiple Vitamins-Minerals (THERAPEUTIC MULTIVITAMIN-MINERALS) tablet Take 1 tablet by mouth daily      CRANBERRY PO Take 1 capsule by mouth daily      Multiple Vitamins-Minerals (OCUVITE PRESERVISION PO) Take 1 tablet by mouth daily      aspirin 81 MG tablet Take 81 mg by mouth daily.  latanoprost (XALATAN) 0.005 % ophthalmic solution Place 1 drop into both eyes nightly.  Ascorbic Acid (VITAMIN C) 500 MG tablet Take 500 mg by mouth 2 times daily. No current facility-administered medications on file prior to visit. Social History     Socioeconomic History    Marital status:       Spouse name: Not on file    Number of children: Not on file    Years of education: Not on file    Highest education level: Not on file   Occupational History    Not on file   Social Needs    Financial resource strain: Not on file    Food insecurity:     Worry: Not on file     Inability: Not on file    Transportation needs:     Medical: Not on file Non-medical: Not on file   Tobacco Use    Smoking status: Never Smoker    Smokeless tobacco: Never Used    Tobacco comment: never smoked tito rrt 01/11/2018   Substance and Sexual Activity    Alcohol use: No     Alcohol/week: 0.0 standard drinks    Drug use: No    Sexual activity: Not on file   Lifestyle    Physical activity:     Days per week: Not on file     Minutes per session: Not on file    Stress: Not on file   Relationships    Social connections:     Talks on phone: Not on file     Gets together: Not on file     Attends [employer] service: Not on file     Active member of club or organization: Not on file     Attends meetings of clubs or organizations: Not on file     Relationship status: Not on file    Intimate partner violence:     Fear of current or ex partner: Not on file     Emotionally abused: Not on file     Physically abused: Not on file     Forced sexual activity: Not on file   Other Topics Concern    Not on file   Social History Narrative    Not on file       Review of Systems   Constitutional: Negative for activity change, appetite change, chills, fatigue, fever and unexpected weight change. HENT: Negative for congestion, dental problem, ear discharge, ear pain, facial swelling, hearing loss, postnasal drip, rhinorrhea, sinus pressure, sore throat and trouble swallowing. Eyes: Negative for pain and visual disturbance. Respiratory: Negative for cough, chest tightness, shortness of breath and wheezing. Cardiovascular: Negative for chest pain, palpitations and leg swelling. Gastrointestinal: Negative for abdominal pain, blood in stool, constipation, diarrhea, nausea and vomiting. Endocrine: Negative for cold intolerance, heat intolerance and polyuria. Genitourinary: Negative for difficulty urinating. Musculoskeletal: Negative for arthralgias, gait problem, myalgias, neck pain and neck stiffness. Skin: Negative for color change, rash and wound.    Neurological: Negative for dizziness, tremors, seizures, weakness, light-headedness, numbness and headaches. Psychiatric/Behavioral: Negative for confusion and hallucinations. The patient is not nervous/anxious. Physical Exam  Vitals signs and nursing note reviewed. Constitutional:       General: She is not in acute distress. Appearance: Normal appearance. She is well-developed. She is not diaphoretic. HENT:      Head: Normocephalic and atraumatic. Right Ear: External ear normal.      Left Ear: External ear normal.   Eyes:      General:         Right eye: No discharge. Left eye: No discharge. Neck:      Trachea: No tracheal deviation. Cardiovascular:      Rate and Rhythm: Normal rate and regular rhythm. Heart sounds: Murmur present. No friction rub. No gallop. Pulmonary:      Effort: Pulmonary effort is normal. No respiratory distress. Breath sounds: Normal breath sounds. No stridor. No wheezing, rhonchi or rales. Chest:      Chest wall: No tenderness. Abdominal:      General: Bowel sounds are normal. There is no distension. Palpations: Abdomen is soft. Tenderness: There is no abdominal tenderness. Musculoskeletal:         General: No swelling. Right lower leg: Edema present. Left lower leg: Edema (Scant pretibial edema bilateral left greater than right) present. Skin:     General: Skin is warm and dry. Capillary Refill: Capillary refill takes less than 2 seconds. Coloration: Skin is not jaundiced or pale. Findings: No rash. Neurological:      General: No focal deficit present. Mental Status: She is alert and oriented to person, place, and time. Cranial Nerves: No cranial nerve deficit. Sensory: No sensory deficit. Coordination: Coordination normal.      Gait: Gait abnormal (Walks with a rolling walker). Psychiatric:         Mood and Affect: Mood normal.         Behavior: Behavior normal.         Thought Content:  Thought

## 2020-02-04 RX ORDER — LEVOTHYROXINE SODIUM 88 UG/1
TABLET ORAL
Qty: 90 TABLET | Refills: 4 | Status: SHIPPED | OUTPATIENT
Start: 2020-02-04

## 2020-02-26 RX ORDER — EZETIMIBE 10 MG/1
TABLET ORAL
Qty: 90 TABLET | Refills: 4 | Status: SHIPPED | OUTPATIENT
Start: 2020-02-26 | End: 2022-02-16

## 2020-03-03 ENCOUNTER — OFFICE VISIT (OUTPATIENT)
Dept: PULMONOLOGY | Age: 85
End: 2020-03-03
Payer: MEDICARE

## 2020-03-03 VITALS
HEIGHT: 62 IN | SYSTOLIC BLOOD PRESSURE: 100 MMHG | WEIGHT: 148.8 LBS | HEART RATE: 59 BPM | DIASTOLIC BLOOD PRESSURE: 60 MMHG | OXYGEN SATURATION: 97 % | TEMPERATURE: 96.5 F | BODY MASS INDEX: 27.38 KG/M2

## 2020-03-03 PROCEDURE — G8427 DOCREV CUR MEDS BY ELIG CLIN: HCPCS | Performed by: INTERNAL MEDICINE

## 2020-03-03 PROCEDURE — 99213 OFFICE O/P EST LOW 20 MIN: CPT

## 2020-03-03 PROCEDURE — G8482 FLU IMMUNIZE ORDER/ADMIN: HCPCS | Performed by: INTERNAL MEDICINE

## 2020-03-03 PROCEDURE — 99214 OFFICE O/P EST MOD 30 MIN: CPT | Performed by: INTERNAL MEDICINE

## 2020-03-03 PROCEDURE — 1090F PRES/ABSN URINE INCON ASSESS: CPT | Performed by: INTERNAL MEDICINE

## 2020-03-03 PROCEDURE — 4040F PNEUMOC VAC/ADMIN/RCVD: CPT | Performed by: INTERNAL MEDICINE

## 2020-03-03 PROCEDURE — 1036F TOBACCO NON-USER: CPT | Performed by: INTERNAL MEDICINE

## 2020-03-03 PROCEDURE — G8417 CALC BMI ABV UP PARAM F/U: HCPCS | Performed by: INTERNAL MEDICINE

## 2020-03-03 PROCEDURE — 1123F ACP DISCUSS/DSCN MKR DOCD: CPT | Performed by: INTERNAL MEDICINE

## 2020-03-03 NOTE — PROGRESS NOTES
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 year old female   Patient completed SBRT October 2019. She had a follow-up CT chest which showed an interval decrease in size of the lung nodule. She does have occasional shortness of breath with activity but is stable denies any chest pain cough hemoptysis or wheezing. Last visit   here for evaluation of abnormal CT of the chest.  Patient had small bowel obstruction in March had CT abdomen pelvis which showed right lower lobe lung nodule. This was followed by CT of the chest which confirmed lung nodule and also a satellite nodule of 8 mm. She denies any cough, hemoptysis. She is a lifelong nonsmoker. Had history of endometrial cancer. ambulates with the help of a walker. Denies any shortness of breath             LUNG CANCER SCREENING     1. CRITERIA MET    []     CT ORDERED  []      2. CRITERIA NOT MET   [x]      3. REFUSED                    []        REASON CRITERIA NOT MET     1. SMOKING LESS THAN 30 PY  []      2. AGE LESS THAN 55 or GREATER 77 YEARS  []      3. QUIT SMOKING 15 YEARS OR GREATER   []      4. RECENT CT WITH IN 11 MONTHS    []      5. LIFE EXPECTANCY < 5 YEARS   []      6.  SIGNS  AND SYMPTOMS OF LUNG CANCER   []         Immunization   Immunization History   Administered Date(s) Administered    Influenza Virus Vaccine 10/09/2009, 10/07/2011, 09/19/2012    Influenza, High Dose (Fluzone 65 yrs and older) 09/16/2013, 11/07/2014, 09/28/2015, 10/17/2016, 10/18/2017, 10/18/2018    Influenza, Triv, inactivated, subunit, adjuvanted, IM (Fluad 65 yrs and older) 10/31/2019    Pneumococcal Conjugate 13-valent ([id number]) 10/22/2015    Pneumococcal Polysaccharide ([id number]) 01/25/2012, 04/14/2014    Tdap (Boostrix, Adacel) 07/05/2015    Zoster Recombinant (Shingrix) 11/09/2019        Pneumococcal Vaccine     [x] Up to date    [] Indicated   [] Refused  [] Contraindicated       Influenza Vaccine   [x] Up to date    [] Indicated   [] Refused  [] Contraindicated        PAST MEDICAL HISTORY:       Diagnosis Date    [facility] [address] [address]     Aortic valve sclerosis     Bradycardia     follows with cardiology- Dr. Yuan Kyle, possible SSS    Cardiac murmur     Cataract of left eye     Chronic diarrhea     s/p radiation    Diabetes mellitus type II, controlled ([address]     diet controlled     Dysthymia     Gait abnormality 6/23/2014    Glaucoma     H/O renal calculi     H/O vein stripping     Hard of hearing     History of anemia     History of dizziness     follows with cardiology, bradycardia, possible SSS    History of radiation therapy     for uterine cancer    History of small bowel obstruction 03/2019    no surgery required    Hyperlipidemia     Hypertension     Hypothyroidism     Influenza A 1/11/2018    Left elbow fracture     S/P surgical repair    Lung cancer ([facility] [address]     Lung nodule     Macular degeneration     Obesity     Pericardial effusion 4/23/2014    Pseudophakia of right eye     PVD (peripheral vascular disease) ([address]     Traumatic injury of lower extremity childhood    bilateral trauma of lower extremities    Vertigo     resolved    Wears glasses     Wears partial dentures     upper          Family History:       Problem Relation Age of Onset    High Blood Pressure Other     Diabetes type 2  Mother         developed later in life       SURGICAL HISTORY:   Past Surgical History:   Procedure Laterality Date    CATARACT REMOVAL Right February 2013    Dr. Scott Diaz, LAPAROSCOPIC  5/28/1998    COLONOSCOPY  7/22/2003    Dr. Juan Armando Left September 2010    ORIF, Dr. Nayeli Watson ERCP  7/19/1998    retained stone, Suzy Ryan and Rocio Stevan MAGDA AND BSO  March 2002    endometrial adenocarcinoma, Dr. Rhoades Like: There  no history of TB or TB exposure. There  no asbestos or silica dust exposure. The patient reports  no coal, foundry, quarry or [employer] exposure. Travel history reveals no. There  no history of recreational or IV drug use. There  no hot tube exposure. Pets  - none now but grew up on farm     Occupational history [employer]     TOBACCO:   reports that she has never smoked. She has never used smokeless tobacco.  ETOH:   reports no history of alcohol use. ALLERGIES:      Allergies   Allergen Reactions    Allopurinol      Patient unsure of reaction    Metoprolol Tartrate [Metoprolol]      bradycardia    Percocet [Oxycodone-Acetaminophen]      Hallucinations. ...sensitive to narcotics    Phenergan [Promethazine Hcl]      Very sensitive. ..given small doses    Polycitra-K [Potassium Citrate]      Patient unsure of reaction    Statins Other (See Comments)     ? Muscle aches     Levaquin [Levofloxacin] Anxiety     Not able to sleep    Sulfa Antibiotics Rash    Tessalon [Benzonatate] Anxiety     Not able to sleep         Home Meds:   Prior to Admission medications    Medication Sig Start Date End Date Taking?  Authorizing Provider   ezetimibe (ZETIA) 10 MG tablet TAKE 1 TABLET DAILY 2/26/20  Yes Wiliam Eddy DO   levothyroxine (SYNTHROID) 88 MCG tablet TAKE 1 TABLET DAILY 2/4/20  Yes Wiliam Eddy DO   lisinopril (PRINIVIL;ZESTRIL) 40 MG tablet TAKE 1 TABLET NIGHTLY 12/17/19  Yes Jazmine Shah,    meclizine (ANTIVERT) 25 MG tablet Take 12.5 mg by mouth 3 times daily as needed   Yes Historical Provider, MD   oxybutynin (DITROPAN-XL) 10 MG extended release tablet Take 1 tablet by mouth daily 3/4/19 3/3/20 Yes Anisa Hunter MD   sertraline (ZOLOFT) 50 MG tablet TAKE 1 TABLET DAILY 3/4/19  Yes Wiliam Eddy, DO   Multiple Vitamins-Minerals (THERAPEUTIC MULTIVITAMIN-MINERALS) tablet Take 1 tablet by mouth daily   Yes Historical Provider, MD   CRANBERRY PO nerves. No overt motor deficit     CBC:   No results for input(s): WBC, HGB, PLT in the last 72 hours. BMP:  No results for input(s): NA, K, CL, CO2, BUN, CREATININE, GLUCOSE in the last 72 hours. Hepatic: No results for input(s): AST, ALT, ALB, BILITOT, ALKPHOS in the last 72 hours. Amylase:   Lab Results   Component Value Date    AMYLASE 16 03/30/2019     Lipase:   Lab Results   Component Value Date    LIPASE 4 03/30/2019     Troponin: No results for input(s): TROPONINI in the last 72 hours. BNP: No results for input(s): BNP in the last 72 hours. Lipids: No results for input(s): CHOL, HDL in the last 72 hours. Invalid input(s): LDLCALCU  ABGs: No results found for: PHART, PO2ART, [id number]  INR:   No results for input(s): INR in the last 72 hours. Thyroid:   Lab Results   Component Value Date    TSH 2.89 06/29/2019     Urinalysis: No results for input(s): BACTERIA, BLOODU, CLARITYU, COLORU, PHUR, PROTEINU, RBCUA, SPECGRAV, BILIRUBINUR, NITRU, WBCUA, LEUKOCYTESUR, GLUCOSEU in the last 72 hours. CT Scans   4/26/2019  No acute cardiopulmonary process.       Right lower lobe nodule medially measuring 2.4 x 1.5 cm with a small 8 mm   satellite nodule.  These are new compared to prior chest CT.  Recommendations   per the Fleischner Society criteria are given below.       A few subtle ground-glass nodules are seen in the right lung apex.  These are   stable compared to prior chest CT performed in April of 2014.           5/1/19    PFT shows normal spirometry, normal lung volumes. Moderately decreased diffusion capacity    7/29/19  Interval enlargement in right lower lobe pulmonary nodules, with gradual increase in size concerning for neoplasia. No change in ground-glass nodules in the right apex and scattered in the left lung. RECOMMENDATIONS: PET-CT imaging may be considered in this patient. Additional follow-up CT in 3 months may be entertained as an alternate.   Tissue sampling would provide

## 2020-03-29 RX ORDER — OXYBUTYNIN CHLORIDE 10 MG/1
TABLET, EXTENDED RELEASE ORAL
Qty: 90 TABLET | Refills: 4 | Status: SHIPPED | OUTPATIENT
Start: 2020-03-29

## 2020-03-31 RX ORDER — OXYBUTYNIN CHLORIDE 10 MG/1
TABLET, EXTENDED RELEASE ORAL
Qty: 90 TABLET | Refills: 3 | OUTPATIENT
Start: 2020-03-31

## 2020-04-29 NOTE — TELEPHONE ENCOUNTER
Patient had CT chest on 4-26-19. Shows ordered by Dr Oli Hernandez but no results in my chart. Can you review it and get results noted so it goes into my chart? Event Note

## 2020-05-05 ENCOUNTER — TELEPHONE (OUTPATIENT)
Dept: GENERAL RADIOLOGY | Age: 85
End: 2020-05-05

## 2020-05-23 ENCOUNTER — HOSPITAL ENCOUNTER (OUTPATIENT)
Age: 85
Discharge: HOME | End: 2020-05-23
Payer: MEDICARE

## 2020-05-23 DIAGNOSIS — R54 FRAIL ELDERLY: ICD-10-CM

## 2020-05-23 DIAGNOSIS — N18.30 CONTROLLED TYPE 2 DIABETES MELLITUS WITH STAGE 3 CHRONIC KIDNEY DISEASE, WITHOUT LONG-TERM CURRENT USE OF INSULIN (HCC): ICD-10-CM

## 2020-05-23 DIAGNOSIS — E11.22 CONTROLLED TYPE 2 DIABETES MELLITUS WITH STAGE 3 CHRONIC KIDNEY DISEASE, WITHOUT LONG-TERM CURRENT USE OF INSULIN (HCC): ICD-10-CM

## 2020-05-23 DIAGNOSIS — E03.4 HYPOTHYROIDISM DUE TO ACQUIRED ATROPHY OF THYROID: ICD-10-CM

## 2020-05-23 DIAGNOSIS — E78.2 MIXED HYPERLIPIDEMIA: ICD-10-CM

## 2020-05-23 DIAGNOSIS — C34.91 ADENOCARCINOMA, LUNG, RIGHT (HCC): ICD-10-CM

## 2020-05-23 LAB
ALBUMIN SERPL-MCNC: 3.5 G/DL (ref 3.5–5.1)
ALP BLD-CCNC: 167 U/L (ref 38–126)
ALT SERPL-CCNC: 43 U/L (ref 11–66)
ANION GAP SERPL CALCULATED.3IONS-SCNC: 12 MEQ/L (ref 8–16)
AST SERPL-CCNC: 35 U/L (ref 5–40)
AVERAGE GLUCOSE: 129 MG/DL (ref 70–126)
BASOPHILS # BLD: 0.6 %
BASOPHILS ABSOLUTE: 0.1 THOU/MM3 (ref 0–0.1)
BILIRUB SERPL-MCNC: 0.3 MG/DL (ref 0.3–1.2)
BUN BLDV-MCNC: 15 MG/DL (ref 7–22)
CALCIUM SERPL-MCNC: 9.5 MG/DL (ref 8.5–10.5)
CHLORIDE BLD-SCNC: 100 MEQ/L (ref 98–111)
CHOLESTEROL, TOTAL: 125 MG/DL
CHOLESTEROL, TOTAL: 125 MG/DL (ref 100–199)
CHOLESTEROL/HDL RATIO: NORMAL
CO2: 23 MEQ/L (ref 23–33)
CREAT SERPL-MCNC: 0.9 MG/DL (ref 0.4–1.2)
EOSINOPHIL # BLD: 2.2 %
EOSINOPHILS ABSOLUTE: 0.2 THOU/MM3 (ref 0–0.4)
ERYTHROCYTE [DISTWIDTH] IN BLOOD BY AUTOMATED COUNT: 13.3 % (ref 11.5–14.5)
ERYTHROCYTE [DISTWIDTH] IN BLOOD BY AUTOMATED COUNT: 51 FL (ref 35–45)
GFR SERPL CREATININE-BSD FRML MDRD: 59 ML/MIN/1.73M2
GLUCOSE BLD-MCNC: 152 MG/DL (ref 70–108)
HBA1C MFR BLD: 6.3 % (ref 4.4–6.4)
HCT VFR BLD CALC: 36.4 % (ref 37–47)
HDLC SERPL-MCNC: 41 MG/DL
HDLC SERPL-MCNC: 41 MG/DL (ref 35–70)
HEMOGLOBIN: 11 GM/DL (ref 12–16)
IMMATURE GRANS (ABS): 0.04 THOU/MM3 (ref 0–0.07)
IMMATURE GRANULOCYTES: 0.5 %
LDL CHOLESTEROL CALCULATED: 59 MG/DL
LDL CHOLESTEROL CALCULATED: 59 MG/DL (ref 0–160)
LYMPHOCYTES # BLD: 15.5 %
LYMPHOCYTES ABSOLUTE: 1.3 THOU/MM3 (ref 1–4.8)
MCH RBC QN AUTO: 31.5 PG (ref 26–33)
MCHC RBC AUTO-ENTMCNC: 30.2 GM/DL (ref 32.2–35.5)
MCV RBC AUTO: 104.3 FL (ref 81–99)
MONOCYTES # BLD: 8.4 %
MONOCYTES ABSOLUTE: 0.7 THOU/MM3 (ref 0.4–1.3)
NUCLEATED RED BLOOD CELLS: 0 /100 WBC
PLATELET # BLD: 284 THOU/MM3 (ref 130–400)
PMV BLD AUTO: 11.5 FL (ref 9.4–12.4)
POTASSIUM SERPL-SCNC: 4.2 MEQ/L (ref 3.5–5.2)
RBC # BLD: 3.49 MILL/MM3 (ref 4.2–5.4)
SEG NEUTROPHILS: 72.8 %
SEGMENTED NEUTROPHILS ABSOLUTE COUNT: 6.3 THOU/MM3 (ref 1.8–7.7)
SODIUM BLD-SCNC: 135 MEQ/L (ref 135–145)
T4 FREE: 1.01 NG/DL (ref 0.93–1.76)
TOTAL PROTEIN: 6.3 G/DL (ref 6.1–8)
TRIGL SERPL-MCNC: 124 MG/DL
TRIGL SERPL-MCNC: 124 MG/DL (ref 0–199)
TSH SERPL DL<=0.05 MIU/L-ACNC: 7.34 UIU/ML (ref 0.4–4.2)
VLDLC SERPL CALC-MCNC: NORMAL MG/DL
WBC # BLD: 8.7 THOU/MM3 (ref 4.8–10.8)

## 2020-05-23 PROCEDURE — 84439 ASSAY OF FREE THYROXINE: CPT

## 2020-05-23 PROCEDURE — 83036 HEMOGLOBIN GLYCOSYLATED A1C: CPT

## 2020-05-23 PROCEDURE — 36415 COLL VENOUS BLD VENIPUNCTURE: CPT

## 2020-05-23 PROCEDURE — 85025 COMPLETE CBC W/AUTO DIFF WBC: CPT

## 2020-05-23 PROCEDURE — 84443 ASSAY THYROID STIM HORMONE: CPT

## 2020-05-23 PROCEDURE — 80061 LIPID PANEL: CPT

## 2020-05-23 PROCEDURE — 80053 COMPREHEN METABOLIC PANEL: CPT

## 2020-06-01 ENCOUNTER — OFFICE VISIT (OUTPATIENT)
Dept: INTERNAL MEDICINE | Age: 85
End: 2020-06-01
Payer: MEDICARE

## 2020-06-01 VITALS
WEIGHT: 154 LBS | BODY MASS INDEX: 28.16 KG/M2 | TEMPERATURE: 98.1 F | RESPIRATION RATE: 20 BRPM | DIASTOLIC BLOOD PRESSURE: 60 MMHG | OXYGEN SATURATION: 96 % | HEART RATE: 60 BPM | SYSTOLIC BLOOD PRESSURE: 136 MMHG

## 2020-06-01 PROCEDURE — 4040F PNEUMOC VAC/ADMIN/RCVD: CPT | Performed by: NURSE PRACTITIONER

## 2020-06-01 PROCEDURE — G8427 DOCREV CUR MEDS BY ELIG CLIN: HCPCS | Performed by: NURSE PRACTITIONER

## 2020-06-01 PROCEDURE — G8417 CALC BMI ABV UP PARAM F/U: HCPCS | Performed by: NURSE PRACTITIONER

## 2020-06-01 PROCEDURE — 99212 OFFICE O/P EST SF 10 MIN: CPT

## 2020-06-01 PROCEDURE — 1090F PRES/ABSN URINE INCON ASSESS: CPT | Performed by: NURSE PRACTITIONER

## 2020-06-01 PROCEDURE — 1036F TOBACCO NON-USER: CPT | Performed by: NURSE PRACTITIONER

## 2020-06-01 PROCEDURE — 1123F ACP DISCUSS/DSCN MKR DOCD: CPT | Performed by: NURSE PRACTITIONER

## 2020-06-01 PROCEDURE — 99214 OFFICE O/P EST MOD 30 MIN: CPT | Performed by: NURSE PRACTITIONER

## 2020-06-01 NOTE — PROGRESS NOTES
for confusion and hallucinations. The patient is not nervous/anxious. Physical Exam  Vitals signs and nursing note reviewed. Constitutional:       General: She is not in acute distress. Appearance: Normal appearance. She is well-developed. She is not diaphoretic. HENT:      Head: Normocephalic and atraumatic. Right Ear: External ear normal.      Left Ear: External ear normal.   Eyes:      General:         Right eye: No discharge. Left eye: No discharge. Neck:      Trachea: No tracheal deviation. Cardiovascular:      Rate and Rhythm: Normal rate and regular rhythm. Heart sounds: Normal heart sounds. No murmur. No friction rub. No gallop. Pulmonary:      Effort: Pulmonary effort is normal. No respiratory distress. Breath sounds: Normal breath sounds. No stridor. No wheezing, rhonchi or rales. Chest:      Chest wall: No tenderness. Abdominal:      General: Bowel sounds are normal. There is no distension. Palpations: Abdomen is soft. Tenderness: There is no abdominal tenderness. Musculoskeletal:         General: No swelling. Skin:     General: Skin is warm and dry. Capillary Refill: Capillary refill takes less than 2 seconds. Coloration: Skin is not jaundiced or pale. Findings: No rash. Neurological:      General: No focal deficit present. Mental Status: She is alert and oriented to person, place, and time. Mental status is at baseline. Cranial Nerves: No cranial nerve deficit. Sensory: No sensory deficit. Coordination: Coordination normal.      Gait: Gait abnormal (Slowed gait). Psychiatric:         Mood and Affect: Mood normal.         Behavior: Behavior normal.         Thought Content: Thought content normal.         Judgment: Judgment normal.       Vitals:    06/01/20 1516   BP: 136/60   Pulse: 60   Resp: 20   Temp: 98.1 °F (36.7 °C)   TempSrc: Tympanic   SpO2: 96%   Weight: 154 lb (69.9 kg)       Assessment:  1.

## 2020-06-03 PROBLEM — K56.609 SMALL BOWEL OBSTRUCTION (HCC): Status: RESOLVED | Noted: 2019-03-29 | Resolved: 2020-06-03

## 2020-06-03 ASSESSMENT — ENCOUNTER SYMPTOMS
NAUSEA: 0
WHEEZING: 0
TROUBLE SWALLOWING: 0
VOMITING: 0
ABDOMINAL PAIN: 0
BLOOD IN STOOL: 0
FACIAL SWELLING: 0
RHINORRHEA: 0
SORE THROAT: 0
CONSTIPATION: 0
SINUS PRESSURE: 0
SHORTNESS OF BREATH: 0
COLOR CHANGE: 0
DIARRHEA: 0
CHEST TIGHTNESS: 0
COUGH: 0
EYE PAIN: 0

## 2020-06-04 ENCOUNTER — OFFICE VISIT (OUTPATIENT)
Dept: CARDIOLOGY | Age: 85
End: 2020-06-04
Payer: MEDICARE

## 2020-06-04 VITALS
SYSTOLIC BLOOD PRESSURE: 150 MMHG | WEIGHT: 155 LBS | DIASTOLIC BLOOD PRESSURE: 67 MMHG | BODY MASS INDEX: 28.52 KG/M2 | HEIGHT: 62 IN | HEART RATE: 65 BPM

## 2020-06-04 PROCEDURE — 93010 ELECTROCARDIOGRAM REPORT: CPT | Performed by: INTERNAL MEDICINE

## 2020-06-04 PROCEDURE — 93005 ELECTROCARDIOGRAM TRACING: CPT | Performed by: INTERNAL MEDICINE

## 2020-06-04 PROCEDURE — 1123F ACP DISCUSS/DSCN MKR DOCD: CPT | Performed by: INTERNAL MEDICINE

## 2020-06-04 PROCEDURE — 1090F PRES/ABSN URINE INCON ASSESS: CPT | Performed by: INTERNAL MEDICINE

## 2020-06-04 PROCEDURE — 4040F PNEUMOC VAC/ADMIN/RCVD: CPT | Performed by: INTERNAL MEDICINE

## 2020-06-04 PROCEDURE — G8417 CALC BMI ABV UP PARAM F/U: HCPCS | Performed by: INTERNAL MEDICINE

## 2020-06-04 PROCEDURE — 99214 OFFICE O/P EST MOD 30 MIN: CPT | Performed by: INTERNAL MEDICINE

## 2020-06-04 PROCEDURE — 99213 OFFICE O/P EST LOW 20 MIN: CPT | Performed by: INTERNAL MEDICINE

## 2020-06-04 PROCEDURE — G8427 DOCREV CUR MEDS BY ELIG CLIN: HCPCS | Performed by: INTERNAL MEDICINE

## 2020-06-04 PROCEDURE — 1036F TOBACCO NON-USER: CPT | Performed by: INTERNAL MEDICINE

## 2020-06-04 NOTE — PROGRESS NOTES
Today's Date: 6/4/2020  Patient Name: Luis Felipe Strickland  Patient's age: 80 y.o., 4/11/1932          The patient is a 80 y.o.  female is in the office for f/u, she had no falls/syncope. She has been using her walker and her balance is stable. No chest pain or SOB. No orthopnea on PND. She had log of BP and HR with BP<140/90 and HR in 50s. Past Medical History:   has a past medical history of Adenocarcinoma of endometrium ([address] Aortic valve sclerosis, Bradycardia, Cardiac murmur, Cataract of left eye, Chronic diarrhea, Diabetes mellitus type II, controlled ([address] Dysthymia, Gait abnormality, Glaucoma, H/O renal calculi, H/O vein stripping, Hard of hearing, History of anemia, History of dizziness, History of radiation therapy, History of small bowel obstruction, Hyperlipidemia, Hypertension, Hypothyroidism, Influenza A, Left elbow fracture, Lung cancer ([address] Lung nodule, Macular degeneration, Obesity, Pericardial effusion, Pseudophakia of right eye, PVD (peripheral vascular disease) ([address] Traumatic injury of lower extremity, Vertigo, Wears glasses, and Wears partial dentures. Past Surgical History:   has a past surgical history that includes jason and bso (cervix removed) (March 2002); Cholecystectomy, laparoscopic (5/28/1998); ERCP (7/19/1998); Colonoscopy (7/22/2003); Elbow fracture surgery (Left, September 2010); and Cataract removal (Right, February 2013). Home Medications:    Prior to Admission medications    Medication Sig Start Date End Date Taking?  Authorizing Provider   sertraline (ZOLOFT) 50 MG tablet TAKE 1 TABLET DAILY 5/8/20  Yes Wiliam Eddy DO   oxybutynin (DITROPAN-XL) 10 MG extended release tablet TAKE 1 TABLET DAILY 3/29/20  Yes Erving Goltz, MD   ezetimibe (ZETIA) 10 MG tablet TAKE 1 TABLET DAILY 2/26/20  Yes Wiliam Eddy DO   levothyroxine (SYNTHROID) 88 MCG tablet TAKE 1 TABLET DAILY 2/4/20  Yes Philomena Eddy DO

## 2020-07-01 ENCOUNTER — HOSPITAL ENCOUNTER (OUTPATIENT)
Age: 85
Discharge: HOME | End: 2020-07-01
Payer: MEDICARE

## 2020-07-01 ENCOUNTER — TELEPHONE (OUTPATIENT)
Dept: INTERNAL MEDICINE | Age: 85
End: 2020-07-01

## 2020-07-01 DIAGNOSIS — D64.9 ANEMIA, UNSPECIFIED TYPE: ICD-10-CM

## 2020-07-01 DIAGNOSIS — E03.4 HYPOTHYROIDISM DUE TO ACQUIRED ATROPHY OF THYROID: ICD-10-CM

## 2020-07-01 LAB
ABSOLUTE RETIC #: 49 THOU/MM3 (ref 20–115)
ERYTHROCYTE [DISTWIDTH] IN BLOOD BY AUTOMATED COUNT: 13.2 % (ref 11.5–14.5)
ERYTHROCYTE [DISTWIDTH] IN BLOOD BY AUTOMATED COUNT: 53.2 FL (ref 35–45)
HCT VFR BLD CALC: 35.6 % (ref 37–47)
HEMOGLOBIN: 10.6 GM/DL (ref 12–16)
IMMATURE RETIC FRACT: 12.1 % (ref 3–15.9)
MCH RBC QN AUTO: 32.1 PG (ref 26–33)
MCHC RBC AUTO-ENTMCNC: 29.8 GM/DL (ref 32.2–35.5)
MCV RBC AUTO: 107.9 FL (ref 81–99)
PLATELET # BLD: 335 THOU/MM3 (ref 130–400)
PMV BLD AUTO: 11.4 FL (ref 9.4–12.4)
RBC # BLD: 3.3 MILL/MM3 (ref 4.2–5.4)
RETIC HEMOGLOBIN: 33.8 PG (ref 28.2–35.7)
RETICULOCYTE ABSOLUTE COUNT: 1.5 % (ref 0.5–2)
TSH SERPL DL<=0.05 MIU/L-ACNC: 3.14 UIU/ML (ref 0.4–4.2)
WBC # BLD: 9.6 THOU/MM3 (ref 4.8–10.8)

## 2020-07-01 PROCEDURE — 83550 IRON BINDING TEST: CPT

## 2020-07-01 PROCEDURE — 85046 RETICYTE/HGB CONCENTRATE: CPT

## 2020-07-01 PROCEDURE — 82607 VITAMIN B-12: CPT

## 2020-07-01 PROCEDURE — 36415 COLL VENOUS BLD VENIPUNCTURE: CPT

## 2020-07-01 PROCEDURE — 84443 ASSAY THYROID STIM HORMONE: CPT

## 2020-07-01 PROCEDURE — 85027 COMPLETE CBC AUTOMATED: CPT

## 2020-07-01 PROCEDURE — 83540 ASSAY OF IRON: CPT

## 2020-07-01 NOTE — TELEPHONE ENCOUNTER
Patient's daughter wants to know if Benny Lind feels it is safe for patient to resume aquatic exercise/therapy at a pool in [place]. Daughter states they have to walk through the fitness center to get to the pool. Please advise.

## 2020-07-01 NOTE — TELEPHONE ENCOUNTER
Patients daughter called and wanted to know if the patient can go back to pool therapy? She was in to see you 6/1/2020 and you told her to wait a month due to the corona virus. Please advise.

## 2020-07-02 LAB
IRON: 34 UG/DL (ref 50–170)
TOTAL IRON BINDING CAPACITY: 189 UG/DL (ref 171–450)
VITAMIN B-12: 422 PG/ML (ref 211–911)

## 2020-07-08 ENCOUNTER — OFFICE VISIT (OUTPATIENT)
Dept: PULMONOLOGY | Age: 85
End: 2020-07-08
Payer: MEDICARE

## 2020-07-08 VITALS
BODY MASS INDEX: 27.02 KG/M2 | HEART RATE: 72 BPM | OXYGEN SATURATION: 96 % | WEIGHT: 146.8 LBS | DIASTOLIC BLOOD PRESSURE: 70 MMHG | TEMPERATURE: 97.7 F | SYSTOLIC BLOOD PRESSURE: 152 MMHG | HEIGHT: 62 IN

## 2020-07-08 PROCEDURE — 1123F ACP DISCUSS/DSCN MKR DOCD: CPT | Performed by: INTERNAL MEDICINE

## 2020-07-08 PROCEDURE — G8417 CALC BMI ABV UP PARAM F/U: HCPCS | Performed by: INTERNAL MEDICINE

## 2020-07-08 PROCEDURE — G8427 DOCREV CUR MEDS BY ELIG CLIN: HCPCS | Performed by: INTERNAL MEDICINE

## 2020-07-08 PROCEDURE — 1090F PRES/ABSN URINE INCON ASSESS: CPT | Performed by: INTERNAL MEDICINE

## 2020-07-08 PROCEDURE — 99213 OFFICE O/P EST LOW 20 MIN: CPT | Performed by: INTERNAL MEDICINE

## 2020-07-08 PROCEDURE — 4040F PNEUMOC VAC/ADMIN/RCVD: CPT | Performed by: INTERNAL MEDICINE

## 2020-07-08 PROCEDURE — 99213 OFFICE O/P EST LOW 20 MIN: CPT

## 2020-07-08 PROCEDURE — 1036F TOBACCO NON-USER: CPT | Performed by: INTERNAL MEDICINE

## 2020-07-08 RX ORDER — FERROUS SULFATE 325(65) MG
325 TABLET ORAL DAILY
COMMUNITY

## 2020-07-08 NOTE — PROGRESS NOTES
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 year old female   Is doing well denies any cough hemoptysis appetite is acceptable  CT chest is next week  Last visit  Patient completed SBRT October 2019. She had a follow-up CT chest which showed an interval decrease in size of the lung nodule. She does have occasional shortness of breath with activity but is stable denies any chest pain cough hemoptysis or wheezing. Last visit   here for evaluation of abnormal CT of the chest.  Patient had small bowel obstruction in March had CT abdomen pelvis which showed right lower lobe lung nodule. This was followed by CT of the chest which confirmed lung nodule and also a satellite nodule of 8 mm. She denies any cough, hemoptysis. She is a lifelong nonsmoker. Had history of endometrial cancer. ambulates with the help of a walker. Denies any shortness of breath             LUNG CANCER SCREENING     1. CRITERIA MET    []     CT ORDERED  []      2. CRITERIA NOT MET   [x]      3. REFUSED                    []        REASON CRITERIA NOT MET     1. SMOKING LESS THAN 30 PY  []      2. AGE LESS THAN 55 or GREATER 77 YEARS  []      3. QUIT SMOKING 15 YEARS OR GREATER   []      4. RECENT CT WITH IN 11 MONTHS    []      5. LIFE EXPECTANCY < 5 YEARS   []      6.  SIGNS  AND SYMPTOMS OF LUNG CANCER   []         Immunization   Immunization History   Administered Date(s) Administered    Influenza Virus Vaccine 10/09/2009, 10/07/2011, 09/19/2012    Influenza, High Dose (Fluzone 65 yrs and older) 09/16/2013, 11/07/2014, 09/28/2015, 10/17/2016, 10/18/2017, 10/18/2018    Influenza, Triv, inactivated, subunit, adjuvanted, IM (Fluad 65 yrs and older) 10/31/2019    Pneumococcal Conjugate 13-valent ([id number]) 10/22/2015    Pneumococcal Polysaccharide ([id number]) 01/25/2012, 04/14/2014    Tdap (Boostrix, Adacel) 07/05/2015  Zoster Recombinant (Shingrix) 11/09/2019        Pneumococcal Vaccine     [x] Up to date    [] Indicated   [] Refused  [] Contraindicated       Influenza Vaccine   [x] Up to date    [] Indicated   [] Refused  [] Contraindicated        PAST MEDICAL HISTORY:       Diagnosis Date    Adenocarcinoma of endometrium ([address]     Aortic valve sclerosis     Bradycardia     follows with cardiology- Dr. Lizbet Morris, possible SSS    Cardiac murmur     Cataract of left eye     Chronic diarrhea     s/p radiation    Diabetes mellitus type II, controlled ([address]     diet controlled     Dysthymia     Gait abnormality 6/23/2014    Glaucoma     H/O renal calculi     H/O vein stripping     Hard of hearing     History of anemia     History of dizziness     follows with cardiology, bradycardia, possible SSS    History of radiation therapy     for uterine cancer    History of small bowel obstruction 03/2019    no surgery required    Hyperlipidemia     Hypertension     Hypothyroidism     Influenza A 1/11/2018    Left elbow fracture     S/P surgical repair    [facility] [address]     Lung nodule     Macular degeneration     Obesity     Pericardial effusion 4/23/2014    Pseudophakia of right eye     PVD (peripheral vascular disease) ([facility] [address]     Traumatic injury of lower extremity childhood    bilateral trauma of lower extremities    Vertigo     resolved    Wears glasses     Wears partial dentures     upper          Family History:       Problem Relation Age of Onset    High Blood Pressure Other     Diabetes type 2  Mother         developed later in life       SURGICAL HISTORY:   Past Surgical History:   Procedure Laterality Date    CATARACT REMOVAL Right February 2013    Dr. Dereje Mustafa, LAPAROSCOPIC  5/28/1998    COLONOSCOPY  7/22/2003    Dr. Bryant William Left September 2010    ORIF, Dr. Humberto Pastor ERCP  7/19/1998    retained stone, Suzy Justice and Ovidio    MAGDA AND BSO March 2002    endometrial adenocarcinoma, Dr. Iza Zamarripa:      There  no history of TB or TB exposure. There  no asbestos or silica dust exposure. The patient reports  no coal, foundry, quarry or [employer] exposure. Travel history reveals no. There  no history of recreational or IV drug use. There  no hot tube exposure. Pets  - none now but grew up on farm     Occupational history [employer]     TOBACCO:   reports that she has never smoked. She has never used smokeless tobacco.  ETOH:   reports no history of alcohol use. ALLERGIES:      Allergies   Allergen Reactions    Allopurinol      Patient unsure of reaction    Metoprolol Tartrate [Metoprolol]      bradycardia    Percocet [Oxycodone-Acetaminophen]      Hallucinations. ...sensitive to narcotics    Phenergan [Promethazine Hcl]      Very sensitive. ..given small doses    Polycitra-K [Potassium Citrate]      Patient unsure of reaction    Statins Other (See Comments)     ? Muscle aches     Levaquin [Levofloxacin] Anxiety     Not able to sleep    Sulfa Antibiotics Rash    Tessalon [Benzonatate] Anxiety     Not able to sleep         Home Meds:   Prior to Admission medications    Medication Sig Start Date End Date Taking?  Authorizing Provider   sertraline (ZOLOFT) 50 MG tablet TAKE 1 TABLET DAILY 5/8/20  Yes Wiliam Eddy DO   oxybutynin (DITROPAN-XL) 10 MG extended release tablet TAKE 1 TABLET DAILY 3/29/20  Yes Cindy Morales MD   ezetimibe (ZETIA) 10 MG tablet TAKE 1 TABLET DAILY 2/26/20  Yes Wiliam Eddy DO   levothyroxine (SYNTHROID) 88 MCG tablet TAKE 1 TABLET DAILY 2/4/20  Yes Wiliam Eddy DO   lisinopril (PRINIVIL;ZESTRIL) 40 MG tablet TAKE 1 TABLET NIGHTLY 12/17/19  Yes Jazmine Shah DO   Probiotic Product (PROBIOTIC DAILY) CAPS One tablet 3 time a day times 10 days then once a day 4/2/19  Yes Leena Correa   Multiple Vitamins-Minerals (THERAPEUTIC MULTIVITAMIN-MINERALS) tablet Take 1 tablet by mouth daily   Yes Historical Provider, MD   CRANBERRY PO Take 1 capsule by mouth daily   Yes Historical Provider, MD   Multiple Vitamins-Minerals (OCUVITE PRESERVISION PO) Take 1 tablet by mouth daily   Yes Historical Provider, MD   aspirin 81 MG tablet Take 81 mg by mouth daily. Yes Historical Provider, MD   latanoprost (XALATAN) 0.005 % ophthalmic solution Place 1 drop into both eyes nightly. Yes Historical Provider, MD   ferrous sulfate (IRON 325) 325 (65 Fe) MG tablet Take 325 mg by mouth daily    Historical Provider, MD   ondansetron (ZOFRAN) 4 MG tablet Take 1 tablet by mouth every 8 hours as needed for Nausea or Vomiting  Patient not taking: Reported on 7/8/2020 1/23/20   Lulú Eddy DO   meclizine (ANTIVERT) 25 MG tablet Take 12.5 mg by mouth 3 times daily as needed    Historical Provider, MD      Review of Systems -  General ROS: negative for - chills, fatigue, fever or weight loss  ENT ROS: negative for - headaches, oral lesions or sore throat  Cardiovascular ROS: no chest pain , orthopnea or pnd   Gastrointestinal ROS: no abdominal pain, change in bowel habits, or black or bloody stools  Skin - no rash   Neuro - no blurry vision , no loc . No focal weakness    Vitals:  BP (!) 152/70   Pulse 72   Temp 97.7 °F (36.5 °C)   Ht 5' 2\" (1.575 m)   Wt 146 lb 12.8 oz (66.6 kg)   SpO2 96%   BMI 26.85 kg/m²     PHYSICAL EXAM:  Head and neck atraumatic, normocephalic    Lymph nodes-no cervical, supraclavicular lymphadenopathy    Neck-no JVP elevation    Lungs - Ventilating all lobes without any rales, rhonchi, wheezes or dullness. No bronchial breath sounds. Chest expansion equal bilaterally  Decreased breath sounds right posterior infrascapular  CVS- S1, S2 regular. No S3 no S4, no murmurs    Abdomen-nontender, nondistended. Bowel sounds are present.   No organomegaly    Lower extremity-no edema    Upper extremity-no edema    Neurological-grossly normal cranial nerves. No overt motor deficit CBC:   No results for input(s): WBC, HGB, PLT in the last 72 hours. BMP:  No results for input(s): NA, K, CL, CO2, BUN, CREATININE, GLUCOSE in the last 72 hours. Hepatic: No results for input(s): AST, ALT, ALB, BILITOT, ALKPHOS in the last 72 hours. Amylase:   Lab Results   Component Value Date    AMYLASE 16 03/30/2019     Lipase:   Lab Results   Component Value Date    LIPASE 4 03/30/2019     Troponin: No results for input(s): TROPONINI in the last 72 hours. BNP: No results for input(s): BNP in the last 72 hours. Lipids: No results for input(s): CHOL, HDL in the last 72 hours. Invalid input(s): LDLCALCU  ABGs: No results found for: PHART, PO2ART, [id number]  INR:   No results for input(s): INR in the last 72 hours. Thyroid:   Lab Results   Component Value Date    TSH 3.140 07/01/2020     Urinalysis: No results for input(s): BACTERIA, BLOODU, CLARITYU, COLORU, PHUR, PROTEINU, RBCUA, SPECGRAV, BILIRUBINUR, NITRU, WBCUA, LEUKOCYTESUR, GLUCOSEU in the last 72 hours. CT Scans   4/26/2019  No acute cardiopulmonary process.       Right lower lobe nodule medially measuring 2.4 x 1.5 cm with a small 8 mm   satellite nodule.  These are new compared to prior chest CT.  Recommendations   per the Fleischner Society criteria are given below.       A few subtle ground-glass nodules are seen in the right lung apex.  These are   stable compared to prior chest CT performed in April of 2014.           5/1/19    PFT shows normal spirometry, normal lung volumes. Moderately decreased diffusion capacity    7/29/19  Interval enlargement in right lower lobe pulmonary nodules, with gradual increase in size concerning for neoplasia. No change in ground-glass nodules in the right apex and scattered in the left lung. RECOMMENDATIONS: PET-CT imaging may be considered in this patient.   Additional follow-up CT in 3 months may be entertained as an accuracy of this automated transcription, some errors in transcription may have occurred.

## 2020-07-17 ENCOUNTER — HOSPITAL ENCOUNTER (OUTPATIENT)
Dept: LAB | Age: 85
End: 2020-07-17
Payer: MEDICARE

## 2020-07-17 ENCOUNTER — HOSPITAL ENCOUNTER (OUTPATIENT)
Dept: CT IMAGING | Age: 85
Discharge: HOME | End: 2020-07-19
Payer: MEDICARE

## 2020-07-17 ENCOUNTER — APPOINTMENT (OUTPATIENT)
Dept: LAB | Age: 85
End: 2020-07-17
Payer: MEDICARE

## 2020-07-17 PROCEDURE — 71250 CT THORAX DX C-: CPT

## 2020-07-18 ENCOUNTER — HOSPITAL ENCOUNTER (OUTPATIENT)
Age: 85
Discharge: HOME | End: 2020-07-18
Payer: MEDICARE

## 2020-07-18 DIAGNOSIS — D64.9 ANEMIA, UNSPECIFIED TYPE: ICD-10-CM

## 2020-07-18 DIAGNOSIS — C34.91 ADENOCARCINOMA, LUNG, RIGHT (HCC): ICD-10-CM

## 2020-07-18 LAB
ALBUMIN SERPL-MCNC: 3.6 G/DL (ref 3.5–5.1)
ALP BLD-CCNC: 138 U/L (ref 38–126)
ALT SERPL-CCNC: 18 U/L (ref 11–66)
ANION GAP SERPL CALCULATED.3IONS-SCNC: 12 MEQ/L (ref 8–16)
AST SERPL-CCNC: 20 U/L (ref 5–40)
BASOPHILS # BLD: 0.8 %
BASOPHILS ABSOLUTE: 0.1 THOU/MM3 (ref 0–0.1)
BILIRUB SERPL-MCNC: 0.2 MG/DL (ref 0.3–1.2)
BUN BLDV-MCNC: 17 MG/DL (ref 7–22)
CALCIUM SERPL-MCNC: 9.3 MG/DL (ref 8.5–10.5)
CHLORIDE BLD-SCNC: 98 MEQ/L (ref 98–111)
CO2: 26 MEQ/L (ref 23–33)
CREAT SERPL-MCNC: 0.9 MG/DL (ref 0.4–1.2)
EOSINOPHIL # BLD: 0.6 %
EOSINOPHILS ABSOLUTE: 0 THOU/MM3 (ref 0–0.4)
ERYTHROCYTE [DISTWIDTH] IN BLOOD BY AUTOMATED COUNT: 12.8 % (ref 11.5–14.5)
ERYTHROCYTE [DISTWIDTH] IN BLOOD BY AUTOMATED COUNT: 49.1 FL (ref 35–45)
GFR SERPL CREATININE-BSD FRML MDRD: 59 ML/MIN/1.73M2
GLUCOSE BLD-MCNC: 146 MG/DL (ref 70–108)
HCT VFR BLD CALC: 34.9 % (ref 37–47)
HEMOGLOBIN: 10.5 GM/DL (ref 12–16)
IMMATURE GRANS (ABS): 0.05 THOU/MM3 (ref 0–0.07)
IMMATURE GRANULOCYTES: 0.6 %
IRON: 38 UG/DL (ref 50–170)
LYMPHOCYTES # BLD: 10.9 %
LYMPHOCYTES ABSOLUTE: 0.9 THOU/MM3 (ref 1–4.8)
MCH RBC QN AUTO: 31.3 PG (ref 26–33)
MCHC RBC AUTO-ENTMCNC: 30.1 GM/DL (ref 32.2–35.5)
MCV RBC AUTO: 104.2 FL (ref 81–99)
MONOCYTES # BLD: 7.8 %
MONOCYTES ABSOLUTE: 0.6 THOU/MM3 (ref 0.4–1.3)
NUCLEATED RED BLOOD CELLS: 0 /100 WBC
PLATELET # BLD: 353 THOU/MM3 (ref 130–400)
PMV BLD AUTO: 11.3 FL (ref 9.4–12.4)
POTASSIUM SERPL-SCNC: 4.6 MEQ/L (ref 3.5–5.2)
RBC # BLD: 3.35 MILL/MM3 (ref 4.2–5.4)
SEG NEUTROPHILS: 79.3 %
SEGMENTED NEUTROPHILS ABSOLUTE COUNT: 6.3 THOU/MM3 (ref 1.8–7.7)
SODIUM BLD-SCNC: 136 MEQ/L (ref 135–145)
TOTAL IRON BINDING CAPACITY: 203 UG/DL (ref 171–450)
TOTAL PROTEIN: 6.6 G/DL (ref 6.1–8)
WBC # BLD: 7.9 THOU/MM3 (ref 4.8–10.8)

## 2020-07-18 PROCEDURE — 85025 COMPLETE CBC W/AUTO DIFF WBC: CPT

## 2020-07-18 PROCEDURE — 36415 COLL VENOUS BLD VENIPUNCTURE: CPT

## 2020-07-18 PROCEDURE — 80053 COMPREHEN METABOLIC PANEL: CPT

## 2020-07-18 PROCEDURE — 83550 IRON BINDING TEST: CPT

## 2020-07-18 PROCEDURE — 83540 ASSAY OF IRON: CPT

## 2020-07-21 ENCOUNTER — OFFICE VISIT (OUTPATIENT)
Dept: ONCOLOGY | Age: 85
End: 2020-07-21
Payer: MEDICARE

## 2020-07-21 VITALS
TEMPERATURE: 97.1 F | HEART RATE: 71 BPM | SYSTOLIC BLOOD PRESSURE: 98 MMHG | HEIGHT: 62 IN | OXYGEN SATURATION: 97 % | BODY MASS INDEX: 26.24 KG/M2 | WEIGHT: 142.6 LBS | DIASTOLIC BLOOD PRESSURE: 60 MMHG

## 2020-07-21 PROCEDURE — 1090F PRES/ABSN URINE INCON ASSESS: CPT | Performed by: INTERNAL MEDICINE

## 2020-07-21 PROCEDURE — 1123F ACP DISCUSS/DSCN MKR DOCD: CPT | Performed by: INTERNAL MEDICINE

## 2020-07-21 PROCEDURE — 99215 OFFICE O/P EST HI 40 MIN: CPT | Performed by: INTERNAL MEDICINE

## 2020-07-21 PROCEDURE — 4040F PNEUMOC VAC/ADMIN/RCVD: CPT | Performed by: INTERNAL MEDICINE

## 2020-07-21 PROCEDURE — 1036F TOBACCO NON-USER: CPT | Performed by: INTERNAL MEDICINE

## 2020-07-21 PROCEDURE — G8417 CALC BMI ABV UP PARAM F/U: HCPCS | Performed by: INTERNAL MEDICINE

## 2020-07-21 PROCEDURE — G8427 DOCREV CUR MEDS BY ELIG CLIN: HCPCS | Performed by: INTERNAL MEDICINE

## 2020-07-21 NOTE — PROGRESS NOTES
Carla Koenig                                                                                                                  7/21/2020  MRN:   [id number]  YOB: 1932  PCP:                           Toan Fernandez DO  Referring Physician: No ref. 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 Stage IA 2. Status post SBRT in October 2019  Right lower lobe pulmonary nodules. History of uterine cancer status post surgery and radiation therapy, diagnosed at age of 79    Interval history:    Patient presents to the clinic accompanied by her son to discuss results of CT chest.  Patient denies fever chills but does complain of some shortness of breath. Complains of a cough. Denies hospitalization or ER visit. CT chest shows increase in size of the lung mass. Patient denies any pain at the time of evaluation. Denies headaches. During this visit patient's allergy, social, medical, surgical history and medications were reviewed and updated. Summary of Case/History:    Carla Koenig a 80 y. o.female who presents to the office to establish care and for further evaluation treatment recommendations. Patient has history of uterine cancer diagnosed about 15 years ago at age of 79. According to the patient she underwent surgery followed by radiation therapy at that time. She did not receive any chemotherapy. Patient lives independently but does require assistance by her daughter on and off. Patient developed small bowel obstruction back in March or 2019. Patient was hospitalized and managed conservatively.   CT scan of abdomen pelvis done at that time time showed a mass in the right lung and also a satellite nodule of 8 mm. Patient has never smoked cigarettes. Her medical problems include hypertension hypothyroidism. Patient subsequently underwent further work-up for the lung mass including CT PET which showed FDG uptake with SUV of 2.2 at the right lung mass otherwise the CT PET was unremarkable. 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 Patient was referred to our office for further evaluation and recommendations. Patient's performance ECOG status is 2.     Past Medical History:   Past Medical History:   Diagnosis Date    Adenocarcinoma of endometrium ([address]     Aortic valve sclerosis     Bradycardia     follows with cardiology- Dr. Jens Lockhart, possible SSS    Cardiac murmur     Cataract of left eye     Chronic diarrhea     s/p radiation    Diabetes mellitus type II, controlled ([address]     diet controlled     Dysthymia     Gait abnormality 6/23/2014    Glaucoma     H/O renal calculi     H/O vein stripping     Hard of hearing     History of anemia     History of dizziness     follows with cardiology, bradycardia, possible SSS    History of radiation therapy     for uterine cancer    History of small bowel obstruction 03/2019    no surgery required    Hyperlipidemia     Hypertension     Hypothyroidism     Influenza A 1/11/2018    Left elbow fracture     S/P surgical repair    Lung cancer ([address]     Lung nodule     Macular degeneration     Obesity     Pericardial effusion 4/23/2014    Pseudophakia of right eye     PVD (peripheral vascular disease) ([address]     Traumatic injury of lower extremity childhood    bilateral trauma of lower extremities    Vertigo     resolved    Wears glasses     Wears partial dentures     upper        Past Surgical History:     Past Surgical History:   Procedure Laterality Date    CATARACT REMOVAL Right February 2013    Dr. Delilah Mirza, LAPAROSCOPIC  5/28/1998    COLONOSCOPY  7/22/2003    Dr. Malaika William Left September 2010    ORIF, Dr. Jass Marie ERCP  7/19/1998    retained stone, Suzy Arriaga and Bello Zimmerman MADGA AND BSO  March 2002    endometrial adenocarcinoma, Dr. Jami Saucedo       Patient Family Social History:     Social History     Socioeconomic History    Marital status:       Spouse name: None    Number of children: None    Years of education: None    Highest education level: None   Occupational History    None   Social Needs    Financial resource strain: None    Food insecurity     Worry: None     Inability: None    Transportation needs     Medical: None     Non-medical: None   Tobacco Use    Smoking status: Never Smoker    Smokeless tobacco: Never Used    Tobacco comment: never smoked yant rrt 01/11/2018   Substance and Sexual Activity    Alcohol use: No     Alcohol/week: 0.0 standard drinks    Drug use: No    Sexual activity: None   Lifestyle    Physical activity     Days per week: None     Minutes per session: None    Stress: None   Relationships    Social connections     Talks on phone: None     Gets together: None     Attends [employer]ism service: None     Active member of club or organization: None     Attends meetings of clubs or organizations: None     Relationship status: None    Intimate partner violence     Fear of current or ex partner: None     Emotionally abused: None     Physically abused: None     Forced sexual activity: None   Other Topics Concern    None   Social History Narrative    None       Family History   Problem Relation Age of Onset    High Blood Pressure Other     Diabetes type 2  Mother         developed later in life       Current Medications:     Current Outpatient Medications   Medication Sig Dispense Refill    ferrous sulfate (IRON 325) 325 (65 Fe) MG tablet Take 325 mg by mouth daily      sertraline (ZOLOFT) 50 MG tablet TAKE 1 TABLET DAILY 90 tablet 3    oxybutynin (DITROPAN-XL) 10 MG extended release tablet TAKE 1 TABLET DAILY 90 tablet 4    ezetimibe (ZETIA) 10 MG tablet TAKE 1 TABLET DAILY 90 tablet 4    levothyroxine (SYNTHROID) 88 MCG tablet TAKE 1 TABLET DAILY 90 tablet 4    lisinopril (PRINIVIL;ZESTRIL) 40 MG tablet TAKE 1 TABLET NIGHTLY 90 tablet 4    meclizine (ANTIVERT) 25 MG tablet Take 12.5 mg by mouth 3 times daily as needed      Probiotic Product (PROBIOTIC DAILY) CAPS One tablet 3 time a day times 10 days then once a day  0    Multiple Vitamins-Minerals (THERAPEUTIC MULTIVITAMIN-MINERALS) tablet Take 1 tablet by mouth daily      CRANBERRY PO Take 1 capsule by mouth daily      Multiple Vitamins-Minerals (OCUVITE PRESERVISION PO) Take 1 tablet by mouth daily      aspirin 81 MG tablet Take 81 mg by mouth daily.  latanoprost (XALATAN) 0.005 % ophthalmic solution Place 1 drop into both eyes nightly.  ondansetron (ZOFRAN) 4 MG tablet Take 1 tablet by mouth every 8 hours as needed for Nausea or Vomiting (Patient not taking: Reported on 7/8/2020) 45 tablet 1     No current facility-administered medications for this visit. Allergies:   Allopurinol; Metoprolol tartrate [metoprolol]; Percocet [oxycodone-acetaminophen]; Phenergan [promethazine hcl]; Polycitra-k [potassium citrate]; Statins; Levaquin [levofloxacin]; Sulfa antibiotics; and Tessalon [benzonatate]    Review of Systems:    Constitutional: No fever or chills.  No night sweats, no weight loss   Eyes: No eye discharge, double vision, or eye pain   HEENT: negative for sore mouth, sore throat, hoarseness and voice change   Respiratory: negative for cough , sputum, dyspnea, wheezing, hemoptysis, chest pain   Cardiovascular: negative for chest pain, dyspnea, palpitations, orthopnea, PND   Gastrointestinal: negative for nausea, vomiting, diarrhea, constipation, abdominal pain, Dysphagia, hematemesis and hematochezia   Genitourinary: negative for frequency, dysuria, nocturia, urinary incontinence, and hematuria   Integument: negative for rash, skin lesions, bruises. Hematologic/Lymphatic: negative for easy bruising, bleeding, lymphadenopathy, or petechiae   Endocrine: negative for heat or cold intolerance,weight changes, change in bowel habits and hair loss   Musculoskeletal: negative for myalgias, arthralgias, pain, joint swelling,and bone pain   Neurological: negative for headaches, dizziness, seizures, weakness, numbness        Physical Exam:    Vitals: BP 98/60 (Site: Right Upper Arm, Position: Sitting, Cuff Size: Medium Adult)   Pulse 71   Temp 97.1 °F (36.2 °C)   Ht 5' 2.01\" (1.575 m)   Wt 142 lb 9.6 oz (64.7 kg)   SpO2 97%   BMI 26.08 kg/m²   General appearance -frail appearing, no in pain or distress  Mental status - AAO X3  Eyes - pupils equal and reactive, extraocular eye movements intact  Mouth - mucous membranes moist, pharynx normal without lesions  Neck - supple, no significant adenopathy  Lymphatics - no palpable lymphadenopathy, no hepatosplenomegaly  Chest -decreased breathing sounds  Heart - normal rate, regular rhythm, normal S1, S2, no murmurs  Abdomen - soft, nontender, nondistended, no masses or organomegaly  Neurological - alert, oriented, normal speech, no focal findings or movement disorder noted  Extremities - peripheral pulses normal, no pedal edema, no clubbing or cyanosis  Skin - normal coloration and turgor, no rashes, no suspicious skin lesions noted       DATA:    CBC:   No results for input(s): WBC, HGB, PLT in the last 72 hours. BMP:  No results for input(s): NA, K, CL, CO2, BUN, CREATININE, GLUCOSE in the last 72 hours. Hepatic: No results for input(s): AST, ALT, ALB, BILITOT, ALKPHOS in the last 72 hours. INR:   No results for input(s): INR in the last 72 hours.   PTT:No results for input(s): PTT in the last 72 hours.      Results for orders placed or performed during the hospital encounter of 07/18/20   Comprehensive Metabolic Panel   Result Value Ref Range    Glucose 146 (H) 70 - 108 mg/dL    CREATININE 0.9 0.4 - 1.2 mg/dL    BUN 17 7 - 22 mg/dL    Sodium 136 135 - 145 meq/L    Potassium 4.6 3.5 - 5.2 meq/L    Chloride 98 98 - 111 meq/L    CO2 26 23 - 33 meq/L    Calcium 9.3 8.5 - 10.5 mg/dL    AST 20 5 - 40 U/L    Alkaline Phosphatase 138 (H) 38 - 126 U/L    Total Protein 6.6 6.1 - 8.0 g/dL    Alb 3.6 3.5 - 5.1 g/dL    Total Bilirubin 0.2 (L) 0.3 - 1.2 mg/dL    ALT 18 11 - 66 U/L   CBC With Auto Differential   Result Value Ref Range    WBC 7.9 4.8 - 10.8 thou/mm3    RBC 3.35 (L) 4.20 - 5.40 mill/mm3    Hemoglobin 10.5 (L) 12.0 - 16.0 gm/dl    Hematocrit 34.9 (L) 37.0 - 47.0 %    .2 (H) 81.0 - 99.0 fL    MCH 31.3 26.0 - 33.0 pg    MCHC 30.1 (L) 32.2 - 35.5 gm/dl    RDW-CV 12.8 11.5 - 14.5 %    RDW-SD 49.1 (H) 35.0 - 45.0 fL    Platelets 579 952 - 807 thou/mm3    MPV 11.3 9.4 - 12.4 fL    Seg Neutrophils 79.3 %    Lymphocytes 10.9 %    Monocytes 7.8 %    Eosinophils 0.6 %    Basophils 0.8 %    Immature Granulocytes 0.6 %    Segs Absolute 6.3 1.8 - 7.7 thou/mm3    Lymphocytes Absolute 0.9 (L) 1.0 - 4.8 thou/mm3    Monocytes Absolute 0.6 0.4 - 1.3 thou/mm3    Eosinophils Absolute 0.0 0.0 - 0.4 thou/mm3    Basophils Absolute 0.1 0.0 - 0.1 thou/mm3    Immature Grans (Abs) 0.05 0.00 - 0.07 thou/mm3    nRBC 0 /100 wbc   Iron And TIBC   Result Value Ref Range    Iron 38 (L) 50 - 170 ug/dL    TIBC 203 171 - 450 ug/dL   Anion Gap   Result Value Ref Range    Anion Gap 12.0 8.0 - 16.0 meq/L   Glomerular Filtration Rate, Estimated   Result Value Ref Range    Est, Glom Filt Rate 59 (A) ml/min/1.73m2     CT Scans   4/26/2019  No acute cardiopulmonary process.       Right lower lobe nodule medially measuring 2.4 x 1.5 cm with a small 8 mm   satellite nodule.  These are new compared to prior chest CT.  Recommendations   per the Fleischner Society criteria are given below.       A few subtle ground-glass nodules are seen in the right lung apex.  These are   stable compared to prior chest CT performed in April of 2014.             5/1/19     PFT shows normal spirometry, normal lung volumes. Moderately decreased diffusion capacity     7/29/19  Interval enlargement in right lower lobe pulmonary nodules, with gradual increase in size concerning for neoplasia.  No change in ground-glass nodules in the right apex and scattered in the left lung.   RECOMMENDATIONS: PET-CT imaging may be considered in this patient.  Additional follow-up CT in 3 months may be entertained as an alternate.  Tissue sampling would provide definitive determination. Xr Chest (single View Frontal)    Result Date: 8/26/2019  EXAMINATION: ONE XRAY VIEW OF THE CHEST 8/26/2019 12:34 pm COMPARISON: May 8, 2008 HISTORY: ORDERING SYSTEM PROVIDED HISTORY: post lung biopsy TECHNOLOGIST PROVIDED HISTORY: post lung biopsy Reason for Exam: upr/inspiration Acuity: Unknown Type of Exam: Unknown FINDINGS: Single-view chest reveals no evidence of pneumothorax. Heart mediastinum are stable when compared to previous. Visualized bony structures show no acute abnormality. No pneumothorax post lung biopsy. Xr Chest Portable    Result Date: 8/27/2019  EXAMINATION: ONE XRAY VIEW OF THE CHEST 8/26/2019 2:43 pm COMPARISON: 8/26/2019 HISTORY: 80year old female presents for pulmonary evaluation after lung biopsy. FINDINGS: Borderline cardiomegaly magnified by technique. Central congestion magnified by technique. No pneumothorax or airspace consolidation. Blunting of the left costophrenic sulcus suggesting small left effusion. Small left pleural effusion. No evidence of right pneumothorax.      Ct Needle Biopsy Lung Percutaneous    Result Date: 8/28/2019  EXAMINATION: CT-guided side lung core biopsy   8/26/2019 11:29 am TECHNIQUE: Dose modulation, iterative reconstruction, TECHNOLOGIST PROVIDED HISTORY: right lower lobe lung nodule SEDATION: Doses not recorded on the request, please refer to nursing notes. IMPRESSION: Successful CT-guided core biopsy of right lung mass. Sample submitted to pathology. Pet Ct Skull Base To Mid Thigh : 9/9/2019    No change in a right lower lobe nodule of 11 mm with only mildly elevated FDG uptake. No mediastinal abnormal activity. Ct Chest Wo Contrast: 1/14/2020    Interval decrease in size of the dominant nodule in the medial aspect of the right lower lobe. There is ill-defined parenchymal density in the surrounding parenchyma suggesting atelectasis or scarring. Numerous additional punctate nodular densities noted bilaterally and diffusely. Continued follow-up is recommended. No new adenopathy     Ct Chest Wo Contrast: 7/17/2020    Interval development of large masslike density about 4.2 x 5.5 cm in the medial basal right lower lobe and golfing area of where previously 1.9 x 1.3 cm nodule was reported. Lack of IV contrast limits detailed assessment and to determine if this is or mass or partly consolidation. .  Additionally there is new right pleural effusion. Altogether, the working IMPRESSION would be disease progression with increase in size of the nodule until proven otherwise. 2. There is some fibrosis with traction bronchiectasis surrounding the above-mentioned mass. 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 Stage IA 2  Disease recurrence per CT chest, 7/2020  History of uterine cancer status post surgery and radiation therapy  Elderly patient  UTI  Hypothyroidism, hypertension  ECOG performance status 2      Plan:  I had a detailed discussion with the patient and her son. I reviewed images of CT chest with the patient and also compared with the previous scans. There is clearly progression of disease. I discussed treatment options.   I feel patient is too weak for chemotherapy. Patient already has had high-dose radiation to the area and unlikely to be a candidate for reirradiation. Discussed option of immunotherapy which if patient eligible may be able to tolerate. I also discussed option of best supportive care and symptom management only. If patient decides to go that route I would also recommend palliative care consultation. I encouraged the patient to continue to follow-up with her pulmonologist  NCCN guidelines were reviewed and discussed with the patient. The diagnosis and care plan were discussed with the patient in detail. I discussed the natural history of the disease, prognosis, risks and goals of therapy and answered all the patients questions to the best of my ability. Patient expressed understanding and was in agreement. Addendum:    I discussed CT scan findings and recommendations with patient's daughter Marcello Glover over the phone. Reiterated options of best supportive care immune therapy. If patient is interested we can also consider radiation therapy. They will let us know. Quinton Washington        I spent more than 40 minutes examining, evaluating, reviewing data, counseling the patient and coordinating care. Greater than 50% of time was spent face-to-face with the patient this note is created with the assistance of a speech recognition program.  While intending to generate a document that actually reflects the content of the visit, the document can still have some errors including those of syntax and sound a like substitutions which may escape proof reading. It such instances, actual meaning can be extrapolated by contextual diversion.

## 2020-07-23 ENCOUNTER — TELEPHONE (OUTPATIENT)
Dept: ONCOLOGY | Age: 85
End: 2020-07-23

## 2020-07-23 ENCOUNTER — TELEPHONE (OUTPATIENT)
Dept: PULMONOLOGY | Age: 85
End: 2020-07-23

## 2020-07-23 NOTE — TELEPHONE ENCOUNTER
Message   Received: Yesterday   Message Contents   Sergio Narayanan MD  [facility] Clinical Staff               Please have the patient see Dr. Adriel Worrell next week. Yamileth albert    Previous Messages     ----- Message -----   From: Del Mir LPN   Sent: [id number]/[id number]   8:36 AM EDT   To: Gomez Butler MD         LMOM FOR PT TO CALL BACK AND MAKE A F/U APPT.

## 2020-07-23 NOTE — TELEPHONE ENCOUNTER
pts daughter Hope Paris called wanting to know additional information as far as treatment, do nothing. She was unable to come to the appointment and patients son was with her. She stated that he did not understand what was being said. Please advise.

## 2020-07-23 NOTE — TELEPHONE ENCOUNTER
Patient daughter Narciso Washington) called and was not with her at her appointment. She said she would like to talk to you about a few things.     [phone]

## 2020-07-30 ENCOUNTER — OFFICE VISIT (OUTPATIENT)
Dept: PULMONOLOGY | Age: 85
End: 2020-07-30
Payer: MEDICARE

## 2020-07-30 VITALS
BODY MASS INDEX: 26.13 KG/M2 | OXYGEN SATURATION: 95 % | HEART RATE: 80 BPM | RESPIRATION RATE: 12 BRPM | DIASTOLIC BLOOD PRESSURE: 73 MMHG | HEIGHT: 62 IN | SYSTOLIC BLOOD PRESSURE: 154 MMHG | WEIGHT: 142 LBS | TEMPERATURE: 97 F

## 2020-07-30 PROBLEM — C54.1 MALIGNANT NEOPLASM OF ENDOMETRIUM (HCC): Status: ACTIVE | Noted: 2019-09-20

## 2020-07-30 PROBLEM — H02.036: Status: ACTIVE | Noted: 2018-09-25

## 2020-07-30 PROBLEM — R91.8 LUNG MASS: Status: ACTIVE | Noted: 2020-07-30

## 2020-07-30 PROBLEM — I10 HYPERTENSIVE DISORDER: Status: ACTIVE | Noted: 2020-07-30

## 2020-07-30 PROBLEM — H35.3190 NONEXUDATIVE AGE-RELATED MACULAR DEGENERATION: Status: ACTIVE | Noted: 2017-10-16

## 2020-07-30 PROBLEM — E11.9 TYPE 2 DIABETES MELLITUS (HCC): Status: ACTIVE | Noted: 2020-07-30

## 2020-07-30 PROBLEM — R54 FRAIL ELDERLY: Status: ACTIVE | Noted: 2020-07-30

## 2020-07-30 PROBLEM — H04.211: Status: ACTIVE | Noted: 2019-04-08

## 2020-07-30 PROBLEM — C34.91 ADENOCARCINOMA OF RIGHT LUNG (HCC): Status: ACTIVE | Noted: 2019-08-28

## 2020-07-30 PROBLEM — H43.813 POSTERIOR VITREOUS DETACHMENT OF BOTH EYES: Status: ACTIVE | Noted: 2018-09-25

## 2020-07-30 PROBLEM — E11.9 TYPE 2 DIABETES MELLITUS WITHOUT COMPLICATION, WITHOUT LONG-TERM CURRENT USE OF INSULIN (HCC): Status: ACTIVE | Noted: 2017-04-10

## 2020-07-30 PROBLEM — C34.31 MALIGNANT NEOPLASM OF LOWER LOBE, RIGHT BRONCHUS OR LUNG (HCC): Status: ACTIVE | Noted: 2019-09-20

## 2020-07-30 PROBLEM — I65.23 BILATERAL CAROTID ARTERY STENOSIS: Status: ACTIVE | Noted: 2018-07-26

## 2020-07-30 PROCEDURE — 99214 OFFICE O/P EST MOD 30 MIN: CPT | Performed by: INTERNAL MEDICINE

## 2020-07-30 PROCEDURE — 1123F ACP DISCUSS/DSCN MKR DOCD: CPT | Performed by: INTERNAL MEDICINE

## 2020-07-30 PROCEDURE — G8417 CALC BMI ABV UP PARAM F/U: HCPCS | Performed by: INTERNAL MEDICINE

## 2020-07-30 PROCEDURE — G8427 DOCREV CUR MEDS BY ELIG CLIN: HCPCS | Performed by: INTERNAL MEDICINE

## 2020-07-30 PROCEDURE — 1036F TOBACCO NON-USER: CPT | Performed by: INTERNAL MEDICINE

## 2020-07-30 PROCEDURE — 1090F PRES/ABSN URINE INCON ASSESS: CPT | Performed by: INTERNAL MEDICINE

## 2020-07-30 PROCEDURE — 4040F PNEUMOC VAC/ADMIN/RCVD: CPT | Performed by: INTERNAL MEDICINE

## 2020-07-31 ENCOUNTER — TELEPHONE (OUTPATIENT)
Dept: INTERNAL MEDICINE | Age: 85
End: 2020-07-31

## 2020-07-31 NOTE — TELEPHONE ENCOUNTER
Patients daughter called and the patient is having urinary frequency with odor. Order entered.          Will have done in [place] at ambulatory care facility [facility])      [address] [facility] in [facility]

## 2020-08-01 NOTE — PROGRESS NOTES
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 year old female   Ct chest reviewed and d/w with pt and her daughter over the phone along with family member in the room with patient . Last visit  Patient completed SBRT October 2019. She had a follow-up CT chest which showed an interval decrease in size of the lung nodule. She does have occasional shortness of breath with activity but is stable denies any chest pain cough hemoptysis or wheezing. Last visit   here for evaluation of abnormal CT of the chest.  Patient had small bowel obstruction in March had CT abdomen pelvis which showed right lower lobe lung nodule. This was followed by CT of the chest which confirmed lung nodule and also a satellite nodule of 8 mm. She denies any cough, hemoptysis. She is a lifelong nonsmoker. Had history of endometrial cancer. ambulates with the help of a walker. Denies any shortness of breath             LUNG CANCER SCREENING     1. CRITERIA MET    []     CT ORDERED  []      2. CRITERIA NOT MET   [x]      3. REFUSED                    []        REASON CRITERIA NOT MET     1. SMOKING LESS THAN 30 PY  []      2. AGE LESS THAN 55 or GREATER 77 YEARS  []      3. QUIT SMOKING 15 YEARS OR GREATER   []      4. RECENT CT WITH IN 11 MONTHS    []      5. LIFE EXPECTANCY < 5 YEARS   []      6.  SIGNS  AND SYMPTOMS OF LUNG CANCER   []         Immunization   Immunization History   Administered Date(s) Administered    Influenza Virus Vaccine 10/09/2009, 10/07/2011, 09/19/2012    Influenza, High Dose (Fluzone 65 yrs and older) 09/16/2013, 11/07/2014, 09/28/2015, 10/17/2016, 10/18/2017, 10/18/2018    Influenza, Triv, inactivated, subunit, adjuvanted, IM (Fluad 65 yrs and older) 10/31/2019    Pneumococcal Conjugate 13-valent ([id number]) 10/22/2015    Pneumococcal Polysaccharide ([id number]) 01/25/2012, 04/14/2014    Tdap retained stone, Suzy Escalante and Khushbu Bisi MAGDA AND BSO  March 2002    endometrial adenocarcinoma, Dr. Bello [place]:      There  no history of TB or TB exposure. There  no asbestos or silica dust exposure. The patient reports  no coal, foundry, quarry or [employer] exposure. Travel history reveals no. There  no history of recreational or IV drug use. There  no hot tube exposure. Pets  - none now but grew up on farm     Occupational history [employer]     TOBACCO:   reports that she has never smoked. She has never used smokeless tobacco.  ETOH:   reports no history of alcohol use. ALLERGIES:      Allergies   Allergen Reactions    Allopurinol      Patient unsure of reaction    Metoprolol Tartrate [Metoprolol]      bradycardia    Percocet [Oxycodone-Acetaminophen]      Hallucinations. ...sensitive to narcotics    Phenergan [Promethazine Hcl]      Very sensitive. ..given small doses    Polycitra-K [Potassium Citrate]      Patient unsure of reaction    Statins Other (See Comments)     ? Muscle aches     Levaquin [Levofloxacin] Anxiety     Not able to sleep    Sulfa Antibiotics Rash    Tessalon [Benzonatate] Anxiety     Not able to sleep         Home Meds:   Prior to Admission medications    Medication Sig Start Date End Date Taking?  Authorizing Provider   ferrous sulfate (IRON 325) 325 (65 Fe) MG tablet Take 325 mg by mouth daily   Yes Historical Provider, MD   sertraline (ZOLOFT) 50 MG tablet TAKE 1 TABLET DAILY 5/8/20  Yes Wiliam Eddy, DO   oxybutynin (DITROPAN-XL) 10 MG extended release tablet TAKE 1 TABLET DAILY 3/29/20  Yes Brett Turner MD   ezetimibe (ZETIA) 10 MG tablet TAKE 1 TABLET DAILY 2/26/20  Yes Wiliam Eddy DO   levothyroxine (SYNTHROID) 88 MCG tablet TAKE 1 TABLET DAILY 2/4/20  Yes Wiliam Eddy DO   ondansetron (ZOFRAN) 4 MG tablet Take 1 tablet by mouth every 8 hours as needed for Nausea or Vomiting may be entertained as an alternate. Tissue sampling would provide definitive determination. January 14, 2020  Interval decrease in size of the dominant nodule in the medial aspect of the   right lower lobe.       There is ill-defined parenchymal density in the surrounding parenchyma   suggesting atelectasis or scarring.       Numerous additional punctate nodular densities noted bilaterally and   diffusely.       Continued follow-up is recommended.       No new adenopathy           . Interval development of large masslike density about 4.2 x 5.5 cm in the    medial basal right lower lobe and golfing area of where previously 1.9 x 1.3    cm nodule was reported.  Lack of IV contrast limits detailed assessment and    to determine if this is or mass or partly consolidation. .  Additionally there    is new right pleural effusion.  Altogether, the working IMPRESSION would be    disease progression with increase in size of the nodule until proven    otherwise. 2. There is some fibrosis with traction bronchiectasis surrounding the    above-mentioned mass. IMPRESSION:     Diagnosis Orders   1. Pleural effusion on right  US THORACENTESIS    Body Fluid Cell Count with Differential    Cytology, Non-Gyn    Flow Cytometry   2. Adenocarcinoma, lung, right ([address]  US THORACENTESIS    Body Fluid Cell Count with Differential    Cytology, Non-Gyn    Flow Cytometry        Left lung has 2 subcentimeter nodules too small for PET characterization  Right lower lobe image 72 series for 2.2 cm lung nodule caudal to this image 63 series 4 has 8 mm lung nodule which may also be part of the mucinous adenocarcinoma. PLAN:      Ct chest shows new rt lower lobe mass and pleural effusion .  This is concerning for re occurrence o flung cancer   Will get thoracentesis     Requested Prescriptions      No prescriptions requested or ordered in this encounter       I spent more than 25 minutes examining, evaluating, reviewing data and counseling the patient. Greater than 50% of time was spent face-to-face with the patient   There are no discontinued medications. Minal Mattson received counseling on the following healthy behaviors: nutrition, exercise and medication adherence    Patient given educational materials : see patient instruction       Discussed use, benefit, and side effects of prescribed medications. Barriers to medication compliance addressed. All patient questions answered. Pt voiced understanding. I hope this updates you on my evaluation and clinical thinking. Thank you for allowing me to participate in his care. Sincerely,    Electronically signed by Shabnam Vazquez MD on 8/1/2020 at 5:57 PM       Please note that this chart was generated using voice recognition Dragon dictation software. Although every effort was made to ensure the accuracy of this automated transcription, some errors in transcription may have occurred.

## 2020-08-02 ENCOUNTER — HOSPITAL ENCOUNTER (OUTPATIENT)
Age: 85
Discharge: HOME | End: 2020-08-02
Payer: MEDICARE

## 2020-08-02 DIAGNOSIS — R35.0 URINATION FREQUENCY: ICD-10-CM

## 2020-08-02 DIAGNOSIS — R82.90 BAD ODOR OF URINE: ICD-10-CM

## 2020-08-02 LAB
AMORPHOUS: ABNORMAL
BACTERIA: ABNORMAL
BILIRUBIN URINE: NEGATIVE
BLOOD, URINE: ABNORMAL
CASTS UA: ABNORMAL /LPF
CHARACTER, URINE: ABNORMAL
COLOR: ABNORMAL
CRYSTALS, UA: ABNORMAL
EPITHELIAL CELLS, UA: ABNORMAL /HPF
GLUCOSE, URINE: NEGATIVE MG/DL
KETONES, URINE: NEGATIVE
LEUKOCYTE ESTERASE, URINE: ABNORMAL
MUCUS: ABNORMAL
NITRITE, URINE: NEGATIVE
PH UA: 6 (ref 5–9)
PROTEIN UA: ABNORMAL MG/DL
RBC UA: ABNORMAL /HPF
REFLEX TO URINE C & S: ABNORMAL
SPECIFIC GRAVITY UA: 1.01 (ref 1–1.03)
UROBILINOGEN, URINE: 0.2 EU/DL (ref 0–1)
WBC UA: ABNORMAL /HPF

## 2020-08-02 PROCEDURE — 81001 URINALYSIS AUTO W/SCOPE: CPT

## 2020-08-02 PROCEDURE — 87086 URINE CULTURE/COLONY COUNT: CPT

## 2020-08-03 ENCOUNTER — TELEPHONE (OUTPATIENT)
Dept: GENERAL RADIOLOGY | Age: 85
End: 2020-08-03

## 2020-08-04 ENCOUNTER — PRE-PROCEDURE TELEPHONE (OUTPATIENT)
Dept: PREADMISSION TESTING | Age: 85
End: 2020-08-04

## 2020-08-04 ENCOUNTER — TELEPHONE (OUTPATIENT)
Dept: GENERAL RADIOLOGY | Age: 85
End: 2020-08-04

## 2020-08-04 ENCOUNTER — TELEPHONE (OUTPATIENT)
Dept: INTERNAL MEDICINE | Age: 85
End: 2020-08-04

## 2020-08-04 LAB — URINE CULTURE REFLEX: NORMAL

## 2020-08-04 RX ORDER — NITROFURANTOIN 25; 75 MG/1; MG/1
100 CAPSULE ORAL 2 TIMES DAILY
Qty: 20 CAPSULE | Refills: 0 | Status: SHIPPED | OUTPATIENT
Start: 2020-08-04 | End: 2020-08-14

## 2020-08-07 ENCOUNTER — HOSPITAL ENCOUNTER (OUTPATIENT)
Dept: PREADMISSION TESTING | Age: 85
Setting detail: SPECIMEN
Discharge: HOME | End: 2020-08-11
Payer: MEDICARE

## 2020-08-07 PROCEDURE — U0003 INFECTIOUS AGENT DETECTION BY NUCLEIC ACID (DNA OR RNA); SEVERE ACUTE RESPIRATORY SYNDROME CORONAVIRUS 2 (SARS-COV-2) (CORONAVIRUS DISEASE [COVID-19]), AMPLIFIED PROBE TECHNIQUE, MAKING USE OF HIGH THROUGHPUT TECHNOLOGIES AS DESCRIBED BY CMS-2020-01-R: HCPCS

## 2020-08-09 ENCOUNTER — APPOINTMENT (OUTPATIENT)
Dept: CT IMAGING | Age: 85
End: 2020-08-09
Payer: MEDICARE

## 2020-08-09 ENCOUNTER — APPOINTMENT (OUTPATIENT)
Dept: GENERAL RADIOLOGY | Age: 85
End: 2020-08-09
Payer: MEDICARE

## 2020-08-09 ENCOUNTER — HOSPITAL ENCOUNTER (EMERGENCY)
Age: 85
Discharge: HOME | End: 2020-08-09
Attending: EMERGENCY MEDICINE
Payer: MEDICARE

## 2020-08-09 VITALS
SYSTOLIC BLOOD PRESSURE: 160 MMHG | HEART RATE: 76 BPM | OXYGEN SATURATION: 95 % | BODY MASS INDEX: 25.97 KG/M2 | DIASTOLIC BLOOD PRESSURE: 72 MMHG | TEMPERATURE: 98.1 F | RESPIRATION RATE: 18 BRPM | WEIGHT: 142 LBS

## 2020-08-09 LAB
ABSOLUTE EOS #: 0.14 K/UL (ref 0–0.4)
ABSOLUTE IMMATURE GRANULOCYTE: 0 K/UL (ref 0–0.3)
ABSOLUTE LYMPH #: 0.41 K/UL (ref 1–4.8)
ABSOLUTE MONO #: 0.54 K/UL (ref 0.1–1.2)
ALBUMIN SERPL-MCNC: 3.1 G/DL (ref 3.5–5.2)
ALBUMIN/GLOBULIN RATIO: 1 (ref 1–2.5)
ALP BLD-CCNC: 195 U/L (ref 35–104)
ALT SERPL-CCNC: 79 U/L (ref 5–33)
ANION GAP SERPL CALCULATED.3IONS-SCNC: 13 MMOL/L (ref 9–17)
AST SERPL-CCNC: 63 U/L
BASOPHILS # BLD: 0 % (ref 0–1)
BASOPHILS ABSOLUTE: 0 K/UL (ref 0–0.2)
BILIRUB SERPL-MCNC: 0.36 MG/DL (ref 0.3–1.2)
BILIRUBIN DIRECT: 0.12 MG/DL
BILIRUBIN, INDIRECT: 0.24 MG/DL (ref 0–1)
BNP INTERPRETATION: ABNORMAL
BUN BLDV-MCNC: 14 MG/DL (ref 8–23)
BUN/CREAT BLD: 14 (ref 9–20)
CALCIUM SERPL-MCNC: 9.3 MG/DL (ref 8.6–10.4)
CHLORIDE BLD-SCNC: 93 MMOL/L (ref 98–107)
CK MB: 1.4 NG/ML
CO2: 26 MMOL/L (ref 20–31)
CREAT SERPL-MCNC: 0.97 MG/DL (ref 0.5–0.9)
D-DIMER QUANTITATIVE: 4.9 MG/L FEU (ref 0–0.59)
DIFFERENTIAL TYPE: ABNORMAL
EKG ATRIAL RATE: 77 BPM
EKG P AXIS: 22 DEGREES
EKG P-R INTERVAL: 132 MS
EKG Q-T INTERVAL: 394 MS
EKG QRS DURATION: 78 MS
EKG QTC CALCULATION (BAZETT): 445 MS
EKG R AXIS: -17 DEGREES
EKG T AXIS: 20 DEGREES
EKG VENTRICULAR RATE: 77 BPM
EOSINOPHILS RELATIVE PERCENT: 1 % (ref 1–7)
GFR AFRICAN AMERICAN: >60 ML/MIN
GFR NON-AFRICAN AMERICAN: 54 ML/MIN
GFR SERPL CREATININE-BSD FRML MDRD: ABNORMAL ML/MIN/{1.73_M2}
GFR SERPL CREATININE-BSD FRML MDRD: ABNORMAL ML/MIN/{1.73_M2}
GLOBULIN: 3.2 G/DL (ref 1.5–3.8)
GLUCOSE BLD-MCNC: 203 MG/DL (ref 70–99)
HCT VFR BLD CALC: 30.1 % (ref 36.3–47.1)
HEMOGLOBIN: 9.7 G/DL (ref 11.9–15.1)
IMMATURE GRANULOCYTES: 0 %
INR BLD: 1.1
LACTATE DEHYDROGENASE: 146 U/L (ref 135–214)
LACTIC ACID, SEPSIS WHOLE BLOOD: NORMAL MMOL/L (ref 0.5–1.9)
LACTIC ACID, SEPSIS: 0.9 MMOL/L (ref 0.5–1.9)
LYMPHOCYTES # BLD: 3 % (ref 16–46)
MAGNESIUM: 2 MG/DL (ref 1.6–2.6)
MCH RBC QN AUTO: 30.6 PG (ref 25.2–33.5)
MCHC RBC AUTO-ENTMCNC: 32.2 G/DL (ref 25.2–33.5)
MCV RBC AUTO: 95 FL (ref 82.6–102.9)
MONOCYTES # BLD: 4 % (ref 4–11)
MORPHOLOGY: ABNORMAL
MORPHOLOGY: ABNORMAL
MYOGLOBIN: 56 NG/ML (ref 25–58)
NRBC AUTOMATED: 0 PER 100 WBC
PARTIAL THROMBOPLASTIN TIME: 35.1 SEC (ref 23.9–33.8)
PDW BLD-RTO: 12.9 % (ref 11.8–14.4)
PLATELET # BLD: 391 K/UL (ref 138–453)
PLATELET ESTIMATE: ABNORMAL
PMV BLD AUTO: 10.4 FL (ref 8.1–13.5)
POTASSIUM SERPL-SCNC: 3.3 MMOL/L (ref 3.7–5.3)
PRO-BNP: 2922 PG/ML
PROTHROMBIN TIME: 14.1 SEC (ref 11.5–14.2)
RBC # BLD: 3.17 M/UL (ref 3.95–5.11)
RBC # BLD: ABNORMAL 10*6/UL
SARS-COV-2, NAA: NOT DETECTED
SEG NEUTROPHILS: 92 % (ref 43–77)
SEGMENTED NEUTROPHILS ABSOLUTE COUNT: 12.41 K/UL (ref 1.8–7.7)
SODIUM BLD-SCNC: 132 MMOL/L (ref 135–144)
TOTAL CK: 36 U/L (ref 26–192)
TOTAL PROTEIN: 6.3 G/DL (ref 6.4–8.3)
TROPONIN INTERP: ABNORMAL
TROPONIN T: ABNORMAL NG/ML
TROPONIN, HIGH SENSITIVITY: 55 NG/L (ref 0–14)
WBC # BLD: 13.5 K/UL (ref 3.5–11.3)
WBC # BLD: ABNORMAL 10*3/UL

## 2020-08-09 PROCEDURE — 85379 FIBRIN DEGRADATION QUANT: CPT

## 2020-08-09 PROCEDURE — 93005 ELECTROCARDIOGRAM TRACING: CPT | Performed by: EMERGENCY MEDICINE

## 2020-08-09 PROCEDURE — 85025 COMPLETE CBC W/AUTO DIFF WBC: CPT

## 2020-08-09 PROCEDURE — 83880 ASSAY OF NATRIURETIC PEPTIDE: CPT

## 2020-08-09 PROCEDURE — 94640 AIRWAY INHALATION TREATMENT: CPT

## 2020-08-09 PROCEDURE — 83874 ASSAY OF MYOGLOBIN: CPT

## 2020-08-09 PROCEDURE — 84484 ASSAY OF TROPONIN QUANT: CPT

## 2020-08-09 PROCEDURE — 83735 ASSAY OF MAGNESIUM: CPT

## 2020-08-09 PROCEDURE — 82553 CREATINE MB FRACTION: CPT

## 2020-08-09 PROCEDURE — 80076 HEPATIC FUNCTION PANEL: CPT

## 2020-08-09 PROCEDURE — 84145 PROCALCITONIN (PCT): CPT

## 2020-08-09 PROCEDURE — 2580000003 HC RX 258: Performed by: EMERGENCY MEDICINE

## 2020-08-09 PROCEDURE — 85730 THROMBOPLASTIN TIME PARTIAL: CPT

## 2020-08-09 PROCEDURE — 85610 PROTHROMBIN TIME: CPT

## 2020-08-09 PROCEDURE — 80048 BASIC METABOLIC PNL TOTAL CA: CPT

## 2020-08-09 PROCEDURE — 83605 ASSAY OF LACTIC ACID: CPT

## 2020-08-09 PROCEDURE — 6370000000 HC RX 637 (ALT 250 FOR IP): Performed by: EMERGENCY MEDICINE

## 2020-08-09 PROCEDURE — 36415 COLL VENOUS BLD VENIPUNCTURE: CPT

## 2020-08-09 PROCEDURE — 87040 BLOOD CULTURE FOR BACTERIA: CPT

## 2020-08-09 PROCEDURE — 86140 C-REACTIVE PROTEIN: CPT

## 2020-08-09 PROCEDURE — 71045 X-RAY EXAM CHEST 1 VIEW: CPT

## 2020-08-09 PROCEDURE — 2709999900 CT CHEST PULMONARY EMBOLISM W CONTRAST

## 2020-08-09 PROCEDURE — 83615 LACTATE (LD) (LDH) ENZYME: CPT

## 2020-08-09 PROCEDURE — 6360000004 HC RX CONTRAST MEDICATION: Performed by: EMERGENCY MEDICINE

## 2020-08-09 PROCEDURE — 82728 ASSAY OF FERRITIN: CPT

## 2020-08-09 PROCEDURE — 82550 ASSAY OF CK (CPK): CPT

## 2020-08-09 PROCEDURE — 99284 EMERGENCY DEPT VISIT MOD MDM: CPT

## 2020-08-09 RX ORDER — SODIUM CHLORIDE 9 MG/ML
1000 INJECTION, SOLUTION INTRAVENOUS CONTINUOUS
Status: DISCONTINUED | OUTPATIENT
Start: 2020-08-09 | End: 2020-08-09 | Stop reason: HOSPADM

## 2020-08-09 RX ORDER — HYDROCODONE BITARTRATE AND ACETAMINOPHEN 5; 325 MG/1; MG/1
1 TABLET ORAL 2 TIMES DAILY PRN
Qty: 10 TABLET | Refills: 0 | Status: SHIPPED | OUTPATIENT
Start: 2020-08-09 | End: 2020-08-14

## 2020-08-09 RX ORDER — ALBUTEROL SULFATE 90 UG/1
4 AEROSOL, METERED RESPIRATORY (INHALATION) EVERY 6 HOURS PRN
Status: DISCONTINUED | OUTPATIENT
Start: 2020-08-09 | End: 2020-08-09 | Stop reason: HOSPADM

## 2020-08-09 RX ORDER — ALBUTEROL SULFATE 90 UG/1
2 AEROSOL, METERED RESPIRATORY (INHALATION) EVERY 6 HOURS PRN
Qty: 1 INHALER | Refills: 0 | Status: SHIPPED | OUTPATIENT
Start: 2020-08-09 | End: 2022-05-25

## 2020-08-09 RX ADMIN — ALBUTEROL SULFATE 4 PUFF: 90 AEROSOL, METERED RESPIRATORY (INHALATION) at 17:11

## 2020-08-09 RX ADMIN — SODIUM CHLORIDE 1000 ML: 9 INJECTION, SOLUTION INTRAVENOUS at 15:52

## 2020-08-09 RX ADMIN — IOPAMIDOL 100 ML: 755 INJECTION, SOLUTION INTRAVENOUS at 16:26

## 2020-08-09 ASSESSMENT — PAIN DESCRIPTION - FREQUENCY: FREQUENCY: CONTINUOUS

## 2020-08-09 ASSESSMENT — PAIN - FUNCTIONAL ASSESSMENT
PAIN_FUNCTIONAL_ASSESSMENT: ACTIVITIES ARE NOT PREVENTED
PAIN_FUNCTIONAL_ASSESSMENT: 0-10

## 2020-08-09 ASSESSMENT — PAIN DESCRIPTION - ONSET: ONSET: ON-GOING

## 2020-08-09 ASSESSMENT — PAIN DESCRIPTION - DESCRIPTORS: DESCRIPTORS: ACHING;SHARP

## 2020-08-09 ASSESSMENT — PAIN DESCRIPTION - LOCATION: LOCATION: RIB CAGE

## 2020-08-09 ASSESSMENT — PAIN DESCRIPTION - PROGRESSION: CLINICAL_PROGRESSION: NOT CHANGED

## 2020-08-09 ASSESSMENT — PAIN SCALES - GENERAL
PAINLEVEL_OUTOF10: 4
PAINLEVEL_OUTOF10: 5

## 2020-08-09 ASSESSMENT — PAIN DESCRIPTION - ORIENTATION: ORIENTATION: LEFT

## 2020-08-09 ASSESSMENT — PAIN DESCRIPTION - PAIN TYPE: TYPE: ACUTE PAIN

## 2020-08-09 NOTE — ED PROVIDER NOTES
[facility] ED  EMERGENCY DEPARTMENT ENCOUNTER      Pt Name: Keya Villanueva  MRN: [id number]  [place] 4/11/1932  Date of evaluation: 8/9/2020  Provider: Vik Coker MD    [address] [place] [address]     Chief Complaint   Patient presents with    Rib Pain     left    Cough         HISTORY OF PRESENT ILLNESS   (Location/Symptom, Timing/Onset, Context/Setting,Quality, Duration, Modifying Factors, Severity)  Note limiting factors. Keya Villanueva is a 80 y.o. female who presents to the emergency department with a chief complaint of pain in the left side of her chest that she noticed when she was getting out of bed 2 days ago. Patient does not report chills or fever. 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 She is scheduled follow-up with her oncologist and pulmonologist later in the week to have thoracentesis performed on the right side.        The history is provided by the patient, a relative and medical records. Nursing Notes werereviewed. REVIEW OF SYSTEMS    (2-9 systems for level 4, 10 or more for level 5)     Review of Systems   Constitutional: Negative for fever. Respiratory: Negative for cough and shortness of breath. All other systems reviewed and are negative. Except as noted above the remainder of the review of systems was reviewed and negative.        PAST MEDICAL HISTORY     Past Medical History:   Diagnosis Date    Adenocarcinoma of endometrium ([address]     Aortic valve sclerosis     Bradycardia     follows with cardiology- Dr. Dallas Ojeda, possible SSS    Cardiac murmur     Cataract of left eye     Chronic diarrhea     s/p radiation    Diabetes mellitus type II, controlled ([address]     diet controlled     Dysthymia     Gait abnormality 6/23/2014    Glaucoma     H/O renal calculi     H/O vein stripping     Hard of hearing     History of anemia     History of dizziness     follows with cardiology, bradycardia, possible SSS    History of radiation therapy     for uterine cancer    History of small bowel obstruction 03/2019    no surgery required    Hyperlipidemia     Hypertension     Hypothyroidism     Influenza A 1/11/2018    Left elbow fracture     S/P surgical repair    [facility]     Lung nodule     Macular degeneration     Obesity     Pericardial effusion 4/23/2014    Pseudophakia of right eye     PVD (peripheral vascular disease) ([facility]     Traumatic injury of lower extremity childhood    bilateral trauma of lower extremities    Vertigo     resolved    Wears glasses     Wears partial dentures     upper          SURGICALHISTORY       Past Surgical History:   Procedure Laterality Date    CATARACT REMOVAL Right February 2013    Dr. Madisyn Reeves, LAPAROSCOPIC  5/28/1998    COLONOSCOPY  7/22/2003    Dr. Ivan Stratton Left September 2010    ORIF, Dr. Franci Merino ERCP  7/19/1998    retained stone, Suzy Weems and Brian Deluca MAGDA AND BSO  March 2002    endometrial adenocarcinoma, Dr. Goodson Son       Discharge Medication List as of 8/9/2020  5:45 PM      CONTINUE these medications which have NOT CHANGED    Details   nitrofurantoin, macrocrystal-monohydrate, (MACROBID) 100 MG capsule Take 1 capsule by mouth 2 times daily for 10 days, Disp-20 capsule,R-0Normal      ferrous sulfate (IRON 325) 325 (65 Fe) MG tablet Take 325 mg by mouth dailyHistorical Med      sertraline (ZOLOFT) 50 MG tablet TAKE 1 TABLET DAILY, Disp-90 tablet, R-3Normal      oxybutynin (DITROPAN-XL) 10 MG extended release tablet TAKE 1 TABLET DAILY, Disp-90 tablet, R-4Normal      ezetimibe (ZETIA) 10 MG tablet TAKE 1 TABLET DAILY, Disp-90 tablet, R-4Normal      levothyroxine (SYNTHROID) 88 MCG tablet TAKE 1 TABLET DAILY, Disp-90 tablet, R-4Normal ondansetron (ZOFRAN) 4 MG tablet Take 1 tablet by mouth every 8 hours as needed for Nausea or Vomiting, Disp-45 tablet, R-1Normal      lisinopril (PRINIVIL;ZESTRIL) 40 MG tablet TAKE 1 TABLET NIGHTLY, Disp-90 tablet, R-4Normal      meclizine (ANTIVERT) 25 MG tablet Take 12.5 mg by mouth 3 times daily as neededHistorical Med      Probiotic Product (PROBIOTIC DAILY) CAPS One tablet 3 time a day times 10 days then once a day, R-0OTC      !! Multiple Vitamins-Minerals (THERAPEUTIC MULTIVITAMIN-MINERALS) tablet Take 1 tablet by mouth dailyHistorical Med      CRANBERRY PO Take 1 capsule by mouth dailyHistorical Med      !! Multiple Vitamins-Minerals (OCUVITE PRESERVISION PO) Take 1 tablet by mouth daily      latanoprost (XALATAN) 0.005 % ophthalmic solution Place 1 drop into both eyes nightly. aspirin 81 MG tablet Take 81 mg by mouth daily. !! - Potential duplicate medications found. Please discuss with provider. ALLERGIES     Allopurinol; Metoprolol tartrate [metoprolol]; Percocet [oxycodone-acetaminophen]; Phenergan [promethazine hcl]; Polycitra-k [potassium citrate]; Statins; Levaquin [levofloxacin]; Sulfa antibiotics; and Tessalon [benzonatate]    FAMILY HISTORY       Family History   Problem Relation Age of Onset    High Blood Pressure Other     Diabetes type 2  Mother         developed later in life          SOCIAL HISTORY       Social History     Socioeconomic History    Marital status:      Spouse name: None    Number of children: None    Years of education: None    Highest education level: None   Occupational History    None   Social Needs    Financial resource strain: None    Food insecurity     Worry: None     Inability: None    Transportation needs     Medical: None     Non-medical: None   Tobacco Use    Smoking status: Never Smoker    Smokeless tobacco: Never Used    Tobacco comment: never smoked tito rrt 01/11/2018   Substance and Sexual Activity    Alcohol use:  No Alcohol/week: 0.0 standard drinks    Drug use: No    Sexual activity: None   Lifestyle    Physical activity     Days per week: None     Minutes per session: None    Stress: None   Relationships    Social connections     Talks on phone: None     Gets together: None     Attends Denominational service: None     Active member of club or organization: None     Attends meetings of clubs or organizations: None     Relationship status: None    Intimate partner violence     Fear of current or ex partner: None     Emotionally abused: None     Physically abused: None     Forced sexual activity: None   Other Topics Concern    None   Social History Narrative    None       SCREENINGS    [place] Coma Scale  Eye Opening: Spontaneous  Best Verbal Response: Oriented  Best Motor Response: Obeys commands  [place] Coma Scale Score: 15        PHYSICAL EXAM    (up to 7 for level 4, 8 or more for level 5)     ED Triage Vitals   BP Temp Temp Source Pulse Resp SpO2 Height Weight   08/09/20 1528 08/09/20 1451 08/09/20 1451 08/09/20 1451 08/09/20 1451 08/09/20 1451 -- 08/09/20 1451   (!) 149/73 98.1 °F (36.7 °C) Tympanic 83 12 95 %  142 lb (64.4 kg)       Physical Exam  Vitals signs reviewed. Constitutional:       General: She is not in acute distress. Appearance: She is not ill-appearing. HENT:      Head: Normocephalic. Right Ear: External ear normal.      Left Ear: External ear normal.      Nose: Nose normal.      Mouth/Throat:      Mouth: Mucous membranes are moist.   Eyes:      Extraocular Movements: Extraocular movements intact. Neck:      Musculoskeletal: Normal range of motion. Cardiovascular:      Rate and Rhythm: Normal rate and regular rhythm. Heart sounds: Normal heart sounds. Pulmonary:      Effort: Pulmonary effort is normal.      Breath sounds: Rhonchi present. Chest:      Chest wall: Tenderness present. Abdominal:      Palpations: Abdomen is soft. Tenderness:  There is no abdominal tenderness. Musculoskeletal: Normal range of motion. Skin:     General: Skin is warm and dry. Coloration: Skin is not pale. Neurological:      General: No focal deficit present. Mental Status: She is alert and oriented to person, place, and time. Mental status is at baseline. Psychiatric:         Mood and Affect: Mood normal.         DIAGNOSTIC RESULTS     EKG: All EKG's are interpreted by the Emergency Department Physician who either signs orCo-signs this chart in the absence of a cardiologist.    Sinus rhythm with rate of 77 beats a minute. LVH by voltage criteria. RADIOLOGY:   Non-plain film images such as CT, Ultrasound and MRI are read by the radiologist. Plain radiographic images are visualized and preliminarily interpreted by the emergency physician with the below findings:    Interpretation per the Radiologist below, ifavailable at the time of this note:    CT CHEST PULMONARY EMBOLISM W CONTRAST   Final Result   1. No evidence of pulmonary embolic disease. 2. Moderate right pleural effusion with trace left pleural effusion. Tree-in-bud opacification within the aerated lungs, likely a bronchiolitis. 3. Significant right lower lobe volume loss. This encompasses the nodule   noted on remote imaging in January of this year. Underlying tumor is not   excluded. Close imaging follow-up recommended. XR CHEST PORTABLE   Final Result   New moderate right pleural effusion with basilar volume loss. Underlying   infiltrate not excluded.                ED BEDSIDE ULTRASOUND:   Performed by ED Physician - none    LABS:  Labs Reviewed   BASIC METABOLIC PANEL - Abnormal; Notable for the following components:       Result Value    Glucose 203 (*)     CREATININE 0.97 (*)     Sodium 132 (*)     Potassium 3.3 (*)     Chloride 93 (*)     GFR Non- 54 (*)     All other components within normal limits   BRAIN NATRIURETIC PEPTIDE - Abnormal; Notable for the following components: Pro-BNP 2,922 (*)     All other components within normal limits   CBC WITH AUTO DIFFERENTIAL - Abnormal; Notable for the following components:    WBC 13.5 (*)     RBC 3.17 (*)     Hemoglobin 9.7 (*)     Hematocrit 30.1 (*)     Lymphocytes 3 (*)     Seg Neutrophils 92 (*)     Absolute Lymph # 0.41 (*)     Segs Absolute 12.41 (*)     All other components within normal limits   C-REACTIVE PROTEIN - Abnormal; Notable for the following components:    CRP 93.9 (*)     All other components within normal limits   D-DIMER, QUANTITATIVE - Abnormal; Notable for the following components:    D-Dimer, Quant 4.90 (*)     All other components within normal limits   HEPATIC FUNCTION PANEL - Abnormal; Notable for the following components:    Alb 3.1 (*)     Alkaline Phosphatase 195 (*)     ALT 79 (*)     AST 63 (*)     Total Protein 6.3 (*)     All other components within normal limits   APTT - Abnormal; Notable for the following components:    PTT 35.1 (*)     All other components within normal limits   TROPONIN - Abnormal; Notable for the following components:    Troponin, High Sensitivity 55 (*)     All other components within normal limits   FERRITIN - Abnormal; Notable for the following components:    Ferritin 821 (*)     All other components within normal limits   PROCALCITONIN - Abnormal; Notable for the following components:    Procalcitonin 0.51 (*)     All other components within normal limits   CULTURE, BLOOD 1   CULTURE, BLOOD 1   CK-MB   CK   LACTATE, SEPSIS   MAGNESIUM   MYOGLOBIN, SERUM   PROTIME-INR   LACTATE DEHYDROGENASE       All other labs were within normal range ornot returned as of this dictation.     EMERGENCY DEPARTMENT COURSE and DIFFERENTIAL DIAGNOSIS/MDM:   Vitals:    Vitals:    08/09/20 1451 08/09/20 1528 08/09/20 1641   BP:  (!) 149/73 (!) 160/72   Pulse: 83 85 76   Resp: 12 12 18   Temp: 98.1 °F (36.7 °C)     TempSrc: Tympanic     SpO2: 95% 94% 95%   Weight: 142 lb (64.4 kg)              Chest x-ray is nondiagnostic. CT scan of the chest was also obtained and there is no sign of pulmonary embolism. There is a right-sided pleural effusion with trace left pleural effusion. Patient's pain is reproducible with local pressure and is felt to be musculoskeletal in etiology. For her wheezing she is prescribed albuterol inhaler and is placed on Norco for the pain. She is referred to her physicians for outpatient follow-up. MDM    CONSULTS:  None    PROCEDURES:  Unlessotherwise noted below, none     Procedures    FINAL IMPRESSION      1. Chest wall pain    2.  Bronchospasm          DISPOSITION/PLAN   DISPOSITION Decision To Discharge 08/09/2020 05:42:02 PM      PATIENT REFERRED TO:  [facility] [address] [facility]          Pantera Alfred MD  [facility] [address] [facility]          Anum Catalan MD  [facility] [address]          [address] [address] [address]  [phone]    As needed, If symptoms worsen      DISCHARGE MEDICATIONS:         Problem List:  Patient Active Problem List   Diagnosis Code    DM (diabetes mellitus) type II controlled with renal manifestation ([facility] E11.29    Hypertension I10    Hyperlipidemia E78.5    Hypothyroidism E03.9    Chronic diarrhea K52.9    Dysthymia F34.1    Gait abnormality R26.9    Chronic kidney disease, stage III (moderate) (HCC), likely related to diabetes N18.3    Early stage nonexudative age-related macular degeneration of both eyes H35.3131    Glaucoma of both eyes H40.9    Overweight E66.3    Carotid stenosis, bilateral I65.23    Urinary incontinence R32    Adenocarcinoma, lung, right (HCC) C34.91    Age related entropion, left H02.036    Epiphora due to excess lacrimation of right side H04.211    Frail elderly R54    Lung mass R91.8    Malignant neoplasm of endometrium (HCC) C54.1    Malignant MD  08/11/20 [id number]

## 2020-08-10 ENCOUNTER — TELEPHONE (OUTPATIENT)
Dept: INTERNAL MEDICINE | Age: 85
End: 2020-08-10

## 2020-08-10 ENCOUNTER — CARE COORDINATION (OUTPATIENT)
Dept: CARE COORDINATION | Age: 85
End: 2020-08-10

## 2020-08-10 LAB
C-REACTIVE PROTEIN: 93.9 MG/L (ref 0–5)
FERRITIN: 821 UG/L (ref 13–150)
PROCALCITONIN: 0.51 NG/ML

## 2020-08-10 RX ORDER — GUAIFENESIN AND CODEINE PHOSPHATE 100; 10 MG/5ML; MG/5ML
5-10 SOLUTION ORAL EVERY 6 HOURS PRN
Qty: 250 ML | Refills: 0 | Status: SHIPPED | OUTPATIENT
Start: 2020-08-10 | End: 2020-08-10 | Stop reason: CLARIF

## 2020-08-10 RX ORDER — GUAIFENESIN AND CODEINE PHOSPHATE 100; 10 MG/5ML; MG/5ML
5-10 SOLUTION ORAL EVERY 6 HOURS PRN
Qty: 250 ML | Refills: 0 | Status: SHIPPED | OUTPATIENT
Start: 2020-08-10 | End: 2020-08-17

## 2020-08-10 NOTE — TELEPHONE ENCOUNTER
Prescription for Cheratussin sent to pharmacy.  She should NOT take it together with the Krysta Troy.

## 2020-08-10 NOTE — TELEPHONE ENCOUNTER
Patient was in ER 8/09/2020 with a cough. She also has a left sided pain due to coughing so much. She was given an rx for albuterol and [place] from the ER, the daughter would like to know if you would send in an rx for cough medicine? The daughter stated she was on Cheratussin ac before and that seemed to help?        Corporation

## 2020-08-11 ENCOUNTER — CARE COORDINATION (OUTPATIENT)
Dept: CARE COORDINATION | Age: 85
End: 2020-08-11

## 2020-08-11 ASSESSMENT — ENCOUNTER SYMPTOMS
COUGH: 0
SHORTNESS OF BREATH: 0

## 2020-08-13 ENCOUNTER — HOSPITAL ENCOUNTER (OUTPATIENT)
Dept: GENERAL RADIOLOGY | Age: 85
Discharge: HOME | End: 2020-08-15
Payer: MEDICARE

## 2020-08-13 ENCOUNTER — HOSPITAL ENCOUNTER (OUTPATIENT)
Dept: ULTRASOUND IMAGING | Age: 85
Discharge: HOME | End: 2020-08-15
Payer: MEDICARE

## 2020-08-13 VITALS
WEIGHT: 140 LBS | TEMPERATURE: 98.2 F | HEART RATE: 68 BPM | OXYGEN SATURATION: 96 % | RESPIRATION RATE: 24 BRPM | HEIGHT: 62 IN | DIASTOLIC BLOOD PRESSURE: 65 MMHG | SYSTOLIC BLOOD PRESSURE: 152 MMHG | BODY MASS INDEX: 25.76 KG/M2

## 2020-08-13 PROCEDURE — C1729 CATH, DRAINAGE: HCPCS

## 2020-08-13 PROCEDURE — 7100000011 HC PHASE II RECOVERY - ADDTL 15 MIN

## 2020-08-13 PROCEDURE — 88305 TISSUE EXAM BY PATHOLOGIST: CPT

## 2020-08-13 PROCEDURE — 88184 FLOWCYTOMETRY/ TC 1 MARKER: CPT

## 2020-08-13 PROCEDURE — 88112 CYTOPATH CELL ENHANCE TECH: CPT

## 2020-08-13 PROCEDURE — 89051 BODY FLUID CELL COUNT: CPT

## 2020-08-13 PROCEDURE — 88185 FLOWCYTOMETRY/TC ADD-ON: CPT

## 2020-08-13 PROCEDURE — 71045 X-RAY EXAM CHEST 1 VIEW: CPT

## 2020-08-13 PROCEDURE — 7100000010 HC PHASE II RECOVERY - FIRST 15 MIN

## 2020-08-13 ASSESSMENT — PAIN - FUNCTIONAL ASSESSMENT
PAIN_FUNCTIONAL_ASSESSMENT: 0-10
PAIN_FUNCTIONAL_ASSESSMENT: 0-10

## 2020-08-13 ASSESSMENT — PAIN SCALES - GENERAL: PAINLEVEL_OUTOF10: 0

## 2020-08-14 LAB
CASE NUMBER:: NORMAL
SPECIMEN DESCRIPTION: NORMAL

## 2020-08-16 LAB
CULTURE: NORMAL
CULTURE: NORMAL
Lab: NORMAL
Lab: NORMAL
SPECIMEN DESCRIPTION: NORMAL
SPECIMEN DESCRIPTION: NORMAL

## 2020-08-17 ENCOUNTER — OFFICE VISIT (OUTPATIENT)
Dept: INTERNAL MEDICINE | Age: 85
End: 2020-08-17
Payer: MEDICARE

## 2020-08-17 ENCOUNTER — HOSPITAL ENCOUNTER (OUTPATIENT)
Dept: LAB | Age: 85
Discharge: HOME | End: 2020-08-17
Payer: MEDICARE

## 2020-08-17 ENCOUNTER — HOSPITAL ENCOUNTER (OUTPATIENT)
Dept: GENERAL RADIOLOGY | Age: 85
Discharge: HOME | End: 2020-08-19
Payer: MEDICARE

## 2020-08-17 VITALS
BODY MASS INDEX: 26.13 KG/M2 | DIASTOLIC BLOOD PRESSURE: 60 MMHG | SYSTOLIC BLOOD PRESSURE: 108 MMHG | TEMPERATURE: 97.7 F | WEIGHT: 142 LBS | HEIGHT: 62 IN | RESPIRATION RATE: 20 BRPM | OXYGEN SATURATION: 96 % | HEART RATE: 68 BPM

## 2020-08-17 LAB
ABSOLUTE EOS #: 0.23 K/UL (ref 0–0.44)
ABSOLUTE IMMATURE GRANULOCYTE: 0.37 K/UL (ref 0–0.3)
ABSOLUTE LYMPH #: 1.52 K/UL (ref 1.1–3.7)
ABSOLUTE MONO #: 1.12 K/UL (ref 0.1–1.2)
ANION GAP SERPL CALCULATED.3IONS-SCNC: 11 MMOL/L (ref 9–17)
APPEARANCE FLUID: ABNORMAL
BASO FLUID: ABNORMAL %
BASOPHILS # BLD: 1 % (ref 0–2)
BASOPHILS ABSOLUTE: 0.09 K/UL (ref 0–0.2)
BUN BLDV-MCNC: 14 MG/DL (ref 8–23)
BUN/CREAT BLD: 14 (ref 9–20)
CALCIUM SERPL-MCNC: 9 MG/DL (ref 8.6–10.4)
CHLORIDE BLD-SCNC: 95 MMOL/L (ref 98–107)
CO2: 27 MMOL/L (ref 20–31)
COLOR FLUID: YELLOW
CREAT SERPL-MCNC: 1 MG/DL (ref 0.5–0.9)
DIFFERENTIAL TYPE: ABNORMAL
EOSINOPHIL FLUID: ABNORMAL %
EOSINOPHILS RELATIVE PERCENT: 1 % (ref 1–4)
ESTIMATED AVERAGE GLUCOSE: 143 MG/DL
FLOW CYTOMETRY SOURCE: NORMAL
FLOW CYTOMETRY, NODE/FLUID: NORMAL
FLUID DIFF COMMENT: ABNORMAL
GFR AFRICAN AMERICAN: >60 ML/MIN
GFR NON-AFRICAN AMERICAN: 52 ML/MIN
GFR SERPL CREATININE-BSD FRML MDRD: ABNORMAL ML/MIN/{1.73_M2}
GFR SERPL CREATININE-BSD FRML MDRD: ABNORMAL ML/MIN/{1.73_M2}
GLUCOSE BLD-MCNC: 159 MG/DL (ref 70–99)
HBA1C MFR BLD: 6.6 % (ref 4.8–5.9)
HCT VFR BLD CALC: 28.4 % (ref 36.3–47.1)
HEMOGLOBIN: 8.8 G/DL (ref 11.9–15.1)
IMMATURE GRANULOCYTES: 2 %
IRON SATURATION: 14 % (ref 20–55)
IRON: 29 UG/DL (ref 37–145)
LYMPHOCYTES # BLD: 9 % (ref 24–43)
LYMPHOCYTES, BODY FLUID: 24 %
MCH RBC QN AUTO: 29.9 PG (ref 25.2–33.5)
MCHC RBC AUTO-ENTMCNC: 31 G/DL (ref 25.2–33.5)
MCV RBC AUTO: 96.6 FL (ref 82.6–102.9)
MONOCYTE, FLUID: 3 %
MONOCYTES # BLD: 7 % (ref 3–12)
NEUTROPHIL, FLUID: 28 %
NRBC AUTOMATED: 0 PER 100 WBC
OTHER CELLS FLUID: 45 %
PDW BLD-RTO: 14 % (ref 11.8–14.4)
PLATELET # BLD: 432 K/UL (ref 138–453)
PLATELET ESTIMATE: ABNORMAL
PMV BLD AUTO: 9.9 FL (ref 8.1–13.5)
POTASSIUM SERPL-SCNC: 3.6 MMOL/L (ref 3.7–5.3)
RBC # BLD: 2.94 M/UL (ref 3.95–5.11)
RBC # BLD: ABNORMAL 10*6/UL
RBC FLUID: 2000 /MM3
SEG NEUTROPHILS: 80 % (ref 36–65)
SEGMENTED NEUTROPHILS ABSOLUTE COUNT: 13.49 K/UL (ref 1.5–8.1)
SODIUM BLD-SCNC: 133 MMOL/L (ref 135–144)
SPECIMEN TYPE: ABNORMAL
SURGICAL PATHOLOGY REPORT: NORMAL
SURGICAL PATHOLOGY REPORT: NORMAL
TOTAL IRON BINDING CAPACITY: 201 UG/DL (ref 250–450)
UNSATURATED IRON BINDING CAPACITY: 172 UG/DL (ref 112–347)
WBC # BLD: 16.8 K/UL (ref 3.5–11.3)
WBC # BLD: ABNORMAL 10*3/UL
WBC FLUID: 1600 /MM3

## 2020-08-17 PROCEDURE — 1123F ACP DISCUSS/DSCN MKR DOCD: CPT | Performed by: NURSE PRACTITIONER

## 2020-08-17 PROCEDURE — 4040F PNEUMOC VAC/ADMIN/RCVD: CPT | Performed by: NURSE PRACTITIONER

## 2020-08-17 PROCEDURE — 83036 HEMOGLOBIN GLYCOSYLATED A1C: CPT

## 2020-08-17 PROCEDURE — G8417 CALC BMI ABV UP PARAM F/U: HCPCS | Performed by: NURSE PRACTITIONER

## 2020-08-17 PROCEDURE — 1036F TOBACCO NON-USER: CPT | Performed by: NURSE PRACTITIONER

## 2020-08-17 PROCEDURE — 1090F PRES/ABSN URINE INCON ASSESS: CPT | Performed by: NURSE PRACTITIONER

## 2020-08-17 PROCEDURE — 83550 IRON BINDING TEST: CPT

## 2020-08-17 PROCEDURE — 71046 X-RAY EXAM CHEST 2 VIEWS: CPT

## 2020-08-17 PROCEDURE — G8427 DOCREV CUR MEDS BY ELIG CLIN: HCPCS | Performed by: NURSE PRACTITIONER

## 2020-08-17 PROCEDURE — 36415 COLL VENOUS BLD VENIPUNCTURE: CPT

## 2020-08-17 PROCEDURE — 99214 OFFICE O/P EST MOD 30 MIN: CPT | Performed by: NURSE PRACTITIONER

## 2020-08-17 PROCEDURE — 83540 ASSAY OF IRON: CPT

## 2020-08-17 PROCEDURE — 85025 COMPLETE CBC W/AUTO DIFF WBC: CPT

## 2020-08-17 PROCEDURE — 80048 BASIC METABOLIC PNL TOTAL CA: CPT

## 2020-08-17 NOTE — PROGRESS NOTES
08/17/20  Ian Puls  4/11/1932      Chief Complaint:   1. Persistent cough    2. Anemia, unspecified type    3. Acute cystitis without hematuria    4. Adenocarcinoma, lung, right ([facility] [place] [address]    5. Essential hypertension    6. Chronic kidney disease, stage III (moderate) ([facility] [place] [address] likely related to diabetes        HPI:  80year-old patient being seen for follow-up from the ER where she was seen for left-sided rib pain on 8/9/2020. Patient with bilateral pleural effusions underwent thoracentesis on 813 patient does have follow-up appointment scheduled with hematology and pulmonology. 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 .  Culture results ending. If noted to be a cancer daughter states not a candidate for radiation or chemotherapy due to her overall health. Notes reviewed from hematology/oncology and verified this. If she would be a candidate for immunotherapy they would suggest doing this versus a follow-up to patient with a persistent cough. Was noted she was given. Daughter states she has not started that yet was told not to take that and the pain pill together. She has not had Norco for this side pain in many days. She currently is using an over-the-counter cough medicine with little to relief in the cough. She does get winded when ambulating long distances. Denies any shortness of breath at rest.  Daughter inquiring about her mentation. Feels that she has been a little \"off\" she has had some urinary frequency. Positive urgency but no dysuria. No fever. No chills. Asking for repeat urine today. Feels she empties out appropriately. Patient was also so noted to have a mild hypokalemia was told to increase her potassium in her diet. Daughter with her today inquiring how her labs look. Wanting further labs checked today.     Allergies   Allergen Reactions    Allopurinol      Patient unsure of reaction    Metoprolol Tartrate [Metoprolol]      bradycardia    Percocet [Oxycodone-Acetaminophen]      Hallucinations. ...sensitive to narcotics    Phenergan [Promethazine Hcl]      Very sensitive. ..given small doses    Polycitra-K [Potassium Citrate]      Patient unsure of reaction    Statins Other (See Comments)     ? Muscle aches     Levaquin [Levofloxacin] Anxiety     Not able to sleep    Sulfa Antibiotics Rash    Tessalon [Benzonatate] Anxiety     Not able to sleep       Past Medical History:   Diagnosis Date    Adenocarcinoma of endometrium ([address]     Aortic valve sclerosis     Bradycardia     follows with cardiology- Dr. Lynda Camilo, possible SSS    Cardiac murmur     Cataract of left eye     Chronic diarrhea     s/p radiation    Diabetes mellitus type II, controlled ([address]     diet controlled     Dysthymia     Gait abnormality 6/23/2014    Glaucoma     H/O renal calculi     H/O vein stripping     Hard of hearing     History of anemia     History of dizziness     follows with cardiology, bradycardia, possible SSS    History of radiation therapy     for uterine cancer    History of small bowel obstruction 03/2019    no surgery required    Hyperlipidemia     Hypertension     Hypothyroidism     Influenza A 1/11/2018    Left elbow fracture     S/P surgical repair    Lung cancer ([facility] [address]     Lung nodule     Macular degeneration     Obesity     Pericardial effusion 4/23/2014    Pseudophakia of right eye     PVD (peripheral vascular disease) ([address]     Traumatic injury of lower extremity childhood    bilateral trauma of lower extremities    Vertigo     resolved    Wears glasses     Wears partial dentures     upper        Past Surgical History:   Procedure Laterality Date    CATARACT REMOVAL Right February 2013    Dr. Muniz Child, LAPAROSCOPIC  5/28/1998    COLONOSCOPY  7/22/2003    Dr. Roseanne Curiel Left September 2010    ORIF, Dr. Rosie Topete ERCP  7/19/1998    retained stone, Suzy Justice and Ovidio    MAGDA AND BSO Medical: Not on file     Non-medical: Not on file   Tobacco Use    Smoking status: Never Smoker    Smokeless tobacco: Never Used    Tobacco comment: never smoked tito rrt 01/11/2018   Substance and Sexual Activity    Alcohol use: No     Alcohol/week: 0.0 standard drinks    Drug use: No    Sexual activity: Not on file   Lifestyle    Physical activity     Days per week: Not on file     Minutes per session: Not on file    Stress: Not on file   Relationships    Social connections     Talks on phone: Not on file     Gets together: Not on file     Attends [employer] service: Not on file     Active member of club or organization: Not on file     Attends meetings of clubs or organizations: Not on file     Relationship status: Not on file    Intimate partner violence     Fear of current or ex partner: Not on file     Emotionally abused: Not on file     Physically abused: Not on file     Forced sexual activity: Not on file   Other Topics Concern    Not on file   Social History Narrative    Not on file       Review of Systems   Constitutional: Negative for activity change, appetite change, chills, fatigue, fever and unexpected weight change. HENT: Negative for congestion, dental problem, ear discharge, ear pain, facial swelling, hearing loss, postnasal drip, rhinorrhea, sinus pressure, sore throat and trouble swallowing. Eyes: Negative for pain and visual disturbance. Respiratory: Negative for cough, chest tightness, shortness of breath and wheezing. Cardiovascular: Negative for chest pain, palpitations and leg swelling. Gastrointestinal: Negative for abdominal pain, blood in stool, constipation, diarrhea, nausea and vomiting. Endocrine: Negative for cold intolerance, heat intolerance and polyuria. Genitourinary: Positive for frequency and urgency. Negative for decreased urine volume, difficulty urinating, dysuria, flank pain, hematuria, pelvic pain and vaginal bleeding.    Musculoskeletal: Positive for arthralgias. Negative for gait problem, myalgias, neck pain and neck stiffness. Skin: Negative for color change, rash and wound. Neurological: Positive for weakness. Negative for dizziness, tremors, seizures, light-headedness, numbness and headaches. Psychiatric/Behavioral: Negative for confusion and hallucinations. The patient is not nervous/anxious. Physical Exam  Vitals signs and nursing note reviewed. Constitutional:       General: She is not in acute distress. Appearance: Normal appearance. She is well-developed. She is not diaphoretic. HENT:      Head: Normocephalic and atraumatic. Right Ear: External ear normal.      Left Ear: External ear normal.   Eyes:      General:         Right eye: No discharge. Left eye: No discharge. Neck:      Trachea: No tracheal deviation. Cardiovascular:      Rate and Rhythm: Normal rate and regular rhythm. Pulses: Normal pulses. Heart sounds: Normal heart sounds. No murmur. No friction rub. No gallop. Pulmonary:      Effort: Pulmonary effort is normal. No respiratory distress. Breath sounds: No stridor. No wheezing, rhonchi or rales. Comments: Diminished in bilateral bases  Chest:      Chest wall: No tenderness. Abdominal:      General: Bowel sounds are normal. There is no distension. Palpations: Abdomen is soft. Tenderness: There is no abdominal tenderness. Musculoskeletal:         General: No swelling. Skin:     General: Skin is warm and dry. Capillary Refill: Capillary refill takes less than 2 seconds. Coloration: Skin is not pale. Findings: No rash. Neurological:      General: No focal deficit present. Mental Status: She is alert. Cranial Nerves: No cranial nerve deficit. Sensory: No sensory deficit.       Coordination: Coordination normal.      Comments: Pleasantly confused on today's exam   Psychiatric:         Mood and Affect: Mood normal. Behavior: Behavior normal.       Vitals:    08/17/20 1451   BP: 108/60   Site: Right Upper Arm   Position: Sitting   Cuff Size: Medium Adult   Pulse: 68   Resp: 20   Temp: 97.7 °F (36.5 °C)   TempSrc: Temporal   SpO2: 96%   Weight: 142 lb (64.4 kg)   Height: 5' 2\" (1.575 m)       Assessment:  1. Persistent cough  May use the Tussionex as previously provided. Stop over-the-counter. No further narcotics being given at present  - XR CHEST STANDARD (2 VW); Future    2. Anemia, unspecified type  Repeat CBC today due to dyspnea on exertion along with history of anemia/ongoing weakness  - CBC Auto Differential; Future  - Basic Metabolic Panel; Future    3. Acute cystitis without hematuria  UA today  - Urinalysis Reflex to Culture; Future    4. Adenocarcinoma, lung, right ([facility] [address]  History of. Radiation received last year. Ongoing follow-up with pulmonologist, hematologist/oncologist along with radiation oncologist    5. Essential hypertension  Stable at present    6. Chronic kidney disease, stage III (moderate) ([address] likely related to diabetes  Repeat labs today. Plan:  As noted above. ER records reviewed plan was discussed with daughter and patient  Follow up for routine visit. Call sooner with concerns prior.     Electronically signed by MOHAN Harper CNP on 8/17/2020 at 4:30 PM

## 2020-08-18 ASSESSMENT — ENCOUNTER SYMPTOMS
TROUBLE SWALLOWING: 0
WHEEZING: 0
SINUS PRESSURE: 0
SHORTNESS OF BREATH: 0
CHEST TIGHTNESS: 0
FACIAL SWELLING: 0
NAUSEA: 0
SORE THROAT: 0
ABDOMINAL PAIN: 0
EYE PAIN: 0
DIARRHEA: 0
COUGH: 0
BLOOD IN STOOL: 0
CONSTIPATION: 0
RHINORRHEA: 0
VOMITING: 0
COLOR CHANGE: 0

## 2020-08-20 ENCOUNTER — HOSPITAL ENCOUNTER (OUTPATIENT)
Age: 85
Setting detail: SPECIMEN
Discharge: HOME | End: 2020-08-20
Payer: MEDICARE

## 2020-08-20 PROCEDURE — 81001 URINALYSIS AUTO W/SCOPE: CPT

## 2020-08-21 ENCOUNTER — TELEPHONE (OUTPATIENT)
Dept: PULMONOLOGY | Age: 85
End: 2020-08-21

## 2020-08-21 LAB
-: NORMAL
AMORPHOUS: NORMAL
BACTERIA: NORMAL
BILIRUBIN URINE: NEGATIVE
CASTS UA: NORMAL /LPF (ref 0–2)
COLOR: ABNORMAL
COMMENT UA: ABNORMAL
CRYSTALS, UA: NORMAL /HPF
EPITHELIAL CELLS UA: NORMAL /HPF (ref 0–5)
GLUCOSE URINE: NEGATIVE
KETONES, URINE: NEGATIVE
LEUKOCYTE ESTERASE, URINE: NEGATIVE
MUCUS: NORMAL
NITRITE, URINE: NEGATIVE
OTHER OBSERVATIONS UA: NORMAL
PH UA: 6 (ref 5–6)
PROTEIN UA: NEGATIVE
RBC UA: NORMAL /HPF (ref 0–4)
RENAL EPITHELIAL, UA: NORMAL /HPF
SPECIFIC GRAVITY UA: 1 (ref 1.01–1.02)
TRICHOMONAS: NORMAL
TURBIDITY: ABNORMAL
URINE HGB: NEGATIVE
UROBILINOGEN, URINE: NORMAL
WBC UA: NORMAL /HPF (ref 0–4)
YEAST: NORMAL

## 2020-08-24 ENCOUNTER — TELEPHONE (OUTPATIENT)
Dept: PULMONOLOGY | Age: 85
End: 2020-08-24

## 2020-08-24 ENCOUNTER — TELEPHONE (OUTPATIENT)
Dept: GENERAL RADIOLOGY | Age: 85
End: 2020-08-24

## 2020-08-24 NOTE — TELEPHONE ENCOUNTER
Dr. Lala BANG would like to speak with you in regards to biopsy. He would like you to call his cell phone at [phone].

## 2020-08-24 NOTE — TELEPHONE ENCOUNTER
I have a referral for Scot Alamo to have a Thoracentesis done. She is on  Aspirin 81mg. Per Radiologist Recommendation, we typically discontinue this 5 days prior to scheduled procedure. Please advise as to whether or not we are able to do so, and if she is on any other blood thinners.     Thanks,  [facility]  Radiology

## 2020-08-24 NOTE — TELEPHONE ENCOUNTER
I called Dr Christian Rdz - he is on vacation but did discuss case with me . Please arrange for patient to have repeat thoracentesis and send fluid for cytology - I will put order in for cytology and thoracentesis . This will increase sensitivity to get malignant cells but if repeat thoracentesis results are negative then she will need bronchoscopy . So please schedule follow up in clinic 1 week after thoracentesis .      Electronically signed by Bekah Duncan MD on 8/24/2020 at 10:42 AM

## 2020-08-26 ENCOUNTER — TELEPHONE (OUTPATIENT)
Dept: GENERAL RADIOLOGY | Age: 85
End: 2020-08-26

## 2020-08-26 NOTE — TELEPHONE ENCOUNTER
Alejandrina Taylor is scheduled for an Ultrasound Guided Thoracentesis on Tuesday 9/1 at noon. She is to arrive one hour prior to scheduled time, check into hospital registration, and she must have a . The appointment and information was confirmed with the patients daughter [facility] ([phone]) on 8/26 at [phone]. Dr. Mackenzie Pierce' office advised [facility] to discontinue Marjories Aspirin 5 days prior to scheduled procedure. Can you please replace the Thoracentesis order with  as that is what we use in [place].   Thanks,  [facility]  Radiology

## 2020-08-27 ENCOUNTER — TELEPHONE (OUTPATIENT)
Dept: INTERNAL MEDICINE | Age: 85
End: 2020-08-27

## 2020-08-27 ENCOUNTER — TELEPHONE (OUTPATIENT)
Dept: PULMONOLOGY | Age: 85
End: 2020-08-27

## 2020-08-27 NOTE — TELEPHONE ENCOUNTER
Patient's daughter, Divina Gonzalez is requesting if a refill for Cheratussin AC would be appropriate. She usually only takes at night. Uses an OTC cough syrup typically during the day prn. Daughter reports her cough seems to be improving but at times coughs more frequently. Scheduled for a thoracentesis on 9/1/20. Please advise.  [facility].    Next follow up on 9/8/20

## 2020-08-27 NOTE — TELEPHONE ENCOUNTER
Can we find out if getting a thoracentesis or CT guided bx- looks like Pulm wanted to bx area in lung

## 2020-08-27 NOTE — TELEPHONE ENCOUNTER
Per patient's daughter, patient is having a hard time using her Albuterol inhaler. Patient is exhaling more rather than inhaler the medication. She is using a spacer. Daughter is asking if this can be switched to the nebulizer solution? Please send to [facility] pharmacy. She will also need an order for a nebulizer machine. I can fax to [facility] once placed. Thanks.

## 2020-08-28 RX ORDER — GUAIFENESIN AND CODEINE PHOSPHATE 100; 10 MG/5ML; MG/5ML
5-10 SOLUTION ORAL EVERY 6 HOURS PRN
Qty: 250 ML | Refills: 0 | Status: SHIPPED | OUTPATIENT
Start: 2020-08-28 | End: 2020-09-04

## 2020-08-28 RX ORDER — ALBUTEROL SULFATE 2.5 MG/3ML
2.5 SOLUTION RESPIRATORY (INHALATION) EVERY 6 HOURS PRN
Qty: 120 EACH | Refills: 3 | Status: SHIPPED | OUTPATIENT
Start: 2020-08-28 | End: 2022-05-25

## 2020-08-28 NOTE — TELEPHONE ENCOUNTER
Left detailed message stating instructions and that it was sent to [facility] [[email or web address]

## 2020-08-28 NOTE — TELEPHONE ENCOUNTER
Spoke with [facility] pulmonology and they are doing thoracentesis. Bx on hold at this time - Dr. Yesica De La Rosa spoke with IR (interventional radiologist).

## 2020-08-28 NOTE — TELEPHONE ENCOUNTER
Daughter asked if it is okay to get a refill on Cheratussin cough syrup to continue to use prn - please advise.  [facility]

## 2020-08-31 ENCOUNTER — APPOINTMENT (OUTPATIENT)
Dept: CT IMAGING | Age: 85
DRG: 641 | End: 2020-08-31
Payer: MEDICARE

## 2020-08-31 ENCOUNTER — HOSPITAL ENCOUNTER (INPATIENT)
Age: 85
LOS: 3 days | Discharge: HOME | DRG: 641 | End: 2020-09-03
Attending: EMERGENCY MEDICINE | Admitting: INTERNAL MEDICINE
Payer: MEDICARE

## 2020-08-31 PROBLEM — E87.1 HYPONATREMIA: Status: ACTIVE | Noted: 2020-08-31

## 2020-08-31 LAB
-: NORMAL
ABSOLUTE EOS #: <0.03 K/UL (ref 0–0.44)
ABSOLUTE IMMATURE GRANULOCYTE: 0.1 K/UL (ref 0–0.3)
ABSOLUTE LYMPH #: 0.78 K/UL (ref 1.1–3.7)
ABSOLUTE MONO #: 1.07 K/UL (ref 0.1–1.2)
ALBUMIN SERPL-MCNC: 3.3 G/DL (ref 3.5–5.2)
ALBUMIN/GLOBULIN RATIO: 1.2 (ref 1–2.5)
ALP BLD-CCNC: 163 U/L (ref 35–104)
ALT SERPL-CCNC: 36 U/L (ref 5–33)
AMORPHOUS: NORMAL
ANION GAP SERPL CALCULATED.3IONS-SCNC: 10 MMOL/L (ref 9–17)
AST SERPL-CCNC: 26 U/L
BACTERIA: NORMAL
BASOPHILS # BLD: 0 % (ref 0–2)
BASOPHILS ABSOLUTE: 0.06 K/UL (ref 0–0.2)
BILIRUB SERPL-MCNC: 0.24 MG/DL (ref 0.3–1.2)
BILIRUBIN DIRECT: 0.1 MG/DL
BILIRUBIN URINE: NEGATIVE
BILIRUBIN, INDIRECT: 0.14 MG/DL (ref 0–1)
BUN BLDV-MCNC: 14 MG/DL (ref 8–23)
BUN/CREAT BLD: 20 (ref 9–20)
CALCIUM SERPL-MCNC: 9.3 MG/DL (ref 8.6–10.4)
CASTS UA: NORMAL /LPF (ref 0–2)
CHLORIDE BLD-SCNC: 93 MMOL/L (ref 98–107)
CHOLESTEROL/HDL RATIO: 3.7
CHOLESTEROL: 129 MG/DL
CO2: 25 MMOL/L (ref 20–31)
COLOR: ABNORMAL
COMMENT UA: ABNORMAL
CREAT SERPL-MCNC: 0.69 MG/DL (ref 0.5–0.9)
CRYSTALS, UA: NORMAL /HPF
DIFFERENTIAL TYPE: ABNORMAL
EOSINOPHILS RELATIVE PERCENT: 0 % (ref 1–4)
EPITHELIAL CELLS UA: NORMAL /HPF (ref 0–5)
GFR AFRICAN AMERICAN: >60 ML/MIN
GFR NON-AFRICAN AMERICAN: >60 ML/MIN
GFR SERPL CREATININE-BSD FRML MDRD: ABNORMAL ML/MIN/{1.73_M2}
GFR SERPL CREATININE-BSD FRML MDRD: ABNORMAL ML/MIN/{1.73_M2}
GLOBULIN: 2.8 G/DL (ref 1.5–3.8)
GLUCOSE BLD-MCNC: 189 MG/DL (ref 70–99)
GLUCOSE URINE: NEGATIVE
HCT VFR BLD CALC: 29.2 % (ref 36.3–47.1)
HDLC SERPL-MCNC: 35 MG/DL
HEMOGLOBIN: 9 G/DL (ref 11.9–15.1)
IMMATURE GRANULOCYTES: 1 %
KETONES, URINE: ABNORMAL
LDL CHOLESTEROL: 67 MG/DL (ref 0–130)
LEUKOCYTE ESTERASE, URINE: NEGATIVE
LIPASE: 7 U/L (ref 13–60)
LYMPHOCYTES # BLD: 6 % (ref 24–43)
MCH RBC QN AUTO: 29.9 PG (ref 25.2–33.5)
MCHC RBC AUTO-ENTMCNC: 30.8 G/DL (ref 25.2–33.5)
MCV RBC AUTO: 97 FL (ref 82.6–102.9)
MONOCYTES # BLD: 8 % (ref 3–12)
MUCUS: NORMAL
NITRITE, URINE: NEGATIVE
NRBC AUTOMATED: 0 PER 100 WBC
OTHER OBSERVATIONS UA: NORMAL
PDW BLD-RTO: 14.6 % (ref 11.8–14.4)
PH UA: 6 (ref 5–6)
PLATELET # BLD: 476 K/UL (ref 138–453)
PLATELET ESTIMATE: ABNORMAL
PMV BLD AUTO: 10.3 FL (ref 8.1–13.5)
POTASSIUM SERPL-SCNC: 4.3 MMOL/L (ref 3.7–5.3)
PROTEIN UA: NEGATIVE
RBC # BLD: 3.01 M/UL (ref 3.95–5.11)
RBC # BLD: ABNORMAL 10*6/UL
RBC UA: NORMAL /HPF (ref 0–4)
RENAL EPITHELIAL, UA: NORMAL /HPF
SEG NEUTROPHILS: 85 % (ref 36–65)
SEGMENTED NEUTROPHILS ABSOLUTE COUNT: 11.7 K/UL (ref 1.5–8.1)
SODIUM BLD-SCNC: 128 MMOL/L (ref 135–144)
SPECIFIC GRAVITY UA: 1.01 (ref 1.01–1.02)
TOTAL PROTEIN: 6.1 G/DL (ref 6.4–8.3)
TRICHOMONAS: NORMAL
TRIGL SERPL-MCNC: 133 MG/DL
TURBIDITY: ABNORMAL
URINE HGB: NEGATIVE
UROBILINOGEN, URINE: NORMAL
VLDLC SERPL CALC-MCNC: ABNORMAL MG/DL (ref 1–30)
WBC # BLD: 13.7 K/UL (ref 3.5–11.3)
WBC # BLD: ABNORMAL 10*3/UL
WBC UA: NORMAL /HPF (ref 0–4)
YEAST: NORMAL

## 2020-08-31 PROCEDURE — 6370000000 HC RX 637 (ALT 250 FOR IP): Performed by: EMERGENCY MEDICINE

## 2020-08-31 PROCEDURE — 99285 EMERGENCY DEPT VISIT HI MDM: CPT

## 2020-08-31 PROCEDURE — 2580000003 HC RX 258: Performed by: EMERGENCY MEDICINE

## 2020-08-31 PROCEDURE — 80061 LIPID PANEL: CPT

## 2020-08-31 PROCEDURE — 83690 ASSAY OF LIPASE: CPT

## 2020-08-31 PROCEDURE — 6360000004 HC RX CONTRAST MEDICATION: Performed by: EMERGENCY MEDICINE

## 2020-08-31 PROCEDURE — 85025 COMPLETE CBC W/AUTO DIFF WBC: CPT

## 2020-08-31 PROCEDURE — 80076 HEPATIC FUNCTION PANEL: CPT

## 2020-08-31 PROCEDURE — 81001 URINALYSIS AUTO W/SCOPE: CPT

## 2020-08-31 PROCEDURE — 94760 N-INVAS EAR/PLS OXIMETRY 1: CPT

## 2020-08-31 PROCEDURE — 2060000000 HC ICU INTERMEDIATE R&B

## 2020-08-31 PROCEDURE — 36415 COLL VENOUS BLD VENIPUNCTURE: CPT

## 2020-08-31 PROCEDURE — 74177 CT ABD & PELVIS W/CONTRAST: CPT

## 2020-08-31 PROCEDURE — 80048 BASIC METABOLIC PNL TOTAL CA: CPT

## 2020-08-31 RX ORDER — NICOTINE 21 MG/24HR
1 PATCH, TRANSDERMAL 24 HOURS TRANSDERMAL DAILY PRN
Status: DISCONTINUED | OUTPATIENT
Start: 2020-08-31 | End: 2020-09-01

## 2020-08-31 RX ORDER — FERROUS SULFATE 325(65) MG
325 TABLET ORAL DAILY
Status: DISCONTINUED | OUTPATIENT
Start: 2020-09-01 | End: 2020-09-03 | Stop reason: HOSPADM

## 2020-08-31 RX ORDER — ASPIRIN 81 MG/1
81 TABLET ORAL DAILY
Status: DISCONTINUED | OUTPATIENT
Start: 2020-09-01 | End: 2020-09-03 | Stop reason: HOSPADM

## 2020-08-31 RX ORDER — LEVOTHYROXINE SODIUM 88 UG/1
88 TABLET ORAL DAILY
Status: DISCONTINUED | OUTPATIENT
Start: 2020-09-01 | End: 2020-09-03 | Stop reason: HOSPADM

## 2020-08-31 RX ORDER — MAGNESIUM SULFATE 1 G/100ML
1 INJECTION INTRAVENOUS PRN
Status: DISCONTINUED | OUTPATIENT
Start: 2020-08-31 | End: 2020-09-01

## 2020-08-31 RX ORDER — SODIUM CHLORIDE 9 MG/ML
INJECTION, SOLUTION INTRAVENOUS CONTINUOUS
Status: DISCONTINUED | OUTPATIENT
Start: 2020-08-31 | End: 2020-09-03 | Stop reason: HOSPADM

## 2020-08-31 RX ORDER — SODIUM CHLORIDE 9 MG/ML
1000 INJECTION, SOLUTION INTRAVENOUS CONTINUOUS
Status: DISCONTINUED | OUTPATIENT
Start: 2020-08-31 | End: 2020-08-31

## 2020-08-31 RX ORDER — DEXTROSE MONOHYDRATE 50 MG/ML
100 INJECTION, SOLUTION INTRAVENOUS PRN
Status: DISCONTINUED | OUTPATIENT
Start: 2020-08-31 | End: 2020-09-03 | Stop reason: HOSPADM

## 2020-08-31 RX ORDER — LISINOPRIL 20 MG/1
40 TABLET ORAL DAILY
Status: DISCONTINUED | OUTPATIENT
Start: 2020-08-31 | End: 2020-09-03 | Stop reason: HOSPADM

## 2020-08-31 RX ORDER — ALBUTEROL SULFATE 2.5 MG/3ML
2.5 SOLUTION RESPIRATORY (INHALATION) EVERY 6 HOURS PRN
Status: DISCONTINUED | OUTPATIENT
Start: 2020-08-31 | End: 2020-09-03 | Stop reason: HOSPADM

## 2020-08-31 RX ORDER — POLYETHYLENE GLYCOL 3350 17 G/17G
17 POWDER, FOR SOLUTION ORAL DAILY PRN
Status: DISCONTINUED | OUTPATIENT
Start: 2020-08-31 | End: 2020-09-03 | Stop reason: HOSPADM

## 2020-08-31 RX ORDER — DEXTROSE MONOHYDRATE 25 G/50ML
12.5 INJECTION, SOLUTION INTRAVENOUS PRN
Status: DISCONTINUED | OUTPATIENT
Start: 2020-08-31 | End: 2020-09-03 | Stop reason: HOSPADM

## 2020-08-31 RX ORDER — LATANOPROST 50 UG/ML
1 SOLUTION/ DROPS OPHTHALMIC NIGHTLY
Status: DISCONTINUED | OUTPATIENT
Start: 2020-08-31 | End: 2020-09-03 | Stop reason: HOSPADM

## 2020-08-31 RX ORDER — SODIUM CHLORIDE 0.9 % (FLUSH) 0.9 %
10 SYRINGE (ML) INJECTION EVERY 12 HOURS SCHEDULED
Status: DISCONTINUED | OUTPATIENT
Start: 2020-08-31 | End: 2020-09-03 | Stop reason: HOSPADM

## 2020-08-31 RX ORDER — SODIUM CHLORIDE 0.9 % (FLUSH) 0.9 %
10 SYRINGE (ML) INJECTION PRN
Status: DISCONTINUED | OUTPATIENT
Start: 2020-08-31 | End: 2020-09-03 | Stop reason: HOSPADM

## 2020-08-31 RX ORDER — ACETAMINOPHEN 500 MG
1000 TABLET ORAL ONCE
Status: COMPLETED | OUTPATIENT
Start: 2020-08-31 | End: 2020-08-31

## 2020-08-31 RX ORDER — ONDANSETRON 2 MG/ML
4 INJECTION INTRAMUSCULAR; INTRAVENOUS EVERY 6 HOURS PRN
Status: DISCONTINUED | OUTPATIENT
Start: 2020-08-31 | End: 2020-09-03 | Stop reason: HOSPADM

## 2020-08-31 RX ORDER — OXYBUTYNIN CHLORIDE 5 MG/1
10 TABLET, EXTENDED RELEASE ORAL DAILY
Status: DISCONTINUED | OUTPATIENT
Start: 2020-09-01 | End: 2020-09-03 | Stop reason: HOSPADM

## 2020-08-31 RX ADMIN — SODIUM CHLORIDE 1000 ML: 9 INJECTION, SOLUTION INTRAVENOUS at 19:10

## 2020-08-31 RX ADMIN — IOPAMIDOL 100 ML: 755 INJECTION, SOLUTION INTRAVENOUS at 19:41

## 2020-08-31 RX ADMIN — ACETAMINOPHEN 1000 MG: 500 TABLET, FILM COATED ORAL at 22:09

## 2020-08-31 ASSESSMENT — PAIN DESCRIPTION - DESCRIPTORS: DESCRIPTORS: SHARP

## 2020-08-31 ASSESSMENT — PAIN DESCRIPTION - LOCATION
LOCATION: ABDOMEN
LOCATION: ABDOMEN

## 2020-08-31 ASSESSMENT — PAIN DESCRIPTION - PAIN TYPE
TYPE: ACUTE PAIN
TYPE: ACUTE PAIN

## 2020-08-31 ASSESSMENT — ENCOUNTER SYMPTOMS
COUGH: 0
SHORTNESS OF BREATH: 0

## 2020-08-31 ASSESSMENT — PAIN SCALES - GENERAL
PAINLEVEL_OUTOF10: 6
PAINLEVEL_OUTOF10: 5

## 2020-08-31 NOTE — ED PROVIDER NOTES
[facility]  EMERGENCY DEPARTMENT ENCOUNTER      Pt Name: Julia Gutierres  MRN: [id number]  [place] 4/11/1932  Date of evaluation: 8/31/2020  Provider: Betty Sanford MD    [address]     Chief Complaint   Patient presents with    Abdominal Pain     x 1 day         HISTORY OF PRESENT ILLNESS   (Location/Symptom, Timing/Onset, Context/Setting,Quality, Duration, Modifying Factors, Severity)  Note limiting factors. Julia Gutierres is a 80 y.o. female who presents to the emergency department with a chief complaint of diffuse lower abdominal pain since early this afternoon. She denies chills or fever, nausea or vomiting. Her daughter states she has had bowel obstruction in the past.  Patient has a history of neoplasm in the right lung and is scheduled for thoracentesis tomorrow. The history is provided by the patient, a relative and medical records. Nursing Notes werereviewed. REVIEW OF SYSTEMS    (2-9 systems for level 4, 10 or more for level 5)     Review of Systems   Constitutional: Negative for fever. Respiratory: Negative for cough and shortness of breath. All other systems reviewed and are negative. Except as noted above the remainder of the review of systems was reviewed and negative.        PAST MEDICAL HISTORY     Past Medical History:   Diagnosis Date    Adenocarcinoma of endometrium ([address]     Aortic valve sclerosis     Bradycardia     follows with cardiology- Dr. Lyubov Batista, possible SSS    Cardiac murmur     Cataract of left eye     Chronic diarrhea     s/p radiation    Diabetes mellitus type II, controlled ([address]     diet controlled     Dysthymia     Gait abnormality 6/23/2014    Glaucoma     H/O renal calculi     H/O vein stripping     Hard of hearing     History of anemia     History of dizziness     follows with cardiology, bradycardia, possible SSS    History of radiation therapy     for uterine cancer    History of small bowel obstruction 03/2019    no surgery required    Hyperlipidemia     Hypertension     Hypothyroidism     Influenza A 1/11/2018    Left elbow fracture     S/P surgical repair    Lung cancer ([facility] [address]     Lung nodule     Macular degeneration     Obesity     Pericardial effusion 4/23/2014    Pseudophakia of right eye     PVD (peripheral vascular disease) ([facility] [address]     Traumatic injury of lower extremity childhood    bilateral trauma of lower extremities    Vertigo     resolved    Wears glasses     Wears partial dentures     upper          SURGICALHISTORY       Past Surgical History:   Procedure Laterality Date    CATARACT REMOVAL Right February 2013    Dr. Dereje Mustafa, LAPAROSCOPIC  5/28/1998    COLONOSCOPY  7/22/2003    Dr. Bryant William Left September 2010    ORIF, Dr. Humberto Pastor ERCP  7/19/1998    retained stone, Suzy Ferrara and Karoline MAN AND BSO  March 2002    endometrial adenocarcinoma, Dr. Adrianna Murdock       Previous Medications    ALBUTEROL (PROVENTIL) (2.5 MG/3ML) 0.083% NEBULIZER SOLUTION    Take 3 mLs by nebulization every 6 hours as needed for Wheezing or Shortness of Breath    ALBUTEROL SULFATE HFA (PROVENTIL HFA) 108 (90 BASE) MCG/ACT INHALER    Inhale 2 puffs into the lungs every 6 hours as needed for Wheezing    ASPIRIN 81 MG TABLET    Take 81 mg by mouth daily Held 5 days prior to paracentesis on 9-1-20    CRANBERRY PO    Take 1 capsule by mouth daily    EZETIMIBE (ZETIA) 10 MG TABLET    TAKE 1 TABLET DAILY    FERROUS SULFATE (IRON 325) 325 (65 FE) MG TABLET    Take 325 mg by mouth daily    GUAIFENESIN-CODEINE (CHERATUSSIN AC) 100-10 MG/5ML SYRUP    Take 5-10 mLs by mouth every 6 hours as needed for Cough for up to 7 days. LATANOPROST (XALATAN) 0.005 % OPHTHALMIC SOLUTION    Place 1 drop into both eyes nightly.     LEVOTHYROXINE (SYNTHROID) 88 MCG TABLET    TAKE 1 TABLET DAILY    LISINOPRIL (PRINIVIL;ZESTRIL) 40 MG TABLET    TAKE 1 TABLET NIGHTLY    MECLIZINE (ANTIVERT) 25 MG TABLET    Take 12.5 mg by mouth 3 times daily as needed    MULTIPLE VITAMINS-MINERALS (OCUVITE PRESERVISION PO)    Take 1 tablet by mouth daily    MULTIPLE VITAMINS-MINERALS (THERAPEUTIC MULTIVITAMIN-MINERALS) TABLET    Take 1 tablet by mouth daily    ONDANSETRON (ZOFRAN) 4 MG TABLET    Take 1 tablet by mouth every 8 hours as needed for Nausea or Vomiting    OXYBUTYNIN (DITROPAN-XL) 10 MG EXTENDED RELEASE TABLET    TAKE 1 TABLET DAILY    PROBIOTIC PRODUCT (PROBIOTIC DAILY) CAPS    One tablet 3 time a day times 10 days then once a day    SERTRALINE (ZOLOFT) 50 MG TABLET    TAKE 1 TABLET DAILY       ALLERGIES     Allopurinol; Metoprolol tartrate [metoprolol]; Percocet [oxycodone-acetaminophen]; Phenergan [promethazine hcl]; Polycitra-k [potassium citrate]; Statins; Levaquin [levofloxacin]; Sulfa antibiotics; and Tessalon [benzonatate]    FAMILY HISTORY       Family History   Problem Relation Age of Onset    High Blood Pressure Other     Diabetes type 2  Mother         developed later in life          SOCIAL HISTORY       Social History     Socioeconomic History    Marital status:       Spouse name: None    Number of children: None    Years of education: None    Highest education level: None   Occupational History    None   Social Needs    Financial resource strain: None    Food insecurity     Worry: None     Inability: None    Transportation needs     Medical: None     Non-medical: None   Tobacco Use    Smoking status: Never Smoker    Smokeless tobacco: Never Used    Tobacco comment: never smoked tito rrt 01/11/2018   Substance and Sexual Activity    Alcohol use: No     Alcohol/week: 0.0 standard drinks    Drug use: No    Sexual activity: None   Lifestyle    Physical activity     Days per week: None     Minutes per session: None    Stress: None   Relationships    Social connections     Talks on phone: None     Gets together: None     Attends [employer] service: None     Active member of club or organization: None     Attends meetings of clubs or organizations: None     Relationship status: None    Intimate partner violence     Fear of current or ex partner: None     Emotionally abused: None     Physically abused: None     Forced sexual activity: None   Other Topics Concern    None   Social History Narrative    None       SCREENINGS    [place] Coma Scale  Eye Opening: Spontaneous  Best Verbal Response: Oriented  Best Motor Response: Obeys commands  [place]ris Coma Scale Score: 15        PHYSICAL EXAM    (up to 7 for level 4, 8 or more for level 5)     ED Triage Vitals   BP Temp Temp src Pulse Resp SpO2 Height Weight   -- -- -- -- -- -- -- --       Physical Exam  Vitals signs reviewed. Constitutional:       General: She is not in acute distress. HENT:      Head: Normocephalic. Right Ear: External ear normal.      Left Ear: External ear normal.      Nose: Nose normal.   Eyes:      Extraocular Movements: Extraocular movements intact. Neck:      Musculoskeletal: Neck supple. Cardiovascular:      Rate and Rhythm: Normal rate and regular rhythm. Pulmonary:      Breath sounds: Examination of the right-lower field reveals decreased breath sounds. Decreased breath sounds present. Chest:      Chest wall: No tenderness. Abdominal:      General: Abdomen is protuberant. Bowel sounds are decreased. Palpations: Abdomen is soft. There is no fluid wave, hepatomegaly or splenomegaly. Tenderness: There is abdominal tenderness in the right lower quadrant, suprapubic area and left lower quadrant. There is no guarding or rebound. Musculoskeletal: Normal range of motion. General: No tenderness. Skin:     General: Skin is warm and dry. Neurological:      General: No focal deficit present. Mental Status: She is alert and oriented to person, place, and time.          DIAGNOSTIC RESULTS     EKG: All EKG's are interpreted by the Emergency Department Physician who either signs orCo-signs this chart in the absence of a cardiologist.    RADIOLOGY:   Non-plain film images such as CT, Ultrasound and MRI are read by the radiologist. Plain radiographic images are visualized and preliminarily interpreted by the emergency physician with the below findings:    Interpretation per the Radiologist below, ifavailable at the time of this note:    CT ABDOMEN PELVIS W IV CONTRAST Additional Contrast? None    (Results Pending)         ED BEDSIDE ULTRASOUND:   Performed by ED Physician - none    LABS:  Labs Reviewed   BASIC METABOLIC PANEL - Abnormal; Notable for the following components:       Result Value    Glucose 189 (*)     Sodium 128 (*)     Chloride 93 (*)     All other components within normal limits   CBC WITH AUTO DIFFERENTIAL - Abnormal; Notable for the following components:    WBC 13.7 (*)     RBC 3.01 (*)     Hemoglobin 9.0 (*)     Hematocrit 29.2 (*)     RDW 14.6 (*)     Platelets 253 (*)     Seg Neutrophils 85 (*)     Lymphocytes 6 (*)     Eosinophils % 0 (*)     Immature Granulocytes 1 (*)     Segs Absolute 11.70 (*)     Absolute Lymph # 0.78 (*)     All other components within normal limits   LIPASE - Abnormal; Notable for the following components:    Lipase 7 (*)     All other components within normal limits   LIPID PANEL - Abnormal; Notable for the following components:    HDL 35 (*)     All other components within normal limits   URINALYSIS   HEPATIC FUNCTION PANEL       All other labs were within normal range ornot returned as of this dictation. EMERGENCY DEPARTMENT COURSE and DIFFERENTIAL DIAGNOSIS/MDM:   Vitals:    Vitals:    08/31/20 1852   BP: 131/78   Pulse: 81   Resp: 12   Temp: 99.1 °F (37.3 °C)   TempSrc: Tympanic   SpO2: 95%   Weight: 140 lb (63.5 kg)   Height: 5' 1\" (1.549 m)       ED Course as of Aug 31 1948   Mon Aug 31, 2020   1947 Work-up is still in progress at change of shift.   Care is transferred to Dr. Roseann Mora for evaluation injection 100 mL (100 mLs Intravenous Given 8/31/20 1941)       New Prescriptions from this visit:    New Prescriptions    No medications on file       Follow-up:  No follow-up provider specified. Final Impression:   1.  Lower abdominal pain               (Please note that portions of this note were completed with a voice recognitionprogram.  Efforts were made to edit the dictations but occasionally words are mis-transcribed.)    Betty Sanford MD (electronically signed)  Attending Emergency Physician            Betty Sanford MD  08/31/20 0985

## 2020-08-31 NOTE — ED TRIAGE NOTES
Pt to room #3 via w/c from private auto with c/o abd pain she states began 8-30-20 in the afternoon. Reports last BM 8-30-20 as normal in consistency. Pt denies N/V. Pt is scheduled to have paracentesis of fluid removal on lung 9-1-20. Pt denies feeling SOB at this time.

## 2020-09-01 ENCOUNTER — HOSPITAL ENCOUNTER (INPATIENT)
Dept: INTERVENTIONAL RADIOLOGY/VASCULAR | Age: 85
Discharge: HOME | DRG: 641 | End: 2020-09-03
Payer: MEDICARE

## 2020-09-01 ENCOUNTER — HOSPITAL ENCOUNTER (OUTPATIENT)
Age: 85
Setting detail: SPECIMEN
DRG: 641 | End: 2020-09-01
Payer: MEDICARE

## 2020-09-01 ENCOUNTER — APPOINTMENT (OUTPATIENT)
Dept: GENERAL RADIOLOGY | Age: 85
DRG: 641 | End: 2020-09-01
Payer: MEDICARE

## 2020-09-01 ENCOUNTER — HOSPITAL ENCOUNTER (OUTPATIENT)
Dept: INTERVENTIONAL RADIOLOGY/VASCULAR | Age: 85
Discharge: HOME | End: 2020-09-03
Payer: COMMERCIAL

## 2020-09-01 ENCOUNTER — HOSPITAL ENCOUNTER (OUTPATIENT)
Age: 85
Setting detail: SPECIMEN
Discharge: HOME | End: 2020-09-01
Payer: COMMERCIAL

## 2020-09-01 LAB
ANION GAP SERPL CALCULATED.3IONS-SCNC: 8 MMOL/L (ref 9–17)
BUN BLDV-MCNC: 11 MG/DL (ref 8–23)
BUN/CREAT BLD: 18 (ref 9–20)
CALCIUM SERPL-MCNC: 8.8 MG/DL (ref 8.6–10.4)
CHLORIDE BLD-SCNC: 99 MMOL/L (ref 98–107)
CO2: 26 MMOL/L (ref 20–31)
CREAT SERPL-MCNC: 0.62 MG/DL (ref 0.5–0.9)
GFR AFRICAN AMERICAN: >60 ML/MIN
GFR NON-AFRICAN AMERICAN: >60 ML/MIN
GFR SERPL CREATININE-BSD FRML MDRD: ABNORMAL ML/MIN/{1.73_M2}
GFR SERPL CREATININE-BSD FRML MDRD: ABNORMAL ML/MIN/{1.73_M2}
GLUCOSE BLD-MCNC: 134 MG/DL (ref 65–105)
GLUCOSE BLD-MCNC: 146 MG/DL (ref 70–99)
GLUCOSE BLD-MCNC: 156 MG/DL (ref 65–105)
GLUCOSE BLD-MCNC: 187 MG/DL (ref 65–105)
HCT VFR BLD CALC: 26.9 % (ref 36.3–47.1)
HEMOGLOBIN: 8.2 G/DL (ref 11.9–15.1)
INR BLD: 1.2
MCH RBC QN AUTO: 30 PG (ref 25.2–33.5)
MCHC RBC AUTO-ENTMCNC: 30.5 G/DL (ref 25.2–33.5)
MCV RBC AUTO: 98.5 FL (ref 82.6–102.9)
NRBC AUTOMATED: 0 PER 100 WBC
PDW BLD-RTO: 14.7 % (ref 11.8–14.4)
PLATELET # BLD: 420 K/UL (ref 138–453)
PMV BLD AUTO: 10.3 FL (ref 8.1–13.5)
POTASSIUM SERPL-SCNC: 3.9 MMOL/L (ref 3.7–5.3)
PROTHROMBIN TIME: 14.3 SEC (ref 11.5–14.2)
RBC # BLD: 2.73 M/UL (ref 3.95–5.11)
SERUM OSMOLALITY: 280 MOSM/KG (ref 275–295)
SODIUM BLD-SCNC: 133 MMOL/L (ref 135–144)
WBC # BLD: 14.4 K/UL (ref 3.5–11.3)

## 2020-09-01 PROCEDURE — 99222 1ST HOSP IP/OBS MODERATE 55: CPT | Performed by: INTERNAL MEDICINE

## 2020-09-01 PROCEDURE — 97165 OT EVAL LOW COMPLEX 30 MIN: CPT | Performed by: OCCUPATIONAL THERAPIST

## 2020-09-01 PROCEDURE — 94760 N-INVAS EAR/PLS OXIMETRY 1: CPT

## 2020-09-01 PROCEDURE — 85610 PROTHROMBIN TIME: CPT

## 2020-09-01 PROCEDURE — 80048 BASIC METABOLIC PNL TOTAL CA: CPT

## 2020-09-01 PROCEDURE — 88305 TISSUE EXAM BY PATHOLOGIST: CPT

## 2020-09-01 PROCEDURE — 2060000000 HC ICU INTERMEDIATE R&B

## 2020-09-01 PROCEDURE — 6360000002 HC RX W HCPCS: Performed by: NURSE PRACTITIONER

## 2020-09-01 PROCEDURE — 93005 ELECTROCARDIOGRAM TRACING: CPT | Performed by: INTERNAL MEDICINE

## 2020-09-01 PROCEDURE — 71045 X-RAY EXAM CHEST 1 VIEW: CPT

## 2020-09-01 PROCEDURE — 2709999900 US THORACENTESIS

## 2020-09-01 PROCEDURE — 6370000000 HC RX 637 (ALT 250 FOR IP): Performed by: NURSE PRACTITIONER

## 2020-09-01 PROCEDURE — 83930 ASSAY OF BLOOD OSMOLALITY: CPT

## 2020-09-01 PROCEDURE — 82947 ASSAY GLUCOSE BLOOD QUANT: CPT

## 2020-09-01 PROCEDURE — 85027 COMPLETE CBC AUTOMATED: CPT

## 2020-09-01 PROCEDURE — 36415 COLL VENOUS BLD VENIPUNCTURE: CPT

## 2020-09-01 PROCEDURE — 97161 PT EVAL LOW COMPLEX 20 MIN: CPT

## 2020-09-01 PROCEDURE — 99221 1ST HOSP IP/OBS SF/LOW 40: CPT | Performed by: SURGERY

## 2020-09-01 PROCEDURE — 2580000003 HC RX 258: Performed by: NURSE PRACTITIONER

## 2020-09-01 PROCEDURE — 88112 CYTOPATH CELL ENHANCE TECH: CPT

## 2020-09-01 PROCEDURE — 0W993ZZ DRAINAGE OF RIGHT PLEURAL CAVITY, PERCUTANEOUS APPROACH: ICD-10-PCS | Performed by: SURGERY

## 2020-09-01 RX ADMIN — LEVOTHYROXINE SODIUM 88 MCG: 88 TABLET ORAL at 09:25

## 2020-09-01 RX ADMIN — LISINOPRIL 40 MG: 20 TABLET ORAL at 21:19

## 2020-09-01 RX ADMIN — ENOXAPARIN SODIUM 40 MG: 40 INJECTION SUBCUTANEOUS at 09:26

## 2020-09-01 RX ADMIN — SODIUM CHLORIDE: 9 INJECTION, SOLUTION INTRAVENOUS at 00:05

## 2020-09-01 RX ADMIN — SERTRALINE HYDROCHLORIDE 50 MG: 50 TABLET ORAL at 09:25

## 2020-09-01 RX ADMIN — INSULIN LISPRO 1 UNITS: 100 INJECTION, SOLUTION INTRAVENOUS; SUBCUTANEOUS at 09:25

## 2020-09-01 RX ADMIN — INSULIN LISPRO 1 UNITS: 100 INJECTION, SOLUTION INTRAVENOUS; SUBCUTANEOUS at 21:20

## 2020-09-01 RX ADMIN — ASPIRIN 81 MG: 81 TABLET, COATED ORAL at 09:25

## 2020-09-01 RX ADMIN — LISINOPRIL 40 MG: 20 TABLET ORAL at 00:04

## 2020-09-01 RX ADMIN — SODIUM CHLORIDE: 9 INJECTION, SOLUTION INTRAVENOUS at 04:46

## 2020-09-01 RX ADMIN — OXYBUTYNIN CHLORIDE 10 MG: 5 TABLET, EXTENDED RELEASE ORAL at 09:25

## 2020-09-01 RX ADMIN — FERROUS SULFATE TAB 325 MG (65 MG ELEMENTAL FE) 325 MG: 325 (65 FE) TAB at 09:25

## 2020-09-01 RX ADMIN — SODIUM CHLORIDE: 9 INJECTION, SOLUTION INTRAVENOUS at 23:02

## 2020-09-01 RX ADMIN — INSULIN LISPRO 1 UNITS: 100 INJECTION, SOLUTION INTRAVENOUS; SUBCUTANEOUS at 18:34

## 2020-09-01 RX ADMIN — LATANOPROST 1 DROP: 50 SOLUTION OPHTHALMIC at 21:20

## 2020-09-01 ASSESSMENT — PAIN SCALES - GENERAL
PAINLEVEL_OUTOF10: 4
PAINLEVEL_OUTOF10: 0

## 2020-09-01 ASSESSMENT — PAIN DESCRIPTION - ONSET: ONSET: ON-GOING

## 2020-09-01 ASSESSMENT — PAIN DESCRIPTION - FREQUENCY: FREQUENCY: INTERMITTENT

## 2020-09-01 ASSESSMENT — PAIN DESCRIPTION - ORIENTATION: ORIENTATION: LOWER

## 2020-09-01 ASSESSMENT — PAIN DESCRIPTION - LOCATION: LOCATION: ABDOMEN

## 2020-09-01 ASSESSMENT — PAIN DESCRIPTION - PAIN TYPE: TYPE: ACUTE PAIN

## 2020-09-01 ASSESSMENT — PAIN DESCRIPTION - DESCRIPTORS: DESCRIPTORS: DISCOMFORT

## 2020-09-01 NOTE — PROGRESS NOTES
General Surgery   Consultation        PATIENT NAME: Aidan See   YOB: 1932    ADMISSION DATE: 8/31/2020  6:37 PM     Admitting Provider: David Hollis Physician: Danilo Pearce DATE: 9/1/2020    Consult Regarding:  Abdominal pain    HISTORY OF PRESENT ILLNESS:  The patient is a 80 y.o. female  who presents with abdominal pain that seems to be located in the upper abdomen. Pt is poor historian. Daughter is at bedside but also unable to give any pertinent hx. Apparently, pt developed pain in central/upper abdomen yesterday. The patient was brought to the ER by another family member for this. No reported hx of nausea or emesis. Pt reports she has been having regular bowel movements. Has been tolerating diet. In ER she had work up which included labs and CT. Pt is noted to have leukocytosis and anemia. Also with elevation of Alk phos. CT not showing any acute process in abdomen. There is evidence of chronic pancreatitis and pancreatic duct dilation. Lipase is low. Pt does have known hx of lung adeno CA and was to undergo thoracentesis today. She has had radiation therapy but apparently had growth of tumor despite radiation. General surgery is consulted for abdominal pain.     Past Medical History:        Diagnosis Date    Adenocarcinoma of endometrium ([address]     Aortic valve sclerosis     Bradycardia     follows with cardiology- Dr. Maxwell Montesinos, possible SSS    Cardiac murmur     Cataract of left eye     Chronic diarrhea     s/p radiation    Diabetes mellitus type II, controlled ([address]     diet controlled     Dysthymia     Gait abnormality 6/23/2014    Glaucoma     H/O renal calculi     H/O vein stripping     Hard of hearing     History of anemia     History of dizziness     follows with cardiology, bradycardia, possible SSS    History of radiation therapy     for uterine cancer    History of small bowel obstruction 03/2019    no surgery required    daily  aspirin 81 MG tablet, Take 81 mg by mouth daily Held 5 days prior to paracentesis on 9-1-20  latanoprost (XALATAN) 0.005 % ophthalmic solution, Place 1 drop into both eyes nightly. albuterol (PROVENTIL) (2.5 MG/3ML) 0.083% nebulizer solution, Take 3 mLs by nebulization every 6 hours as needed for Wheezing or Shortness of Breath  ondansetron (ZOFRAN) 4 MG tablet, Take 1 tablet by mouth every 8 hours as needed for Nausea or Vomiting  Probiotic Product (PROBIOTIC DAILY) CAPS, One tablet 3 time a day times 10 days then once a day    Allergies:  Allopurinol; Metoprolol tartrate [metoprolol]; Percocet [oxycodone-acetaminophen]; Phenergan [promethazine hcl]; Polycitra-k [potassium citrate]; Statins; Levaquin [levofloxacin]; Sulfa antibiotics; and Tessalon [benzonatate]    Social History:   Social History     Socioeconomic History    Marital status:       Spouse name: Not on file    Number of children: Not on file    Years of education: Not on file    Highest education level: Not on file   Occupational History    Not on file   Social Needs    Financial resource strain: Not on file    Food insecurity     Worry: Not on file     Inability: Not on file    Transportation needs     Medical: Not on file     Non-medical: Not on file   Tobacco Use    Smoking status: Never Smoker    Smokeless tobacco: Never Used    Tobacco comment: never smoked tito rrt 01/11/2018   Substance and Sexual Activity    Alcohol use: No     Alcohol/week: 0.0 standard drinks    Drug use: No    Sexual activity: Not on file   Lifestyle    Physical activity     Days per week: Not on file     Minutes per session: Not on file    Stress: Not on file   Relationships    Social connections     Talks on phone: Not on file     Gets together: Not on file     Attends Taoism service: Not on file     Active member of club or organization: Not on file     Attends meetings of clubs or organizations: Not on file     Relationship status: Not on file    Intimate partner violence     Fear of current or ex partner: Not on file     Emotionally abused: Not on file     Physically abused: Not on file     Forced sexual activity: Not on file   Other Topics Concern    Not on file   Social History Narrative    Not on file       Family History:       Problem Relation Age of Onset    High Blood Pressure Other     Diabetes type 2  Mother         developed later in life       REVIEW OF SYSTEMS:    CONSTITUTIONAL:  No recent weight gain/loss. Energy level normal for pt. HEENT:  negative  CARDIOVASCULAR:  No chest pain  GASTROINTESTINAL:  Per HPI  GENITOURINARY:  No dysuria  HEMATOLOGIC/LYMPHATIC:  No easy bruising. Current lung adenoCA  ENDOCRINE: negative  Review of systems negative unless above.     PHYSICAL EXAM:    VITALS:  BP (!) 176/79   Pulse 90   Temp 98.8 °F (37.1 °C) (Oral)   Resp 18   Ht 5' 4\" (1.626 m)   Wt 140 lb (63.5 kg)   SpO2 95%   BMI 24.03 kg/m²   INTAKE/OUTPUT:     Intake/Output Summary (Last 24 hours) at 9/1/2020 1158  Last data filed at 9/1/2020 0800  Gross per 24 hour   Intake 699 ml   Output --   Net 699 ml       CONSTITUTIONAL:  awake, alert, not distressed  ENT:  normocephalic/atraumatic, without obvious abnormality  NECK:  supple, symmetrical, trachea midline   LUNGS:  clear to auscultation  CARDIOVASCULAR:  regular rate and rhythm  ABDOMEN: soft, non distended, some tenderness to palpation across upper abdomen without guarding or rebound tenderness, bowel sounds present  MUSCULOSKELETAL:  negative, there is not obvious somatic dysfunction  NEUROLOGIC:  Mental Status Exam:  Level of Alertness:   alert      CBC:   Lab Results   Component Value Date    WBC 14.4 09/01/2020    RBC 2.73 09/01/2020    HGB 8.2 09/01/2020    HCT 26.9 09/01/2020    MCV 98.5 09/01/2020    MCH 30.0 09/01/2020    MCHC 30.5 09/01/2020    RDW 14.7 09/01/2020     09/01/2020    MPV 10.3 09/01/2020     CMP:    Lab Results   Component Value Date     09/01/2020    K 3.9 09/01/2020    CL 99 09/01/2020    CO2 26 09/01/2020    BUN 11 09/01/2020    CREATININE 0.62 09/01/2020    GFRAA >60 09/01/2020    LABGLOM >60 09/01/2020    LABGLOM 59 07/18/2020    GLUCOSE 146 09/01/2020    PROT 6.1 08/31/2020    LABALBU 3.3 08/31/2020    CALCIUM 8.8 09/01/2020    BILITOT 0.24 08/31/2020    ALKPHOS 163 08/31/2020    AST 26 08/31/2020    ALT 36 08/31/2020     BMP:    Lab Results   Component Value Date     09/01/2020    K 3.9 09/01/2020    CL 99 09/01/2020    CO2 26 09/01/2020    BUN 11 09/01/2020    LABALBU 3.3 08/31/2020    CREATININE 0.62 09/01/2020    CALCIUM 8.8 09/01/2020    GFRAA >60 09/01/2020    LABGLOM >60 09/01/2020    LABGLOM 59 07/18/2020    GLUCOSE 146 09/01/2020     Hepatic Function Panel:    Lab Results   Component Value Date    ALKPHOS 163 08/31/2020    ALT 36 08/31/2020    AST 26 08/31/2020    PROT 6.1 08/31/2020    BILITOT 0.24 08/31/2020    BILIDIR 0.10 08/31/2020    IBILI 0.14 08/31/2020    LABALBU 3.3 08/31/2020     U/A:    Lab Results   Component Value Date    COLORU NOT REPORTED 08/31/2020    PROTEINU NEGATIVE 08/31/2020    PHUR 6.0 08/31/2020    WBCUA 0 TO 4 08/31/2020    RBCUA None 08/31/2020    MUCUS NOT REPORTED 08/31/2020    TRICHOMONAS NOT REPORTED 08/31/2020    YEAST NOT REPORTED 08/31/2020    BACTERIA None 08/31/2020    SPECGRAV 1.015 08/31/2020    LEUKOCYTESUR NEGATIVE 08/31/2020    UROBILINOGEN Normal 08/31/2020    BILIRUBINUR NEGATIVE 08/31/2020    BLOODU TRACE 08/02/2020    GLUCOSEU NEGATIVE 08/31/2020    AMORPHOUS NOT REPORTED 08/31/2020     AMYLASE:    Lab Results   Component Value Date    AMYLASE 16 03/30/2019     LIPASE:    Lab Results   Component Value Date    LIPASE 7 08/31/2020       Pertinent Radiology:   CT abd pel images reviewed and read noted.       ASSESSMENT     Patient Active Problem List   Diagnosis    DM (diabetes mellitus) type II controlled with renal manifestation ([facility]    Hypertension    Hyperlipidemia    Hypothyroidism    Chronic diarrhea    Dysthymia    Gait abnormality    Chronic kidney disease, stage III (moderate) (HCC), likely related to diabetes    Early stage nonexudative age-related macular degeneration of both eyes    Glaucoma of both eyes    Overweight    Carotid stenosis, bilateral    Urinary incontinence    Adenocarcinoma, lung, right ([address]    Age related entropion, left    Epiphora due to excess lacrimation of right side    Frail elderly    Lung mass    Malignant neoplasm of endometrium ([address]    Malignant neoplasm of lower lobe, right bronchus or lung (HCC)    Posterior vitreous detachment of both eyes    Adenocarcinoma of right lung (HCC)    Bilateral carotid artery stenosis    Chronic renal insufficiency, stage III (moderate) (HCC)    Type 2 diabetes mellitus without complication, without long-term current use of insulin (HCC)    Type 2 diabetes mellitus (HCC)    Nonexudative age-related macular degeneration    Abnormal gait    Hypertensive disorder    Hyponatremia     Abdominal pain in upper abdomen  CT findings suggestive of chronic pancreatitis with ductal dilation    PLAN    1. In light of CT findings consideration of pain from pancreatitis must be considered but no acute signs of inflammation and normal lipase. With the ductal dilation could consider MRCP or possible ERCP for further evaluation. It is also possible the pain in upper abdomen is related to the pleural fluid and chest findings. No evidence of obstruction. 2. No plans for surgical intervention at this time. [address] for diet from general surgery standpoint  4. Medical management per primary.           Electronically signed by Hattie Zeng DO  on 9/1/2020 at 11:58 AM

## 2020-09-01 NOTE — FLOWSHEET NOTE
Assessment: Patient is awake but very hard of hearing. Daughter is in room with her. She is well connected to her parish. Intervention: Engaged in conversation. Prayed with patient and she received Communion. Patient expressed appreciation for visit and offer of continued prayer. ACP: Attempted to determine with patient what her wishes would be regarding CPR and ventilation. She did not seem to comprehend the questions. Daughter stated that she had attempted to converse with her mother about her wishes but was unable to get an answer other than not to interfere with her natural death. Documents for health care power of  are on file and current. Plan: Chaplains are available on site or on call 24/7 for spiritual and emotional support.      09/01/20 1322   Encounter Summary   Services provided to: Patient and family together   Referral/Consult From: [address] [facility] Family members   Continue Visiting   (9/1/20)   Complexity of Encounter Moderate   Length of Encounter 15 minutes   Spiritual Assessment Completed Yes   Advance Care Planning Yes   Spiritual/Hoahaoism   Type Spiritual support   Assessment Approachable   Intervention Active listening;Explored feelings, thoughts, concerns;Prayer;Communion   Outcome Expressed gratitude;Engaged in conversation

## 2020-09-01 NOTE — FLOWSHEET NOTE
Right-sided thoracentesis completed. Pt tolerated well. Frequent dry cough noted. Absent breath sounds noted RLL prior to thoracentesis. Post thoracentesis, RLL lung sounds diminished.

## 2020-09-01 NOTE — H&P
HOSPITALIST ADMISSION H&P      REASON FOR ADMISSION:     Lower abdominal pain----hyponatremia-----adenocarcinoma right lung--effusion   ESTIMATED LENGTH OF STAY:   > 2 midnights---2-3 days    ATTENDING/ADMITTING PHYSICIAN: Komal Andrade MD  PCP: Felix Jarvis DO    HISTORY OF PRESENT ILLNESS:      The patient is a 80 y.o. female patient of Felix Jarvis DO who presents with lower--abdominal pain---that shifts---see imaging studies of possible chronic pancreatitis----may also have pain from tumor for which underwent thoracentesis today ---> 750 ml serous fluid. See General Surgery commentaries    Hyponatremia sodium = 128 at admission    Adenocarcinoma---right lung--conservative management at this time with symptomatic relief with thoracentesis and diagnostics for today's study by pulmonology--being followed by Dr. Yuan Benjamin, Pulmonary. Anemia---chronic----hemoglobin = 8.2     See below for additional PMH. Patient jrzw-oewrfvpjhs-ojqyioev-available records reviewed, including, but not limited to,  ER reports--labs--imaging----EKGs----office records---personal notes.        Past Medical History:   Diagnosis Date    [facility]en[facility]rium [facility]     Aortic valve sclerosis     Bradycardia     follows with cardiology- Dr. Lizbet Morris, possible SSS    Cardiac murmur     Cataract of left eye     Chronic diarrhea     s/p radiation    Diabetes mellitus type II, controlled ([facility] [facility]     diet controlled     Dysthymia     Gait abnormality 6/23/2014    Glaucoma     H/O renal calculi     H/O vein stripping     Hard of hearing     History of anemia     History of dizziness     follows with cardiology, bradycardia, possible SSS    History of radiation therapy     for uterine cancer    History of small bowel obstruction 03/2019    no surgery required    Hyperlipidemia     Hypertension     Hypothyroidism     Influenza A 1/11/2018    Left elbow fracture     S/P surgical repair    Lung cancer ([facility]     Lung nodule     Macular degeneration     Obesity     Pericardial effusion 4/23/2014    Pseudophakia of right eye     PVD (peripheral vascular disease) ([facility]     Traumatic injury of lower extremity childhood    bilateral trauma of lower extremities    Vertigo     resolved    Wears glasses     Wears partial dentures     upper            Past Surgical History:   Procedure Laterality Date    CATARACT REMOVAL Right February 2013    Dr. Galindo Cage, LAPAROSCOPIC  5/28/1998    COLONOSCOPY  7/22/2003    Dr. Donya Murillo Left September 2010    ORIF, Dr. Karmen Mora ERCP  7/19/1998    retained stone, Suzy Hood and Kristen Duke MGADA AND BSO  March 2002    endometrial adenocarcinoma, Dr. Benita Ayoub       Medications Prior to Admission:    Medications Prior to Admission: guaiFENesin-codeine (CHERATUSSIN AC) 100-10 MG/5ML syrup, Take 5-10 mLs by mouth every 6 hours as needed for Cough for up to 7 days.   albuterol sulfate HFA (PROVENTIL HFA) 108 (90 Base) MCG/ACT inhaler, Inhale 2 puffs into the lungs every 6 hours as needed for Wheezing  ferrous sulfate (IRON 325) 325 (65 Fe) MG tablet, Take 325 mg by mouth daily  sertraline (ZOLOFT) 50 MG tablet, TAKE 1 TABLET DAILY  oxybutynin (DITROPAN-XL) 10 MG extended release tablet, TAKE 1 TABLET DAILY  ezetimibe (ZETIA) 10 MG tablet, TAKE 1 TABLET DAILY  levothyroxine (SYNTHROID) 88 MCG tablet, TAKE 1 TABLET DAILY  lisinopril (PRINIVIL;ZESTRIL) 40 MG tablet, TAKE 1 TABLET NIGHTLY  meclizine (ANTIVERT) 25 MG tablet, Take 12.5 mg by mouth 3 times daily as needed  Multiple Vitamins-Minerals (THERAPEUTIC MULTIVITAMIN-MINERALS) tablet, Take 1 tablet by mouth daily  CRANBERRY PO, Take 1 capsule by mouth daily  Multiple Vitamins-Minerals (OCUVITE PRESERVISION PO), Take 1 tablet by mouth daily  aspirin 81 MG tablet, Take 81 mg by mouth daily Held 5 days prior to paracentesis on 9-1-20  latanoprost (XALATAN) 0.005 % ophthalmic solution, Place 1 drop into both eyes nightly. albuterol (PROVENTIL) (2.5 MG/3ML) 0.083% nebulizer solution, Take 3 mLs by nebulization every 6 hours as needed for Wheezing or Shortness of Breath  ondansetron (ZOFRAN) 4 MG tablet, Take 1 tablet by mouth every 8 hours as needed for Nausea or Vomiting  Probiotic Product (PROBIOTIC DAILY) CAPS, One tablet 3 time a day times 10 days then once a day    Allergies:    Allopurinol; Metoprolol tartrate [metoprolol]; Percocet [oxycodone-acetaminophen]; Phenergan [promethazine hcl]; Polycitra-k [potassium citrate]; Statins; Levaquin [levofloxacin]; Sulfa antibiotics; and Tessalon [benzonatate]    Social History:    reports that she has never smoked. She has never used smokeless tobacco. She reports that she does not drink alcohol or use drugs. Family History:   family history includes Diabetes type 2  in her mother; High Blood Pressure in an other family member. REVIEW OF SYSTEMS:  See HPI and problem list; otherwise no other new complaints with respect to HEENT, neck, pulmonary, coronary, GI, , endocrine, musculoskeletal, immune system/connective tissue disease, hematologic, neuropsych, skin, lymphatics, or malignancies. PHYSICAL EXAM:  Vitals:  BP (!) 164/69   Pulse 82   Temp 99.4 °F (37.4 °C) (Oral)   Resp 16   Ht 5' 4\" (1.626 m)   Wt 140 lb (63.5 kg)   SpO2 94%   BMI 24.03 kg/m²     HEENT: Normocephalic and Atraumatic  Neck: Supple, No Masses, Tenderness, Nodularity and No Lymphadenopathy  Chest/Lungs: Rales Present, Expiratory Wheezes and Distant Breath Sounds  Cardiac: Regular Rate and Rhythm without Rubs, Clicks, Gallops, or Murmurs  GI/Abdomen:  Bowel Sounds Present and Soft, diffuse non-specific tenderness without Guarding or Rebound Tenderness---no referred pain  : Not examined  EXT/Skin: No Edema, No Cyanosis and No Clubbing  Neuro:   Alert---very hard of hearing---dementia = baseline---generalized weakness---known gait-balance instability---otherwise No Localizing Signs/Symptoms        LABS:    CBC with Differential:    Lab Results   Component Value Date    WBC 14.4 09/01/2020    RBC 2.73 09/01/2020    HGB 8.2 09/01/2020    HCT 26.9 09/01/2020     09/01/2020    MCV 98.5 09/01/2020    MCH 30.0 09/01/2020    MCHC 30.5 09/01/2020    RDW 14.7 09/01/2020    NRBC 0 07/18/2020    SEGSPCT 79.3 07/18/2020    LYMPHOPCT 6 08/31/2020    MONOPCT 8 08/31/2020    BASOPCT 0 08/31/2020    MONOSABS 1.07 08/31/2020    MONOSABS 0.6 07/18/2020    LYMPHSABS 0.78 08/31/2020    LYMPHSABS 0.9 07/18/2020    EOSABS <0.03 08/31/2020    EOSABS 0.0 07/18/2020    BASOSABS 0.06 08/31/2020    DIFFTYPE NOT REPORTED 08/31/2020     BMP:    Lab Results   Component Value Date     09/01/2020    K 3.9 09/01/2020    CL 99 09/01/2020    CO2 26 09/01/2020    BUN 11 09/01/2020    LABALBU 3.3 08/31/2020    CREATININE 0.62 09/01/2020    CALCIUM 8.8 09/01/2020    GFRAA >60 09/01/2020    LABGLOM >60 09/01/2020    LABGLOM 59 07/18/2020    GLUCOSE 146 09/01/2020       ASSESSMENT:      Patient Active Problem List   Diagnosis    DM (diabetes mellitus) type II controlled with renal manifestation ([address]    Hypertension    Hyperlipidemia    Hypothyroidism    Chronic diarrhea    Dysthymia    Gait abnormality    Chronic kidney disease, stage III (moderate) ([facility]), likely related to diabetes    Early stage nonexudative age-related macular degeneration of both eyes    Glaucoma of both eyes    Overweight    Carotid stenosis, bilateral    Urinary incontinence    Adenocarcinoma, lung, right ([address]    Age related entropion, left    Epiphora due to excess lacrimation of right side    Frail elderly    Lung mass    Malignant neoplasm of endometrium ([address]    Malignant neoplasm of lower lobe, right bronchus or lung (HCC)    Posterior vitreous detachment of both eyes    Adenocarcinoma of right lung ([address]    Bilateral carotid artery stenosis    Chronic renal insufficiency, stage III (moderate) (HCC)    Type 2 diabetes mellitus without complication, without long-term current use of insulin (HCC)    Type 2 diabetes mellitus (HCC)    Nonexudative age-related macular degeneration    Abnormal gait    Hypertensive disorder    Hyponatremia    Lower abdominal pain     Rimma Crenshaw   A         88  WF   [sr NAYELI Eddy;  ANNE Quevedo,  ANNE Brery]  FULL CODE         ASPIRIN  LOVENOX    POD ____ right thoracentesis---9. 1.2020---750 ml serous fluid off           CXR---post thoracentesis---9. 1.2020---no PTX---known RLL mass persists  Pancreatitis---chronic-appearing----8.31.2020   Lower abdominal pain---8.31.2020---leukocytosis           POD ____ colonoscopy--8.27.2020----diverticulosis---Saleem           CT abdomen-pelvis----8.31.2020--chronic diverticulosis---NACs---chronic                                pancreatitis with atrophy--ductal dilatation--stable left renal atrophy--                               residual RLL dependent LLL atelectasis--rounded mass medial aspect                                right atelectatic lung 3.6 x 3.9 cm  [see lung carcinoma--below]          SBO--3.29.2019  Adenocarcinoma RLL--8.31.2020--previously scheduled for thoracentesis  Hyponatremia----8.31.2020  Anemia      Hypertension            EKG---9.1.2020---SR--80--PACs--LVH?   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  Gait--balance instability  Obesity  Dysthymia   Malignancies            Adenocarcinoma---RLL--bronchus---2018            Endometrial adenocarcinoma---history  HEARING IMPAIRMENT   PMH:  anemia---chronic, bilateral cataracts,  dysthymia, chest pain,             pericardial effusion, gait abnormality, kidney stones, left entropion,             macular degeneration---OU, glaucoma--OU, vertigo, BLE trauma---              childhood, Influenza A---2018, UTI--- 2018,

## 2020-09-01 NOTE — PLAN OF CARE
Problem: Discharge Planning:  Goal: Patients continuum of care needs are met  Description: Patients continuum of care needs are met  Outcome: Met This Shift   Pt lives home alone. Pt has family member that live nearby that check on her frequently. Pt's daughter requesting information on home health services. List of local agencies provided.

## 2020-09-01 NOTE — PROGRESS NOTES
Physical Therapy    Facility/Department: [facility]  PROGRESSIVE CARE  Initial Assessment    NAME: Aidan See  : 1932  MRN: [id number]    Date of Service: 2020    Discharge Recommendations:  Home with Home health PT, Continue to assess pending progress, Home with assist PRN        Assessment   Body structures, Functions, Activity limitations: Decreased functional mobility ; Decreased safe awareness;Decreased balance;Decreased ADL status; Decreased endurance;Decreased ROM; Decreased strength;Decreased posture  Prognosis: Good  Decision Making: Low Complexity  REQUIRES PT FOLLOW UP: Yes  Activity Tolerance  Activity Tolerance: Patient Tolerated treatment well       Patient Diagnosis(es): The primary encounter diagnosis was Lower abdominal pain. A diagnosis of Hyponatremia was also pertinent to this visit. has a past medical history of Adenocarcinoma of endometrium ([address] Aortic valve sclerosis, Bradycardia, Cardiac murmur, Cataract of left eye, Chronic diarrhea, Diabetes mellitus type II, controlled ([address] Dysthymia, Gait abnormality, Glaucoma, H/O renal calculi, H/O vein stripping, Hard of hearing, History of anemia, History of dizziness, History of radiation therapy, History of small bowel obstruction, Hyperlipidemia, Hypertension, Hypothyroidism, Influenza A, Left elbow fracture, Lung cancer ([address] Lung nodule, Macular degeneration, Obesity, Pericardial effusion, Pseudophakia of right eye, PVD (peripheral vascular disease) ([address] Traumatic injury of lower extremity, Vertigo, Wears glasses, and Wears partial dentures. has a past surgical history that includes jason and bso (cervix removed) (2002); Cholecystectomy, laparoscopic (1998); ERCP (1998); Colonoscopy (2003); Elbow fracture surgery (Left, 2010); and Cataract removal (Right, 2013).     Restrictions     Vision/Hearing  Vision: Impaired  Vision Exceptions: Wears glasses at all times  Hearing: Exceptions to WFL  Hearing Exceptions: Hard of hearing/hearing concerns     Subjective  General  Chart Reviewed: Yes  Patient assessed for rehabilitation services?: Yes  Pain Screening  Patient Currently in Pain: Denies  Vital Signs  Patient Currently in Pain: Denies       Orientation  Orientation  Overall Orientation Status: Within Functional Limits  Social/Functional History  Social/Functional History  Lives With: Alone  Type of Home: House  Home Layout: One level  Home Access: Stairs to enter with rails  Entrance Stairs - Number of Steps: 1  Entrance Stairs - Rails: Both  Bathroom Shower/Tub: Walk-in shower  Bathroom Toilet: Handicap height  Bathroom Equipment: Grab bars in shower, Grab bars around toilet, Shower chair  Receives Help From: Family  ADL Assistance: Needs assistance  Homemaking Assistance: Needs assistance  Homemaking Responsibilities: No  Ambulation Assistance: Independent  Transfer Assistance: Independent  Active : No  Cognition        Objective     Observation/Palpation  Posture: Poor    AROM RLE (degrees)  RLE AROM: WFL  AROM LLE (degrees)  LLE AROM : WFL  Strength RLE  Comment: grossly 4/5  Strength LLE  Comment: grossly 4/5        Bed mobility  Supine to Sit: Minimal assistance  Sit to Supine: Minimal assistance  Transfers  Sit to Stand: Minimal Assistance  Stand to sit: Minimal Assistance  Ambulation  Ambulation?: Yes  Ambulation 1  Surface: level tile  Device: Rolling Walker  Assistance: Contact guard assistance  Quality of Gait: moderate to severe trunk flexion in standing. Brought walker up to assist with posture. Patient amb with arms on the walker due to posture.   Gait Deviations: Slow Barbara  Distance: 150'     Balance  Posture: Poor  Sitting - Static: Good  Sitting - Dynamic: Fair;+  Standing - Static: Fair  Standing - Dynamic: Fair;-        Plan   Plan  Times per day: Daily  Current Treatment Recommendations: Strengthening, Transfer Training, Endurance Training, Neuromuscular Re-education, Equipment Evaluation, Education, & procurement, Balance Training, Gait Training, Home Exercise Program, Safety Education & Training, Functional Mobility Training, Stair training    G-Code       OutComes Score                                                  AM-PAC Score             Goals  Short term goals  Time Frame for Short term goals: LOS  Short term goal 1: Bed mobilty with mod I  Short term goal 2: Trnasfers with mod I  Short term goal 3: Amb 150ft with RW and mod I  Patient Goals   Patient goals : Return Home       Therapy Time   Individual Concurrent Group Co-treatment   Time In 0215         Time Out 0230         Minutes [id number], PT

## 2020-09-01 NOTE — ED PROVIDER NOTES
mouth daily    EZETIMIBE (ZETIA) 10 MG TABLET    TAKE 1 TABLET DAILY    FERROUS SULFATE (IRON 325) 325 (65 FE) MG TABLET    Take 325 mg by mouth daily    GUAIFENESIN-CODEINE (CHERATUSSIN AC) 100-10 MG/5ML SYRUP    Take 5-10 mLs by mouth every 6 hours as needed for Cough for up to 7 days. LATANOPROST (XALATAN) 0.005 % OPHTHALMIC SOLUTION    Place 1 drop into both eyes nightly. LEVOTHYROXINE (SYNTHROID) 88 MCG TABLET    TAKE 1 TABLET DAILY    LISINOPRIL (PRINIVIL;ZESTRIL) 40 MG TABLET    TAKE 1 TABLET NIGHTLY    MECLIZINE (ANTIVERT) 25 MG TABLET    Take 12.5 mg by mouth 3 times daily as needed    MULTIPLE VITAMINS-MINERALS (OCUVITE PRESERVISION PO)    Take 1 tablet by mouth daily    MULTIPLE VITAMINS-MINERALS (THERAPEUTIC MULTIVITAMIN-MINERALS) TABLET    Take 1 tablet by mouth daily    ONDANSETRON (ZOFRAN) 4 MG TABLET    Take 1 tablet by mouth every 8 hours as needed for Nausea or Vomiting    OXYBUTYNIN (DITROPAN-XL) 10 MG EXTENDED RELEASE TABLET    TAKE 1 TABLET DAILY    PROBIOTIC PRODUCT (PROBIOTIC DAILY) CAPS    One tablet 3 time a day times 10 days then once a day    SERTRALINE (ZOLOFT) 50 MG TABLET    TAKE 1 TABLET DAILY       ALLERGIES     is allergic to allopurinol; metoprolol tartrate [metoprolol]; percocet [oxycodone-acetaminophen]; phenergan [promethazine hcl]; polycitra-k [potassium citrate]; statins; levaquin [levofloxacin]; sulfa antibiotics; and tessalon [benzonatate].       Diagnostic Results     EKG: All EKG's are interpreted by the Emergency Department Physician who either signs or Co-signs this chart in the absenceof a cardiologist.        Manny Brady:   Results for orders placed or performed during the hospital encounter of 55/30/66   Basic Metabolic Prof   Result Value Ref Range    Glucose 189 (H) 70 - 99 mg/dL    BUN 14 8 - 23 mg/dL    CREATININE 0.69 0.50 - 0.90 mg/dL    Bun/Cre Ratio 20 9 - 20    Calcium 9.3 8.6 - 10.4 mg/dL    Sodium 128 (L) 135 - 144 mmol/L    Potassium 4.3 3.7 - 5.3 mmol/L Chloride 93 (L) 98 - 107 mmol/L    CO2 25 20 - 31 mmol/L    Anion Gap 10 9 - 17 mmol/L    GFR Non-African American >60 >60 mL/min    GFR African American >60 >60 mL/min    GFR Comment          GFR Staging NOT REPORTED    CBC with DIFF   Result Value Ref Range    WBC 13.7 (H) 3.5 - 11.3 k/uL    RBC 3.01 (L) 3.95 - 5.11 m/uL    Hemoglobin 9.0 (L) 11.9 - 15.1 g/dL    Hematocrit 29.2 (L) 36.3 - 47.1 %    MCV 97.0 82.6 - 102.9 fL    MCH 29.9 25.2 - 33.5 pg    MCHC 30.8 25.2 - 33.5 g/dL    RDW 14.6 (H) 11.8 - 14.4 %    Platelets 693 (H) 336 - 453 k/uL    MPV 10.3 8.1 - 13.5 fL    NRBC Automated 0.0 0.0 per 100 WBC    Differential Type NOT REPORTED     Seg Neutrophils 85 (H) 36 - 65 %    Lymphocytes 6 (L) 24 - 43 %    Monocytes 8 3 - 12 %    Eosinophils % 0 (L) 1 - 4 %    Basophils 0 0 - 2 %    Immature Granulocytes 1 (H) 0 %    Segs Absolute 11.70 (H) 1.50 - 8.10 k/uL    Absolute Lymph # 0.78 (L) 1.10 - 3.70 k/uL    Absolute Mono # 1.07 0.10 - 1.20 k/uL    Absolute Eos # <0.03 0.00 - 0.44 k/uL    Basophils Absolute 0.06 0.00 - 0.20 k/uL    Absolute Immature Granulocyte 0.10 0.00 - 0.30 k/uL    WBC Morphology NOT REPORTED     RBC Morphology ANISOCYTOSIS PRESENT     Platelet Estimate NOT REPORTED    Lipase   Result Value Ref Range    Lipase 7 (L) 13 - 60 U/L   Lipid Profile   Result Value Ref Range    Cholesterol 129 <200 mg/dL    HDL 35 (L) >40 mg/dL    LDL Cholesterol 67 0 - 130 mg/dL    Chol/HDL Ratio 3.7 <5    Triglycerides 133 <150 mg/dL    VLDL NOT REPORTED 1 - 30 mg/dL   Liver Profile   Result Value Ref Range    Alb 3.3 (L) 3.5 - 5.2 g/dL    Alkaline Phosphatase 163 (H) 35 - 104 U/L    ALT 36 (H) 5 - 33 U/L    AST 26 <32 U/L    Total Bilirubin 0.24 (L) 0.3 - 1.2 mg/dL    Bilirubin, Direct 0.10 <0.31 mg/dL    Bilirubin, Indirect 0.14 0.00 - 1.00 mg/dL    Total Protein 6.1 (L) 6.4 - 8.3 g/dL    Globulin 2.8 1.5 - 3.8 g/dL    Albumin/Globulin Ratio 1.2 1.0 - 2.5       RADIOLOGY:  CT ABDOMEN PELVIS W IV CONTRAST DISPOSITION/PLAN   DISPOSITION Decision To Admit 08/31/2020 08:56:41 PM      CONDITION ON DISPOSITION:   Stable    PATIENT REFERRED TO:  No follow-up provider specified. DISCHARGE MEDICATIONS:  New Prescriptions    No medications on file       (Please note that portions of this note were completed with a voicerecognition program.  Efforts were made to edit the dictations but occasionally words are mis-transcribed.)    [place] MD, F.A.C.E.P.   Attending Emergency Medicine Physician                    Tia Harris MD  08/31/20 [id number]

## 2020-09-01 NOTE — PROGRESS NOTES
Occupational Therapy   Occupational Therapy Initial Assessment  Date: 2020   Patient Name: Jean Pierre Weldon  MRN: [id number]     : 1932    Date of Service: 2020    Discharge Recommendations:  Home with assist PRN       Assessment   Performance deficits / Impairments: Decreased endurance;Decreased high-level IADLs;Decreased balance  Treatment Diagnosis: generalized weakness  Decision Making: Low Complexity  No Skilled OT: At baseline function  REQUIRES OT FOLLOW UP: No  Safety Devices  Safety Devices in place: Yes  Type of devices: Left in chair;Call light within reach           Patient Diagnosis(es): The primary encounter diagnosis was Lower abdominal pain. A diagnosis of Hyponatremia was also pertinent to this visit. has a past medical history of Adenocarcinoma of endometrium ([address] Aortic valve sclerosis, Bradycardia, Cardiac murmur, Cataract of left eye, Chronic diarrhea, Diabetes mellitus type II, controlled ([address] Dysthymia, Gait abnormality, Glaucoma, H/O renal calculi, H/O vein stripping, Hard of hearing, History of anemia, History of dizziness, History of radiation therapy, History of small bowel obstruction, Hyperlipidemia, Hypertension, Hypothyroidism, Influenza A, Left elbow fracture, Lung cancer ([address] Lung nodule, Macular degeneration, Obesity, Pericardial effusion, Pseudophakia of right eye, PVD (peripheral vascular disease) ([address] Traumatic injury of lower extremity, Vertigo, Wears glasses, and Wears partial dentures. has a past surgical history that includes jason and bso (cervix removed) (2002); Cholecystectomy, laparoscopic (1998); ERCP (1998); Colonoscopy (2003); Elbow fracture surgery (Left, 2010); and Cataract removal (Right, 2013).     Treatment Diagnosis: generalized weakness        Subjective   General  Chart Reviewed: Yes  Patient assessed for rehabilitation services?: Yes  Family / Caregiver Present: Yes  Referring Practitioner: In 1415         Time Out 1430         Minutes 15         Timed Code Treatment Minutes: 0 Minutes       KURTIS YOUSSEF OTR, OT

## 2020-09-02 ENCOUNTER — APPOINTMENT (OUTPATIENT)
Dept: GENERAL RADIOLOGY | Age: 85
DRG: 641 | End: 2020-09-02
Payer: MEDICARE

## 2020-09-02 LAB
ABSOLUTE EOS #: 0.16 K/UL (ref 0–0.44)
ABSOLUTE IMMATURE GRANULOCYTE: 0.11 K/UL (ref 0–0.3)
ABSOLUTE LYMPH #: 1 K/UL (ref 1.1–3.7)
ABSOLUTE MONO #: 1.37 K/UL (ref 0.1–1.2)
ANION GAP SERPL CALCULATED.3IONS-SCNC: 11 MMOL/L (ref 9–17)
BASOPHILS # BLD: 0 % (ref 0–2)
BASOPHILS ABSOLUTE: 0.05 K/UL (ref 0–0.2)
BUN BLDV-MCNC: 9 MG/DL (ref 8–23)
BUN/CREAT BLD: 13 (ref 9–20)
CALCIUM SERPL-MCNC: 8.5 MG/DL (ref 8.6–10.4)
CASE NUMBER:: NORMAL
CHLORIDE BLD-SCNC: 96 MMOL/L (ref 98–107)
CO2: 25 MMOL/L (ref 20–31)
CREAT SERPL-MCNC: 0.69 MG/DL (ref 0.5–0.9)
DIFFERENTIAL TYPE: ABNORMAL
EKG ATRIAL RATE: 80 BPM
EKG P AXIS: 19 DEGREES
EKG P-R INTERVAL: 130 MS
EKG Q-T INTERVAL: 396 MS
EKG QRS DURATION: 70 MS
EKG QTC CALCULATION (BAZETT): 456 MS
EKG R AXIS: -14 DEGREES
EKG T AXIS: 3 DEGREES
EKG VENTRICULAR RATE: 80 BPM
EOSINOPHILS RELATIVE PERCENT: 1 % (ref 1–4)
GFR AFRICAN AMERICAN: >60 ML/MIN
GFR NON-AFRICAN AMERICAN: >60 ML/MIN
GFR SERPL CREATININE-BSD FRML MDRD: ABNORMAL ML/MIN/{1.73_M2}
GFR SERPL CREATININE-BSD FRML MDRD: ABNORMAL ML/MIN/{1.73_M2}
GLUCOSE BLD-MCNC: 132 MG/DL (ref 65–105)
GLUCOSE BLD-MCNC: 148 MG/DL (ref 65–105)
GLUCOSE BLD-MCNC: 159 MG/DL (ref 65–105)
GLUCOSE BLD-MCNC: 188 MG/DL (ref 70–99)
HCT VFR BLD CALC: 28 % (ref 36.3–47.1)
HEMOGLOBIN: 8.5 G/DL (ref 11.9–15.1)
IMMATURE GRANULOCYTES: 1 %
LYMPHOCYTES # BLD: 7 % (ref 24–43)
MCH RBC QN AUTO: 29.3 PG (ref 25.2–33.5)
MCHC RBC AUTO-ENTMCNC: 30.4 G/DL (ref 25.2–33.5)
MCV RBC AUTO: 96.6 FL (ref 82.6–102.9)
MONOCYTES # BLD: 10 % (ref 3–12)
NRBC AUTOMATED: 0 PER 100 WBC
PDW BLD-RTO: 14.7 % (ref 11.8–14.4)
PLATELET # BLD: 440 K/UL (ref 138–453)
PLATELET ESTIMATE: ABNORMAL
PMV BLD AUTO: 10.3 FL (ref 8.1–13.5)
POTASSIUM SERPL-SCNC: 3.8 MMOL/L (ref 3.7–5.3)
RBC # BLD: 2.9 M/UL (ref 3.95–5.11)
RBC # BLD: ABNORMAL 10*6/UL
SEG NEUTROPHILS: 81 % (ref 36–65)
SEGMENTED NEUTROPHILS ABSOLUTE COUNT: 11.46 K/UL (ref 1.5–8.1)
SODIUM BLD-SCNC: 132 MMOL/L (ref 135–144)
SPECIMEN DESCRIPTION: NORMAL
WBC # BLD: 14.2 K/UL (ref 3.5–11.3)
WBC # BLD: ABNORMAL 10*3/UL

## 2020-09-02 PROCEDURE — 99231 SBSQ HOSP IP/OBS SF/LOW 25: CPT | Performed by: INTERNAL MEDICINE

## 2020-09-02 PROCEDURE — 2060000000 HC ICU INTERMEDIATE R&B

## 2020-09-02 PROCEDURE — 6360000002 HC RX W HCPCS: Performed by: NURSE PRACTITIONER

## 2020-09-02 PROCEDURE — 80048 BASIC METABOLIC PNL TOTAL CA: CPT

## 2020-09-02 PROCEDURE — 82947 ASSAY GLUCOSE BLOOD QUANT: CPT

## 2020-09-02 PROCEDURE — 85025 COMPLETE CBC W/AUTO DIFF WBC: CPT

## 2020-09-02 PROCEDURE — 6360000002 HC RX W HCPCS: Performed by: INTERNAL MEDICINE

## 2020-09-02 PROCEDURE — 2580000003 HC RX 258: Performed by: INTERNAL MEDICINE

## 2020-09-02 PROCEDURE — 6370000000 HC RX 637 (ALT 250 FOR IP): Performed by: NURSE PRACTITIONER

## 2020-09-02 PROCEDURE — 97116 GAIT TRAINING THERAPY: CPT

## 2020-09-02 PROCEDURE — 6370000000 HC RX 637 (ALT 250 FOR IP): Performed by: INTERNAL MEDICINE

## 2020-09-02 PROCEDURE — U0003 INFECTIOUS AGENT DETECTION BY NUCLEIC ACID (DNA OR RNA); SEVERE ACUTE RESPIRATORY SYNDROME CORONAVIRUS 2 (SARS-COV-2) (CORONAVIRUS DISEASE [COVID-19]), AMPLIFIED PROBE TECHNIQUE, MAKING USE OF HIGH THROUGHPUT TECHNOLOGIES AS DESCRIBED BY CMS-2020-01-R: HCPCS

## 2020-09-02 PROCEDURE — 97110 THERAPEUTIC EXERCISES: CPT

## 2020-09-02 PROCEDURE — 36415 COLL VENOUS BLD VENIPUNCTURE: CPT

## 2020-09-02 PROCEDURE — 71046 X-RAY EXAM CHEST 2 VIEWS: CPT

## 2020-09-02 PROCEDURE — 94760 N-INVAS EAR/PLS OXIMETRY 1: CPT

## 2020-09-02 RX ORDER — SODIUM CHLORIDE 1000 MG
1 TABLET, SOLUBLE MISCELLANEOUS
Status: DISCONTINUED | OUTPATIENT
Start: 2020-09-02 | End: 2020-09-03 | Stop reason: HOSPADM

## 2020-09-02 RX ORDER — HYDROCHLOROTHIAZIDE 25 MG/1
25 TABLET ORAL DAILY
Status: DISCONTINUED | OUTPATIENT
Start: 2020-09-02 | End: 2020-09-03 | Stop reason: HOSPADM

## 2020-09-02 RX ORDER — INSULIN GLARGINE 100 [IU]/ML
10 INJECTION, SOLUTION SUBCUTANEOUS DAILY
Status: DISCONTINUED | OUTPATIENT
Start: 2020-09-02 | End: 2020-09-03 | Stop reason: HOSPADM

## 2020-09-02 RX ADMIN — SODIUM CHLORIDE TAB 1 GM 1 G: 1 TAB at 12:05

## 2020-09-02 RX ADMIN — SERTRALINE HYDROCHLORIDE 50 MG: 50 TABLET ORAL at 08:47

## 2020-09-02 RX ADMIN — ENOXAPARIN SODIUM 40 MG: 40 INJECTION SUBCUTANEOUS at 08:47

## 2020-09-02 RX ADMIN — INSULIN LISPRO 3 UNITS: 100 INJECTION, SOLUTION INTRAVENOUS; SUBCUTANEOUS at 12:05

## 2020-09-02 RX ADMIN — FERROUS SULFATE TAB 325 MG (65 MG ELEMENTAL FE) 325 MG: 325 (65 FE) TAB at 08:47

## 2020-09-02 RX ADMIN — LISINOPRIL 40 MG: 20 TABLET ORAL at 21:46

## 2020-09-02 RX ADMIN — INSULIN LISPRO 1 UNITS: 100 INJECTION, SOLUTION INTRAVENOUS; SUBCUTANEOUS at 12:05

## 2020-09-02 RX ADMIN — INSULIN LISPRO 1 UNITS: 100 INJECTION, SOLUTION INTRAVENOUS; SUBCUTANEOUS at 21:46

## 2020-09-02 RX ADMIN — INSULIN GLARGINE 10 UNITS: 100 INJECTION, SOLUTION SUBCUTANEOUS at 08:47

## 2020-09-02 RX ADMIN — CEFTRIAXONE 1 G: 1 INJECTION, POWDER, FOR SOLUTION INTRAMUSCULAR; INTRAVENOUS at 13:46

## 2020-09-02 RX ADMIN — LATANOPROST 1 DROP: 50 SOLUTION OPHTHALMIC at 21:46

## 2020-09-02 RX ADMIN — LEVOTHYROXINE SODIUM 88 MCG: 88 TABLET ORAL at 08:47

## 2020-09-02 RX ADMIN — INSULIN LISPRO 1 UNITS: 100 INJECTION, SOLUTION INTRAVENOUS; SUBCUTANEOUS at 08:48

## 2020-09-02 RX ADMIN — ASPIRIN 81 MG: 81 TABLET, COATED ORAL at 08:47

## 2020-09-02 RX ADMIN — HYDROCHLOROTHIAZIDE 25 MG: 25 TABLET ORAL at 08:47

## 2020-09-02 RX ADMIN — OXYBUTYNIN CHLORIDE 10 MG: 5 TABLET, EXTENDED RELEASE ORAL at 08:47

## 2020-09-02 RX ADMIN — SODIUM CHLORIDE TAB 1 GM 1 G: 1 TAB at 17:08

## 2020-09-02 ASSESSMENT — PAIN SCALES - GENERAL
PAINLEVEL_OUTOF10: 0

## 2020-09-02 NOTE — PLAN OF CARE
Problem: Pain:  Goal: Pain level will decrease  Description: Pain level will decrease  9/2/2020 0143 by Jenniffer Nur RN  Outcome: Ongoing  9/1/2020 1148 by Jacquelyn Chinchilla RN  Outcome: Ongoing  Goal: Control of acute pain  Description: Control of acute pain  9/2/2020 0143 by Jenniffer Nur RN  Outcome: Ongoing  9/1/2020 1148 by Jacquelyn Chinchilla RN  Outcome: Ongoing  Goal: Control of chronic pain  Description: Control of chronic pain  9/2/2020 0143 by Jenniffer Nur RN  Outcome: Ongoing  9/1/2020 1148 by aJcquelyn Chinchilla RN  Outcome: Ongoing     Problem: Skin Integrity:  Goal: Will show no infection signs and symptoms  Description: Will show no infection signs and symptoms  9/2/2020 0143 by Jenniffer Nur RN  Outcome: Ongoing  9/1/2020 1148 by Jacquelyn Chinchilla RN  Outcome: Ongoing  Goal: Absence of new skin breakdown  Description: Absence of new skin breakdown  9/2/2020 0143 by Jenniffer Nur RN  Outcome: Ongoing  9/1/2020 1148 by Jacquelyn Chinchilla RN  Outcome: Ongoing     Problem: Discharge Planning:  Goal: Patients continuum of care needs are met  Description: Patients continuum of care needs are met  9/2/2020 0143 by Jenniffer Nur RN  Outcome: Ongoing  9/1/2020 1251 by Kelsy Tapia RN  Outcome: Met This Shift

## 2020-09-02 NOTE — PROGRESS NOTES
Hospitalist Progress Note    Patient:  Howard Wild     YOB: 1932    MRN: [id number]   Admit date: 8/31/2020     Acct: [de-identified]     PCP: DO SANTA Espinosa--Interval History:   Adenocarcinoma RLL---sp thoracentesis--9. 1.2020    Coughing today--rales---rhonchi---wheezing ---> CXR-----starting Rocephin---still leukocytosis--may be related to tumor    Lower abdominal pain---decreased today    Na = 132   ---> sodium replacement    See note below     All other ROS negative except noted in HPI    Diet:  DIET CARDIAC;    Medications:  Scheduled Meds:   insulin glargine  10 Units Subcutaneous Daily    insulin lispro  3 Units Subcutaneous TID WC    sodium chloride  1 g Oral TID WC    hydroCHLOROthiazide  25 mg Oral Daily    cefTRIAXone (ROCEPHIN) IV  1 g Intravenous Q24H    aspirin  81 mg Oral Daily    ferrous sulfate  325 mg Oral Daily    latanoprost  1 drop Both Eyes Nightly    levothyroxine  88 mcg Oral Daily    lisinopril  40 mg Oral Daily    oxybutynin  10 mg Oral Daily    sertraline  50 mg Oral Daily    sodium chloride flush  10 mL Intravenous 2 times per day    enoxaparin  40 mg Subcutaneous Daily    insulin lispro  0-6 Units Subcutaneous TID WC    insulin lispro  0-3 Units Subcutaneous Nightly     Continuous Infusions:   dextrose      sodium chloride 75 mL/hr at 09/01/20 2302     PRN Meds:albuterol, sodium chloride flush, polyethylene glycol, dextrose, glucagon (rDNA), dextrose, ondansetron    Objective:  Labs:  CBC with Differential:    Lab Results   Component Value Date    WBC 14.2 09/02/2020    RBC 2.90 09/02/2020    HGB 8.5 09/02/2020    HCT 28.0 09/02/2020     09/02/2020    MCV 96.6 09/02/2020    MCH 29.3 09/02/2020    MCHC 30.4 09/02/2020    RDW 14.7 09/02/2020    NRBC 0 07/18/2020    SEGSPCT 79.3 07/18/2020    LYMPHOPCT 7 09/02/2020    MONOPCT 10 09/02/2020    BASOPCT 0 09/02/2020    MONOSABS 1.37 09/02/2020    MONOSABS 0.6 07/18/2020    LYMPHSABS 1.00 09/02/2020    LYMPHSABS 0.9 07/18/2020    EOSABS 0.16 09/02/2020    EOSABS 0.0 07/18/2020    BASOSABS 0.05 09/02/2020    DIFFTYPE NOT REPORTED 09/02/2020     BMP:    Lab Results   Component Value Date     09/02/2020    K 3.8 09/02/2020    CL 96 09/02/2020    CO2 25 09/02/2020    BUN 9 09/02/2020    LABALBU 3.3 08/31/2020    CREATININE 0.69 09/02/2020    CALCIUM 8.5 09/02/2020    GFRAA >60 09/02/2020    LABGLOM >60 09/02/2020    LABGLOM 59 07/18/2020    GLUCOSE 188 09/02/2020           Physical Exam:  Vitals: BP (!) 167/77   Pulse 84   Temp 99.3 °F (37.4 °C) (Oral)   Resp 16   Ht 5' 4\" (1.626 m)   Wt 145 lb 3.2 oz (65.9 kg)   SpO2 93%   BMI 24.92 kg/m²   24 hour intake/output:    Intake/Output Summary (Last 24 hours) at 9/2/2020 1327  Last data filed at 9/2/2020 1001  Gross per 24 hour   Intake 1810.2 ml   Output --   Net 1810.2 ml     Last 3 weights: Wt Readings from Last 3 Encounters:   09/02/20 145 lb 3.2 oz (65.9 kg)   08/17/20 142 lb (64.4 kg)   08/13/20 140 lb (63.5 kg)     HEENT: Normocephalic and Atraumatic  Neck: Supple, No Masses, Tenderness, Nodularity and No Lymphadenopathy  Chest/Lungs: cough----, Rales Present, Rhonchi Present and Expiratory Wheezes  Cardiac: Regular Rate and Rhythm  GI/Abdomen: Bowel Sounds Present and Soft, Non-tender, without Guarding or Rebound Tenderness  : Not examined  EXT/Skin: No Edema, No Cyanosis and No Clubbing  Neuro: alert---very hard of hearing---dementia = baseline--- and No Localizing Signs/Symptoms      Assessment:    Active Problems:    Hyponatremia    Lower abdominal pain  Resolved Problems:    * No resolved hospital problems. DOUG Corcoran   A         80  WF   [sr Surjit IM;  ANNE Viramontes,  ANNE Lewis]  DNR-CCA        ASPIRIN  LOVENOX    Anti-infectives:   Rocephin IV    POD ____ right thoracentesis---9. 1.2020---750 ml serous fluid off           CXR---9.2.2020---bilateral pleural effusions--slightly increased on the right-- vein-stripping    Allergies:        allopurinol, Percocet---oxycodone, Phenergan--promethazine,                         potassium citrate--Polycitra-K, sulfa--rash  Intolerances:  Tessalon---benzonatate---anxiety      Plan:  1. Lung carcinoma---await cytology from thoracentesis  2. Hyponatremia---sodium replacement  3. Cough---possible bronchitis--Rocephin IV---med nebs  4. DM2-----Lantus 10----log 3-3-3 and correction  5. HTN---HCTZ 25 mg PO daily  6. Code Status----DNR-CCA  7.   See orders     Electronically signed by Devon Gandhi on 9/2/2020 at 1:27 PM    Hospitalist

## 2020-09-02 NOTE — PLAN OF CARE
Problem: Pain:  Goal: Pain level will decrease  Description: Pain level will decrease  9/2/2020 1005 by Rae Darby RN  Outcome: Ongoing  9/2/2020 0143 by Nicholas Rosales RN  Outcome: Ongoing  Goal: Control of acute pain  Description: Control of acute pain  9/2/2020 1005 by Rae Darby RN  Outcome: Ongoing  9/2/2020 0143 by Nicholas Rosales RN  Outcome: Ongoing  Goal: Control of chronic pain  Description: Control of chronic pain  9/2/2020 1005 by Rea Darby RN  Outcome: Ongoing  9/2/2020 0143 by Nicholas Rosales RN  Outcome: Ongoing     Problem: Skin Integrity:  Goal: Will show no infection signs and symptoms  Description: Will show no infection signs and symptoms  9/2/2020 1005 by Rae Darby RN  Outcome: Ongoing  9/2/2020 0143 by Nicholas Rosales RN  Outcome: Ongoing  Goal: Absence of new skin breakdown  Description: Absence of new skin breakdown  9/2/2020 1005 by Rae Darby RN  Outcome: Ongoing  9/2/2020 0143 by Nicholas Rosales RN  Outcome: Ongoing     Problem: Discharge Planning:  Goal: Patients continuum of care needs are met  Description: Patients continuum of care needs are met  9/2/2020 1005 by Rae Darby RN  Outcome: Ongoing  9/2/2020 0143 by Nicholas Rosales RN  Outcome: Ongoing

## 2020-09-02 NOTE — PROGRESS NOTES
SW attempted to meet with patient to complete Psychosocial Assessment. Patient was resting at this time. SW discussed with patient daughter. Daughter states she is not POA however, feels patient would do better in a nursing home. Daughter states POA lives next to patient and is thinking of home health aids. SW provided supportive listening. Daughter states she has been in contact via phone with patients son as well so all family members are aware of what is going on. SW provided business card. SW discussed with patients nurse to assess needs. SW will continue to monitor needs and assist as appropriate.        Electronically signed by CHELLE Mann on 9/2/2020 at 10:54 AM

## 2020-09-02 NOTE — PROGRESS NOTES
Physical Therapy  Facility/Department: [facility]  PROGRESSIVE CARE  Daily Treatment Note  NAME: Aidan See  : 1932  MRN: [id number]    Date of Service: 2020    Discharge Recommendations:  Continue to assess pending progress        Assessment   Body structures, Functions, Activity limitations: Decreased functional mobility ; Decreased strength;Decreased balance;Decreased endurance  Prognosis: Good  REQUIRES PT FOLLOW UP: Yes  Activity Tolerance  Activity Tolerance: Patient Tolerated treatment well;Patient limited by endurance     Patient Diagnosis(es): The primary encounter diagnosis was Lower abdominal pain. A diagnosis of Hyponatremia was also pertinent to this visit. has a past medical history of Adenocarcinoma of endometrium ([address] Aortic valve sclerosis, Bradycardia, Cardiac murmur, Cataract of left eye, Chronic diarrhea, Diabetes mellitus type II, controlled ([address] Dysthymia, Gait abnormality, Glaucoma, H/O renal calculi, H/O vein stripping, Hard of hearing, History of anemia, History of dizziness, History of radiation therapy, History of small bowel obstruction, Hyperlipidemia, Hypertension, Hypothyroidism, Influenza A, Left elbow fracture, Lung cancer ([address] Lung nodule, Macular degeneration, Obesity, Pericardial effusion, Pseudophakia of right eye, PVD (peripheral vascular disease) ([address] Traumatic injury of lower extremity, Vertigo, Wears glasses, and Wears partial dentures. has a past surgical history that includes jason and bso (cervix removed) (2002); Cholecystectomy, laparoscopic (1998); ERCP (1998); Colonoscopy (2003); Elbow fracture surgery (Left, 2010); and Cataract removal (Right, 2013). Restrictions     Subjective   General  Chart Reviewed: Yes  Response To Previous Treatment: Patient with no complaints from previous session.   Family / Caregiver Present: No  Subjective  Subjective: Pt found supine in bed upon arrival. Agreeable to Patient goals : Return Home    Plan    Plan  Times per day: Daily  Current Treatment Recommendations: Strengthening, Balance Training, Functional Mobility Training, Transfer Training, Gait Training, Endurance Training  Safety Devices  Type of devices:  All fall risk precautions in place, Call light within reach, Gait belt, Patient at risk for falls, Left in bed, Nurse notified     Therapy Time   Individual Concurrent Group Co-treatment   Time In 0753         Time Out 0817         Minutes 24         Timed Code Treatment Minutes: HUNTER Dean

## 2020-09-03 ENCOUNTER — APPOINTMENT (OUTPATIENT)
Dept: GENERAL RADIOLOGY | Age: 85
DRG: 641 | End: 2020-09-03
Payer: MEDICARE

## 2020-09-03 VITALS
SYSTOLIC BLOOD PRESSURE: 169 MMHG | TEMPERATURE: 98.5 F | HEART RATE: 73 BPM | HEIGHT: 64 IN | WEIGHT: 145.2 LBS | OXYGEN SATURATION: 92 % | DIASTOLIC BLOOD PRESSURE: 77 MMHG | RESPIRATION RATE: 18 BRPM | BODY MASS INDEX: 24.79 KG/M2

## 2020-09-03 LAB
ABSOLUTE EOS #: 0.52 K/UL (ref 0–0.44)
ABSOLUTE IMMATURE GRANULOCYTE: 0.14 K/UL (ref 0–0.3)
ABSOLUTE LYMPH #: 1.36 K/UL (ref 1.1–3.7)
ABSOLUTE MONO #: 1.25 K/UL (ref 0.1–1.2)
ANION GAP SERPL CALCULATED.3IONS-SCNC: 8 MMOL/L (ref 9–17)
BASOPHILS # BLD: 1 % (ref 0–2)
BASOPHILS ABSOLUTE: 0.08 K/UL (ref 0–0.2)
BUN BLDV-MCNC: 7 MG/DL (ref 8–23)
BUN/CREAT BLD: 9 (ref 9–20)
CALCIUM SERPL-MCNC: 8.8 MG/DL (ref 8.6–10.4)
CHLORIDE BLD-SCNC: 102 MMOL/L (ref 98–107)
CO2: 27 MMOL/L (ref 20–31)
CREAT SERPL-MCNC: 0.82 MG/DL (ref 0.5–0.9)
DIFFERENTIAL TYPE: ABNORMAL
EOSINOPHILS RELATIVE PERCENT: 5 % (ref 1–4)
GFR AFRICAN AMERICAN: >60 ML/MIN
GFR NON-AFRICAN AMERICAN: >60 ML/MIN
GFR SERPL CREATININE-BSD FRML MDRD: ABNORMAL ML/MIN/{1.73_M2}
GFR SERPL CREATININE-BSD FRML MDRD: ABNORMAL ML/MIN/{1.73_M2}
GLUCOSE BLD-MCNC: 130 MG/DL (ref 70–99)
GLUCOSE BLD-MCNC: 180 MG/DL (ref 65–105)
HCT VFR BLD CALC: 28.3 % (ref 36.3–47.1)
HEMOGLOBIN: 8.6 G/DL (ref 11.9–15.1)
IMMATURE GRANULOCYTES: 1 %
LYMPHOCYTES # BLD: 12 % (ref 24–43)
MCH RBC QN AUTO: 29.6 PG (ref 25.2–33.5)
MCHC RBC AUTO-ENTMCNC: 30.4 G/DL (ref 25.2–33.5)
MCV RBC AUTO: 97.3 FL (ref 82.6–102.9)
MONOCYTES # BLD: 11 % (ref 3–12)
NRBC AUTOMATED: 0 PER 100 WBC
PDW BLD-RTO: 14.7 % (ref 11.8–14.4)
PLATELET # BLD: 412 K/UL (ref 138–453)
PLATELET ESTIMATE: ABNORMAL
PMV BLD AUTO: 10.2 FL (ref 8.1–13.5)
POTASSIUM SERPL-SCNC: 3.9 MMOL/L (ref 3.7–5.3)
RBC # BLD: 2.91 M/UL (ref 3.95–5.11)
RBC # BLD: ABNORMAL 10*6/UL
SARS-COV-2, PCR: NORMAL
SARS-COV-2, RAPID: NORMAL
SARS-COV-2: NOT DETECTED
SEG NEUTROPHILS: 70 % (ref 36–65)
SEGMENTED NEUTROPHILS ABSOLUTE COUNT: 8.24 K/UL (ref 1.5–8.1)
SODIUM BLD-SCNC: 137 MMOL/L (ref 135–144)
SOURCE: NORMAL
SURGICAL PATHOLOGY REPORT: NORMAL
WBC # BLD: 11.6 K/UL (ref 3.5–11.3)
WBC # BLD: ABNORMAL 10*3/UL

## 2020-09-03 PROCEDURE — 82947 ASSAY GLUCOSE BLOOD QUANT: CPT

## 2020-09-03 PROCEDURE — 97110 THERAPEUTIC EXERCISES: CPT | Performed by: PHYSICAL THERAPY ASSISTANT

## 2020-09-03 PROCEDURE — 6370000000 HC RX 637 (ALT 250 FOR IP): Performed by: INTERNAL MEDICINE

## 2020-09-03 PROCEDURE — 71045 X-RAY EXAM CHEST 1 VIEW: CPT

## 2020-09-03 PROCEDURE — 2580000003 HC RX 258: Performed by: NURSE PRACTITIONER

## 2020-09-03 PROCEDURE — 94761 N-INVAS EAR/PLS OXIMETRY MLT: CPT

## 2020-09-03 PROCEDURE — 6370000000 HC RX 637 (ALT 250 FOR IP): Performed by: NURSE PRACTITIONER

## 2020-09-03 PROCEDURE — 99238 HOSP IP/OBS DSCHRG MGMT 30/<: CPT | Performed by: INTERNAL MEDICINE

## 2020-09-03 PROCEDURE — 36415 COLL VENOUS BLD VENIPUNCTURE: CPT

## 2020-09-03 PROCEDURE — 6360000002 HC RX W HCPCS: Performed by: NURSE PRACTITIONER

## 2020-09-03 PROCEDURE — 97116 GAIT TRAINING THERAPY: CPT | Performed by: PHYSICAL THERAPY ASSISTANT

## 2020-09-03 PROCEDURE — 94760 N-INVAS EAR/PLS OXIMETRY 1: CPT

## 2020-09-03 PROCEDURE — 85025 COMPLETE CBC W/AUTO DIFF WBC: CPT

## 2020-09-03 PROCEDURE — 80048 BASIC METABOLIC PNL TOTAL CA: CPT

## 2020-09-03 RX ORDER — CEFDINIR 300 MG/1
300 CAPSULE ORAL 2 TIMES DAILY
Qty: 12 CAPSULE | Refills: 0
Start: 2020-09-03 | End: 2020-09-09

## 2020-09-03 RX ORDER — INSULIN GLARGINE 100 [IU]/ML
15 INJECTION, SOLUTION SUBCUTANEOUS DAILY
Qty: 1 VIAL | Refills: 0
Start: 2020-09-04

## 2020-09-03 RX ORDER — HYDROCHLOROTHIAZIDE 25 MG/1
25 TABLET ORAL DAILY
Qty: 30 TABLET | Refills: 0
Start: 2020-09-04 | End: 2022-05-25

## 2020-09-03 RX ORDER — SODIUM CHLORIDE 1000 MG
1 TABLET, SOLUBLE MISCELLANEOUS 2 TIMES DAILY
Qty: 90 TABLET | Refills: 0
Start: 2020-09-03

## 2020-09-03 RX ADMIN — INSULIN GLARGINE 10 UNITS: 100 INJECTION, SOLUTION SUBCUTANEOUS at 08:50

## 2020-09-03 RX ADMIN — FERROUS SULFATE TAB 325 MG (65 MG ELEMENTAL FE) 325 MG: 325 (65 FE) TAB at 08:51

## 2020-09-03 RX ADMIN — HYDROCHLOROTHIAZIDE 25 MG: 25 TABLET ORAL at 08:51

## 2020-09-03 RX ADMIN — INSULIN LISPRO 1 UNITS: 100 INJECTION, SOLUTION INTRAVENOUS; SUBCUTANEOUS at 13:15

## 2020-09-03 RX ADMIN — SERTRALINE HYDROCHLORIDE 50 MG: 50 TABLET ORAL at 08:51

## 2020-09-03 RX ADMIN — ASPIRIN 81 MG: 81 TABLET, COATED ORAL at 08:51

## 2020-09-03 RX ADMIN — INSULIN LISPRO 3 UNITS: 100 INJECTION, SOLUTION INTRAVENOUS; SUBCUTANEOUS at 13:14

## 2020-09-03 RX ADMIN — LEVOTHYROXINE SODIUM 88 MCG: 88 TABLET ORAL at 08:51

## 2020-09-03 RX ADMIN — SODIUM CHLORIDE TAB 1 GM 1 G: 1 TAB at 08:51

## 2020-09-03 RX ADMIN — SODIUM CHLORIDE TAB 1 GM 1 G: 1 TAB at 13:14

## 2020-09-03 RX ADMIN — ENOXAPARIN SODIUM 40 MG: 40 INJECTION SUBCUTANEOUS at 08:50

## 2020-09-03 RX ADMIN — OXYBUTYNIN CHLORIDE 10 MG: 5 TABLET, EXTENDED RELEASE ORAL at 08:51

## 2020-09-03 RX ADMIN — SODIUM CHLORIDE: 9 INJECTION, SOLUTION INTRAVENOUS at 06:48

## 2020-09-03 NOTE — PROGRESS NOTES
Hospitalist Progress Note    Patient:  Keya Villanueva     YOB: 1932    MRN: [id number]   Admit date: 8/31/2020     Acct: [de-identified]     PCP: Milla Martinez DO    CC--Interval History: To [facility] BILL [place]---9.3.2020    Adenocarcinoma RLL---sp thoracentesis---9. 1.2020---750 ml off---pathology pending---conservative management at this time    No abdominal pain today    Bronchitis---vs--tumor---on Rocephin ---> Omnicef    Hyponatremia---corrected---on sodium replacement    Dementia---progressive    See note below     All other ROS negative except noted in HPI    Diet:  DIET CARDIAC;    Medications:  Scheduled Meds:   insulin glargine  10 Units Subcutaneous Daily    insulin lispro  3 Units Subcutaneous TID WC    sodium chloride  1 g Oral TID WC    hydroCHLOROthiazide  25 mg Oral Daily    cefTRIAXone (ROCEPHIN) IV  1 g Intravenous Q24H    aspirin  81 mg Oral Daily    ferrous sulfate  325 mg Oral Daily    latanoprost  1 drop Both Eyes Nightly    levothyroxine  88 mcg Oral Daily    lisinopril  40 mg Oral Daily    oxybutynin  10 mg Oral Daily    sertraline  50 mg Oral Daily    sodium chloride flush  10 mL Intravenous 2 times per day    enoxaparin  40 mg Subcutaneous Daily    insulin lispro  0-6 Units Subcutaneous TID WC    insulin lispro  0-3 Units Subcutaneous Nightly     Continuous Infusions:   dextrose      sodium chloride 75 mL/hr at 09/03/20 0648     PRN Meds:albuterol, sodium chloride flush, polyethylene glycol, dextrose, glucagon (rDNA), dextrose, ondansetron    Objective:  Labs:  CBC with Differential:    Lab Results   Component Value Date    WBC 11.6 09/03/2020    RBC 2.91 09/03/2020    HGB 8.6 09/03/2020    HCT 28.3 09/03/2020     09/03/2020    MCV 97.3 09/03/2020    MCH 29.6 09/03/2020    MCHC 30.4 09/03/2020    RDW 14.7 09/03/2020    NRBC 0 07/18/2020    SEGSPCT 79.3 07/18/2020    LYMPHOPCT 12 09/03/2020    MONOPCT 11 09/03/2020    BASOPCT 1 09/03/2020 Lower abdominal pain---8.31.2020---leukocytosis           POD ____ colonoscopy--8.27.2020----diverticulosis---[facility]           CT abdomen-pelvis----8.31.2020--chronic diverticulosis---NACs---chronic                                pancreatitis with atrophy--ductal dilatation--stable left renal atrophy--                               residual RLL dependent LLL atelectasis--rounded mass medial aspect                                right atelectatic lung 3.6 x 3.9 cm  [see lung carcinoma--below]          SBO--3.29.2019  Adenocarcinoma RLL--8.31.2020--previously scheduled for thoracentesis  Hyponatremia----8.31.2020  Anemia      Hypertension            EKG---9.1.2020---SR--80--PACs--LVH? 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  Gait--balance instability  Obesity  Dysthymia   Malignancies            Adenocarcinoma---RLL--bronchus---2018            Endometrial adenocarcinoma---history  Dementia   HEARING IMPAIRMENT   PMH:  anemia---chronic, bilateral cataracts,  dysthymia, chest pain,             pericardial effusion, gait abnormality, kidney stones, left entropion,             macular degeneration---OU, glaucoma--OU, vertigo, BLE trauma---              childhood, Influenza A---2018, UTI--- 2018, dehydration---2018,              CRUZ---1.11.2018---dehydration--fluoroquinolone,  hyponatremia,              chronic diarrhea,  UTI--.2019  PSH:  MAGDA--BSO---cervix absent--2002, laparoscopic cholecystectomy--            1998, ERCP--1998, colonoscopy---2003, left elbow ORIF--fracture--             2010, right cataract extraction----IOL--2013, vein-stripping    Allergies:        allopurinol, Percocet---oxycodone, Phenergan--promethazine,                         potassium citrate--Polycitra-K, sulfa--rash  Intolerances:  Tessalon---benzonatate---anxiety      Plan:  1. To [facility]---9.3.2020  2.   Home medications reviewed  3. DM2--Lantus 15---log--5-5-5 and correction  4. Bronchitis--Omnicef  5. Hyponatremia---sodium replacement  6. Adenocarcinoma--RLL---await cytology  7. Case discussed with Clint Guerrero as requested  8. Follow up Dr. Dar Knox, IM and/or medical director [facility]   9.   See orders    Electronically signed by Alyssa Zavala on 9/3/2020 at 12:48 PM    Hospitalist

## 2020-09-03 NOTE — PROGRESS NOTES
Reviewed plan of care, medication and discharge instructions. Explained discharged plan of care to dtr. Expressed understanding of instructions. Discharged to the [facility] via w/c to daughter. See discharged instructions. Report called to the [facility].

## 2020-09-03 NOTE — PROGRESS NOTES
Physical Therapy  Facility/Department: [facility]  PROGRESSIVE CARE  Daily Treatment Note  NAME: Jean Pierre Weldon  : 1932  MRN: [id number]    Date of Service: 9/3/2020    Discharge Recommendations:  Continue to assess pending progress, ECF with PT        Assessment   Body structures, Functions, Activity limitations: Decreased functional mobility ; Decreased strength;Decreased balance;Decreased endurance  Prognosis: Good  Decision Making: Low Complexity  REQUIRES PT FOLLOW UP: Yes  Activity Tolerance  Activity Tolerance: Patient Tolerated treatment well;Patient limited by endurance     Patient Diagnosis(es): The primary encounter diagnosis was Lower abdominal pain. A diagnosis of Hyponatremia was also pertinent to this visit. has a past medical history of Adenocarcinoma of endometrium ([address] Aortic valve sclerosis, Bradycardia, Cardiac murmur, Cataract of left eye, Chronic diarrhea, Diabetes mellitus type II, controlled ([address] Dysthymia, Gait abnormality, Glaucoma, H/O renal calculi, H/O vein stripping, Hard of hearing, History of anemia, History of dizziness, History of radiation therapy, History of small bowel obstruction, Hyperlipidemia, Hypertension, Hypothyroidism, Influenza A, Left elbow fracture, Lung cancer ([address] Lung nodule, Macular degeneration, Obesity, Pericardial effusion, Pseudophakia of right eye, PVD (peripheral vascular disease) ([address] Traumatic injury of lower extremity, Vertigo, Wears glasses, and Wears partial dentures. has a past surgical history that includes jason and bso (cervix removed) (2002); Cholecystectomy, laparoscopic (1998); ERCP (1998); Colonoscopy (2003); Elbow fracture surgery (Left, 2010); and Cataract removal (Right, 2013). Restrictions     Subjective   General  Chart Reviewed: Yes  Response To Previous Treatment: Patient with no complaints from previous session.   Family / Caregiver Present: No  Subjective  Subjective: Patient in bedside chair at initiation of session and agreeable to therapy. No pain noted at this time. Orientation  Orientation  Overall Orientation Status: Within Normal Limits  Cognition      Objective      Transfers  Sit to Stand: Contact guard assistance;Minimal Assistance  Stand to sit: Contact guard assistance  Ambulation  Ambulation?: Yes  WB Status: WBAT  More Ambulation?: No  Ambulation 1  Surface: level tile  Device: Rolling Walker  Assistance: Contact guard assistance  Quality of Gait: Pt exhibits slow aydin gait with decreased step length on B sides. Hunched posture present. V/C to correct  Gait Deviations: Slow Aydin;Decreased step length  Distance: 30'x2  Comments: Patient returned to bedside chair at conclusion of session. Stairs/Curb  Stairs?: No  Neuromuscular Education  NDT Treatment: Gait ;Sitting;Standing  Balance  Posture: Fair  Sitting - Static: Good  Sitting - Dynamic: Good  Standing - Static: Fair;+  Standing - Dynamic: Fair  Exercises  Quad Sets: 20x  Heelslides: 20x  Gluteal Sets: 20x  Hip Flexion: 20x  Hip Abduction: 20x with manual resistance  Knee Long Arc Quad: 20x  Ankle Pumps: x20 supine and sitting  Comments: hip add x15, HS curls x15 both with manual resistance                        G-Code     OutComes Score                                                     AM-PAC Score             Goals  Short term goals  Time Frame for Short term goals: LOS  Short term goal 1: Bed mobilty with mod I  Short term goal 2: Trnasfers with mod I  Short term goal 3: Amb 150ft with RW and mod I  Patient Goals   Patient goals : Return Home    Plan    Plan  Times per day: Daily  Current Treatment Recommendations: Strengthening, Balance Training, Functional Mobility Training, Transfer Training, Gait Training, Endurance Training  Safety Devices  Type of devices:  All fall risk precautions in place, Call light within reach, Gait belt, Patient at risk for falls, Nurse notified, Left in chair     Therapy Time   Individual Concurrent Group Co-treatment   Time In 0846         Time Out 0911         Minutes [address] [place] [address]

## 2020-09-04 ENCOUNTER — TELEPHONE (OUTPATIENT)
Dept: INTERNAL MEDICINE | Age: 85
End: 2020-09-04

## 2020-09-04 NOTE — TELEPHONE ENCOUNTER
[facility] Transitions Initial Follow Up Call    Outreach made within 2 business days of discharge: Yes    Patient: Uziel Perez Patient : 1932   MRN: [id number]  Reason for Admission: Hyponatremia  Discharge Date: 9/3/20       Spoke with: First attempt 20 @10:02am no answer, left message to call    DUPLICATE ENTRY

## 2020-09-04 NOTE — DISCHARGE SUMMARY
[address]                 [address] [place] [address], [address] [place] [address]                               DISCHARGE SUMMARY    PATIENT NAME: Luciano Oreilly                  :        1932  MED REC NO:   [id number]                             ROOM:       [id number]  ACCOUNT NO:   [de-identified]                           ADMIT DATE: 2020  PROVIDER:     Camila Hyde MD                  DISCHARGE DATE:  2020    ATTENDING PHYSICIAN OF HOSPITALIZATION:  Hitesh Perdomo MD    PERSONAL PHYSICIAN:  Mary Jensen DO, Internal Medicine, [facility]. The patient has been seen by General Surgery this hospitalization. DIAGNOSES:  1. Lower abdominal pain, 2020, with leukocytosis. Status post  colonoscopy, 2020, with diverticulosis. CT scan of the abdomen  and pelvis, 2020, chronic diverticulosis, no acute changes,  chronic pancreatitis with atrophy, ductal dilatation, stable left renal  atrophy, residual right lower lobe, dependent left lower lobe  atelectasis, surrounded mass, medial aspect of the right atelectatic  lung, 3.6 x 3.9 cm (lung carcinoma). 2.  Adenocarcinoma of the right lower lobe, 2020.  3.  Status post thoracentesis, 2020, 750 mL of serous fluid off.  4.  Chest x-ray, 2020, bilateral pleural effusions, slightly  increased on the right, no pneumothorax, right lower lobe consolidation  tumor. 5.  Chest x-ray, thoracentesis, 2020, no pneumothorax and a known  right lower lobe mass persistent. 6.  Pancreatitis, chronic-appearing. 7.  Hyponatremia, 2020, corrected. 8.  Anemia, chronic. 9.  Hypertension. 10.  EKG, 2020, sinus rhythm, rate 80, PACs, LVH? 11.  Hyperlipidemia. 12.  Hypothyroidism. 13.  Diabetes mellitus type 2.  14.  Peripheral vascular disease, bilateral carotid stenosis. 15.  Chronic kidney disease, stage II.  16.  Gait and balance instability. 17.  Obesity.   18. Dysthymia. 19.  Malignancies, adenocarcinoma of the right lower lobe bronchus,  2018. 20.  Endometrial carcinoma, by history. 21.  Dementia. 22.  Hearing impairment. Other medical problems set forth in the progress note of 09/03/2020,  incorporated for reference herein. HISTORY OF PRESENT ILLNESS AND HOSPITAL COURSE:  An 66-year-old white  female, patient of Adam Lee DO, Internal Medicine, [facility], presented with multiplicity of issues, initially lower  abdominal pain, which has now resolved. Leukocytosis, the patient is on  Rocephin, Omnicef. The patient's white blood cell count is down to  11.6. She had some rales, rhonchi with increasing wheezing, bronchitis,  leukocytosis likely. The patient underwent thoracentesis as set forth above, tolerated the  procedure well, no known complications, pathology pending. The patient's condition overall is such that she is unable to function  in the home setting, weakness, dementia, in addition to the multiplicity  of medical issues. The patient will be going to [address] [place] [address] on  09/03/2020. Diabetes mellitus type 2, blood glucose level 130. LABORATORY DATA:  Around the time of discharge, white cell count 11.6;  hemoglobin 8.6; hematocrit 28.3; and platelets 069,097. Sodium 137,  potassium 3.9, chloride 102, CO2 of 27, BUN 7, creatinine 0.82, glucose  130, calcium 8.8, and GFR of greater than 60. DISCHARGE INSTRUCTIONS:  FOLLOWUP:  Discharged to [address] [place] [address] on 09/03/2020. DIET:  General diet, to encourage p.o. intake and a broader selection of  foods, even though she is a diabetic. ACTIVITY:  Physical and occupational therapies continued from the  hospital setting. CONDITION AT DISCHARGE:  Fair, expected decline however due to her  adenocarcinoma of the lung. NEW MEDICATIONS:  Hydrochlorothiazide (HCTZ) 25 mg p.o. daily; sodium  chloride 1 gm p.o. twice daily; Omnicef (cefdinir) 300 mg p.o. b.i.d. 6  days; Lantus insulin 15 units subcutaneously daily; lispro (Humalog  insulin) 5 units subcutaneously with meals 3 times a day. FOLLOWING MEDICATIONS CONTINUED, NO CHANGE:  Guaifenesin/codeine 10 mL  every 6 hours p.r.n. cough; albuterol HFA two puffs every 6 hours p.r.n.  shortness of breath, wheezing; ferrous sulfate 325 mg p.o. daily; Zoloft  (sertraline) 50 mg p.o. daily; oxybutynin (Ditropan XL) 10 mg p.o.  daily; Zetia (ezetimibe) 10 mg p.o. daily; levothyroxine (Synthroid) 88  mcg (0.088 mg) p.o. daily; lisinopril 40 mg p.o. nightly; Antivert  (meclizine) 12.5 mg p.o. three times a day p.r.n. vertigo; multivitamin  with minerals one p.o. daily; cranberry one p.o. daily; multivitamin one  p.o. daily; aspirin 81 mg p.o. daily; latanoprost (Xalatan) 0.005%  ophthalmic solution 1 drop both eyes nightly; albuterol 0.083% nebulizer  every 6 hours p.r.n. shortness of breath, wheezing; ondansetron (Zofran)  4 mg p.o. q.8 hours p.r.n. nausea and vomiting; probiotic acidophilus  one p.o. three times a day. Follow up with the patient's personal physician, Frederick Baer DO, Internal Medicine, [facility] and/or medical director at  [facility] [place]. Follow up with Dr. Yousuf Ta, General Surgery  in 1 to 2 weeks. Fall precautions. Any aspect of the patient's care not discussed in the chart and/or  dictation will be addressed and treated as an outpatient. The patient's medications have been reviewed including, but not limited  to, pre-hospital, hospital and discharge medications. The patient  and/or the patient's personal representatives were specifically advised  the only medications to be taken are those set forth in the discharge  orders and no other medications should be taken. Any prior medications  not on the discharge orders are specifically discontinued.         Carrol Boyd MD    D: 09/03/2020 13:47:49       T: 09/03/2020 15:36:39     [email or web address]  Job#: [id number]     Doc#: [id number]    CC:

## 2020-09-08 PROBLEM — F32.A CHRONIC DEPRESSION: Status: ACTIVE | Noted: 2020-09-08

## 2020-09-10 ENCOUNTER — TELEPHONE (OUTPATIENT)
Dept: SURGERY | Age: 85
End: 2020-09-10

## 2020-09-10 ENCOUNTER — OUTSIDE SERVICES (OUTPATIENT)
Dept: FAMILY MEDICINE CLINIC | Age: 85
End: 2020-09-10
Payer: MEDICARE

## 2020-09-10 PROBLEM — E11.9 TYPE 2 DIABETES MELLITUS (HCC): Status: RESOLVED | Noted: 2020-07-30 | Resolved: 2020-09-10

## 2020-09-10 PROBLEM — D64.9 CHRONIC ANEMIA: Status: ACTIVE | Noted: 2020-09-10

## 2020-09-10 PROBLEM — R91.8 LUNG MASS: Status: RESOLVED | Noted: 2020-07-30 | Resolved: 2020-09-10

## 2020-09-10 PROBLEM — Z85.42 HISTORY OF ENDOMETRIAL CANCER: Status: ACTIVE | Noted: 2019-09-20

## 2020-09-10 PROCEDURE — 99310 SBSQ NF CARE HIGH MDM 45: CPT | Performed by: FAMILY MEDICINE

## 2020-09-10 ASSESSMENT — ENCOUNTER SYMPTOMS
DIARRHEA: 0
STRIDOR: 0
NAUSEA: 0
WHEEZING: 0
SHORTNESS OF BREATH: 1
BLOOD IN STOOL: 0
VOMITING: 0
COUGH: 0
CONSTIPATION: 0
ABDOMINAL PAIN: 0

## 2020-09-10 NOTE — PROGRESS NOTES
9/10/2020    Dillon Boast (:  1932) is a 80 y.o. female, here for evaluation of the following medical concerns:    HPI     New pt  Pt recently discharged from [facility] after being admitted for lower abdominal pain with leukocytosis. GI eval was not revealing of any acute cause to her symptoms. She has known lung cancer/ adenocarcinoma dx earlier this year-- possiby cause of her abd pain? She underwent thoracentesis. Plan is for conservative management at this time with symptomatic relief with multiple thoracentesis done in hospital and diagnostics from the throacentesis being being followed by Dr. Melody Baldwin, Pulmonary. Completed radiation earlier this year for lung cancer. States no increase in sob from her baseline. She was also found to have hyponatremia with Na of 128 on admission, which was corrected. Recheck of Na on  wnl. DMII-  Has been on Lantus and humalog. States that was not on any insulin at home  Lab Results   Component Value Date    LABA1C 6.6 (H) 2020     Lab Results   Component Value Date     2020     Chronic dysthymia/depression-  Stable on sertraline    Dementia- per chart.   Does have some mild memory difficulties apparent today, but was able to recall most of recent events accurately as well as AOx3      HLD-  Lab Results   Component Value Date    CHOL 129 2020    CHOL 125 2020    CHOL 125 2020     Lab Results   Component Value Date    TRIG 133 2020    TRIG 124 2020    TRIG 124 2020     Lab Results   Component Value Date    HDL 35 (L) 2020    HDL 41 2020    HDL 41 2020     Lab Results   Component Value Date    LDLCHOLESTEROL 67 2020    LDLCHOLESTEROL 63 2019    LDLCHOLESTEROL 58 2018    LDLCALC 59 2020    LDLCALC 59 2020     Lab Results   Component Value Date    VLDL NOT REPORTED 2020    VLDL NOT REPORTED 2019    VLDL NOT REPORTED 11/03/2018     Lab Results   Component Value Date    CRIS 3.7 08/31/2020    CHOLMEETAO 2.9 06/29/2019    SERENAO 2.6 11/03/2018         Had fall this morning where slid out of chair onto R buttock. C/o pain in r buttock and sacrum. Mild to moderate. Able tos stand for me wihtout pain and states pain actually better with standing      Patient seen and examined. History partially obtained by chart review and nursing notes. I have reviewed patients past medical, surgical, social, and family history and have made updates where appropriate. Allergies and Medications were reviewed in resident's EMR chart at the [facility]. See Kasie Lord of [employer] for updated med list.          Review of Systems   Constitutional: Negative for activity change, appetite change, chills, diaphoresis, fatigue, fever and unexpected weight change. HENT: Negative for congestion. Respiratory: Positive for shortness of breath (stable, mild). Negative for cough, wheezing and stridor. Cardiovascular: Negative for chest pain, palpitations and leg swelling. Gastrointestinal: Negative for abdominal pain, blood in stool, constipation, diarrhea, nausea and vomiting. Genitourinary: Negative for difficulty urinating. Musculoskeletal: Positive for arthralgias and gait problem. Skin: Negative. Neurological: Negative for headaches. Psychiatric/Behavioral: Negative. Prior to Visit Medications    Medication Sig Taking?  Authorizing Provider   hydroCHLOROthiazide (HYDRODIURIL) 25 MG tablet Take 1 tablet by mouth daily  Alyssa Bring   sodium chloride 1 g tablet Take 1 tablet by mouth 2 times daily  Alyssa Bring   insulin glargine (LANTUS) 100 UNIT/ML injection vial Inject 15 Units into the skin daily  Alyssa Bring   insulin lispro (HUMALOG) 100 UNIT/ML injection vial Inject 5 Units into the skin 3 times daily (with meals)  Alyssa Bring   albuterol (PROVENTIL) (2.5 MG/3ML) 0.083% nebulizer solution Take 3 mLs by nebulization every 6 hours as needed for Wheezing or Shortness of Breath  Neeru Martin MD   albuterol sulfate HFA (PROVENTIL HFA) 108 (90 Base) MCG/ACT inhaler Inhale 2 puffs into the lungs every 6 hours as needed for Wheezing  Reji Felton MD   ferrous sulfate (IRON 325) 325 (65 Fe) MG tablet Take 325 mg by mouth daily  Historical Provider, MD   sertraline (ZOLOFT) 50 MG tablet TAKE 1 TABLET DAILY  Wiliam Eddy,    oxybutynin (DITROPAN-XL) 10 MG extended release tablet TAKE 1 TABLET DAILY  Jan Tellez MD   ezetimibe (ZETIA) 10 MG tablet TAKE 1 TABLET DAILY  Wiliam Eddy DO   levothyroxine (SYNTHROID) 88 MCG tablet TAKE 1 TABLET DAILY  Wiliam Eddy DO   ondansetron (ZOFRAN) 4 MG tablet Take 1 tablet by mouth every 8 hours as needed for Nausea or Vomiting  Wiliam Eddy DO   lisinopril (PRINIVIL;ZESTRIL) 40 MG tablet TAKE 1 TABLET NIGHTLY  Jazmine Shah DO   meclizine (ANTIVERT) 25 MG tablet Take 12.5 mg by mouth 3 times daily as needed  Historical Provider, MD   Probiotic Product (PROBIOTIC DAILY) CAPS One tablet 3 time a day times 10 days then once a day  Ronnie Mancia   Multiple Vitamins-Minerals (THERAPEUTIC MULTIVITAMIN-MINERALS) tablet Take 1 tablet by mouth daily  Historical Provider, MD   CRANBERRY PO Take 1 capsule by mouth daily  Historical Provider, MD   Multiple Vitamins-Minerals (OCUVITE PRESERVISION PO) Take 1 tablet by mouth daily  Historical Provider, MD   aspirin 81 MG tablet Take 81 mg by mouth daily Held 5 days prior to paracentesis on 9-1-20  Historical Provider, MD   latanoprost (XALATAN) 0.005 % ophthalmic solution Place 1 drop into both eyes nightly. Historical Provider, MD        Allergies   Allergen Reactions    Allopurinol      Patient unsure of reaction    Metoprolol Tartrate [Metoprolol]      bradycardia    Percocet [Oxycodone-Acetaminophen]      Hallucinations. ...sensitive to narcotics    Phenergan [Promethazine Hcl]      Very sensitive. ..given small doses    Polycitra-K [Potassium Citrate]      Patient unsure of reaction    Statins Other (See Comments)     ? Muscle aches     Levaquin [Levofloxacin] Anxiety     Not able to sleep    Sulfa Antibiotics Rash    Tessalon [Benzonatate] Anxiety     Not able to sleep       Past Medical History:   Diagnosis Date    Aden[facility] of [facility]rium ([facility]     Aortic valve sclerosis     Bradycardia     follows with cardiology- Dr. Sherrie Savage, possible SSS    Cardiac murmur     Cataract of left eye     Chronic diarrhea     s/p radiation    Diabetes mellitus type II, controlled ([address]     diet controlled     Dysthymia     Gait abnormality 6/23/2014    Glaucoma     H/O renal calculi     H/O vein stripping     Hard of hearing     History of anemia     History of dizziness     follows with cardiology, bradycardia, possible SSS    History of radiation therapy     for uterine cancer    History of small bowel obstruction 03/2019    no surgery required    Hyperlipidemia     Hypertension     Hypothyroidism     Influenza A 1/11/2018    Left elbow fracture     S/P surgical repair    Lung cancer ([facility]     Lung nodule     Macular degeneration     Obesity     Pericardial effusion 4/23/2014    Pseudophakia of right eye     PVD (peripheral vascular disease) ([facility]     Traumatic injury of lower extremity childhood    bilateral trauma of lower extremities    Vertigo     resolved    Wears glasses     Wears partial dentures     upper        Past Surgical History:   Procedure Laterality Date    CATARACT REMOVAL Right February 2013    Dr. Lewayne Osler, LAPAROSCOPIC  5/28/1998    COLONOSCOPY  7/22/2003    Dr. Idania Millard Left September 2010    ORIF, Dr. Bryce Saunders ERCP  7/19/1998    retained stone, Suzy Mantilla and Carrol Krishnan MAGDA AND BSO  March 2002    endometrial adenocarcinoma, Dr. Tejinder Jarrett History    Marital status:      Spouse name: Not on file    Number of children: Not on file    Years of education: Not on file    Highest education level: Not on file   Occupational History    Not on file   Social Needs    Financial resource strain: Not on file    Food insecurity     Worry: Not on file     Inability: Not on file    Transportation needs     Medical: Not on file     Non-medical: Not on file   Tobacco Use    Smoking status: Never Smoker    Smokeless tobacco: Never Used    Tobacco comment: never smoked yant rrt 01/11/2018   Substance and Sexual Activity    Alcohol use: No     Alcohol/week: 0.0 standard drinks    Drug use: No    Sexual activity: Not on file   Lifestyle    Physical activity     Days per week: Not on file     Minutes per session: Not on file    Stress: Not on file   Relationships    Social connections     Talks on phone: Not on file     Gets together: Not on file     Attends Yarsanism service: Not on file     Active member of club or organization: Not on file     Attends meetings of clubs or organizations: Not on file     Relationship status: Not on file    Intimate partner violence     Fear of current or ex partner: Not on file     Emotionally abused: Not on file     Physically abused: Not on file     Forced sexual activity: Not on file   Other Topics Concern    Not on file   Social History Narrative    Not on file        Family History   Problem Relation Age of Onset    High Blood Pressure Other     Diabetes type 2  Mother         developed later in life     Temperature: 99.1 °F 09/10/2020 08:58 AM Pulse: 73 per minute 09/10/2020 08:59 AM Respirations: 17 per minute 09/10/2020 08:59 AM Blood Pressure: 127 / 71 mmHg 09/10/2020 08:59 AM O2 Saturation: 95 % 09/10/2020 08:58 AM Blood Sugar: 181 mg/dL 09/10/2020 08:58 AM Weight: 140 lbs / Admission 09/03/2020 04:04 PM      Physical Exam  Vitals signs and nursing note reviewed.    Constitutional:       General: She is not in acute distress. Appearance: She is well-developed. She is not diaphoretic. HENT:      Head: Normocephalic and atraumatic. Neck:      Musculoskeletal: Normal range of motion and neck supple. Thyroid: No thyromegaly. Trachea: No tracheal deviation. Cardiovascular:      Rate and Rhythm: Normal rate and regular rhythm. Heart sounds: Normal heart sounds. No murmur. No friction rub. No gallop. Pulmonary:      Effort: Pulmonary effort is normal. No respiratory distress. Breath sounds: Normal breath sounds. No stridor. No wheezing or rales. Musculoskeletal:         General: Tenderness (mild over R buttock and sacrum) present. No swelling. Lymphadenopathy:      Cervical: No cervical adenopathy. Skin:     General: Skin is warm and dry. Findings: No rash. Neurological:      Mental Status: She is alert and oriented to person, place, and time. Psychiatric:         Behavior: Behavior normal.         Thought Content: Thought content normal.         Judgment: Judgment normal.         ASSESSMENT/PLAN:  1. Adenocarcinoma, lung, right [facility])  Has f/u with pulmonology on Dr. Jerardo Perez, [facility], 9/16/20 @ 2:30 pm for f/u of results of thoracentesis. 2. Malignant neoplasm of lower lobe, right bronchus or lung ([facility]      3. Type 2 diabetes mellitus without complication, without long-term current use of insulin ([facility])   a1c 6.6% on 9/8/2020  -on Lantus 15 units with Humalog 5 units TID and ISS. Monitor A1c's  -sugars generally running 140-low 200s  -As a1c conbtrolled, her age and comorbitidies, d/c humalog and increase lantus to 17 units    4. Chronic renal insufficiency, stage III (moderate) ([facility])  Stable    5. Chronic depression  Stable on sertraline  Monitor Na with SSRI    6. Hyponatremia  - resolved in hospital.   - monitor    7. Essential hypertension  - controlled  - cont hctz and lisinopril      8.  History of endometrial cancer  -no signs/symptoms of recurrence. - monitor    9. Frail elderly  PT/OT  Pt plans to return home    10. Early stage nonexudative age-related macular degeneration of both eyes    11. Glaucoma of both eyes, unspecified glaucoma type  -cont eye drops    13. Chronic kidney disease, stage III (moderate) ([address] likely related to diabetes  -stable. Last Cr earlier this week 0.9  -monitor     14. Hypothyroidism due to acquired atrophy of thyroid  - cont synthroid  Lab Results   Component Value Date    TSH 3.140 07/01/2020   recheck TSH in 4 mos      15. Chronic anemia-   -iron deficiencey based on recent labs. Per chart, sounds like has had poor appetite.   -cont ferrous sulfate daily  -monitor CBC's  -lower GI eval in hospital 8/2020 unrevealing. Doesn't appear has had EGD, but hold on further eval of this given recent lung ca diagnosis. 16. HLD-  - On Zetia   -given age and comorbidities, may need to re-evaluate if appoprirate to continue. Await pulm visit for f/u of recent lung cancer dx    17. Leukocytosis-   WBC 12.2 this week. Stable compared to discharge wbc of 11.6 2 weeks ago. No active signs of infection. Recheck cbc in 1 week to monitor. ?related to lung ca. 18. Fall this morning  - no apparent signs of fx. If pain persists, check sacral x-ray. Pt/ot      Called daughter to update her on above but no answer. Left VM      F/u monthly or sooner prn  An  electronic signature was used to authenticate this note. Total time review chart, discussing with nursing and face to face with patient- 65 minutes.         --Jenny Segura MD on 9/10/2020 at 9:35 AM

## 2020-09-10 NOTE — TELEPHONE ENCOUNTER
Patient was in the hospital on 8/31/2020 for pancreatitis. She was discharged to the nursing home. Patient's daughter, Divina Gonzalez, is calling the office to see if patient still needs the 2 week follow up with Dr. Raul Salazar on 9/18/2020. Please call her back at [phone].

## 2020-09-14 NOTE — TELEPHONE ENCOUNTER
Spoke with patients daughter Amanda Recio and she states that the patient is in a nursing home and she is doing well from a surgery stand point and they would not like to bring her out so they are cancelling her appointment.

## 2020-09-16 ENCOUNTER — OFFICE VISIT (OUTPATIENT)
Dept: PULMONOLOGY | Age: 85
End: 2020-09-16
Payer: MEDICARE

## 2020-09-16 VITALS
HEIGHT: 64 IN | SYSTOLIC BLOOD PRESSURE: 108 MMHG | TEMPERATURE: 97.3 F | WEIGHT: 140 LBS | BODY MASS INDEX: 23.9 KG/M2 | HEART RATE: 83 BPM | OXYGEN SATURATION: 96 % | DIASTOLIC BLOOD PRESSURE: 66 MMHG

## 2020-09-16 PROCEDURE — G8427 DOCREV CUR MEDS BY ELIG CLIN: HCPCS | Performed by: INTERNAL MEDICINE

## 2020-09-16 PROCEDURE — 1036F TOBACCO NON-USER: CPT | Performed by: INTERNAL MEDICINE

## 2020-09-16 PROCEDURE — 4040F PNEUMOC VAC/ADMIN/RCVD: CPT | Performed by: INTERNAL MEDICINE

## 2020-09-16 PROCEDURE — G8420 CALC BMI NORM PARAMETERS: HCPCS | Performed by: INTERNAL MEDICINE

## 2020-09-16 PROCEDURE — 1123F ACP DISCUSS/DSCN MKR DOCD: CPT | Performed by: INTERNAL MEDICINE

## 2020-09-16 PROCEDURE — 1090F PRES/ABSN URINE INCON ASSESS: CPT | Performed by: INTERNAL MEDICINE

## 2020-09-16 PROCEDURE — 1111F DSCHRG MED/CURRENT MED MERGE: CPT | Performed by: INTERNAL MEDICINE

## 2020-09-16 PROCEDURE — 99214 OFFICE O/P EST MOD 30 MIN: CPT | Performed by: INTERNAL MEDICINE

## 2020-09-16 PROCEDURE — 99213 OFFICE O/P EST LOW 20 MIN: CPT | Performed by: INTERNAL MEDICINE

## 2020-09-16 RX ORDER — ACETAMINOPHEN 325 MG/1
650 TABLET ORAL EVERY 6 HOURS PRN
Status: ON HOLD | COMMUNITY
End: 2022-04-30 | Stop reason: HOSPADM

## 2020-09-16 RX ORDER — LOPERAMIDE HYDROCHLORIDE 2 MG/1
2 CAPSULE ORAL 4 TIMES DAILY PRN
COMMUNITY

## 2020-09-20 NOTE — PROGRESS NOTES
REASON FOR follow-up  Adenocarcinoma of right lower lobe stage I A post SBRT October 2019  Right pleural effusion and right lower lobe lung mass  HISTORY OF PRESENT ILLNESS:    Nancy Bullard is a 80y.o. year old female   Patient is accompanied by her daughter. She is presently in the nursing home. Patient has been placed there and COVID isolation. I reviewed results of the thoracentesis with patient and her daughter. She had thoracentesis twice and cytology has been negative twice but right lower lobe lung mass has enlarged. This is highly concerning for neoplastic process and reoccurrence of lung cancer. I discussed the case with interventional radiology so as to get a CT-guided biopsy. According to the interventional radiologist they feel that the disease would be best approached by bronchoscopy. They I discussed in detail bronchoscopy procedure with patient and her daughter and also advised him to consider goals of care keeping in mind patient's advanced age and frailty. Last visit   Ct chest reviewed and d/w with pt and her daughter over the phone along with family member in the room with patient . Last visit  Patient completed SBRT October 2019. She had a follow-up CT chest which showed an interval decrease in size of the lung nodule. She does have occasional shortness of breath with activity but is stable denies any chest pain cough hemoptysis or wheezing. Last visit   here for evaluation of abnormal CT of the chest.  Patient had small bowel obstruction in March had CT abdomen pelvis which showed right lower lobe lung nodule. This was followed by CT of the chest which confirmed lung nodule and also a satellite nodule of 8 mm. She denies any cough, hemoptysis. She is a lifelong nonsmoker. Had history of endometrial cancer. ambulates with the help of a walker.   Denies any shortness of breath             LUNG CANCER SCREENING     1. CRITERIA MET    []     CT ORDERED  [] 2. CRITERIA NOT MET   [x]      3. REFUSED                    []        REASON CRITERIA NOT MET     1. SMOKING LESS THAN 30 PY  []      2. AGE LESS THAN 55 or GREATER 77 YEARS  []      3. QUIT SMOKING 15 YEARS OR GREATER   []      4. RECENT CT WITH IN 11 MONTHS    []      5. LIFE EXPECTANCY < 5 YEARS   []      6.  SIGNS  AND SYMPTOMS OF LUNG CANCER   []         Immunization   Immunization History   Administered Date(s) Administered    Influenza Virus Vaccine 10/09/2009, 10/07/2011, 09/19/2012    Influenza, High Dose (Fluzone 65 yrs and older) 09/16/2013, 11/07/2014, 09/28/2015, 10/17/2016, 10/18/2017, 10/18/2018    Influenza, Triv, inactivated, subunit, adjuvanted, IM (Fluad 65 yrs and older) 10/31/2019    Pneumococcal Conjugate 13-valent (Aeatooc70) 10/22/2015    Pneumococcal Polysaccharide ([id number]) 01/25/2012, 04/14/2014    Tdap (Boostrix, Adacel) 07/05/2015    Zoster Recombinant (Shingrix) 11/09/2019, 11/09/2019, 03/07/2020        Pneumococcal Vaccine     [x] Up to date    [] Indicated   [] Refused  [] Contraindicated       Influenza Vaccine   [x] Up to date    [] Indicated   [] Refused  [] Contraindicated        PAST MEDICAL HISTORY:       Diagnosis Date    Adenocarcinoma of endometrium ([address]     Aortic valve sclerosis     Bradycardia     follows with cardiology- Dr. Yinka Henning, possible SSS    Cardiac murmur     Cataract of left eye     Chronic diarrhea     s/p radiation    Diabetes mellitus type II, controlled ([address]     diet controlled     Dysthymia     Gait abnormality 6/23/2014    Glaucoma     H/O renal calculi     H/O vein stripping     Hard of hearing     History of anemia     History of dizziness     follows with cardiology, bradycardia, possible SSS    History of radiation therapy     for uterine cancer    History of small bowel obstruction 03/2019    no surgery required    Hyperlipidemia     Hypertension     Hypothyroidism     Influenza A 1/11/2018    Left elbow [Levofloxacin] Anxiety     Not able to sleep    Sulfa Antibiotics Rash    Tessalon [Benzonatate] Anxiety     Not able to sleep         Home Meds:   Prior to Admission medications    Medication Sig Start Date End Date Taking?  Authorizing Provider   acetaminophen (TYLENOL) 325 MG tablet Take 650 mg by mouth every 6 hours as needed for Pain   Yes Historical Provider, MD   hydroCHLOROthiazide (HYDRODIURIL) 25 MG tablet Take 1 tablet by mouth daily 9/4/20  Yes Mine Gr   sodium chloride 1 g tablet Take 1 tablet by mouth 2 times daily 9/3/20  Yes Ronnie Mancia   insulin glargine (LANTUS) 100 UNIT/ML injection vial Inject 15 Units into the skin daily 9/4/20  Yes Ronnie Mancia   insulin lispro (HUMALOG) 100 UNIT/ML injection vial Inject 5 Units into the skin 3 times daily (with meals) 9/3/20  Yes Mine Gr   albuterol (PROVENTIL) (2.5 MG/3ML) 0.083% nebulizer solution Take 3 mLs by nebulization every 6 hours as needed for Wheezing or Shortness of Breath 8/28/20  Yes Matias Castillo MD   ferrous sulfate (IRON 325) 325 (65 Fe) MG tablet Take 325 mg by mouth daily   Yes Historical Provider, MD   sertraline (ZOLOFT) 50 MG tablet TAKE 1 TABLET DAILY 5/8/20  Yes Wiliam Eddy DO   oxybutynin (DITROPAN-XL) 10 MG extended release tablet TAKE 1 TABLET DAILY 3/29/20  Yes Audelia Benitez MD   ezetimibe (ZETIA) 10 MG tablet TAKE 1 TABLET DAILY 2/26/20  Yes Wiliam Eddy DO   levothyroxine (SYNTHROID) 88 MCG tablet TAKE 1 TABLET DAILY 2/4/20  Yes Wiliam Eddy DO   ondansetron (ZOFRAN) 4 MG tablet Take 1 tablet by mouth every 8 hours as needed for Nausea or Vomiting 1/23/20  Yes Wiliam Eddy DO   lisinopril (PRINIVIL;ZESTRIL) 40 MG tablet TAKE 1 TABLET NIGHTLY 12/17/19  Yes Jazmine Shah DO   meclizine (ANTIVERT) 25 MG tablet Take 12.5 mg by mouth 3 times daily as needed   Yes Historical Provider, MD   Probiotic Product (PROBIOTIC DAILY) CAPS One tablet 3 time a day times 10 days then once a day 4/2/19  Yes Carlos Loveimmer   Multiple Vitamins-Minerals (THERAPEUTIC MULTIVITAMIN-MINERALS) tablet Take 1 tablet by mouth daily   Yes Historical Provider, MD   CRANBERRY PO Take 1 capsule by mouth daily   Yes Historical Provider, MD   Multiple Vitamins-Minerals (OCUVITE PRESERVISION PO) Take 1 tablet by mouth daily   Yes Historical Provider, MD   aspirin 81 MG tablet Take 81 mg by mouth daily Held 5 days prior to paracentesis on 9-1-20   Yes Historical Provider, MD   latanoprost (XALATAN) 0.005 % ophthalmic solution Place 1 drop into both eyes nightly. Yes Historical Provider, MD   loperamide (IMODIUM) 2 MG capsule Take 2 mg by mouth 4 times daily as needed for Diarrhea    Historical Provider, MD   albuterol sulfate HFA (PROVENTIL HFA) 108 (90 Base) MCG/ACT inhaler Inhale 2 puffs into the lungs every 6 hours as needed for Wheezing  Patient not taking: Reported on 9/16/2020 8/9/20   Lorna Romeo MD      Review of Systems -  General ROS:  weight loss  ENT ROS: negative for - headaches, oral lesions or sore throat  Cardiovascular ROS: no chest pain , orthopnea or pnd   Gastrointestinal ROS: no abdominal pain, change in bowel habits, or black or bloody stools  Skin - no rash   Neuro - no blurry vision , no loc . No focal weakness    Vitals:  /66   Pulse 83   Temp 97.3 °F (36.3 °C)   Ht 5' 4\" (1.626 m)   Wt 140 lb (63.5 kg) Comment: nursing home, unable to stand  SpO2 96%   BMI 24.03 kg/m²     PHYSICAL EXAM:  Head and neck atraumatic, normocephalic    Lymph nodes-no cervical, supraclavicular lymphadenopathy    Neck-no JVP elevation    Lungs - Ventilating all lobes without any rales, rhonchi, wheezes or dullness. No bronchial breath sounds. Chest expansion equal bilaterally  Decreased breath sounds right posterior infrascapular  CVS- S1, S2 regular. No S3 no S4, no murmurs    Abdomen-nontender, nondistended. Bowel sounds are present.   No organomegaly    Lower extremity-no edema    Upper medication compliance addressed. All patient questions answered. Pt voiced understanding. I hope this updates you on my evaluation and clinical thinking. Thank you for allowing me to participate in his care. Sincerely,    Electronically signed by Adrienne Alfonso MD on 9/20/2020 at 3:09 PM       Please note that this chart was generated using voice recognition Dragon dictation software. Although every effort was made to ensure the accuracy of this automated transcription, some errors in transcription may have occurred.

## 2020-09-22 ENCOUNTER — TELEPHONE (OUTPATIENT)
Dept: GENERAL RADIOLOGY | Age: 85
End: 2020-09-22

## 2020-09-22 NOTE — TELEPHONE ENCOUNTER
We are trying to remove unnecessary orders from our scheduling workqueue. Joan Balderrama has a CT needle placement order from 8/21. I believe this was the situation where Dr. Judd Norton spoke with Dr. Melody Baldwin and they opted for a repeat thoracentesis, can you confirm? If so, and this is not needed, can you please delete the order. Also, there is an IR thoracentesis order from 8/24. I had requested and US thoracentesis order be placed instead, so I believe this order can be deleted as well.   Thanks,  [facility]  Radiology

## 2020-10-15 ENCOUNTER — OUTSIDE SERVICES (OUTPATIENT)
Dept: FAMILY MEDICINE CLINIC | Age: 85
End: 2020-10-15
Payer: MEDICARE

## 2020-10-15 VITALS
OXYGEN SATURATION: 96 % | DIASTOLIC BLOOD PRESSURE: 62 MMHG | HEART RATE: 73 BPM | SYSTOLIC BLOOD PRESSURE: 127 MMHG | WEIGHT: 130.2 LBS | RESPIRATION RATE: 18 BRPM | BODY MASS INDEX: 22.35 KG/M2 | TEMPERATURE: 97.9 F

## 2020-10-15 PROCEDURE — 99309 SBSQ NF CARE MODERATE MDM 30: CPT | Performed by: NURSE PRACTITIONER

## 2020-10-15 ASSESSMENT — ENCOUNTER SYMPTOMS
BLOOD IN STOOL: 0
SHORTNESS OF BREATH: 0
EYE PAIN: 0
COLOR CHANGE: 0
ABDOMINAL PAIN: 0
RHINORRHEA: 0
VOMITING: 0
COUGH: 0
SORE THROAT: 0
NAUSEA: 0
EYE DISCHARGE: 0

## 2020-10-15 NOTE — PROGRESS NOTES
Domingo Mattson is a 80 y.o. female who presents today for her medical conditions/complaints as noted below. Chief Complaint   Patient presents with    Other     changes in labs           HPI:     Kerline Rothman is seen today for anemia with noted drop in recent labs, she has also lost 10 lbs since her admission a month ago. She continues with therapy and still plans on going home when stronger. She denies any s/s active bleeding. Staff also deny s/s of active bleeding. She is alert, denies pain, shortness of breath, or cough. She is on room air with no distress noted. She is sitting in recliner during visit playing cards. Staff denies concerns.       Past Medical History:   Diagnosis Date    Adenocarcinoma of endometrium ([address]     Aortic valve sclerosis     Bradycardia     follows with cardiology- Dr. Vesna Chau, possible SSS    Cardiac murmur     Cataract of left eye     Chronic diarrhea     s/p radiation    Diabetes mellitus type II, controlled ([address]     diet controlled     Dysthymia     Gait abnormality 6/23/2014    Glaucoma     H/O renal calculi     H/O vein stripping     Hard of hearing     History of anemia     History of dizziness     follows with cardiology, bradycardia, possible SSS    History of radiation therapy     for uterine cancer    History of small bowel obstruction 03/2019    no surgery required    Hyperlipidemia     Hypertension     Hypothyroidism     Influenza A 1/11/2018    Left elbow fracture     S/P surgical repair    Lung cancer ([address]     Lung nodule     Macular degeneration     Obesity     Pericardial effusion 4/23/2014    Pseudophakia of right eye     PVD (peripheral vascular disease) ([address]     Traumatic injury of lower extremity childhood    bilateral trauma of lower extremities    Vertigo     resolved    Wears glasses     Wears partial dentures     upper       Past Surgical History:   Procedure Laterality Date    CATARACT REMOVAL Right February 2013 Dr. Arambula March, LAPAROSCOPIC  5/28/1998    COLONOSCOPY  7/22/2003    Dr. Gavin Sessions Left September 2010    ORIGIOVANNA, Dr. Emily Patton ERCP  7/19/1998    retained stone, Suzy Salomon and Bobby Trejo MAGDA AND BSO  March 2002    endometrial adenocarcinoma, Dr. Aissatou Almaraz       Family History   Problem Relation Age of Onset    High Blood Pressure Other     Diabetes type 2  Mother         developed later in life       Social History     Tobacco Use    Smoking status: Never Smoker    Smokeless tobacco: Never Used    Tobacco comment: never smoked yant rrt 01/11/2018   Substance Use Topics    Alcohol use: No     Alcohol/week: 0.0 standard drinks      Allergies   Allergen Reactions    Allopurinol      Patient unsure of reaction    Metoprolol Tartrate [Metoprolol]      bradycardia    Percocet [Oxycodone-Acetaminophen]      Hallucinations. ...sensitive to narcotics    Phenergan [Promethazine Hcl]      Very sensitive. ..given small doses    Polycitra-K [Potassium Citrate]      Patient unsure of reaction    Statins Other (See Comments)     ? Muscle aches     Levaquin [Levofloxacin] Anxiety     Not able to sleep    Sulfa Antibiotics Rash    Tessalon [Benzonatate] Anxiety     Not able to sleep       Health Maintenance   Topic Date Due    Flu vaccine (1) 09/01/2020    Annual Wellness Visit (AWV)  09/16/2020    TSH testing  07/01/2021    Potassium monitoring  09/03/2021    Creatinine monitoring  09/03/2021    DTaP/Tdap/Td vaccine (2 - Td) 07/05/2025    Shingles Vaccine  Completed    Pneumococcal 65+ years Vaccine  Completed    Hepatitis A vaccine  Aged Out    Hib vaccine  Aged Out    Meningococcal (ACWY) vaccine  Aged Out       Subjective:      Review of Systems   Constitutional: Negative for activity change and appetite change. HENT: Negative for congestion, nosebleeds, rhinorrhea and sore throat. Eyes: Negative for pain and discharge.    Respiratory: Negative for cough and shortness of breath. Cardiovascular: Negative for chest pain and leg swelling. Gastrointestinal: Negative for abdominal pain, blood in stool, nausea and vomiting. Genitourinary: Negative for difficulty urinating, frequency and urgency. Musculoskeletal: Negative for arthralgias and gait problem. Skin: Negative for color change. Neurological: Negative for dizziness, speech difficulty, light-headedness and headaches. Psychiatric/Behavioral: Negative for agitation. The patient is not nervous/anxious. Objective:     Physical Exam  Vitals signs and nursing note reviewed. Constitutional:       General: She is not in acute distress. Appearance: Normal appearance. She is not diaphoretic. HENT:      Head: Normocephalic and atraumatic. Right Ear: External ear normal.      Left Ear: External ear normal.      Nose: Nose normal. No congestion or rhinorrhea. Mouth/Throat:      Pharynx: No oropharyngeal exudate. Eyes:      General: No scleral icterus. Right eye: No discharge. Left eye: No discharge. Conjunctiva/sclera: Conjunctivae normal.   Neck:      Musculoskeletal: Normal range of motion and neck supple. No neck rigidity or muscular tenderness. Cardiovascular:      Rate and Rhythm: Normal rate and regular rhythm. Pulses: Normal pulses. Heart sounds: Normal heart sounds. No murmur. Pulmonary:      Effort: Pulmonary effort is normal. No respiratory distress. Breath sounds: Normal breath sounds. No wheezing, rhonchi or rales. Abdominal:      General: Bowel sounds are normal. There is no distension. Palpations: Abdomen is soft. Tenderness: There is no abdominal tenderness. Musculoskeletal: Normal range of motion. General: No swelling or tenderness. Right lower leg: No edema. Left lower leg: No edema. Skin:     General: Skin is warm and dry. Coloration: Skin is not jaundiced or pale.    Neurological: Mental Status: She is alert. Mental status is at baseline. Psychiatric:         Mood and Affect: Mood normal.         Behavior: Behavior normal.       /62   Pulse 73   Temp 97.9 °F (36.6 °C)   Resp 18   Wt 130 lb 3.2 oz (59.1 kg)   SpO2 96%   BMI 22.35 kg/m²     Assessment/Plan      1. Weight loss   Encourage PO intake and supplements  Monitor weights    2. Anemia, unspecified type   Monitor for s/s active bleeding  Repeat labs on Monday   3. Adenocarcinoma, lung, right [facility])          Patient seen and examined. History partially obtained by chart review and nursing notes. I have reviewed patient's past medical, surgical, social, and family history and have made updates where appropriate.   See facility EMR for updated medication list.       Electronically signed by MOHAN Masters CNP on 10/15/2020 at 4:19 PM

## 2020-11-12 ENCOUNTER — OUTSIDE SERVICES (OUTPATIENT)
Dept: FAMILY MEDICINE CLINIC | Age: 85
End: 2020-11-12
Payer: MEDICARE

## 2020-11-12 PROCEDURE — 99309 SBSQ NF CARE MODERATE MDM 30: CPT | Performed by: FAMILY MEDICINE

## 2020-11-14 ASSESSMENT — ENCOUNTER SYMPTOMS
STRIDOR: 0
BLOOD IN STOOL: 0
VOMITING: 0
NAUSEA: 0
WHEEZING: 0
DIARRHEA: 0
CONSTIPATION: 0
SHORTNESS OF BREATH: 1
COUGH: 0
ABDOMINAL PAIN: 0

## 2020-11-15 NOTE — PROGRESS NOTES
2020    Silvia Cook (:  1932) is a 80 y.o. female, here for evaluation of the following medical concerns:    HPI     States is feelign well overall  Recently diagnosed with COVID-19 and just moved to recovery unit earlier this week  Complains of some fatigue but overall improving. She has known lung cancer/ adenocarcinoma dx earlier this year. I being followed by Dr. Comfort Clark, Pulmonary. Completed radiation earlier this year for lung cancer. Few months ago was found to have progression of her lung cancer an recommended for palliative care. Is being followed by [facility] palliative care. Alfonso Kyle DMII-  Stable on Lantus 17 units daily  Fasting sugars usually in low to mid 100s  No lows  Next A1c to be done next month  Lab Results   Component Value Date    LABA1C 6.6 (H) 2020     Lab Results   Component Value Date     2020     Chronic dysthymia/depression-  Stable on sertraline        Patient seen and examined. History partially obtained by chart review and nursing notes. I have reviewed patients past medical, surgical, social, and family history and have made updates where appropriate. Allergies and Medications were reviewed in resident's EMR chart at the [facility]. See [facility] [employer] for updated med list.          Review of Systems   Constitutional: Negative for activity change, appetite change, chills, diaphoresis, fatigue, fever and unexpected weight change. HENT: Negative for congestion. Respiratory: Positive for shortness of breath (stable, mild). Negative for cough, wheezing and stridor. Cardiovascular: Negative for chest pain, palpitations and leg swelling. Gastrointestinal: Negative for abdominal pain, blood in stool, constipation, diarrhea, nausea and vomiting. Genitourinary: Negative for difficulty urinating. Musculoskeletal: Positive for arthralgias and gait problem. Skin: Negative. Neurological: Negative for headaches. Psychiatric/Behavioral: Negative. Prior to Visit Medications    Medication Sig Taking?  Authorizing Provider   loperamide (IMODIUM) 2 MG capsule Take 2 mg by mouth 4 times daily as needed for Diarrhea  Historical Provider, MD   acetaminophen (TYLENOL) 325 MG tablet Take 650 mg by mouth every 6 hours as needed for Pain  Historical Provider, MD   hydroCHLOROthiazide (HYDRODIURIL) 25 MG tablet Take 1 tablet by mouth daily   No   sodium chloride 1 g tablet Take 1 tablet by mouth 2 times daily   No   insulin glargine (LANTUS) 100 UNIT/ML injection vial Inject 15 Units into the skin daily   No   insulin lispro (HUMALOG) 100 UNIT/ML injection vial Inject 5 Units into the skin 3 times daily (with meals)   No   albuterol (PROVENTIL) (2.5 MG/3ML) 0.083% nebulizer solution Take 3 mLs by nebulization every 6 hours as needed for Wheezing or Shortness of Breath  Burt Liao MD   albuterol sulfate HFA (PROVENTIL HFA) 108 (90 Base) MCG/ACT inhaler Inhale 2 puffs into the lungs every 6 hours as needed for Wheezing  Patient not taking: Reported on 9/16/2020  Tanya Tong MD   ferrous sulfate (IRON 325) 325 (65 Fe) MG tablet Take 325 mg by mouth daily  Historical Provider, MD   sertraline (ZOLOFT) 50 MG tablet TAKE 1 TABLET DAILY  Wiliam Eddy DO   oxybutynin (DITROPAN-XL) 10 MG extended release tablet TAKE 1 TABLET DAILY  Nii Van MD   ezetimibe (ZETIA) 10 MG tablet TAKE 1 TABLET DAILY  Wiliam Eddy DO   levothyroxine (SYNTHROID) 88 MCG tablet TAKE 1 TABLET DAILY  Wiliam Eddy DO   ondansetron (ZOFRAN) 4 MG tablet Take 1 tablet by mouth every 8 hours as needed for Nausea or Vomiting  Wiliam Eddy DO   lisinopril (PRINIVIL;ZESTRIL) 40 MG tablet TAKE 1 TABLET NIGHTLY  Jazmine Shah,    meclizine (ANTIVERT) 25 MG tablet Take 12.5 mg by mouth 3 times daily as needed  Historical Provider, MD   Probiotic Product (PROBIOTIC DAILY) CAPS One tablet 3 time a day times 10 days then once a day  Ronnie Mancia   Multiple Vitamins-Minerals (THERAPEUTIC MULTIVITAMIN-MINERALS) tablet Take 1 tablet by mouth daily  Historical Provider, MD   CRANBERRY PO Take 1 capsule by mouth daily  Historical Provider, MD   Multiple Vitamins-Minerals (OCUVITE PRESERVISION PO) Take 1 tablet by mouth daily  Historical Provider, MD   aspirin 81 MG tablet Take 81 mg by mouth daily Held 5 days prior to paracentesis on 9-1-20  Historical Provider, MD   latanoprost (XALATAN) 0.005 % ophthalmic solution Place 1 drop into both eyes nightly. Historical Provider, MD        Allergies   Allergen Reactions    Allopurinol      Patient unsure of reaction    Metoprolol Tartrate [Metoprolol]      bradycardia    Percocet [Oxycodone-Acetaminophen]      Hallucinations. ...sensitive to narcotics    Phenergan [Promethazine Hcl]      Very sensitive. ..given small doses    Polycitra-K [Potassium Citrate]      Patient unsure of reaction    Statins Other (See Comments)     ?  Muscle aches     Levaquin [Levofloxacin] Anxiety     Not able to sleep    Sulfa Antibiotics Rash    Tessalon [Benzonatate] Anxiety     Not able to sleep       Past Medical History:   Diagnosis Date    [address] [address]     Aortic valve sclerosis     Bradycardia     follows with cardiology- Dr. Angel Reynolds, possible SSS    Cardiac murmur     Cataract of left eye     Chronic diarrhea     s/p radiation    Diabetes mellitus type II, controlled ([facility]     diet controlled     Dysthymia     Gait abnormality 6/23/2014    Glaucoma     H/O renal calculi     H/O vein stripping     Hard of hearing     History of anemia     History of dizziness     follows with cardiology, bradycardia, possible SSS    History of radiation therapy     for uterine cancer    History of small bowel obstruction 03/2019    no surgery required    Hyperlipidemia     Hypertension     Hypothyroidism     Influenza A 1/11/2018    Left elbow fracture     S/P surgical repair    [facility] [address]     Lung nodule     Macular degeneration     Obesity     Pericardial effusion 4/23/2014    Pseudophakia of right eye     PVD (peripheral vascular disease) ([facility]     Traumatic injury of lower extremity childhood    bilateral trauma of lower extremities    Vertigo     resolved    Wears glasses     Wears partial dentures     upper        Past Surgical History:   Procedure Laterality Date    CATARACT REMOVAL Right February 2013    Dr. Ray Hurtado, LAPAROSCOPIC  5/28/1998    COLONOSCOPY  7/22/2003    Dr. Tsering Schilling Left September 2010    ORIF, Dr. Lestine Cushing ERCP  7/19/1998    retained stone, Suzy Wilkes and [place] MAGDA AND BSO  March 2002    endometrial adenocarcinoma, Dr. Rosa Isela Wang History     Socioeconomic History    Marital status:       Spouse name: Not on file    Number of children: Not on file    Years of education: Not on file    Highest education level: Not on file   Occupational History    Not on file   Social Needs    Financial resource strain: Not on file    Food insecurity     Worry: Not on file     Inability: Not on file    Transportation needs     Medical: Not on file     Non-medical: Not on file   Tobacco Use    Smoking status: Never Smoker    Smokeless tobacco: Never Used    Tobacco comment: never smoked yant rrt 01/11/2018   Substance and Sexual Activity    Alcohol use: No     Alcohol/week: 0.0 standard drinks    Drug use: No    Sexual activity: Not on file   Lifestyle    Physical activity     Days per week: Not on file     Minutes per session: Not on file    Stress: Not on file   Relationships    Social connections     Talks on phone: Not on file     Gets together: Not on file     Attends Worship service: Not on file     Active member of club or organization: Not on file     Attends meetings of clubs or organizations: Not on file

## 2020-11-25 ENCOUNTER — OUTSIDE SERVICES (OUTPATIENT)
Dept: FAMILY MEDICINE CLINIC | Age: 85
End: 2020-11-25

## 2020-11-25 VITALS
DIASTOLIC BLOOD PRESSURE: 80 MMHG | HEART RATE: 82 BPM | SYSTOLIC BLOOD PRESSURE: 124 MMHG | OXYGEN SATURATION: 96 % | RESPIRATION RATE: 18 BRPM | TEMPERATURE: 96 F

## 2020-11-25 ASSESSMENT — ENCOUNTER SYMPTOMS
VOMITING: 0
SORE THROAT: 0
NAUSEA: 0
SHORTNESS OF BREATH: 0
EYE DISCHARGE: 0
RHINORRHEA: 0
EYE PAIN: 0
ABDOMINAL PAIN: 0
CHEST TIGHTNESS: 0
TROUBLE SWALLOWING: 0
COUGH: 0

## 2020-11-25 NOTE — PROGRESS NOTES
Erika Porter is a 80 y.o. female that presents for Other (admission)      This visit was performed via synchronous telecommunication system. Patient is located in the SNF  I am located in my office    HPI:  Chasity Victor is seen today as an admission to AL. She was supposed to go a little over a month ago and then was diagnosed with Covid. She has recovered and was determined to be stable enough for AL. She has a PMX of Adenocarcinoma of the right lung, with hx of thoracentesis, bradycardia, uterine cancer with radiation, chronic diarrhea, cataracts, glaucoma, DM II, HTN, HLD, among others. She was originally admitted to the SNF after a Hospitalization at [place] with lower abdominal pain with Leukocytosis. She was diagnosed with adenocarcinoma earlier this year and has elected  Conservative treatment and symptom management. She has gotten stronger and is excited about the move to AL. Medications reviewed, no changes at this time. Nursing denies concerns. I have reviewed the patient's past medical history, past surgical history, allergies,medications, social and family history and I have made updates where appropriate.     Past Medical History:   Diagnosis Date    Adenocarcinoma of endometrium ([address]     Aortic valve sclerosis     Bradycardia     follows with cardiology- Dr. Ariana Servin, possible SSS    Cardiac murmur     Cataract of left eye     Chronic diarrhea     s/p radiation    Diabetes mellitus type II, controlled ([address]     diet controlled     Dysthymia     Gait abnormality 6/23/2014    Glaucoma     H/O renal calculi     H/O vein stripping     Hard of hearing     History of anemia     History of dizziness     follows with cardiology, bradycardia, possible SSS    History of radiation therapy     for uterine cancer    History of small bowel obstruction 03/2019    no surgery required    Hyperlipidemia     Hypertension     Hypothyroidism     Influenza A 1/11/2018    Left elbow Pulse: 82   Resp: 18   Temp: 96 °F (35.6 °C)   SpO2: 96%        Physical Exam  Vitals signs and nursing note reviewed. Constitutional:       General: She is not in acute distress. Appearance: Normal appearance. She is normal weight. She is not diaphoretic. HENT:      Head: Normocephalic and atraumatic. Right Ear: External ear normal.      Left Ear: External ear normal.      Nose: Nose normal. No congestion or rhinorrhea. Eyes:      General: No scleral icterus. Right eye: No discharge. Left eye: No discharge. Neck:      Musculoskeletal: Normal range of motion. No neck rigidity. Pulmonary:      Effort: Pulmonary effort is normal. No respiratory distress. Musculoskeletal: Normal range of motion. General: No swelling or tenderness. Right lower leg: No edema. Left lower leg: No edema. Skin:     Coloration: Skin is not jaundiced or pale. Neurological:      Mental Status: She is alert. Mental status is at baseline. Psychiatric:         Mood and Affect: Mood normal.         Behavior: Behavior normal.          ASSESSMENT & PLAN  Nahomy Shore was seen today for other.     Diagnoses and all orders for this visit:    COVID-19  Hx of - recovered   Continue to monitor for relapse  Continue therapies    Malignant neoplasm of lower lobe, right bronchus or lung ([address]  Hx of - conservative treatment  Hx of [facility] thoracentesis at [facility]    Adenocarcinoma of right lung [facility])  As above    Controlled type 2 diabetes mellitus with stage 3 chronic kidney disease, without long-term current use of insulin (HCC)  Continue Lantus    Chronic anemia  Follow labs  Monitor for s/s active bleeding    Chronic depression  Controlled  Continue sertraline    Weight loss  Encourage PO intake and monitor weights    Essential hypertension  Controlled  Continue hydrochlorothiazide, Lisinopril    Bilateral carotid artery stenosis - hx of    Hypothyroidism due to acquired atrophy of thyroid  Follow labs  Continuelevothyroxine     Stage 3 chronic kidney disease, unspecified whether stage 3a or 3b CKD  Follow labs    Urinary incontinence, unspecified type  Continue oxybutynin     Posterior vitreous detachment of both eyes -  Hx of     Mixed hyperlipidemia  Follow labs    Glaucoma of both eyes, unspecified glaucoma type  Continue eye drops as prescribed       These chronic conditions place resident at a significant risk of death, acute exacerbation, decline in function or functional decline. The patient's comprehensive plan was monitored today. I spent 20 minutes reviewing plan. I have seen and examined the patient virtually and chaperone was present. The history was obtained through the patient, past medical records, and the staff. The patient's chart was updated as appropriate. Please see facility EMR for complete medication reconciliation. Pursuant to the emergency declaration under the [facility], [facility] waiver authority and the [employer] Act, this Virtual  Visit was conducted, with patient's consent, to reduce the patient's risk of exposure to COVID-19 and provide continuity of care for an established patient. Services were provided through a video synchronous discussion virtually to substitute for in-person SNF visit.     Electronically signed by MOHAN Avila CNP on 11/25/2020 at 6:15 PM

## 2020-12-04 ENCOUNTER — TELEPHONE (OUTPATIENT)
Dept: FAMILY MEDICINE CLINIC | Age: 85
End: 2020-12-04

## 2021-01-11 ENCOUNTER — OUTSIDE SERVICES (OUTPATIENT)
Dept: FAMILY MEDICINE CLINIC | Age: 86
End: 2021-01-11
Payer: MEDICARE

## 2021-01-11 VITALS
SYSTOLIC BLOOD PRESSURE: 108 MMHG | BODY MASS INDEX: 22.45 KG/M2 | TEMPERATURE: 98.8 F | RESPIRATION RATE: 18 BRPM | WEIGHT: 130.8 LBS | DIASTOLIC BLOOD PRESSURE: 58 MMHG | OXYGEN SATURATION: 95 % | HEART RATE: 61 BPM

## 2021-01-11 DIAGNOSIS — K52.9 CHRONIC DIARRHEA: ICD-10-CM

## 2021-01-11 DIAGNOSIS — C34.91 ADENOCARCINOMA OF RIGHT LUNG (HCC): ICD-10-CM

## 2021-01-11 DIAGNOSIS — N30.00 ACUTE CYSTITIS WITHOUT HEMATURIA: Primary | ICD-10-CM

## 2021-01-11 DIAGNOSIS — N39.0 RECURRENT UTI: ICD-10-CM

## 2021-01-11 DIAGNOSIS — R32 URINARY INCONTINENCE, UNSPECIFIED TYPE: ICD-10-CM

## 2021-01-11 ASSESSMENT — ENCOUNTER SYMPTOMS: SHORTNESS OF BREATH: 0

## 2021-01-11 NOTE — PROGRESS NOTES
Neptali Ruiz is a 80 y.o. female who presents today for her medical conditions/complaints as noted below. Chief Complaint   Patient presents with    Other     UTI           HPI:     Angel Sosa is seen today for UTI. She is seen while laying in her bed. She is sleeping and difficult to arouse. When awake she denies cough or shortness of breath. She had a small emesis earlier this AM.  She has not eaten. She states that she is cold and very tired. She was diagnosed with Covid in October and has been recovering. She has a hx of UTI and has been on antibiotics 4 times in the past few months. She was started on Macrobid over the weekend per Culture sensitivity. She has a PMX of Adenocarcinoma of the right lung with conservative tx, with hx of thoracentesis, bradycardia, uterine cancer with radiation, chronic diarrhea, cataracts, glaucoma, DM II, HTN, HLD, among others. She is pale and frail appearing. No respiratory distress noted. She answered very few questions and followed no commands.       Past Medical History:   Diagnosis Date    Adenocarcinoma of endometrium ([address]     Aortic valve sclerosis     Bradycardia     follows with cardiology- Dr. Brian Feliz, possible SSS    Cardiac murmur     Cataract of left eye     Chronic diarrhea     s/p radiation    Diabetes mellitus type II, controlled ([address]     diet controlled     Dysthymia     Gait abnormality 6/23/2014    Glaucoma     H/O renal calculi     H/O vein stripping     Hard of hearing     History of anemia     History of dizziness     follows with cardiology, bradycardia, possible SSS    History of radiation therapy     for uterine cancer    History of small bowel obstruction 03/2019    no surgery required    Hyperlipidemia     Hypertension     Hypothyroidism     Influenza A 1/11/2018    Left elbow fracture     S/P surgical repair    Lung cancer ([address]     Lung nodule     Macular degeneration     Obesity  Pericardial effusion 4/23/2014    Pseudophakia of right eye     [facility]VD [facility]peripheral vascular disease) [facility]     Traumatic injury of lower extremity childhood    bilateral trauma of lower extremities    Vertigo     resolved    Wears glasses     Wears partial dentures     upper       Past Surgical History:   Procedure Laterality Date    CATARACT REMOVAL Right February 2013    Dr. Volodymyr Olea, LAPAROSCOPIC  5/28/1998    COLONOSCOPY  7/22/2003    Dr. Aldo Campbell Left September 2010    ORIF, Dr. Ann-Marie Tian ERCP  7/19/1998    retained stone, Suzy Jett [facility] and Dena Rogers MAGDA AND BSO  March 2002    endometrial adenocarcinoma, Dr. Flowers Pyo       Family History   Problem Relation Age of Onset    High Blood Pressure Other     Diabetes type 2  Mother         developed later in life       Social History     Tobacco Use    Smoking status: Never Smoker    Smokeless tobacco: Never Used    Tobacco comment: never smoked yant rrt 01/11/2018   Substance Use Topics    Alcohol use: No     Alcohol/week: 0.0 standard drinks      Allergies   Allergen Reactions    Allopurinol      Patient unsure of reaction    Metoprolol Tartrate [Metoprolol]      bradycardia    Percocet [Oxycodone-Acetaminophen]      Hallucinations. ...sensitive to narcotics    Phenergan [Promethazine Hcl]      Very sensitive. ..given small doses    Polycitra-K [Potassium Citrate]      Patient unsure of reaction    Statins Other (See Comments)     ?  Muscle aches     Levaquin [Levofloxacin] Anxiety     Not able to sleep    Sulfa Antibiotics Rash    Tessalon [Benzonatate] Anxiety     Not able to sleep       Health Maintenance   Topic Date Due    Flu vaccine (1) 09/01/2020    Annual Wellness Visit (AWV)  09/16/2020    TSH testing  07/01/2021    Potassium monitoring  09/03/2021    Creatinine monitoring  09/03/2021    DTaP/Tdap/Td vaccine (2 - Td) 07/05/2025    Shingles Vaccine  Completed  Pneumococcal 65+ years Vaccine  Completed    Hepatitis A vaccine  Aged Out    Hib vaccine  Aged Out    Meningococcal (ACWY) vaccine  Aged Out       Subjective:      Review of Systems   Unable to perform ROS: Mental status change   Respiratory: Negative for shortness of breath. Cardiovascular: Negative for chest pain. Objective:     Physical Exam  Vitals signs and nursing note reviewed. Constitutional:       General: She is not in acute distress. Appearance: She is normal weight. She is ill-appearing. She is not diaphoretic. HENT:      Head: Normocephalic and atraumatic. Right Ear: External ear normal.      Left Ear: External ear normal.      Nose: Nose normal. No congestion or rhinorrhea. Eyes:      General: No scleral icterus. Right eye: No discharge. Left eye: No discharge. Conjunctiva/sclera: Conjunctivae normal.   Neck:      Musculoskeletal: Normal range of motion and neck supple. No neck rigidity or muscular tenderness. Cardiovascular:      Rate and Rhythm: Normal rate. Rhythm irregular. Pulses: Normal pulses. Heart sounds: Normal heart sounds. Pulmonary:      Effort: Pulmonary effort is normal. No respiratory distress. Breath sounds: Normal breath sounds. No wheezing, rhonchi or rales. Abdominal:      General: Bowel sounds are normal. There is no distension. Palpations: Abdomen is soft. Tenderness: There is no abdominal tenderness. Musculoskeletal:         General: No swelling or tenderness. Right lower leg: No edema. Left lower leg: No edema. Comments: Limited ROM due to weakness   Skin:     General: Skin is warm and dry. Coloration: Skin is pale. Skin is not jaundiced. Neurological:      Mental Status: Mental status is at baseline. She is disoriented.        BP (!) 108/58   Pulse 61   Temp 98.8 °F (37.1 °C)   Resp 18   Wt 130 lb 12.8 oz (59.3 kg)   SpO2 95%   BMI 22.45 kg/m²     Assessment/Plan 1. Acute cystitis without hematuria   Continue Macrobid  Continue to monitor for worsening s/s infection  Encourage PO intake  Zofran for nausea   2. Urinary incontinence, unspecified type - hx of   Try to change depends often to prevent UTI   3. Recurrent UTI   Start D Mannos 1000 mg BID  Continue Cranberry extract   4. Adenocarcinoma of right lung (HCC) - hx of   5. Chronic diarrhea   Encourage PO intake  Imodium prn         Patient seen and examined. History partially obtained by chart review and nursing notes. I have reviewed patient's past medical, surgical, social, and family history and have made updates where appropriate.   See facility EMR for updated medication list.       Electronically signed by MOHAN Erickson CNP on 1/11/2021 at 4:05 PM

## 2021-01-12 ENCOUNTER — OUTSIDE SERVICES (OUTPATIENT)
Dept: FAMILY MEDICINE CLINIC | Age: 86
End: 2021-01-12
Payer: MEDICARE

## 2021-01-12 VITALS
WEIGHT: 130.8 LBS | RESPIRATION RATE: 18 BRPM | HEART RATE: 92 BPM | DIASTOLIC BLOOD PRESSURE: 46 MMHG | BODY MASS INDEX: 22.45 KG/M2 | SYSTOLIC BLOOD PRESSURE: 101 MMHG | TEMPERATURE: 98.4 F | OXYGEN SATURATION: 96 %

## 2021-01-12 DIAGNOSIS — N39.0 RECURRENT UTI: ICD-10-CM

## 2021-01-12 DIAGNOSIS — R32 URINARY INCONTINENCE, UNSPECIFIED TYPE: ICD-10-CM

## 2021-01-12 DIAGNOSIS — U07.1 COVID-19: ICD-10-CM

## 2021-01-12 DIAGNOSIS — J18.9 PNEUMONIA OF BOTH LOWER LOBES DUE TO INFECTIOUS ORGANISM: ICD-10-CM

## 2021-01-12 DIAGNOSIS — R50.9 ELEVATED TEMPERATURE: ICD-10-CM

## 2021-01-12 DIAGNOSIS — N30.00 ACUTE CYSTITIS WITHOUT HEMATURIA: Primary | ICD-10-CM

## 2021-01-12 PROCEDURE — 99309 SBSQ NF CARE MODERATE MDM 30: CPT | Performed by: NURSE PRACTITIONER

## 2021-01-12 ASSESSMENT — ENCOUNTER SYMPTOMS
RHINORRHEA: 0
ABDOMINAL PAIN: 0
SORE THROAT: 0
SINUS PRESSURE: 0
VOMITING: 0
SHORTNESS OF BREATH: 0
NAUSEA: 0
COUGH: 1
EYE DISCHARGE: 0

## 2021-01-12 NOTE — PROGRESS NOTES
Jade Aschoff is a 80 y.o. female who presents today for her medical conditions/complaints as noted below. Chief Complaint   Patient presents with    Other     sepsis           HPI:     Lilly Schmitt was seen today as a recheck from yesterday. She is more alert today. She had a temp of 102 through the night. Dr. Gracie Chavez was called and changed the antibiotic to Augmentin and also gave a shot of Rocephin. She had a CXR this AM that showed bilat lower infiltrates with some CHF noted. Orders given. She is on O2 per NC. She has a moist non productive cough. She states that she is feeling better than yesterday. No distress noted. Labs were drawn, waiting on results. Some confusion noted.       Past Medical History:   Diagnosis Date    Adenocarcinoma of endometrium ([address]     Aortic valve sclerosis     Bradycardia     follows with cardiology- Dr. Nicholas Martinez, possible SSS    Cardiac murmur     Cataract of left eye     Chronic diarrhea     s/p radiation    Diabetes mellitus type II, controlled ([address]     diet controlled     Dysthymia     Gait abnormality 6/23/2014    Glaucoma     H/O renal calculi     H/O vein stripping     Hard of hearing     History of anemia     History of dizziness     follows with cardiology, bradycardia, possible SSS    History of radiation therapy     for uterine cancer    History of small bowel obstruction 03/2019    no surgery required    Hyperlipidemia     Hypertension     Hypothyroidism     Influenza A 1/11/2018    Left elbow fracture     S/P surgical repair    Lung cancer ([address]     Lung nodule     Macular degeneration     Obesity     Pericardial effusion 4/23/2014    Pseudophakia of right eye     PVD (peripheral vascular disease) ([address]     Traumatic injury of lower extremity childhood    bilateral trauma of lower extremities    Vertigo     resolved    Wears glasses     Wears partial dentures     upper       Past Surgical History: Procedure Laterality Date    CATARACT REMOVAL Right February 2013    Dr. Jesus Back, LAPAROSCOPIC  5/28/1998    COLONOSCOPY  7/22/2003    Dr. Tabatha Arango Left September 2010    ORIF, Dr. Esme Daniels ERCP  7/19/1998    retained stone, Suzy Salazar and Yessica Roth MAGDA AND BSO  March 2002    endometrial adenocarcinoma, Dr. Anderson Morejon       Family History   Problem Relation Age of Onset    High Blood Pressure Other     Diabetes type 2  Mother         developed later in life       Social History     Tobacco Use    Smoking status: Never Smoker    Smokeless tobacco: Never Used    Tobacco comment: never smoked yant rrt 01/11/2018   Substance Use Topics    Alcohol use: No     Alcohol/week: 0.0 standard drinks      Allergies   Allergen Reactions    Allopurinol      Patient unsure of reaction    Metoprolol Tartrate [Metoprolol]      bradycardia    Percocet [Oxycodone-Acetaminophen]      Hallucinations. ...sensitive to narcotics    Phenergan [Promethazine Hcl]      Very sensitive. ..given small doses    Polycitra-K [Potassium Citrate]      Patient unsure of reaction    Statins Other (See Comments)     ? Muscle aches     Levaquin [Levofloxacin] Anxiety     Not able to sleep    Sulfa Antibiotics Rash    Tessalon [Benzonatate] Anxiety     Not able to sleep       Health Maintenance   Topic Date Due    Flu vaccine (1) 09/01/2020    Annual Wellness Visit (AWV)  09/16/2020    TSH testing  07/01/2021    Potassium monitoring  09/03/2021    Creatinine monitoring  09/03/2021    DTaP/Tdap/Td vaccine (2 - Td) 07/05/2025    Shingles Vaccine  Completed    Pneumococcal 65+ years Vaccine  Completed    Hepatitis A vaccine  Aged Out    Hib vaccine  Aged Out    Meningococcal (ACWY) vaccine  Aged Out       Subjective:      Review of Systems   Unable to perform ROS: Mental status change   Constitutional: Negative for appetite change. HENT: Negative for congestion, rhinorrhea, sinus pressure and sore throat. Eyes: Negative for discharge. Respiratory: Positive for cough. Negative for shortness of breath. Cardiovascular: Positive for leg swelling. Negative for chest pain. Gastrointestinal: Negative for abdominal pain, nausea and vomiting. Genitourinary: Negative for difficulty urinating. Incontinent    Musculoskeletal: Positive for arthralgias and gait problem. Neurological: Positive for weakness. Negative for dizziness, speech difficulty, light-headedness and headaches. Psychiatric/Behavioral: The patient is not nervous/anxious. Objective:     Physical Exam  Vitals signs and nursing note reviewed. Constitutional:       General: She is not in acute distress. Appearance: Normal appearance. She is normal weight. She is not diaphoretic. HENT:      Head: Normocephalic and atraumatic. Right Ear: External ear normal.      Left Ear: External ear normal.      Nose: Nose normal. No congestion or rhinorrhea. Mouth/Throat:      Pharynx: No oropharyngeal exudate. Eyes:      General: No scleral icterus. Right eye: No discharge. Left eye: No discharge. Conjunctiva/sclera: Conjunctivae normal.   Neck:      Musculoskeletal: Normal range of motion and neck supple. No neck rigidity or muscular tenderness. Cardiovascular:      Rate and Rhythm: Normal rate. Rhythm irregular. Heart sounds: Murmur present. Pulmonary:      Effort: Pulmonary effort is normal. No respiratory distress. Breath sounds: Normal breath sounds. No wheezing, rhonchi or rales. Abdominal:      General: Bowel sounds are normal. There is no distension. Palpations: Abdomen is soft. Tenderness: There is no abdominal tenderness. Musculoskeletal:         General: No swelling or tenderness. Right lower leg: No edema. Left lower leg: No edema.       Comments: Limited ROM due to weakness   Skin:

## 2021-01-15 NOTE — PROGRESS NOTES
Subjective:      Patient ID: Melida Curiel is a 80 y.o. female. 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 Patient was last seen in the office of September 2020 per Dr. Anna Mcgee. During the last visit patient had discussion regarding her thoracentesis results which noted that she has had thoracentesis completed on 2 separate occasions and cytology has been negative on 2 separate occasions but with enlarging right lower lung mass concerning for neoplastic process and recurrence from lung cancer. Patient case was discussed with interventional radiology for CT-guided biopsy however interventional radiologist felt that this would best be approached by bronchoscopy. According to oncology notes patient was not a candidate for repeat radiation or chemotherapy but may be a candidate for immunotherapy. Case was discussed with oncology and was reportedly advised that there is no further treatment options. Patient remains on palliative care. Patient was diagnosed with COVID-19 in November 2020. Patient is accompanied by her daughter. Reports that her patient has been treated with 3 rounds of antibiotics in the nursing home due to concern for \"fluid\" on her lungs. Patient was reported to have a cough that was persistent and is now resolved. Chest x-ray completed 1/12/21 noting no pleural effusion, PTX. Notes bibasilar airspace disease concerning for multifocal infectious process. Daughter indicating that patient is more of a palliative type patient not interested in aggressive treatments. Patient denies any difficulty with cough/choking with her oral intake either food or drink. Patient's only complaint is a headache. Received Covid 1/15/21 at [facility]. Unknown if [place] or [place]. Lengthy discussion with patient and with daughter. Patient's lungs are clear on exam today.   Likely underlying issue previously noted has resolved with antibiotic course. Agree with discontinuation of antibiotics after completing today's dose. Monitor patient for increasing cough, shortness of breath. If patient is having dysphagia may need to have a speech therapy evaluation to ensure a safe diet. Patient is complaining of a headache but has no sinus pain on exam.    Discussed with daughter that with patient considering palliative plan of care will defer any invasive work-ups and consideration of and goal of treatment. Medications:   Albuterol as needed  Augmentin-last dose today      PRIOR WORKUP:  PFT:  PFT completed 5/1/2019: Suboptimal expiratory maneuver. FEV1 110% of predicted, % of predicted. Ratio 81. Lung volume is normal.  Diffusion capacity 62% predicted and when corrected for alveolar volume is 75% of predicted. Final impression: Within the limitations of the effort, spirometry is normal, lung volumes are normal, diffusion capacity is moderately decreased. CT Imaging:  CT chest 8/9/2020: Negative for pulmonary embolism. Moderate right pleural effusion with trace left pleural effusion. There is significant right lower lobe volume loss. There is tree-in-bud opacification throughout much of the aerated lungs. Findings are new since the comparison study mild intralobular septal thickening. There are 2 groundglass nodules in the right lung apex likely infectious or inflammatory. CT chest 7/17/2020: New small right pleural effusion with interval development of a large masslike density 4.2 x 5.5 cm in the medial basal right lower lobe with previously residual nodule measuring 1.9 x 1.3 cm was reported. This involves the expected location of the nodule. Due to the lack of IV contrast difficult to clearly determine if there is a mass with surrounding consolidation or if this is mostly mass.   Based on the appearance the working impression would be that the nodule has increased in size until proven otherwise. There is some fibrosis with traction bronchiectasis surrounding the mass along the area of subsegmental atelectasis posteriorly inferiorly adjacent to the pleural effusion. No significant nodules or mass elsewhere. CT chest 1/14/2020: Mild bronchiectatic changes are noted. Ovoid opacity in the right medial lung base is noted measuring 1.9 x 1.3 cm. This is smaller compared to the exam performed during biopsy. Adjacent groundglass and linear densities are noted. Likely atelectasis or scarring. Numerous too numerous to count scattered punctate nodular densities are also noted bilaterally. No other dominant nodules are noted. CT needle biopsy 8/26/2019: Successful core biopsy of right lung mass. CT chest 7/29/2019: Shotty mediastinal lymph nodes are present without sagar adenopathy. Mild emphysematous changes are present. There is been no change in scattered groundglass nodules at the right apex largest measuring 3.3 x 5 mm. Right lower lobe ill-defined nodule is redemonstrated in the azygous esophageal recess 12.0 x 9.1 mm. Previously was measuring 7.5 mm maximum size. Interval increase in size a suggestion of neoplasia. This represents a satellite nodule to the larger lesion seen on previous exams. Just caudal and medial to this lesion is an ill-defined solid nodule measuring 2.2 x 1.7 x 1.8 cm previously was measuring 1.9 x 1.2 x 1.8 cm. There is no change in the small subpleural nodule in the medial right costophrenic gutter of 7.4 mm. Small 2.8 left upper lobe groundglass nodules present, left upper lobe granulomas noted. No change in the left lower lobe groundglass nodule of 3.2 mm. CT chest 4/26/2019: No acute pulmonary consolidation or effusion. There is redemonstration of a nodule in the right lower lobe medially that measures 2.4 x 1.5 cm. With an adjacent 8 mm solid nodule in the medial aspect of the right lower lobe.   There are a few subtle groundglass nodules in the right lung apex with no pneumothorax. Tracheobronchial tree is patent. CT chest 4/13/2014: Negative for pulmonary embolism. Moderate to large size pericardial effusion is evident. Measuring 2.6 cm in greatest width along the level of the left ventricle. No enlarged by CT criteria mediastinal, hilar or axillary lymph nodes. Calcified granuloma is seen in the left upper lobe. Small bilateral pleural effusions and bibasilar atelectasis are noted. Sleep Study:  None on chart    Laboratory Evaluation:    Thoracentesis fluid 9/1/2020: Negative for malignancy. Thoracentesis fluid 8/13/2020: Body fluid cell count WBCs 1600, RBCs 2000, neutrophil count 28, lymphocytes 24, monocytes 3, other cells 45 (mesothelial). Flow cytometric immunophenotyping is negative for B-cell monoclonality and T-cell aberrancy. Negative for malignancy    Right lung CT guided needle biopsies 8/26/2019: Invasive well-differentiated mucinous adenocarcinoma    Immunizations:   Immunization History   Administered Date(s) Administered    Influenza Virus Vaccine 10/09/2009, 10/07/2011, 09/19/2012    Influenza, High Dose (Fluzone 65 yrs and older) 09/16/2013, 11/07/2014, 09/28/2015, 10/17/2016, 10/18/2017, 10/18/2018    Influenza, Triv, inactivated, subunit, adjuvanted, IM (Fluad 65 yrs and older) 10/31/2019    Pneumococcal Conjugate 13-valent ([id number]) 10/22/2015    Pneumococcal Polysaccharide ([id number]) 01/25/2012, 04/14/2014    Tdap (Boostrix, Adacel) 07/05/2015    Zoster Recombinant (Shingrix) 11/09/2019, 11/09/2019, 03/07/2020        No flowsheet data found.     BP (!) 106/54   Pulse 76   Temp 96.8 °F (36 °C)   Ht 5' 4\" (1.626 m)   SpO2 96%   BMI 22.45 kg/m²     Past Medical History:   Diagnosis Date    Adenocarcinoma of endometrium ([address]     Aortic valve sclerosis     Bradycardia     follows with cardiology- Dr. Tory Lyons, possible SSS    Cardiac murmur     Cataract of left eye     Chronic diarrhea     s/p radiation    Diabetes mellitus type II, controlled ([address]     diet controlled     Dysthymia     Gait abnormality 6/23/2014    Glaucoma     H/O renal calculi     H/O vein stripping     Hard of hearing     History of anemia     History of dizziness     follows with cardiology, bradycardia, possible SSS    History of radiation therapy     for uterine cancer    History of small bowel obstruction 03/2019    no surgery required    Hyperlipidemia     Hypertension     Hypothyroidism     Influenza A 1/11/2018    Left elbow fracture     S/P surgical repair    Lung cancer ([address]     Lung nodule     Macular degeneration     Obesity     Pericardial effusion 4/23/2014    Pseudophakia of right eye     PVD (peripheral vascular disease) ([address]     Traumatic injury of lower extremity childhood    bilateral trauma of lower extremities    Vertigo     resolved    Wears glasses     Wears partial dentures     upper      Past Surgical History:   Procedure Laterality Date    CATARACT REMOVAL Right February 2013    Dr. Сергей Retana, LAPAROSCOPIC  5/28/1998    COLONOSCOPY  7/22/2003    Dr. Catrina Kelly Left September 2010    ORIF, Dr. Belinda Rivera ERCP  7/19/1998    retained stone, Suzy Najera and Joi ACOSTAH AND BSO  March 2002    endometrial adenocarcinoma, Dr. Larry Preciado     Family History   Problem Relation Age of Onset    High Blood Pressure Other     Diabetes type 2  Mother         developed later in life       Social History     Socioeconomic History    Marital status:       Spouse name: Not on file    Number of children: Not on file    Years of education: Not on file    Highest education level: Not on file   Occupational History    Not on file   Social Needs    Financial resource strain: Not on file    Food insecurity     Worry: Not on file     Inability: Not on file    Transportation needs     Medical: Not on file     Non-medical: Not on file   Tobacco Use    Smoking status: Never Smoker    Smokeless tobacco: Never Used    Tobacco comment: never smoked tito rrt 01/11/2018   Substance and Sexual Activity    Alcohol use: No     Alcohol/week: 0.0 standard drinks    Drug use: No    Sexual activity: Not on file   Lifestyle    Physical activity     Days per week: Not on file     Minutes per session: Not on file    Stress: Not on file   Relationships    Social connections     Talks on phone: Not on file     Gets together: Not on file     Attends [employer] service: Not on file     Active member of club or organization: Not on file     Attends meetings of clubs or organizations: Not on file     Relationship status: Not on file    Intimate partner violence     Fear of current or ex partner: Not on file     Emotionally abused: Not on file     Physically abused: Not on file     Forced sexual activity: Not on file   Other Topics Concern    Not on file   Social History Narrative    Not on file       Review of Systems   Constitutional: Negative. HENT:        Bifrontal headache   Eyes: Negative. Respiratory:        Exertional dyspnea   Cardiovascular: Negative. Gastrointestinal: Negative. Endocrine: Negative. Genitourinary: Negative. Musculoskeletal: Negative. Skin: Negative. Allergic/Immunologic: Negative. Neurological: Negative. Hematological: Negative. Psychiatric/Behavioral: Negative.         Objective:       Physical Exam  General appearance - alert, well appearing, and in no distress, oriented to person, place, and time and normal appearing weight  Mental status - alert, oriented to person, place, and time, normal mood, behavior, speech, dress, motor activity, and thought processes  Eyes - pupils equal and reactive, extraocular eye movements intact  Ears - not examined  Nose - normal and patent, no erythema, discharge or polyps  Mouth - mucous membranes moist, pharynx normal without lesions  Neck - supple, no significant adenopathy  Chest - clear to auscultation, no wheezes, rales or rhonchi, symmetric air entry  Heart -normal rate, regular rhythm, normal S1, S2, no murmurs, rubs, clicks or gallops  Abdomen - soft, nontender, nondistended, no masses or organomegaly  Neuro- alert, oriented, normal speech, no focal findings or movement disorder noted}  Extremities - peripheral pulses normal, no pedal edema, no clubbing or cyanosis  Skin - normal coloration and turgor, no rashes, no suspicious skin lesions noted     Wt Readings from Last 3 Encounters:   01/18/21 130 lb 12.8 oz (59.3 kg)   01/12/21 130 lb 12.8 oz (59.3 kg)   01/11/21 130 lb 12.8 oz (59.3 kg)       Results for orders placed or performed during the hospital encounter of 88/08/41   Basic Metabolic Prof   Result Value Ref Range    Glucose 189 (H) 70 - 99 mg/dL    BUN 14 8 - 23 mg/dL    CREATININE 0.69 0.50 - 0.90 mg/dL    Bun/Cre Ratio 20 9 - 20    Calcium 9.3 8.6 - 10.4 mg/dL    Sodium 128 (L) 135 - 144 mmol/L    Potassium 4.3 3.7 - 5.3 mmol/L    Chloride 93 (L) 98 - 107 mmol/L    CO2 25 20 - 31 mmol/L    Anion Gap 10 9 - 17 mmol/L    GFR Non-African American >60 >60 mL/min    GFR African American >60 >60 mL/min    GFR Comment          GFR Staging NOT REPORTED    CBC with DIFF   Result Value Ref Range    WBC 13.7 (H) 3.5 - 11.3 k/uL    RBC 3.01 (L) 3.95 - 5.11 m/uL    Hemoglobin 9.0 (L) 11.9 - 15.1 g/dL    Hematocrit 29.2 (L) 36.3 - 47.1 %    MCV 97.0 82.6 - 102.9 fL    MCH 29.9 25.2 - 33.5 pg    MCHC 30.8 25.2 - 33.5 g/dL    RDW 14.6 (H) 11.8 - 14.4 %    Platelets 646 (H) 731 - 453 k/uL    MPV 10.3 8.1 - 13.5 fL    NRBC Automated 0.0 0.0 per 100 WBC    Differential Type NOT REPORTED     Seg Neutrophils 85 (H) 36 - 65 %    Lymphocytes 6 (L) 24 - 43 %    Monocytes 8 3 - 12 %    Eosinophils % 0 (L) 1 - 4 %    Basophils 0 0 - 2 %    Immature Granulocytes 1 (H) 0 %    Segs Absolute 11.70 (H) 1.50 - 8.10 k/uL    Absolute Lymph # 0.78 (L) 1.10 - 3.70 k/uL    Absolute Mono # 1.07 0.10 - 1.20 k/uL    Absolute Eos # <0.03 0.00 - 0.44 k/uL    Basophils Absolute 0.06 0.00 - 0.20 k/uL    Absolute Immature Granulocyte 0.10 0.00 - 0.30 k/uL    WBC Morphology NOT REPORTED     RBC Morphology ANISOCYTOSIS PRESENT     Platelet Estimate NOT REPORTED    Lipase   Result Value Ref Range    Lipase 7 (L) 13 - 60 U/L   Lipid Profile   Result Value Ref Range    Cholesterol 129 <200 mg/dL    HDL 35 (L) >40 mg/dL    LDL Cholesterol 67 0 - 130 mg/dL    Chol/HDL Ratio 3.7 <5    Triglycerides 133 <150 mg/dL    VLDL NOT REPORTED 1 - 30 mg/dL   Urinalysis, Routine   Result Value Ref Range    Color, UA NOT REPORTED YELLOW    Turbidity UA NOT REPORTED CLEAR    Glucose, Ur NEGATIVE NEGATIVE    Bilirubin Urine NEGATIVE NEGATIVE    Ketones, Urine TRACE (A) NEGATIVE    Specific Gravity, UA 1.015 1.010 - 1.025    Urine Hgb NEGATIVE NEGATIVE    pH, UA 6.0 5.0 - 6.0    Protein, UA NEGATIVE NEGATIVE    Urobilinogen, Urine Normal Normal    Nitrite, Urine NEGATIVE NEGATIVE    Leukocyte Esterase, Urine NEGATIVE NEGATIVE    Urinalysis Comments NOT REPORTED    Liver Profile   Result Value Ref Range    Alb 3.3 (L) 3.5 - 5.2 g/dL    Alkaline Phosphatase 163 (H) 35 - 104 U/L    ALT 36 (H) 5 - 33 U/L    AST 26 <32 U/L    Total Bilirubin 0.24 (L) 0.3 - 1.2 mg/dL    Bilirubin, Direct 0.10 <0.31 mg/dL    Bilirubin, Indirect 0.14 0.00 - 1.00 mg/dL    Total Protein 6.1 (L) 6.4 - 8.3 g/dL    Globulin 2.8 1.5 - 3.8 g/dL    Albumin/Globulin Ratio 1.2 1.0 - 2.5   Microscopic Urinalysis   Result Value Ref Range    -          WBC, UA 0 TO 4 0 - 4 /HPF    RBC, UA None 0 - 4 /HPF    Casts UA NOT REPORTED 0 - 2 /LPF    Crystals, UA NOT REPORTED None /HPF    Epithelial Cells UA 5 TO 10 0 - 5 /HPF    Renal Epithelial, UA NOT REPORTED 0 /HPF    Bacteria, UA None None    Mucus, UA NOT REPORTED None    Trichomonas, UA NOT REPORTED None    Amorphous, UA NOT REPORTED None    Other Observations UA NOT REPORTED NOT REQ.     Yeast, UA NOT REPORTED WBC Morphology NOT REPORTED     RBC Morphology ANISOCYTOSIS PRESENT     Platelet Estimate NOT REPORTED    Basic Metabolic Panel   Result Value Ref Range    Glucose 188 (H) 70 - 99 mg/dL    BUN 9 8 - 23 mg/dL    CREATININE 0.69 0.50 - 0.90 mg/dL    Bun/Cre Ratio 13 9 - 20    Calcium 8.5 (L) 8.6 - 10.4 mg/dL    Sodium 132 (L) 135 - 144 mmol/L    Potassium 3.8 3.7 - 5.3 mmol/L    Chloride 96 (L) 98 - 107 mmol/L    CO2 25 20 - 31 mmol/L    Anion Gap 11 9 - 17 mmol/L    GFR Non-African American >60 >60 mL/min    GFR African American >60 >60 mL/min    GFR Comment          GFR Staging NOT REPORTED    COVID-19    Specimen: Other   Result Value Ref Range    SARS-CoV-2 Not Detected Not Detected    SARS-CoV-2, Rapid          Source . NASOPHARYNGEAL SWAB     SARS-CoV-2, PCR         CBC Auto Differential   Result Value Ref Range    WBC 11.6 (H) 3.5 - 11.3 k/uL    RBC 2.91 (L) 3.95 - 5.11 m/uL    Hemoglobin 8.6 (L) 11.9 - 15.1 g/dL    Hematocrit 28.3 (L) 36.3 - 47.1 %    MCV 97.3 82.6 - 102.9 fL    MCH 29.6 25.2 - 33.5 pg    MCHC 30.4 25.2 - 33.5 g/dL    RDW 14.7 (H) 11.8 - 14.4 %    Platelets 525 059 - 665 k/uL    MPV 10.2 8.1 - 13.5 fL    NRBC Automated 0.0 0.0 per 100 WBC    Differential Type NOT REPORTED     Seg Neutrophils 70 (H) 36 - 65 %    Lymphocytes 12 (L) 24 - 43 %    Monocytes 11 3 - 12 %    Eosinophils % 5 (H) 1 - 4 %    Basophils 1 0 - 2 %    Immature Granulocytes 1 (H) 0 %    Segs Absolute 8.24 (H) 1.50 - 8.10 k/uL    Absolute Lymph # 1.36 1.10 - 3.70 k/uL    Absolute Mono # 1.25 (H) 0.10 - 1.20 k/uL    Absolute Eos # 0.52 (H) 0.00 - 0.44 k/uL    Basophils Absolute 0.08 0.00 - 0.20 k/uL    Absolute Immature Granulocyte 0.14 0.00 - 0.30 k/uL    WBC Morphology NOT REPORTED     RBC Morphology ANISOCYTOSIS PRESENT     Platelet Estimate NOT REPORTED    Basic Metabolic Panel   Result Value Ref Range    Glucose 130 (H) 70 - 99 mg/dL    BUN 7 (L) 8 - 23 mg/dL    CREATININE 0.82 0.50 - 0.90 mg/dL    Bun/Cre Ratio 9 9 - 20 Calcium 8.8 8.6 - 10.4 mg/dL    Sodium 137 135 - 144 mmol/L    Potassium 3.9 3.7 - 5.3 mmol/L    Chloride 102 98 - 107 mmol/L    CO2 27 20 - 31 mmol/L    Anion Gap 8 (L) 9 - 17 mmol/L    GFR Non-African American >60 >60 mL/min    GFR African American >60 >60 mL/min    GFR Comment          GFR Staging NOT REPORTED    POC Glucose Fingerstick   Result Value Ref Range    POC Glucose 134 (H) 65 - 105 mg/dL   POC Glucose Fingerstick   Result Value Ref Range    POC Glucose 156 (H) 65 - 105 mg/dL   POC Glucose Fingerstick   Result Value Ref Range    POC Glucose 187 (H) 65 - 105 mg/dL   POC Glucose Fingerstick   Result Value Ref Range    POC Glucose 159 (H) 65 - 105 mg/dL   POC Glucose Fingerstick   Result Value Ref Range    POC Glucose 132 (H) 65 - 105 mg/dL   POC Glucose Fingerstick   Result Value Ref Range    POC Glucose 148 (H) 65 - 105 mg/dL   POC Glucose Fingerstick   Result Value Ref Range    POC Glucose 180 (H) 65 - 105 mg/dL   EKG 12 Lead   Result Value Ref Range    Ventricular Rate 80 BPM    Atrial Rate 80 BPM    P-R Interval 130 ms    QRS Duration 70 ms    Q-T Interval 396 ms    QTc Calculation (Bazett) 456 ms    P Axis 19 degrees    R Axis -14 degrees    T Axis 3 degrees       No results found. Assessment:      1. Adenocarcinoma of right lung ([address]    2. Right lower lobe lung mass    3. Bronchitis          Plan:      1. Medications reviewed, continue as ordered. Patient completing last dose of antibiotic today. 2. Educated and clarified the medication use. 3. Recommend flu vaccination in the fall annually. 4. Patient is up-to-date with vaccinations from pulmonary perspective. 5. Patient received Covid vaccine #1 on 1/15/2021-unknown if [facility] or [facility]  6. Maintain an active lifestyle. 7. Patient's questions were answered to her satisfaction. 8. Pulmonary function tests were reviewed   9. Chest x-ray was reviewed-radiology report only  10. CT scan of the chest was reviewed  11.  Per patient's preference for more of a palliative care approach, limit aggressive invasive work-ups.     12. We'll see the patient back in 4 months        Electronically signed by MOHAN Cardoso CNP on 1/20/2021 at 4:03 PM

## 2021-01-18 ENCOUNTER — OUTSIDE SERVICES (OUTPATIENT)
Dept: FAMILY MEDICINE CLINIC | Age: 86
End: 2021-01-18
Payer: MEDICARE

## 2021-01-18 VITALS
BODY MASS INDEX: 22.45 KG/M2 | HEART RATE: 51 BPM | RESPIRATION RATE: 16 BRPM | OXYGEN SATURATION: 98 % | SYSTOLIC BLOOD PRESSURE: 104 MMHG | TEMPERATURE: 97.3 F | WEIGHT: 130.8 LBS | DIASTOLIC BLOOD PRESSURE: 57 MMHG

## 2021-01-18 DIAGNOSIS — E11.22 CONTROLLED TYPE 2 DIABETES MELLITUS WITH STAGE 3 CHRONIC KIDNEY DISEASE, WITHOUT LONG-TERM CURRENT USE OF INSULIN (HCC): ICD-10-CM

## 2021-01-18 DIAGNOSIS — N18.30 CONTROLLED TYPE 2 DIABETES MELLITUS WITH STAGE 3 CHRONIC KIDNEY DISEASE, WITHOUT LONG-TERM CURRENT USE OF INSULIN (HCC): ICD-10-CM

## 2021-01-18 DIAGNOSIS — R32 URINARY INCONTINENCE, UNSPECIFIED TYPE: ICD-10-CM

## 2021-01-18 DIAGNOSIS — N18.30 CHRONIC RENAL IMPAIRMENT, STAGE 3 (MODERATE), UNSPECIFIED WHETHER STAGE 3A OR 3B CKD (HCC): ICD-10-CM

## 2021-01-18 DIAGNOSIS — H35.3190 NONEXUDATIVE AGE-RELATED MACULAR DEGENERATION, UNSPECIFIED LATERALITY, UNSPECIFIED STAGE: ICD-10-CM

## 2021-01-18 DIAGNOSIS — F32.A CHRONIC DEPRESSION: ICD-10-CM

## 2021-01-18 DIAGNOSIS — H40.9 GLAUCOMA OF BOTH EYES, UNSPECIFIED GLAUCOMA TYPE: ICD-10-CM

## 2021-01-18 DIAGNOSIS — J18.9 PNEUMONIA OF BOTH LOWER LOBES DUE TO INFECTIOUS ORGANISM: ICD-10-CM

## 2021-01-18 DIAGNOSIS — E03.4 HYPOTHYROIDISM DUE TO ACQUIRED ATROPHY OF THYROID: ICD-10-CM

## 2021-01-18 DIAGNOSIS — Z85.42 HISTORY OF ENDOMETRIAL CANCER: ICD-10-CM

## 2021-01-18 DIAGNOSIS — E78.2 MIXED HYPERLIPIDEMIA: ICD-10-CM

## 2021-01-18 DIAGNOSIS — I65.23 CAROTID STENOSIS, BILATERAL: ICD-10-CM

## 2021-01-18 DIAGNOSIS — D64.9 CHRONIC ANEMIA: ICD-10-CM

## 2021-01-18 DIAGNOSIS — R91.8 RIGHT LOWER LOBE LUNG MASS: ICD-10-CM

## 2021-01-18 DIAGNOSIS — U07.1 COVID-19: ICD-10-CM

## 2021-01-18 DIAGNOSIS — H43.813 POSTERIOR VITREOUS DETACHMENT OF BOTH EYES: ICD-10-CM

## 2021-01-18 DIAGNOSIS — K52.9 CHRONIC DIARRHEA: ICD-10-CM

## 2021-01-18 DIAGNOSIS — N30.00 ACUTE CYSTITIS WITHOUT HEMATURIA: Primary | ICD-10-CM

## 2021-01-18 DIAGNOSIS — C34.31 MALIGNANT NEOPLASM OF LOWER LOBE, RIGHT BRONCHUS OR LUNG (HCC): ICD-10-CM

## 2021-01-18 DIAGNOSIS — A41.9 SEPSIS WITHOUT ACUTE ORGAN DYSFUNCTION, DUE TO UNSPECIFIED ORGANISM (HCC): ICD-10-CM

## 2021-01-18 DIAGNOSIS — C34.91 ADENOCARCINOMA OF RIGHT LUNG (HCC): ICD-10-CM

## 2021-01-18 DIAGNOSIS — I10 ESSENTIAL HYPERTENSION: ICD-10-CM

## 2021-01-18 DIAGNOSIS — N39.0 RECURRENT UTI: ICD-10-CM

## 2021-01-18 PROBLEM — E66.3 OVERWEIGHT: Status: RESOLVED | Noted: 2018-03-23 | Resolved: 2021-01-18

## 2021-01-18 PROCEDURE — 99310 SBSQ NF CARE HIGH MDM 45: CPT | Performed by: NURSE PRACTITIONER

## 2021-01-18 PROCEDURE — 99490 CHRNC CARE MGMT STAFF 1ST 20: CPT | Performed by: NURSE PRACTITIONER

## 2021-01-18 ASSESSMENT — ENCOUNTER SYMPTOMS
NAUSEA: 0
SORE THROAT: 0
EYE DISCHARGE: 0
EYE PAIN: 0
RHINORRHEA: 0
CHEST TIGHTNESS: 0
SHORTNESS OF BREATH: 0
WHEEZING: 0
COUGH: 0
ABDOMINAL PAIN: 0
SINUS PRESSURE: 0
VOMITING: 0
COLOR CHANGE: 0

## 2021-01-18 NOTE — PROGRESS NOTES
Khushbu Marquez is a 80 y.o. female who presents today for her medical conditions/complaints as noted below. Chief Complaint   Patient presents with    3 Month Follow-Up           HPI:     Chelsea Saba is seen today for her quarterly chronic illness f/u. She has a PMX of Adenocarcinoma of the right lung, with hx of thoracentesis, bradycardia, uterine cancer with radiation, chronic diarrhea, cataracts, glaucoma, DM II, HTN, HLD, Covid 19, among others. Last week she had a UTI with possible sepsis and pneumonia. She was started on antibiotics. She has improved and is sitting in a recliner and doing a word search today. She states that she is feeling much better. She denies pain, cough or shortness of breath. She denies frequency or urgency with urination. She is eating and sleeping better. Bowels are moving. No distress noted. Nursing denies concerns. She is oriented to person and place, she is not sure of date or time.       Past Medical History:   Diagnosis Date    Adenocarcinoma of endometrium ([address]     Aortic valve sclerosis     Bradycardia     follows with cardiology- Dr. Danisha Kirby, possible SSS    Cardiac murmur     Cataract of left eye     Chronic diarrhea     s/p radiation    Diabetes mellitus type II, controlled ([address]     diet controlled     Dysthymia     Gait abnormality 6/23/2014    Glaucoma     H/O renal calculi     H/O vein stripping     Hard of hearing     History of anemia     History of dizziness     follows with cardiology, bradycardia, possible SSS    History of radiation therapy     for uterine cancer    History of small bowel obstruction 03/2019    no surgery required    Hyperlipidemia     Hypertension     Hypothyroidism     Influenza A 1/11/2018    Left elbow fracture     S/P surgical repair    Lung cancer ([address]     Lung nodule     Macular degeneration     Obesity     Pericardial effusion 4/23/2014    Pseudophakia of right eye     [facility]VD [facility]per[facility] disease[facility]     Traumatic injury of lower extremity childhood    bilateral trauma of lower extremities    Vertigo     resolved    Wears glasses     Wears partial dentures     upper       Past Surgical History:   Procedure Laterality Date    CATARACT REMOVAL Right February 2013    Dr. Adia Koenig, LAPAROSCOPIC  5/28/1998    COLONOSCOPY  7/22/2003    Dr. Oumar Marquez Left September 2010    ORIF, Dr. Bard Ovalles ERCP  7/19/1998    retained stone, Suzy Jean-Baptiste and Claris Cogan MAGDA AND BSO  March 2002    endometrial adenocarcinoma, Dr. Jennifer Pitts       Family History   Problem Relation Age of Onset    High Blood Pressure Other     Diabetes type 2  Mother         developed later in life       Social History     Tobacco Use    Smoking status: Never Smoker    Smokeless tobacco: Never Used    Tobacco comment: never smoked yant rrt 01/11/2018   Substance Use Topics    Alcohol use: No     Alcohol/week: 0.0 standard drinks      Allergies   Allergen Reactions    Allopurinol      Patient unsure of reaction    Metoprolol Tartrate [Metoprolol]      bradycardia    Percocet [Oxycodone-Acetaminophen]      Hallucinations. ...sensitive to narcotics    Phenergan [Promethazine Hcl]      Very sensitive. ..given small doses    Polycitra-K [Potassium Citrate]      Patient unsure of reaction    Statins Other (See Comments)     ?  Muscle aches     Levaquin [Levofloxacin] Anxiety     Not able to sleep    Sulfa Antibiotics Rash    Tessalon [Benzonatate] Anxiety     Not able to sleep       Health Maintenance   Topic Date Due    COVID-19 Vaccine (1 of 2) 04/11/1948    Flu vaccine (1) 09/01/2020    Annual Wellness Visit (AWV)  09/16/2020    TSH testing  07/01/2021    Potassium monitoring  09/03/2021    Creatinine monitoring  09/03/2021    DTaP/Tdap/Td vaccine (2 - Td) 07/05/2025    Shingles Vaccine  Completed    Pneumococcal 65+ years Vaccine  Completed    Hepatitis A vaccine  Aged Out    Hib vaccine  Aged Out    Meningococcal (ACWY) vaccine  Aged Out       Subjective:      Review of Systems   Constitutional: Negative for activity change and appetite change. HENT: Negative for congestion, postnasal drip, rhinorrhea, sinus pressure and sore throat. Eyes: Negative for pain and discharge. Respiratory: Negative for cough, chest tightness, shortness of breath and wheezing. Cardiovascular: Negative for chest pain and leg swelling. Gastrointestinal: Negative for abdominal pain, nausea and vomiting. Endocrine: Negative for cold intolerance. Genitourinary: Negative for difficulty urinating, frequency and urgency. Musculoskeletal: Positive for arthralgias, gait problem and myalgias. Negative for neck pain. Skin: Negative for color change. Allergic/Immunologic: Negative for environmental allergies. Neurological: Positive for weakness. Negative for dizziness, speech difficulty and headaches. Psychiatric/Behavioral: Negative for agitation. The patient is not nervous/anxious. Objective:     Physical Exam  Vitals signs and nursing note reviewed. Constitutional:       General: She is not in acute distress. Appearance: Normal appearance. She is normal weight. She is not diaphoretic. HENT:      Head: Normocephalic and atraumatic. Right Ear: External ear normal.      Left Ear: External ear normal.      Nose: Nose normal. No congestion or rhinorrhea. Mouth/Throat:      Pharynx: No oropharyngeal exudate. Eyes:      General: No scleral icterus. Right eye: No discharge. Left eye: No discharge. Conjunctiva/sclera: Conjunctivae normal.   Neck:      Musculoskeletal: Normal range of motion and neck supple. No neck rigidity or muscular tenderness. Cardiovascular:      Rate and Rhythm: Normal rate and regular rhythm. Pulses: Normal pulses. Heart sounds: Murmur present.    Pulmonary:      Effort: Pulmonary effort is normal. No respiratory distress. Breath sounds: Normal breath sounds. No wheezing, rhonchi or rales. Abdominal:      General: Bowel sounds are normal. There is no distension. Palpations: Abdomen is soft. Tenderness: There is no abdominal tenderness. Musculoskeletal: Normal range of motion. General: No swelling or tenderness. Right lower leg: No edema. Left lower leg: No edema. Skin:     General: Skin is warm and dry. Coloration: Skin is not jaundiced or pale. Neurological:      Mental Status: She is alert. Mental status is at baseline. She is disoriented. Psychiatric:         Mood and Affect: Mood normal.         Behavior: Behavior normal.       BP (!) 104/57   Pulse 51   Temp 97.3 °F (36.3 °C)   Resp 16   Wt 130 lb 12.8 oz (59.3 kg)   SpO2 98%   BMI 22.45 kg/m²     Assessment/Plan      1. Acute cystitis without hematuria   Continue Augmentin   Improving  Continue to observe for s/s worsening UTI   2. Recurrent UTI   Start D mannose 1000 mg BID  Continue Cranberry extract  Monitor for s/s infection   3. Sepsis without acute organ dysfunction, due to unspecified organism [facility])   As above - improving   4. Urinary incontinence, unspecified type   Change depends often  Keep dry as able  Continue oxybutynin   5. Pneumonia of both lower lobes due to infectious organism   Lungs clear - improving  Continue Augmentin   6. Adenocarcinoma of right lung ([facility] - hx of dx in 2020 early part of year  Chose conservative treatment with symptom management   Hx of [facility]acentesis at [facility]   7. Controlled type 2 diabetes mellitus with stage 3 chronic kidney disease, without long-term current use of insulin (HCC)   Monitor AiC, monitor blood sugars  Continue Lantus   8. Chronic diarrhea   Imodium prn  Monitor for dehydration  Currently denies   9. Chronic anemia   Follow labs  Monitor for s/s active bleeding   10. COVID-19   Recovered  DC Zinc and Vit C, and Albuterol nebs   11. Essential hypertension   Controlled  Continue hydrochlorothiazide, Lisinopril   12. Carotid stenosis, bilateral - hx of   13. Hypothyroidism due to acquired atrophy of thyroid   Follow labs   Continue levothyroxine   14. Chronic renal impairment, stage 3 (moderate), unspecified whether stage 3a or 3b CKD - hx of   15. Posterior vitreous detachment of both eyes - hx of   16. Nonexudative age-related macular degeneration, unspecified laterality, unspecified stage - hx of   17. Mixed hyperlipidemia   Follow labs  Continue ezetimibe   20. History of endometrial cancer    21. Glaucoma of both eyes, unspecified glaucoma type    22. Chronic depression   Continue Certaline         Patient seen and examined. History partially obtained by chart review and nursing notes. I have reviewed patient's past medical, surgical, social, and family history and have made updates where appropriate.   See facility EMR for updated medication list.       Electronically signed by MOHAN Candelario CNP on 1/18/2021 at 2:36 PM

## 2021-01-20 ENCOUNTER — OFFICE VISIT (OUTPATIENT)
Dept: PULMONOLOGY | Age: 86
End: 2021-01-20
Payer: MEDICARE

## 2021-01-20 VITALS
DIASTOLIC BLOOD PRESSURE: 54 MMHG | TEMPERATURE: 96.8 F | BODY MASS INDEX: 22.45 KG/M2 | OXYGEN SATURATION: 96 % | HEIGHT: 64 IN | SYSTOLIC BLOOD PRESSURE: 106 MMHG | HEART RATE: 76 BPM

## 2021-01-20 DIAGNOSIS — J40 BRONCHITIS: ICD-10-CM

## 2021-01-20 DIAGNOSIS — C34.91 ADENOCARCINOMA OF RIGHT LUNG (HCC): Primary | ICD-10-CM

## 2021-01-20 DIAGNOSIS — R91.8 RIGHT LOWER LOBE LUNG MASS: ICD-10-CM

## 2021-01-20 PROCEDURE — 99213 OFFICE O/P EST LOW 20 MIN: CPT | Performed by: NURSE PRACTITIONER

## 2021-01-20 PROCEDURE — 4040F PNEUMOC VAC/ADMIN/RCVD: CPT | Performed by: NURSE PRACTITIONER

## 2021-01-20 PROCEDURE — 1123F ACP DISCUSS/DSCN MKR DOCD: CPT | Performed by: NURSE PRACTITIONER

## 2021-01-20 PROCEDURE — G8484 FLU IMMUNIZE NO ADMIN: HCPCS | Performed by: NURSE PRACTITIONER

## 2021-01-20 PROCEDURE — 1036F TOBACCO NON-USER: CPT | Performed by: NURSE PRACTITIONER

## 2021-01-20 PROCEDURE — G8420 CALC BMI NORM PARAMETERS: HCPCS | Performed by: NURSE PRACTITIONER

## 2021-01-20 PROCEDURE — G8427 DOCREV CUR MEDS BY ELIG CLIN: HCPCS | Performed by: NURSE PRACTITIONER

## 2021-01-20 PROCEDURE — 1090F PRES/ABSN URINE INCON ASSESS: CPT | Performed by: NURSE PRACTITIONER

## 2021-01-20 RX ORDER — ACETAMINOPHEN 160 MG
TABLET,DISINTEGRATING ORAL
COMMUNITY
End: 2022-02-16

## 2021-01-20 RX ORDER — FUROSEMIDE 20 MG/1
20 TABLET ORAL DAILY
COMMUNITY
End: 2022-02-16

## 2021-01-20 RX ORDER — AMOXICILLIN AND CLAVULANATE POTASSIUM 875; 125 MG/1; MG/1
1 TABLET, FILM COATED ORAL 2 TIMES DAILY
COMMUNITY
End: 2022-02-16

## 2021-01-20 RX ORDER — ZINC GLUCONATE 50 MG
50 TABLET ORAL DAILY
COMMUNITY
End: 2022-02-16

## 2021-01-20 ASSESSMENT — ENCOUNTER SYMPTOMS
ALLERGIC/IMMUNOLOGIC NEGATIVE: 1
EYES NEGATIVE: 1
GASTROINTESTINAL NEGATIVE: 1

## 2021-04-08 ENCOUNTER — OUTSIDE SERVICES (OUTPATIENT)
Dept: FAMILY MEDICINE CLINIC | Age: 86
End: 2021-04-08
Payer: MEDICARE

## 2021-04-08 DIAGNOSIS — N18.30 CONTROLLED TYPE 2 DIABETES MELLITUS WITH STAGE 3 CHRONIC KIDNEY DISEASE, WITHOUT LONG-TERM CURRENT USE OF INSULIN (HCC): ICD-10-CM

## 2021-04-08 DIAGNOSIS — F32.A CHRONIC DEPRESSION: ICD-10-CM

## 2021-04-08 DIAGNOSIS — D64.9 CHRONIC ANEMIA: ICD-10-CM

## 2021-04-08 DIAGNOSIS — E11.22 CONTROLLED TYPE 2 DIABETES MELLITUS WITH STAGE 3 CHRONIC KIDNEY DISEASE, WITHOUT LONG-TERM CURRENT USE OF INSULIN (HCC): ICD-10-CM

## 2021-04-08 DIAGNOSIS — C34.31 MALIGNANT NEOPLASM OF LOWER LOBE, RIGHT BRONCHUS OR LUNG (HCC): ICD-10-CM

## 2021-04-08 DIAGNOSIS — N18.30 STAGE 3 CHRONIC KIDNEY DISEASE, UNSPECIFIED WHETHER STAGE 3A OR 3B CKD (HCC): ICD-10-CM

## 2021-04-08 DIAGNOSIS — C34.91 ADENOCARCINOMA OF RIGHT LUNG (HCC): ICD-10-CM

## 2021-04-08 DIAGNOSIS — E11.9 TYPE 2 DIABETES MELLITUS WITHOUT COMPLICATION, WITHOUT LONG-TERM CURRENT USE OF INSULIN (HCC): Primary | ICD-10-CM

## 2021-04-08 DIAGNOSIS — E03.4 HYPOTHYROIDISM DUE TO ACQUIRED ATROPHY OF THYROID: ICD-10-CM

## 2021-04-08 ASSESSMENT — ENCOUNTER SYMPTOMS
RHINORRHEA: 0
ABDOMINAL PAIN: 0
EYE PAIN: 0
SINUS PRESSURE: 0
COUGH: 0
NAUSEA: 0
SORE THROAT: 0
COLOR CHANGE: 0
SHORTNESS OF BREATH: 0
VOMITING: 0
CHEST TIGHTNESS: 0
WHEEZING: 0
EYE DISCHARGE: 0

## 2021-04-08 NOTE — PROGRESS NOTES
Darrell Barker is a 80 y.o. female who presents today for her medical conditions/complaints as noted below. Chief Complaint   Patient presents with    3 Month Follow-Up           HPI:     Onelia Zamarripa is seen today for her quarterly chronic illness f/u. She has a PMX of Adenocarcinoma of the right lung, with hx of thoracentesis, bradycardia, uterine cancer with radiation, chronic diarrhea, cataracts, glaucoma, DM II, HTN, HLD, Covid 19, among others. She has no complaints today. She denies pain, cough or shortness of breath. She denies frequency or urgency with urination. She is eating and sleeping better. No distress noted. Nursing denies concerns. Patient remains on palliative care with [facility] for lung cancer with no plans for aggressive treatments. No weight loss and actually has gained nearly 10 lbs in past 5 mos. Moved to [place] few mos ago and doing well there.       Past Medical History:   Diagnosis Date    Adenocarcinoma of endometrium ([address]     Aortic valve sclerosis     Bradycardia     follows with cardiology- Dr. Brian Bauman, possible SSS    Cardiac murmur     Cataract of left eye     Chronic diarrhea     s/p radiation    Diabetes mellitus type II, controlled ([address]     diet controlled     Dysthymia     Gait abnormality 6/23/2014    Glaucoma     H/O renal calculi     H/O vein stripping     Hard of hearing     History of anemia     History of dizziness     follows with cardiology, bradycardia, possible SSS    History of radiation therapy     for uterine cancer    History of small bowel obstruction 03/2019    no surgery required    Hyperlipidemia     Hypertension     Hypothyroidism     Influenza A 1/11/2018    Left elbow fracture     S/P surgical repair    Lung cancer ([address]     Lung nodule     Macular degeneration     Obesity     Pericardial effusion 4/23/2014    Pseudophakia of right eye     PVD (peripheral vascular disease) ([address]     Traumatic injury of lower extremity childhood    bilateral trauma of lower extremities    Vertigo     resolved    Wears glasses     Wears partial dentures     upper       Past Surgical History:   Procedure Laterality Date    CATARACT REMOVAL Right February 2013    Dr. Stephany Galindo, LAPAROSCOPIC  5/28/1998    COLONOSCOPY  7/22/2003    Dr. Bhavani Rivera Left September 2010    ORIF, Dr. Crystal Menjivar ERCP  7/19/1998    retained stone, Suzy William and Rachel Cleaves MAGDA AND BSO  March 2002    endometrial adenocarcinoma, Dr. Sravan Stanley       Family History   Problem Relation Age of Onset    High Blood Pressure Other     Diabetes type 2  Mother         developed later in life       Social History     Tobacco Use    Smoking status: Never Smoker    Smokeless tobacco: Never Used    Tobacco comment: never smoked yant rrt 01/11/2018   Substance Use Topics    Alcohol use: No     Alcohol/week: 0.0 standard drinks      Allergies   Allergen Reactions    Allopurinol      Patient unsure of reaction    Metoprolol Tartrate [Metoprolol]      bradycardia    Percocet [Oxycodone-Acetaminophen]      Hallucinations. ...sensitive to narcotics    Phenergan [Promethazine Hcl]      Very sensitive. ..given small doses    Polycitra-K [Potassium Citrate]      Patient unsure of reaction    Statins Other (See Comments)     ?  Muscle aches     Levaquin [Levofloxacin] Anxiety     Not able to sleep    Sulfa Antibiotics Rash    Tessalon [Benzonatate] Anxiety     Not able to sleep       Health Maintenance   Topic Date Due    COVID-19 Vaccine (1) Never done   [employer] Visit (AWV)  Never done    TSH testing  07/01/2021    Flu vaccine (Season Ended) 09/01/2021    Potassium monitoring  09/03/2021    Creatinine monitoring  09/03/2021    DTaP/Tdap/Td vaccine (2 - Td) 07/05/2025    Shingles Vaccine  Completed    Pneumococcal 65+ years Vaccine  Completed    Hepatitis A vaccine  Aged Out    Hib vaccine  Aged Out    Meningococcal (ACWY) vaccine  Aged Out       Subjective:      Review of Systems   Constitutional: Negative for activity change and appetite change. HENT: Negative for congestion, postnasal drip, rhinorrhea, sinus pressure and sore throat. Eyes: Negative for pain and discharge. Respiratory: Negative for cough, chest tightness, shortness of breath and wheezing. Cardiovascular: Negative for chest pain and leg swelling. Gastrointestinal: Negative for abdominal pain, nausea and vomiting. Endocrine: Negative for cold intolerance. Genitourinary: Negative for difficulty urinating, frequency and urgency. Musculoskeletal: Positive for arthralgias, gait problem and myalgias. Negative for neck pain. Skin: Negative for color change. Allergic/Immunologic: Negative for environmental allergies. Neurological: Positive for weakness. Negative for dizziness, speech difficulty and headaches. Psychiatric/Behavioral: Negative for agitation. The patient is not nervous/anxious. Objective:     Physical Exam  Vitals signs and nursing note reviewed. Constitutional:       General: She is not in acute distress. Appearance: Normal appearance. She is normal weight. She is not diaphoretic. HENT:      Head: Normocephalic and atraumatic. Right Ear: External ear normal.      Left Ear: External ear normal.      Nose: Nose normal. No congestion or rhinorrhea. Mouth/Throat:      Pharynx: No oropharyngeal exudate. Eyes:      General: No scleral icterus. Right eye: No discharge. Left eye: No discharge. Conjunctiva/sclera: Conjunctivae normal.   Neck:      Musculoskeletal: Normal range of motion and neck supple. No neck rigidity or muscular tenderness. Cardiovascular:      Rate and Rhythm: Normal rate and regular rhythm. Pulses: Normal pulses. Heart sounds: Murmur present. Pulmonary:      Effort: Pulmonary effort is normal. No respiratory distress.       Breath sounds: Normal breath sounds. No wheezing, rhonchi or rales. Abdominal:      General: Bowel sounds are normal. There is no distension. Palpations: Abdomen is soft. Tenderness: There is no abdominal tenderness. Musculoskeletal: Normal range of motion. General: No swelling or tenderness. Right lower leg: No edema. Left lower leg: No edema. Skin:     General: Skin is warm and dry. Coloration: Skin is not jaundiced or pale. Neurological:      Mental Status: She is alert. Mental status is at baseline. She is disoriented. Psychiatric:         Mood and Affect: Mood normal.         Behavior: Behavior normal.       Temperature: 97.3 °F 04/08/2021 12:18 PM Pulse: 54 per minute 04/01/2021 03:58 PM Respirations: 18 per minute 04/01/2021 03:58 PM Blood Pressure: 151 / 53 mmHg 04/01/2021 03:58 PM O2 Saturation: 97 % 04/08/2021 12:18 PM Blood Sugar: 103 mg/dL 04/08/2021 10:31 AM Weight: 141.8 lbs / Routine BMI: 24.34 04/01/2021 06:29 AM Height: 5ft 4.0in 11/27/2020 09:52 AM      Assessment/Plan        Diagnosis Orders   1. Type 2 diabetes mellitus without complication, without long-term current use of insulin ([facility])  -- monitor a1c q 6 mos. Will do more freq if needed but balance lab testing given is palliative care  -- fastings q am controlled 100-130s generally. No lows. Continue same dose lantus    2. Malignant neoplasm of lower lobe, right bronchus or lung ([address]  -- continues with palliative care   3. Stage 3 chronic kidney disease, unspecified whether stage 3a or 3b CKD  --CMP/BMP q 6 mos. stable   4. Hypothyroidism due to acquired atrophy of thyroid  --TSH yearly in April  Continue levothyroxine   5. Adenocarcinoma of right lung ([address]     6. Controlled type 2 diabetes mellitus with stage 3 chronic kidney disease, without long-term current use of insulin ([address]     7. Chronic anemia  --continue to monitor at least q 6 mos   8. Chronic depression  --controlled on low dose sertraline   9.

## 2021-04-11 ENCOUNTER — TELEPHONE (OUTPATIENT)
Dept: FAMILY MEDICINE CLINIC | Age: 86
End: 2021-04-11

## 2021-04-11 NOTE — TELEPHONE ENCOUNTER
Call [facility] of [place] and give orders for A1c, TSH, and CMP to be drawn this week    Give orders for a1c q 6 months (in April and November) and CMP, CBC yearly every April and CBC, BMP every November and TSH yearly in April.

## 2021-07-20 ENCOUNTER — OUTSIDE SERVICES (OUTPATIENT)
Dept: FAMILY MEDICINE CLINIC | Age: 86
End: 2021-07-20
Payer: MEDICARE

## 2021-07-20 VITALS
TEMPERATURE: 97.3 F | HEART RATE: 65 BPM | RESPIRATION RATE: 18 BRPM | WEIGHT: 157.6 LBS | DIASTOLIC BLOOD PRESSURE: 80 MMHG | OXYGEN SATURATION: 97 % | BODY MASS INDEX: 27.05 KG/M2 | SYSTOLIC BLOOD PRESSURE: 146 MMHG

## 2021-07-20 DIAGNOSIS — R32 URINARY INCONTINENCE, UNSPECIFIED TYPE: ICD-10-CM

## 2021-07-20 DIAGNOSIS — E11.9 TYPE 2 DIABETES MELLITUS WITHOUT COMPLICATION, WITHOUT LONG-TERM CURRENT USE OF INSULIN (HCC): ICD-10-CM

## 2021-07-20 DIAGNOSIS — C34.91 ADENOCARCINOMA OF RIGHT LUNG (HCC): Primary | ICD-10-CM

## 2021-07-20 DIAGNOSIS — H35.3190 NONEXUDATIVE AGE-RELATED MACULAR DEGENERATION, UNSPECIFIED LATERALITY, UNSPECIFIED STAGE: ICD-10-CM

## 2021-07-20 DIAGNOSIS — D64.9 CHRONIC ANEMIA: ICD-10-CM

## 2021-07-20 DIAGNOSIS — H43.813 POSTERIOR VITREOUS DETACHMENT OF BOTH EYES: ICD-10-CM

## 2021-07-20 DIAGNOSIS — H40.9 GLAUCOMA OF BOTH EYES, UNSPECIFIED GLAUCOMA TYPE: ICD-10-CM

## 2021-07-20 DIAGNOSIS — N18.30 CHRONIC RENAL IMPAIRMENT, STAGE 3 (MODERATE), UNSPECIFIED WHETHER STAGE 3A OR 3B CKD (HCC): ICD-10-CM

## 2021-07-20 DIAGNOSIS — Z85.42 HISTORY OF ENDOMETRIAL CANCER: ICD-10-CM

## 2021-07-20 DIAGNOSIS — F32.A CHRONIC DEPRESSION: ICD-10-CM

## 2021-07-20 DIAGNOSIS — E03.4 HYPOTHYROIDISM DUE TO ACQUIRED ATROPHY OF THYROID: ICD-10-CM

## 2021-07-20 PROCEDURE — 99490 CHRNC CARE MGMT STAFF 1ST 20: CPT | Performed by: NURSE PRACTITIONER

## 2021-07-20 ASSESSMENT — ENCOUNTER SYMPTOMS
ABDOMINAL PAIN: 0
COUGH: 0
CHEST TIGHTNESS: 0
SHORTNESS OF BREATH: 0

## 2021-07-20 NOTE — PROGRESS NOTES
glasses     Wears partial dentures     upper       Past Surgical History:   Procedure Laterality Date    CATARACT REMOVAL Right February 2013    Dr. Olvin Carranza, LAPAROSCOPIC  5/28/1998    COLONOSCOPY  7/22/2003    Dr. Yuriy Beard Left September 2010    ORIF, Dr. Julieth Mccauley ERCP  7/19/1998    retained stone, Suzy Schmidt and Omero Lorenzana MAGDA AND BSO  March 2002    endometrial adenocarcinoma, Dr. Haro Section       Family History   Problem Relation Age of Onset    High Blood Pressure Other     Diabetes type 2  Mother         developed later in life       Social History     Tobacco Use    Smoking status: Never Smoker    Smokeless tobacco: Never Used    Tobacco comment: never smoked yant rrt 01/11/2018   Substance Use Topics    Alcohol use: No     Alcohol/week: 0.0 standard drinks      Allergies   Allergen Reactions    Allopurinol      Patient unsure of reaction    Metoprolol Tartrate [Metoprolol]      bradycardia    Percocet [Oxycodone-Acetaminophen]      Hallucinations. ...sensitive to narcotics    Phenergan [Promethazine Hcl]      Very sensitive. ..given small doses    Polycitra-K [Potassium Citrate]      Patient unsure of reaction    Statins Other (See Comments)     ?  Muscle aches     Levaquin [Levofloxacin] Anxiety     Not able to sleep    Sulfa Antibiotics Rash    Tessalon [Benzonatate] Anxiety     Not able to sleep       Health Maintenance   Topic Date Due    COVID-19 Vaccine (1) Never done   [employer] Visit (AWV)  Never done    TSH testing  07/01/2021    Flu vaccine (1) 09/01/2021    Potassium monitoring  09/03/2021    Creatinine monitoring  09/03/2021    DTaP/Tdap/Td vaccine (2 - Td or Tdap) 07/05/2025    Shingles Vaccine  Completed    Pneumococcal 65+ years Vaccine  Completed    Hepatitis A vaccine  Aged Out    Hib vaccine  Aged Out    Meningococcal (ACWY) vaccine  Aged Out       Subjective:      Review of Systems   Unable to perform ROS: Dementia   Respiratory: Negative for cough, chest tightness and shortness of breath. Cardiovascular: Negative for chest pain and leg swelling. Gastrointestinal: Negative for abdominal pain. Genitourinary: Negative for difficulty urinating. Neurological: Negative for headaches. Psychiatric/Behavioral: Negative for sleep disturbance. The patient is not nervous/anxious. Objective:     Physical Exam  Vitals and nursing note reviewed. Constitutional:       General: She is not in acute distress. Appearance: Normal appearance. She is normal weight. She is not diaphoretic. HENT:      Head: Normocephalic and atraumatic. Right Ear: External ear normal.      Left Ear: External ear normal.      Nose: Nose normal. No congestion or rhinorrhea. Mouth/Throat:      Pharynx: No oropharyngeal exudate. Eyes:      General: No scleral icterus. Right eye: No discharge. Left eye: No discharge. Conjunctiva/sclera: Conjunctivae normal.   Cardiovascular:      Rate and Rhythm: Normal rate and regular rhythm. Pulses: Normal pulses. Heart sounds: Murmur heard. Pulmonary:      Effort: Pulmonary effort is normal. No respiratory distress. Breath sounds: Normal breath sounds. No wheezing, rhonchi or rales. Abdominal:      General: Bowel sounds are normal. There is no distension. Palpations: Abdomen is soft. Tenderness: There is no abdominal tenderness. There is no guarding. Musculoskeletal:         General: No swelling or tenderness. Cervical back: Normal range of motion and neck supple. No rigidity or tenderness. No muscular tenderness. Right lower leg: No edema. Left lower leg: No edema. Comments: Limited ROM due to weakness and wheelchair bound   Skin:     General: Skin is warm and dry. Coloration: Skin is not jaundiced or pale. Neurological:      Mental Status: She is alert. Mental status is at baseline. She is disoriented. Psychiatric:         Mood and Affect: Mood normal.         Behavior: Behavior normal.       BP (!) 146/80   Pulse 65   Temp 97.3 °F (36.3 °C)   Resp 18   Wt 157 lb 9.6 oz (71.5 kg)   SpO2 97%   BMI 27.05 kg/m²     Assessment/Plan      1. Recurrent UTI   Continue D mannose, Cranberry extract  Monitor for s/s infection - she has not had one in a few months - doing better   2. Urinary incontinence, unspecified type   Change depends often  Continue oxybutynin   3. Adenocarcinoma of right lung ([address] - hx of dx in 2020 early part of year  Chose conservative treatment with symptom management   [facility]acentesis at [facility]  Has been stable   4. Controlled type 2 diabetes mellitus with stage 3 chronic kidney disease, without long-term current use of insulin (HCC)   Monitor AiC and blood sugars - doing well  Continue Lantus   5. Chronic diarrhea   Imodium prn - has been stable   6. Chronic anemia   Follow labs   7. Essential hypertension   Controlled  Continue hydrochlorothiazide, Lisinopril   8. Carotid stenosis, bilateral - hx of   9. Hypothyroidism due to acquired atrophy of thyroid   Follow labs   Continue levothyroxine   10. Chronic renal impairment, stage 3 (moderate), unspecified whether stage 3a or 3b CKD - hx of   11. Posterior vitreous detachment of both eyes - hx of   12. Nonexudative age-related macular degeneration, unspecified laterality, unspecified stage - hx of   13. Mixed hyperlipidemia   Follow labs  Continue ezetimibe   14. History of endometrial cancer    15. Glaucoma of both eyes, unspecified glaucoma type    16. Chronic depression   Continue Certaline       Patient seen and examined. History partially obtained by chart review and nursing notes. I have reviewed patient's past medical, surgical, social, and family history and have made updates where appropriate.   See facility EMR for updated medication list.      Resident has hx lung and endometrial cancer, HTN, HLD, DM II, Glaucoma,  and these are expected to last 12 or more months. These chronic conditions place the resident at a significant risk of death, acute exacerbation, or functional decline. The patient's comprehensive plan was monitored today. I spent 20 minutes reviewing the plan.         Electronically signed by MOHAN Nowak CNP on 7/20/2021 at 4:07 PM

## 2021-10-14 ENCOUNTER — OUTSIDE SERVICES (OUTPATIENT)
Dept: FAMILY MEDICINE CLINIC | Age: 86
End: 2021-10-14
Payer: MEDICARE

## 2021-10-14 DIAGNOSIS — E87.1 HYPONATREMIA: ICD-10-CM

## 2021-10-14 DIAGNOSIS — F32.A CHRONIC DEPRESSION: ICD-10-CM

## 2021-10-14 DIAGNOSIS — E78.2 MIXED HYPERLIPIDEMIA: ICD-10-CM

## 2021-10-14 DIAGNOSIS — E03.4 HYPOTHYROIDISM DUE TO ACQUIRED ATROPHY OF THYROID: ICD-10-CM

## 2021-10-14 DIAGNOSIS — C34.31 MALIGNANT NEOPLASM OF LOWER LOBE, RIGHT BRONCHUS OR LUNG (HCC): ICD-10-CM

## 2021-10-14 DIAGNOSIS — C34.91 ADENOCARCINOMA OF RIGHT LUNG (HCC): Primary | ICD-10-CM

## 2021-10-14 DIAGNOSIS — N18.30 TYPE 2 DIABETES MELLITUS WITH STAGE 3 CHRONIC KIDNEY DISEASE, WITH LONG-TERM CURRENT USE OF INSULIN, UNSPECIFIED WHETHER STAGE 3A OR 3B CKD (HCC): ICD-10-CM

## 2021-10-14 DIAGNOSIS — D64.9 CHRONIC ANEMIA: ICD-10-CM

## 2021-10-14 DIAGNOSIS — Z79.4 TYPE 2 DIABETES MELLITUS WITH STAGE 3 CHRONIC KIDNEY DISEASE, WITH LONG-TERM CURRENT USE OF INSULIN, UNSPECIFIED WHETHER STAGE 3A OR 3B CKD (HCC): ICD-10-CM

## 2021-10-14 DIAGNOSIS — N18.30 CONTROLLED TYPE 2 DIABETES MELLITUS WITH STAGE 3 CHRONIC KIDNEY DISEASE, WITHOUT LONG-TERM CURRENT USE OF INSULIN (HCC): ICD-10-CM

## 2021-10-14 DIAGNOSIS — E11.22 TYPE 2 DIABETES MELLITUS WITH STAGE 3 CHRONIC KIDNEY DISEASE, WITH LONG-TERM CURRENT USE OF INSULIN, UNSPECIFIED WHETHER STAGE 3A OR 3B CKD (HCC): ICD-10-CM

## 2021-10-14 DIAGNOSIS — E11.22 CONTROLLED TYPE 2 DIABETES MELLITUS WITH STAGE 3 CHRONIC KIDNEY DISEASE, WITHOUT LONG-TERM CURRENT USE OF INSULIN (HCC): ICD-10-CM

## 2021-10-14 PROBLEM — N18.6 TYPE 2 DIABETES MELLITUS WITH CHRONIC KIDNEY DISEASE ON CHRONIC DIALYSIS, WITH LONG-TERM CURRENT USE OF INSULIN (HCC): Status: ACTIVE | Noted: 2017-04-10

## 2021-10-14 PROBLEM — Z99.2 TYPE 2 DIABETES MELLITUS WITH CHRONIC KIDNEY DISEASE ON CHRONIC DIALYSIS, WITH LONG-TERM CURRENT USE OF INSULIN (HCC): Status: ACTIVE | Noted: 2017-04-10

## 2021-10-14 ASSESSMENT — ENCOUNTER SYMPTOMS
SORE THROAT: 0
EYE PAIN: 0
RHINORRHEA: 0
CHEST TIGHTNESS: 0
ABDOMINAL PAIN: 0
VOMITING: 0
SINUS PRESSURE: 0
EYE DISCHARGE: 0
SHORTNESS OF BREATH: 0
COUGH: 0
COLOR CHANGE: 0
NAUSEA: 0
WHEEZING: 0

## 2021-10-14 NOTE — PROGRESS NOTES
Squire Dakins is a 80 y.o. female who presents today for her medical conditions/complaints as noted below. Chief Complaint   Patient presents with    3 Month Follow-Up     routine           HPI:     Lise Low is seen today for her quarterly chronic illness f/u. She has a PMX of Adenocarcinoma of the right lung, with hx of thoracentesis, bradycardia, uterine cancer with radiation, chronic diarrhea, cataracts, glaucoma, DM II, HTN, HLD, COVID-19, among others. She is seen in her room today. She has no complaints today. She states that she'll be going to chiropractor's office later today for an appointment. She denies any complaints. She denies pain, cough or shortness of breath. She denies frequency or urgency with urination or constipation. Patient remains on palliative care with [facility] for lung cancer with no plans for aggressive treatments. No weight loss and has actually gained 35 lbs in past year. and actually has gained nearly 10 lbs in past 5 mos. Moved to [place] few mos ago and doing well there.           Past Medical History:   Diagnosis Date    Adenocarcinoma of endometrium ([address]     Aortic valve sclerosis     Bradycardia     follows with cardiology- Dr. Darrick Resendiz, possible SSS    Cardiac murmur     Cataract of left eye     Chronic diarrhea     s/p radiation    Diabetes mellitus type II, controlled ([address]     diet controlled     Dysthymia     Gait abnormality 6/23/2014    Glaucoma     H/O renal calculi     H/O vein stripping     Hard of hearing     History of anemia     History of dizziness     follows with cardiology, bradycardia, possible SSS    History of radiation therapy     for uterine cancer    History of small bowel obstruction 03/2019    no surgery required    Hyperlipidemia     Hypertension     Hypothyroidism     Influenza A 1/11/2018    Left elbow fracture     S/P surgical repair    Lung cancer ([address]     Lung nodule     Macular degeneration     Obesity     Pericardial effusion 4/23/2014    Pseudophakia of right eye     [facility]     Traumatic injury of lower extremity childhood    bilateral trauma of lower extremities    Vertigo     resolved    Wears glasses     Wears partial dentures     upper       Past Surgical History:   Procedure Laterality Date    CATARACT REMOVAL Right February 2013    Dr. Jonathan Benjamin, LAPAROSCOPIC  5/28/1998    COLONOSCOPY  7/22/2003    Dr. Daljit Maradiaga Left September 2010    ORIF, Dr. Turner Kiefer ERCP  7/19/1998    retained stone, Suzy Lomax and [employer] MAGDA AND BSO  March 2002    endometrial adenocarcinoma, Dr. Giuliana Ly       Family History   Problem Relation Age of Onset    High Blood Pressure Other     Diabetes type 2  Mother         developed later in life       Social History     Tobacco Use    Smoking status: Never Smoker    Smokeless tobacco: Never Used    Tobacco comment: never smoked yant rrt 01/11/2018   Substance Use Topics    Alcohol use: No     Alcohol/week: 0.0 standard drinks      Allergies   Allergen Reactions    Allopurinol      Patient unsure of reaction    Metoprolol Tartrate [Metoprolol]      bradycardia    Percocet [Oxycodone-Acetaminophen]      Hallucinations. ...sensitive to narcotics    Phenergan [Promethazine Hcl]      Very sensitive. ..given small doses    Polycitra-K [Potassium Citrate]      Patient unsure of reaction    Statins Other (See Comments)     ?  Muscle aches     Levaquin [Levofloxacin] Anxiety     Not able to sleep    Sulfa Antibiotics Rash    Tessalon [Benzonatate] Anxiety     Not able to sleep       Health Maintenance   Topic Date Due    COVID-19 Vaccine (1) Never done   [employer] Visit (AWV)  09/16/2020    TSH testing  07/01/2021    Flu vaccine (1) 09/01/2021    Potassium monitoring  09/03/2021    Creatinine monitoring  09/03/2021    DTaP/Tdap/Td vaccine (2 - Td or Tdap) 07/05/2025    Shingles Vaccine  Completed    Pneumococcal 65+ years Vaccine  Completed    Hepatitis A vaccine  Aged Out    Hib vaccine  Aged Out    Meningococcal (ACWY) vaccine  Aged Out       Subjective:      Review of Systems   Constitutional: Negative for activity change and appetite change. HENT: Negative for congestion, postnasal drip, rhinorrhea, sinus pressure and sore throat. Eyes: Negative for pain and discharge. Respiratory: Negative for cough, chest tightness, shortness of breath and wheezing. Cardiovascular: Negative for chest pain and leg swelling. Gastrointestinal: Negative for abdominal pain, nausea and vomiting. Endocrine: Negative for cold intolerance. Genitourinary: Negative for difficulty urinating, frequency and urgency. Musculoskeletal: Positive for arthralgias, gait problem and myalgias. Negative for neck pain. Skin: Negative for color change. Allergic/Immunologic: Negative for environmental allergies. Neurological: Positive for weakness. Negative for dizziness, speech difficulty and headaches. Psychiatric/Behavioral: Negative for agitation. The patient is not nervous/anxious. Objective:   Temperature: 98.1 °F 10/14/2021 09:08 AM  Pulse: 57 per minute 10/01/2021 02:43 PM  Respirations: 18 per minute 10/01/2021 02:43 PM  Blood Pressure: 118 / 68 mmHg 10/01/2021 02:43 PM  O2 Saturation: 96 % 10/14/2021 09:08 AM  Blood Sugar: 128 mg/dL 10/14/2021 08:04 AM  Weight: 165.1 lbs / Routine       BMI: 28.34 10/01/2021 12:58 PM  Height: 5ft 4.0in 11/27/2020 09:52 AM      Physical Exam  Vitals and nursing note reviewed. Constitutional:       General: She is not in acute distress. Appearance: Normal appearance. She is normal weight. She is not diaphoretic. HENT:      Head: Normocephalic and atraumatic. Right Ear: External ear normal.      Left Ear: External ear normal.      Nose: Nose normal. No congestion or rhinorrhea. Mouth/Throat:      Pharynx: No oropharyngeal exudate. Eyes:      General: No scleral icterus. Right eye: No discharge. Left eye: No discharge. Conjunctiva/sclera: Conjunctivae normal.   Cardiovascular:      Rate and Rhythm: Normal rate and regular rhythm. Pulses: Normal pulses. Heart sounds: Murmur heard. Pulmonary:      Effort: Pulmonary effort is normal. No respiratory distress. Breath sounds: Normal breath sounds. No wheezing, rhonchi or rales. Abdominal:      General: Bowel sounds are normal. There is no distension. Palpations: Abdomen is soft. Tenderness: There is no abdominal tenderness. Musculoskeletal:         General: No swelling or tenderness. Normal range of motion. Cervical back: Normal range of motion and neck supple. No rigidity. No muscular tenderness. Right lower leg: No edema. Left lower leg: No edema. Skin:     General: Skin is warm and dry. Coloration: Skin is not jaundiced or pale. Neurological:      Mental Status: She is alert. Mental status is at baseline. She is disoriented. Psychiatric:         Mood and Affect: Mood normal.         Behavior: Behavior normal.         Assessment/Plan        Diagnosis Orders   1. Type 2 diabetes mellitus without complication, without long-term current use of insulin ([facility])  -- monitor a1c q 6 mos. -- fastings q am controlled 100-130s generally. No lows. Continue lantus    2. Malignant neoplasm of lower lobe, right bronchus or lung ([address]  -- continues with palliative care  -stable   3. Stage 3 chronic kidney disease, unspecified whether stage 3a or 3b CKD  --CMP/BMP q 6 mos. stable   4. Hypothyroidism due to acquired atrophy of thyroid  --TSH yearly in April  Continue levothyroxine   5. Hyponatremia  --H/o hyponatremia- resolved on last labs. On sodium chloride tabs BID. Decrease to one tab daily and check bmp in 3-4 weeks. If Na still normal then, plan to d/c sodium chloride tabs. 6. HTN - controlled with current meds   7.

## 2021-11-22 ENCOUNTER — OUTSIDE SERVICES (OUTPATIENT)
Dept: FAMILY MEDICINE CLINIC | Age: 86
End: 2021-11-22
Payer: MEDICARE

## 2021-11-22 VITALS
TEMPERATURE: 96.6 F | WEIGHT: 163.4 LBS | DIASTOLIC BLOOD PRESSURE: 75 MMHG | OXYGEN SATURATION: 93 % | SYSTOLIC BLOOD PRESSURE: 129 MMHG | RESPIRATION RATE: 18 BRPM | BODY MASS INDEX: 28.05 KG/M2 | HEART RATE: 72 BPM

## 2021-11-22 DIAGNOSIS — E03.4 HYPOTHYROIDISM DUE TO ACQUIRED ATROPHY OF THYROID: ICD-10-CM

## 2021-11-22 DIAGNOSIS — E78.2 MIXED HYPERLIPIDEMIA: ICD-10-CM

## 2021-11-22 DIAGNOSIS — D64.9 CHRONIC ANEMIA: ICD-10-CM

## 2021-11-22 DIAGNOSIS — H35.3190 NONEXUDATIVE AGE-RELATED MACULAR DEGENERATION, UNSPECIFIED LATERALITY, UNSPECIFIED STAGE: ICD-10-CM

## 2021-11-22 DIAGNOSIS — R42 DIZZINESS: ICD-10-CM

## 2021-11-22 DIAGNOSIS — Z85.42 HISTORY OF ENDOMETRIAL CANCER: ICD-10-CM

## 2021-11-22 DIAGNOSIS — R32 URINARY INCONTINENCE, UNSPECIFIED TYPE: ICD-10-CM

## 2021-11-22 DIAGNOSIS — H40.9 GLAUCOMA OF BOTH EYES, UNSPECIFIED GLAUCOMA TYPE: ICD-10-CM

## 2021-11-22 DIAGNOSIS — Z79.4 TYPE 2 DIABETES MELLITUS WITH STAGE 3 CHRONIC KIDNEY DISEASE, WITH LONG-TERM CURRENT USE OF INSULIN, UNSPECIFIED WHETHER STAGE 3A OR 3B CKD (HCC): ICD-10-CM

## 2021-11-22 DIAGNOSIS — N18.30 CHRONIC RENAL IMPAIRMENT, STAGE 3 (MODERATE), UNSPECIFIED WHETHER STAGE 3A OR 3B CKD (HCC): ICD-10-CM

## 2021-11-22 DIAGNOSIS — R11.0 NAUSEA: ICD-10-CM

## 2021-11-22 DIAGNOSIS — N18.30 TYPE 2 DIABETES MELLITUS WITH STAGE 3 CHRONIC KIDNEY DISEASE, WITH LONG-TERM CURRENT USE OF INSULIN, UNSPECIFIED WHETHER STAGE 3A OR 3B CKD (HCC): ICD-10-CM

## 2021-11-22 DIAGNOSIS — F32.A CHRONIC DEPRESSION: ICD-10-CM

## 2021-11-22 DIAGNOSIS — N18.30 STAGE 3 CHRONIC KIDNEY DISEASE, UNSPECIFIED WHETHER STAGE 3A OR 3B CKD (HCC): ICD-10-CM

## 2021-11-22 DIAGNOSIS — E11.22 TYPE 2 DIABETES MELLITUS WITH STAGE 3 CHRONIC KIDNEY DISEASE, WITH LONG-TERM CURRENT USE OF INSULIN, UNSPECIFIED WHETHER STAGE 3A OR 3B CKD (HCC): ICD-10-CM

## 2021-11-22 DIAGNOSIS — C34.91 ADENOCARCINOMA OF RIGHT LUNG (HCC): Primary | ICD-10-CM

## 2021-11-22 PROCEDURE — 99490 CHRNC CARE MGMT STAFF 1ST 20: CPT | Performed by: NURSE PRACTITIONER

## 2021-11-22 ASSESSMENT — ENCOUNTER SYMPTOMS
SHORTNESS OF BREATH: 0
ABDOMINAL PAIN: 0
CHEST TIGHTNESS: 0
COUGH: 0

## 2021-12-07 ENCOUNTER — OUTSIDE SERVICES (OUTPATIENT)
Dept: FAMILY MEDICINE CLINIC | Age: 86
End: 2021-12-07
Payer: MEDICARE

## 2021-12-07 DIAGNOSIS — C34.91 ADENOCARCINOMA OF RIGHT LUNG (HCC): ICD-10-CM

## 2021-12-07 DIAGNOSIS — C34.31 MALIGNANT NEOPLASM OF LOWER LOBE, RIGHT BRONCHUS OR LUNG (HCC): Primary | ICD-10-CM

## 2021-12-07 DIAGNOSIS — N18.30 TYPE 2 DIABETES MELLITUS WITH STAGE 3 CHRONIC KIDNEY DISEASE, WITH LONG-TERM CURRENT USE OF INSULIN, UNSPECIFIED WHETHER STAGE 3A OR 3B CKD (HCC): ICD-10-CM

## 2021-12-07 DIAGNOSIS — Z79.4 TYPE 2 DIABETES MELLITUS WITH STAGE 3 CHRONIC KIDNEY DISEASE, WITH LONG-TERM CURRENT USE OF INSULIN, UNSPECIFIED WHETHER STAGE 3A OR 3B CKD (HCC): ICD-10-CM

## 2021-12-07 DIAGNOSIS — N18.30 CONTROLLED TYPE 2 DIABETES MELLITUS WITH STAGE 3 CHRONIC KIDNEY DISEASE, WITHOUT LONG-TERM CURRENT USE OF INSULIN (HCC): ICD-10-CM

## 2021-12-07 DIAGNOSIS — E03.4 HYPOTHYROIDISM DUE TO ACQUIRED ATROPHY OF THYROID: ICD-10-CM

## 2021-12-07 DIAGNOSIS — E11.22 TYPE 2 DIABETES MELLITUS WITH STAGE 3 CHRONIC KIDNEY DISEASE, WITH LONG-TERM CURRENT USE OF INSULIN, UNSPECIFIED WHETHER STAGE 3A OR 3B CKD (HCC): ICD-10-CM

## 2021-12-07 DIAGNOSIS — E11.22 CONTROLLED TYPE 2 DIABETES MELLITUS WITH STAGE 3 CHRONIC KIDNEY DISEASE, WITHOUT LONG-TERM CURRENT USE OF INSULIN (HCC): ICD-10-CM

## 2021-12-07 DIAGNOSIS — D64.9 CHRONIC ANEMIA: ICD-10-CM

## 2021-12-07 DIAGNOSIS — F32.A CHRONIC DEPRESSION: ICD-10-CM

## 2021-12-07 DIAGNOSIS — R32 URINARY INCONTINENCE, UNSPECIFIED TYPE: ICD-10-CM

## 2021-12-07 ASSESSMENT — ENCOUNTER SYMPTOMS
WHEEZING: 0
NAUSEA: 0
SINUS PRESSURE: 0
SHORTNESS OF BREATH: 0
VOMITING: 0
ABDOMINAL PAIN: 0
COUGH: 0
RHINORRHEA: 0
SORE THROAT: 0
EYE PAIN: 0
COLOR CHANGE: 0
CHEST TIGHTNESS: 0
EYE DISCHARGE: 0

## 2021-12-07 NOTE — PROGRESS NOTES
Clara Keenan is a 80 y.o. female who presents today for her medical conditions/complaints as noted below. Chief Complaint   Patient presents with    3 Month Follow-Up           HPI:     Marisel Small is seen today for her quarterly chronic illness f/u. She has a PMX of Adenocarcinoma of the right lung, with hx of thoracentesis, bradycardia, uterine cancer with radiation, chronic diarrhea, cataracts, glaucoma, DM II, HTN, HLD, COVID-19, among others. She is seen in her room today. She has no complaints today and is very pleasant. Showing me flowers that have newly bloomed in her room and many pictures of her family. She denies pain, cough or shortness of breath. She denies frequency or urgency with urination or constipation. Patient remains on palliative care with [facility] for lung cancer with no plans for aggressive treatments. No weight loss and has actually gained 35 lbs in past year. Med pass was stopped few mos ago then lost almost 10 lbs in just over a month. Med pass just once per day recently restarted and monitoring weights more frequently for now.          Past Medical History:   Diagnosis Date    Adenocarcinoma of endometrium ([address]     Aortic valve sclerosis     Bradycardia     follows with cardiology- Dr. Ruben Danielle, possible SSS    Cardiac murmur     Cataract of left eye     Chronic diarrhea     s/p radiation    Diabetes mellitus type II, controlled ([address]     diet controlled     Dysthymia     Gait abnormality 6/23/2014    Glaucoma     H/O renal calculi     H/O vein stripping     Hard of hearing     History of anemia     History of dizziness     follows with cardiology, bradycardia, possible SSS    History of radiation therapy     for uterine cancer    History of small bowel obstruction 03/2019    no surgery required    Hyperlipidemia     Hypertension     Hypothyroidism     Influenza A 1/11/2018    Left elbow fracture     S/P surgical repair    Lung cancer ([address]     Lung nodule     Macular degeneration     Obesity     Pericardial effusion 4/23/2014    Pseudophakia of right eye     [facility]) [facility]     Traumatic injury of lower extremity childhood    bilateral trauma of lower extremities    Vertigo     resolved    Wears glasses     Wears partial dentures     upper       Past Surgical History:   Procedure Laterality Date    CATARACT REMOVAL Right February 2013    Dr. Razo Pandora, LAPAROSCOPIC  5/28/1998    COLONOSCOPY  7/22/2003    Dr. Danny Jolley Left September 2010    ORIF, Dr. Brie Celis ERCP  7/19/1998    retained stone, Suzy Figueredo and Wendy Beard MAGDA AND BSO  March 2002    endometrial adenocarcinoma, Dr. Darrell Ha       Family History   Problem Relation Age of Onset    High Blood Pressure Other     Diabetes type 2  Mother         developed later in life       Social History     Tobacco Use    Smoking status: Never Smoker    Smokeless tobacco: Never Used    Tobacco comment: never smoked tito rrt 01/11/2018   Substance Use Topics    Alcohol use: No     Alcohol/week: 0.0 standard drinks      Allergies   Allergen Reactions    Allopurinol      Patient unsure of reaction    Metoprolol Tartrate [Metoprolol]      bradycardia    Percocet [Oxycodone-Acetaminophen]      Hallucinations. ...sensitive to narcotics    Phenergan [Promethazine Hcl]      Very sensitive. ..given small doses    Polycitra-K [Potassium Citrate]      Patient unsure of reaction    Statins Other (See Comments)     ?  Muscle aches     Levaquin [Levofloxacin] Anxiety     Not able to sleep    Sulfa Antibiotics Rash    Tessalon [Benzonatate] Anxiety     Not able to sleep       Health Maintenance   Topic Date Due    COVID-19 Vaccine (1) Never done   [employer] Visit (AWV)  09/16/2020    TSH testing  07/01/2021    Flu vaccine (1) 09/01/2021    Potassium monitoring  09/03/2021    Creatinine monitoring  09/03/2021    DTaP/Tdap/Td vaccine (2 - Td or Tdap) 07/05/2025    Shingles Vaccine  Completed    Pneumococcal 65+ years Vaccine  Completed    Hepatitis A vaccine  Aged Out    Hib vaccine  Aged Out    Meningococcal (ACWY) vaccine  Aged Out       Subjective:      Review of Systems   Constitutional: Negative for activity change and appetite change. HENT: Negative for congestion, postnasal drip, rhinorrhea, sinus pressure and sore throat. Eyes: Negative for pain and discharge. Respiratory: Negative for cough, chest tightness, shortness of breath and wheezing. Cardiovascular: Negative for chest pain and leg swelling. Gastrointestinal: Negative for abdominal pain, nausea and vomiting. Endocrine: Negative for cold intolerance. Genitourinary: Negative for difficulty urinating, frequency and urgency. Musculoskeletal: Positive for arthralgias, gait problem and myalgias. Negative for neck pain. Skin: Negative for color change. Allergic/Immunologic: Negative for environmental allergies. Neurological: Positive for weakness. Negative for dizziness, speech difficulty and headaches. Psychiatric/Behavioral: Negative for agitation. The patient is not nervous/anxious. Objective:     Temperature: 97.7 °F 12/07/2021 08:46 AM  Pulse: 62 per minute 12/01/2021 06:13 PM  Respirations: 16 per minute 12/01/2021 06:13 PM  Blood Pressure: 126 / 66 mmHg 12/01/2021 06:13 PM  O2 Saturation: 99 % 12/07/2021 08:46 AM  Blood Sugar: 117 mg/dL 12/07/2021 08:46 AM  Weight: 159.4 lbs / Routine       BMI: 29.15 12/06/2021 08:54 AM  Height: 5ft 2.0in 11/27/2021 08:08 AM      Physical Exam  Vitals and nursing note reviewed. Constitutional:       General: She is not in acute distress. Appearance: Normal appearance. She is normal weight. She is not diaphoretic. HENT:      Head: Normocephalic and atraumatic. Right Ear: External ear normal.      Left Ear: External ear normal.      Nose: Nose normal. No congestion or rhinorrhea. Mouth/Throat:      Pharynx: No oropharyngeal exudate. Eyes:      General: No scleral icterus. Right eye: No discharge. Left eye: No discharge. Conjunctiva/sclera: Conjunctivae normal.   Cardiovascular:      Rate and Rhythm: Normal rate and regular rhythm. Pulses: Normal pulses. Heart sounds: Murmur heard. Pulmonary:      Effort: Pulmonary effort is normal. No respiratory distress. Breath sounds: Normal breath sounds. No wheezing, rhonchi or rales. Abdominal:      General: Bowel sounds are normal. There is no distension. Palpations: Abdomen is soft. Tenderness: There is no abdominal tenderness. Musculoskeletal:         General: No swelling or tenderness. Normal range of motion. Cervical back: Normal range of motion and neck supple. No rigidity. No muscular tenderness. Right lower leg: No edema. Left lower leg: No edema. Skin:     General: Skin is warm and dry. Coloration: Skin is not jaundiced or pale. Neurological:      Mental Status: She is alert. Mental status is at baseline. She is disoriented. Psychiatric:         Mood and Affect: Mood normal.         Behavior: Behavior normal.         Assessment/Plan        Diagnosis Orders   1. Type 2 diabetes mellitus without complication, without long-term current use of insulin ([facility])  -- monitor a1c q 6 mos. -11/11/21 hgb a1c good at 6.1%  --  No lows. Decrease lantus from 15 to 10 units. a1c in 3 mos. 2. Malignant neoplasm of lower lobe, right bronchus or lung ([facility]  -- continues with palliative care  -stable     3. Stage 3 chronic kidney disease, unspecified whether stage 3a or 3b CKD    --CMP/BMP q 6 mos. stable   4. Hypothyroidism due to acquired atrophy of thyroid  --TSH yearly in April  Continue levothyroxine     5. Hyponatremia  --H/o hyponatremia- resolved on last labs. On sodium chloride tabs BID. Decrease to one tab daily and check bmp in 3-4 weeks.   If Na still normal then, plan to d/c sodium chloride tabs. 6. HTN - controlled with current meds     7. Chronic anemia  --continue to monitor at least q 6 mos. CBC few weeks ago stable     8. HLD - on zetia. Recent lipids very good. Likely getting minimal benefit from med at this poitn. Stop zetia. 9. Weight gain - stop med pass. Monitor weights. 10 Chronic anemia-  -likely due to AOCD and iron def. Cont ferrous sulfate. CBC q 6 mos and sooner prn     11 Recurrent UTI -continues to do reasonably well with ppx d-mannose and cranberry     12. Chronic depression  --controlled on low dose sertraline     13. Hypothyroidism         - TSH yearly in April             - Continue levothyroxine      F/u in 3 mos, sooner prn      Patient seen and examined. History partially obtained by chart review and nursing notes. I have reviewed patient's past medical, surgical, social, and family history and have made updates where appropriate. See facility EMR for updated medication list.      Total time spent on date of service was 45 minutes. Time spent includes some or all of the following, both face-to-face time and non face-to-face, but is not limited to: reviewing records or discussing history or plan with colleagues; obtaining and/or reviewing the history; performing a medically appropriate examination/evaluation; counseling patient and/or caregiver; documentation, placing orders/referrals, and coordinating care.

## 2022-02-16 ENCOUNTER — HOSPITAL ENCOUNTER (EMERGENCY)
Age: 87
Discharge: HOME | End: 2022-02-16
Attending: EMERGENCY MEDICINE
Payer: MEDICARE

## 2022-02-16 VITALS
HEIGHT: 65 IN | HEART RATE: 80 BPM | WEIGHT: 157.4 LBS | BODY MASS INDEX: 26.23 KG/M2 | DIASTOLIC BLOOD PRESSURE: 77 MMHG | OXYGEN SATURATION: 94 % | SYSTOLIC BLOOD PRESSURE: 131 MMHG | RESPIRATION RATE: 21 BRPM | TEMPERATURE: 98.5 F

## 2022-02-16 DIAGNOSIS — R11.2 NAUSEA VOMITING AND DIARRHEA: Primary | ICD-10-CM

## 2022-02-16 DIAGNOSIS — R19.7 NAUSEA VOMITING AND DIARRHEA: Primary | ICD-10-CM

## 2022-02-16 LAB
ABSOLUTE EOS #: 0.09 K/UL (ref 0–0.44)
ABSOLUTE IMMATURE GRANULOCYTE: 0.09 K/UL (ref 0–0.3)
ABSOLUTE LYMPH #: 0.43 K/UL (ref 1.1–3.7)
ABSOLUTE MONO #: 0.51 K/UL (ref 0.1–1.2)
ALBUMIN SERPL-MCNC: 3.2 G/DL (ref 3.5–5.2)
ALBUMIN/GLOBULIN RATIO: 0.9 (ref 1–2.5)
ALP BLD-CCNC: 127 U/L (ref 35–104)
ALT SERPL-CCNC: 14 U/L (ref 5–33)
AMYLASE: 25 U/L (ref 28–100)
ANION GAP SERPL CALCULATED.3IONS-SCNC: 10 MMOL/L (ref 9–17)
AST SERPL-CCNC: 19 U/L
BASOPHILS # BLD: 1 % (ref 0–2)
BASOPHILS ABSOLUTE: 0.09 K/UL (ref 0–0.2)
BILIRUB SERPL-MCNC: 0.3 MG/DL (ref 0.3–1.2)
BUN BLDV-MCNC: 19 MG/DL (ref 8–23)
BUN/CREAT BLD: 20 (ref 9–20)
CALCIUM SERPL-MCNC: 9.4 MG/DL (ref 8.6–10.4)
CHLORIDE BLD-SCNC: 98 MMOL/L (ref 98–107)
CO2: 26 MMOL/L (ref 20–31)
CREAT SERPL-MCNC: 0.94 MG/DL (ref 0.5–0.9)
DIFFERENTIAL TYPE: ABNORMAL
EKG ATRIAL RATE: 53 BPM
EKG ATRIAL RATE: 57 BPM
EKG P AXIS: 36 DEGREES
EKG P AXIS: 58 DEGREES
EKG P-R INTERVAL: 136 MS
EKG P-R INTERVAL: 152 MS
EKG Q-T INTERVAL: 464 MS
EKG Q-T INTERVAL: 464 MS
EKG QRS DURATION: 70 MS
EKG QRS DURATION: 74 MS
EKG QTC CALCULATION (BAZETT): 435 MS
EKG QTC CALCULATION (BAZETT): 451 MS
EKG R AXIS: -24 DEGREES
EKG R AXIS: -26 DEGREES
EKG T AXIS: 10 DEGREES
EKG T AXIS: 2 DEGREES
EKG VENTRICULAR RATE: 53 BPM
EKG VENTRICULAR RATE: 57 BPM
EOSINOPHILS RELATIVE PERCENT: 1 % (ref 1–4)
GFR AFRICAN AMERICAN: >60 ML/MIN
GFR NON-AFRICAN AMERICAN: 56 ML/MIN
GFR SERPL CREATININE-BSD FRML MDRD: ABNORMAL ML/MIN/{1.73_M2}
GFR SERPL CREATININE-BSD FRML MDRD: ABNORMAL ML/MIN/{1.73_M2}
GLUCOSE BLD-MCNC: 197 MG/DL (ref 70–99)
HCT VFR BLD CALC: 33.8 % (ref 36.3–47.1)
HEMOGLOBIN: 10.8 G/DL (ref 11.9–15.1)
IMMATURE GRANULOCYTES: 1 %
LIPASE: 6 U/L (ref 13–60)
LYMPHOCYTES # BLD: 5 % (ref 24–43)
MCH RBC QN AUTO: 32 PG (ref 25.2–33.5)
MCHC RBC AUTO-ENTMCNC: 32 G/DL (ref 25.2–33.5)
MCV RBC AUTO: 100 FL (ref 82.6–102.9)
MONOCYTES # BLD: 6 % (ref 3–12)
MORPHOLOGY: ABNORMAL
MORPHOLOGY: ABNORMAL
NRBC AUTOMATED: 0 PER 100 WBC
PDW BLD-RTO: 12.8 % (ref 11.8–14.4)
PLATELET # BLD: 304 K/UL (ref 138–453)
PLATELET ESTIMATE: ABNORMAL
PMV BLD AUTO: 10.6 FL (ref 8.1–13.5)
POTASSIUM SERPL-SCNC: 4.1 MMOL/L (ref 3.7–5.3)
RBC # BLD: 3.38 M/UL (ref 3.95–5.11)
RBC # BLD: ABNORMAL 10*6/UL
SARS-COV-2, RAPID: NOT DETECTED
SEG NEUTROPHILS: 86 % (ref 36–65)
SEGMENTED NEUTROPHILS ABSOLUTE COUNT: 7.29 K/UL (ref 1.5–8.1)
SODIUM BLD-SCNC: 134 MMOL/L (ref 135–144)
SPECIMEN DESCRIPTION: NORMAL
TOTAL PROTEIN: 6.8 G/DL (ref 6.4–8.3)
TROPONIN INTERP: ABNORMAL
TROPONIN INTERP: ABNORMAL
TROPONIN T: ABNORMAL NG/ML
TROPONIN T: ABNORMAL NG/ML
TROPONIN, HIGH SENSITIVITY: 33 NG/L (ref 0–14)
TROPONIN, HIGH SENSITIVITY: 34 NG/L (ref 0–14)
WBC # BLD: 8.5 K/UL (ref 3.5–11.3)
WBC # BLD: ABNORMAL 10*3/UL

## 2022-02-16 PROCEDURE — 84484 ASSAY OF TROPONIN QUANT: CPT

## 2022-02-16 PROCEDURE — 85025 COMPLETE CBC W/AUTO DIFF WBC: CPT

## 2022-02-16 PROCEDURE — 87635 SARS-COV-2 COVID-19 AMP PRB: CPT

## 2022-02-16 PROCEDURE — 99285 EMERGENCY DEPT VISIT HI MDM: CPT

## 2022-02-16 PROCEDURE — 36415 COLL VENOUS BLD VENIPUNCTURE: CPT

## 2022-02-16 PROCEDURE — 93005 ELECTROCARDIOGRAM TRACING: CPT | Performed by: EMERGENCY MEDICINE

## 2022-02-16 PROCEDURE — 82150 ASSAY OF AMYLASE: CPT

## 2022-02-16 PROCEDURE — 2580000003 HC RX 258: Performed by: EMERGENCY MEDICINE

## 2022-02-16 PROCEDURE — 80053 COMPREHEN METABOLIC PANEL: CPT

## 2022-02-16 PROCEDURE — 83690 ASSAY OF LIPASE: CPT

## 2022-02-16 RX ORDER — ONDANSETRON 2 MG/ML
4 INJECTION INTRAMUSCULAR; INTRAVENOUS ONCE
Status: DISCONTINUED | OUTPATIENT
Start: 2022-02-16 | End: 2022-02-16 | Stop reason: HOSPADM

## 2022-02-16 RX ORDER — BRIMONIDINE TARTRATE 2 MG/ML
1 SOLUTION/ DROPS OPHTHALMIC 2 TIMES DAILY
COMMUNITY
End: 2022-02-16

## 2022-02-16 RX ORDER — 0.9 % SODIUM CHLORIDE 0.9 %
1000 INTRAVENOUS SOLUTION INTRAVENOUS ONCE
Status: COMPLETED | OUTPATIENT
Start: 2022-02-16 | End: 2022-02-16

## 2022-02-16 RX ORDER — ONDANSETRON 4 MG/1
4 TABLET, ORALLY DISINTEGRATING ORAL EVERY 8 HOURS PRN
Qty: 20 TABLET | Refills: 0 | Status: SHIPPED | OUTPATIENT
Start: 2022-02-16

## 2022-02-16 RX ADMIN — SODIUM CHLORIDE 1000 ML: 9 INJECTION, SOLUTION INTRAVENOUS at 12:21

## 2022-02-16 ASSESSMENT — ENCOUNTER SYMPTOMS
EYE PAIN: 0
CONSTIPATION: 0
ABDOMINAL PAIN: 0
DIARRHEA: 1
BACK PAIN: 0
NAUSEA: 1
VOMITING: 0
SHORTNESS OF BREATH: 0
BLOOD IN STOOL: 0
COUGH: 0

## 2022-02-16 ASSESSMENT — PAIN SCALES - GENERAL: PAINLEVEL_OUTOF10: 0

## 2022-02-16 ASSESSMENT — PAIN - FUNCTIONAL ASSESSMENT: PAIN_FUNCTIONAL_ASSESSMENT: 0-10

## 2022-02-16 NOTE — ED PROVIDER NOTES
[facility]  eMERGENCY dEPARTMENT eNCOUnter      Pt Name: Zach Klein  MRN: [id number]  [place] 4/11/1932  Date of evaluation: 2/16/2022      CHIEF COMPLAINT       Chief Complaint   Patient presents with    Nausea    Diarrhea         HISTORY OF PRESENT ILLNESS    Zach Klein is a 80 y.o. female who presents with a chief complaint of diarrhea, patient states that this started this morning she said very copious watery stools she had feeling lightheaded her blood pressure was down little bit she was a bit nauseated but no vomiting no abdominal pain no fevers no chills no cough otherwise feels okay  Squad placed an IV gave her some fluids and some Zofran she says she is feeling better now      REVIEW OF SYSTEMS         Review of Systems   Constitutional: Negative for chills and fever. HENT: Negative for congestion and ear pain. Eyes: Negative for pain and visual disturbance. Respiratory: Negative for cough and shortness of breath. Cardiovascular: Negative for chest pain, palpitations and leg swelling. Gastrointestinal: Positive for diarrhea and nausea. Negative for abdominal pain, blood in stool, constipation and vomiting. Endocrine: Negative for polydipsia and polyuria. Genitourinary: Negative for difficulty urinating, dysuria, frequency, vaginal bleeding and vaginal discharge. Musculoskeletal: Negative for back pain, joint swelling, myalgias, neck pain and neck stiffness. Skin: Negative for rash. Neurological: Positive for light-headedness. Negative for dizziness, weakness and headaches. Hematological: Negative for adenopathy. Does not bruise/bleed easily. Psychiatric/Behavioral: Negative for confusion, self-injury and suicidal ideas.          PAST MEDICAL HISTORY    has a past medical history of Adenocarcinoma of endometrium [facility]), Aortic valve sclerosis, Bradycardia, Cardiac murmur, Cataract of left eye, Chronic diarrhea, Diabetes mellitus type II, controlled ([address] Dysthymia, Gait abnormality, Glaucoma, H/O renal calculi, H/O vein stripping, Hard of hearing, History of anemia, History of dizziness, History of radiation therapy, History of small bowel obstruction, Hyperlipidemia, Hypertension, Hypothyroidism, Influenza A, Left elbow fracture, Lung cancer ([facility] [address] Lung nodule, Macular degeneration, Obesity, Pericardial effusion, Pseudophakia of right eye, PVD (peripheral vascular disease) ([address] Traumatic injury of lower extremity, Vertigo, Wears glasses, and Wears partial dentures. SURGICAL HISTORY      has a past surgical history that includes jason and bso (cervix removed) (March 2002); Cholecystectomy, laparoscopic (5/28/1998); ERCP (7/19/1998); Colonoscopy (7/22/2003); Elbow fracture surgery (Left, September 2010); and Cataract removal (Right, February 2013). CURRENT MEDICATIONS       Discharge Medication List as of 2/16/2022  4:43 PM      CONTINUE these medications which have NOT CHANGED    Details   carbamide peroxide (DEBROX) 6.5 % otic solution 5-10 drops as needed (In effected ear for wax impaction. )Historical Med      loperamide (IMODIUM) 2 MG capsule Take 2 mg by mouth 4 times daily as needed for DiarrheaHistorical Med      acetaminophen (TYLENOL) 325 MG tablet Take 650 mg by mouth every 6 hours as needed for PainHistorical Med      hydroCHLOROthiazide (HYDRODIURIL) 25 MG tablet Take 1 tablet by mouth daily, Disp-30 tablet,R-0NO PRINT      sodium chloride 1 g tablet Take 1 tablet by mouth 2 times daily, Disp-90 tablet,R-0NO PRINT      insulin glargine (LANTUS) 100 UNIT/ML injection vial Inject 15 Units into the skin daily, Disp-1 vial,R-0NO PRINT      albuterol (PROVENTIL) (2.5 MG/3ML) 0.083% nebulizer solution Take 3 mLs by nebulization every 6 hours as needed for Wheezing or Shortness of Breath, Disp-120 each,R-3Normal      albuterol sulfate HFA (PROVENTIL HFA) 108 (90 Base) MCG/ACT inhaler Inhale 2 puffs into the lungs every 6 hours as needed for Wheezing, Disp-1 Inhaler,R-0Print      ferrous sulfate (IRON 325) 325 (65 Fe) MG tablet Take 325 mg by mouth dailyHistorical Med      sertraline (ZOLOFT) 50 MG tablet TAKE 1 TABLET DAILY, Disp-90 tablet, R-3Normal      oxybutynin (DITROPAN-XL) 10 MG extended release tablet TAKE 1 TABLET DAILY, Disp-90 tablet, R-4Normal      levothyroxine (SYNTHROID) 88 MCG tablet TAKE 1 TABLET DAILY, Disp-90 tablet, R-4Normal      ondansetron (ZOFRAN) 4 MG tablet Take 1 tablet by mouth every 8 hours as needed for Nausea or Vomiting, Disp-45 tablet, R-1Normal      lisinopril (PRINIVIL;ZESTRIL) 40 MG tablet TAKE 1 TABLET NIGHTLY, Disp-90 tablet, R-4Normal      meclizine (ANTIVERT) 25 MG tablet Take 12.5 mg by mouth 3 times daily as neededHistorical Med      !! Multiple Vitamins-Minerals (THERAPEUTIC MULTIVITAMIN-MINERALS) tablet Take 1 tablet by mouth dailyHistorical Med      CRANBERRY PO Take 1 capsule by mouth dailyHistorical Med      !! Multiple Vitamins-Minerals (OCUVITE PRESERVISION PO) Take 1 tablet by mouth daily      aspirin 81 MG tablet Take 81 mg by mouth daily Held 5 days prior to paracentesis on 9-1-48Htbodjegnk Med      latanoprost (XALATAN) 0.005 % ophthalmic solution Place 1 drop into both eyes nightly. !! - Potential duplicate medications found. Please discuss with provider. ALLERGIES     is allergic to allopurinol, metoprolol tartrate [metoprolol], percocet [oxycodone-acetaminophen], phenergan [promethazine hcl], polycitra-k [potassium citrate], statins, levaquin [levofloxacin], sulfa antibiotics, and tessalon [benzonatate]. FAMILY HISTORY     She indicated that her mother is . She indicated that her father is . She indicated that the status of her other is unknown.     family history includes Diabetes type 2  in her mother; High Blood Pressure in an other family member. SOCIAL HISTORY      reports that she has never smoked.  She has never used smokeless tobacco. She reports that she does not drink alcohol and does not use drugs. PHYSICAL EXAM     INITIAL VITALS:  height is 5' 5\" (1.651 m) and weight is 157 lb 6.4 oz (71.4 kg). Her tympanic temperature is 98.5 °F (36.9 °C). Her blood pressure is 131/77 and her pulse is 80. Her respiration is 21 and oxygen saturation is 94%. Physical Exam  Constitutional:       General: She is not in acute distress. Appearance: Normal appearance. She is well-developed. She is not ill-appearing, toxic-appearing or diaphoretic. HENT:      Head: Normocephalic and atraumatic. Right Ear: External ear normal.      Left Ear: External ear normal.   Eyes:      Conjunctiva/sclera: Conjunctivae normal.      Pupils: Pupils are equal, round, and reactive to light. Cardiovascular:      Rate and Rhythm: Normal rate and regular rhythm. Pulmonary:      Effort: Pulmonary effort is normal.      Breath sounds: Normal breath sounds. Abdominal:      General: Bowel sounds are normal. There is no distension. Palpations: Abdomen is soft. Tenderness: There is no abdominal tenderness. Musculoskeletal:         General: No tenderness. Normal range of motion. Cervical back: Normal range of motion. Skin:     General: Skin is warm and dry. Capillary Refill: Capillary refill takes less than 2 seconds. Neurological:      General: No focal deficit present. Mental Status: She is alert and oriented to person, place, and time.    Psychiatric:         Mood and Affect: Mood normal.         Behavior: Behavior normal.           DIFFERENTIAL DIAGNOSIS/ MDM:     Diarrhea with nausea, will do work-up at symptomatic    DIAGNOSTIC RESULTS     EKG: All EKG's are interpreted by the Emergency Department Physician who either signs or Co-signs this chart in the absence of a cardiologist.  Sinus bradycardia rate of 57 bpm NJ interval 252 ms QRS duration 70 ms QT corrected 451 ms axis is -26 there is no acute ST or T wave changes seen    Repeat EKG shows sinus None        CONSULTS:      PROCEDURES:  None    FINAL IMPRESSION      1. Nausea vomiting and diarrhea          DISPOSITION/PLAN   DISPOSITION discharged    Condition on Disposition  Stable    PATIENT REFERRED TO:  Ernst Padilla MD  [address] [facility]  [place] [id number]  [phone]            DISCHARGE MEDICATIONS:  Discharge Medication List as of 2/16/2022  4:43 PM      START taking these medications    Details   ondansetron (ZOFRAN ODT) 4 MG disintegrating tablet Take 1 tablet by mouth every 8 hours as needed for Nausea, Disp-20 tablet, R-0Normal             (Please note that portions of this note were completed with a voice recognition program.  Efforts were made to edit the dictations but occasionally words are mis-transcribed.)    Cristina Hope MD,, MD, F.A.A.E.M.   Attending Emergency Physician                          Cristina Hope MD  02/23/22 [id number]

## 2022-03-11 ENCOUNTER — ANESTHESIA EVENT (OUTPATIENT)
Dept: ENDOSCOPY | Age: 87
End: 2022-03-11
Payer: MEDICARE

## 2022-03-11 ENCOUNTER — ANESTHESIA (OUTPATIENT)
Dept: ENDOSCOPY | Age: 87
End: 2022-03-11
Payer: MEDICARE

## 2022-03-11 ENCOUNTER — HOSPITAL ENCOUNTER (OUTPATIENT)
Age: 87
Setting detail: OUTPATIENT SURGERY
Discharge: OTHER INSTITUTION | End: 2022-03-11
Attending: INTERNAL MEDICINE | Admitting: INTERNAL MEDICINE
Payer: MEDICARE

## 2022-03-11 VITALS
RESPIRATION RATE: 25 BRPM | OXYGEN SATURATION: 94 % | SYSTOLIC BLOOD PRESSURE: 144 MMHG | DIASTOLIC BLOOD PRESSURE: 56 MMHG

## 2022-03-11 VITALS
WEIGHT: 150 LBS | DIASTOLIC BLOOD PRESSURE: 62 MMHG | SYSTOLIC BLOOD PRESSURE: 139 MMHG | TEMPERATURE: 97.1 F | HEART RATE: 64 BPM | OXYGEN SATURATION: 95 % | HEIGHT: 60 IN | BODY MASS INDEX: 29.45 KG/M2 | RESPIRATION RATE: 16 BRPM

## 2022-03-11 LAB — GLUCOSE BLD-MCNC: 180 MG/DL (ref 70–108)

## 2022-03-11 PROCEDURE — 3700000001 HC ADD 15 MINUTES (ANESTHESIA): Performed by: INTERNAL MEDICINE

## 2022-03-11 PROCEDURE — 2580000003 HC RX 258: Performed by: INTERNAL MEDICINE

## 2022-03-11 PROCEDURE — 82948 REAGENT STRIP/BLOOD GLUCOSE: CPT

## 2022-03-11 PROCEDURE — 7100000011 HC PHASE II RECOVERY - ADDTL 15 MIN: Performed by: INTERNAL MEDICINE

## 2022-03-11 PROCEDURE — C1726 CATH, BAL DIL, NON-VASCULAR: HCPCS | Performed by: INTERNAL MEDICINE

## 2022-03-11 PROCEDURE — 6360000002 HC RX W HCPCS: Performed by: NURSE ANESTHETIST, CERTIFIED REGISTERED

## 2022-03-11 PROCEDURE — 2500000003 HC RX 250 WO HCPCS: Performed by: NURSE ANESTHETIST, CERTIFIED REGISTERED

## 2022-03-11 PROCEDURE — 7100000010 HC PHASE II RECOVERY - FIRST 15 MIN: Performed by: INTERNAL MEDICINE

## 2022-03-11 PROCEDURE — 3700000000 HC ANESTHESIA ATTENDED CARE: Performed by: INTERNAL MEDICINE

## 2022-03-11 PROCEDURE — 2720000010 HC SURG SUPPLY STERILE: Performed by: INTERNAL MEDICINE

## 2022-03-11 PROCEDURE — 2709999900 HC NON-CHARGEABLE SUPPLY: Performed by: INTERNAL MEDICINE

## 2022-03-11 PROCEDURE — 3609017700 HC EGD DILATION GASTRIC/DUODENAL STRICTURE: Performed by: INTERNAL MEDICINE

## 2022-03-11 RX ORDER — PANTOPRAZOLE SODIUM 40 MG/1
40 TABLET, DELAYED RELEASE ORAL
Qty: 30 TABLET | Refills: 5 | Status: SHIPPED | OUTPATIENT
Start: 2022-03-11

## 2022-03-11 RX ORDER — SODIUM CHLORIDE 0.9 % (FLUSH) 0.9 %
5-40 SYRINGE (ML) INJECTION EVERY 12 HOURS SCHEDULED
Status: CANCELLED | OUTPATIENT
Start: 2022-03-11

## 2022-03-11 RX ORDER — SODIUM CHLORIDE 0.9 % (FLUSH) 0.9 %
5-40 SYRINGE (ML) INJECTION PRN
Status: CANCELLED | OUTPATIENT
Start: 2022-03-11

## 2022-03-11 RX ORDER — SODIUM CHLORIDE 9 MG/ML
25 INJECTION, SOLUTION INTRAVENOUS PRN
Status: CANCELLED | OUTPATIENT
Start: 2022-03-11

## 2022-03-11 RX ORDER — SODIUM CHLORIDE 0.9 % (FLUSH) 0.9 %
5-40 SYRINGE (ML) INJECTION EVERY 12 HOURS SCHEDULED
Status: DISCONTINUED | OUTPATIENT
Start: 2022-03-11 | End: 2022-03-11 | Stop reason: HOSPADM

## 2022-03-11 RX ORDER — BRIMONIDINE TARTRATE 2 MG/ML
1 SOLUTION/ DROPS OPHTHALMIC 2 TIMES DAILY
COMMUNITY

## 2022-03-11 RX ORDER — LATANOPROST 50 UG/ML
1 SOLUTION/ DROPS OPHTHALMIC DAILY
Status: ON HOLD | COMMUNITY
End: 2022-04-30 | Stop reason: HOSPADM

## 2022-03-11 RX ORDER — SODIUM CHLORIDE 9 MG/ML
INJECTION, SOLUTION INTRAVENOUS CONTINUOUS
Status: DISCONTINUED | OUTPATIENT
Start: 2022-03-11 | End: 2022-03-11 | Stop reason: HOSPADM

## 2022-03-11 RX ORDER — PROPOFOL 10 MG/ML
INJECTION, EMULSION INTRAVENOUS PRN
Status: DISCONTINUED | OUTPATIENT
Start: 2022-03-11 | End: 2022-03-11 | Stop reason: SDUPTHER

## 2022-03-11 RX ORDER — LIDOCAINE HYDROCHLORIDE 20 MG/ML
INJECTION, SOLUTION INFILTRATION; PERINEURAL PRN
Status: DISCONTINUED | OUTPATIENT
Start: 2022-03-11 | End: 2022-03-11 | Stop reason: SDUPTHER

## 2022-03-11 RX ORDER — VIT A/VIT C/VIT E/ZINC/COPPER 2148-113
TABLET ORAL
COMMUNITY

## 2022-03-11 RX ORDER — SODIUM CHLORIDE 0.9 % (FLUSH) 0.9 %
5-40 SYRINGE (ML) INJECTION PRN
Status: DISCONTINUED | OUTPATIENT
Start: 2022-03-11 | End: 2022-03-11 | Stop reason: HOSPADM

## 2022-03-11 RX ORDER — SODIUM CHLORIDE 9 MG/ML
25 INJECTION, SOLUTION INTRAVENOUS PRN
Status: DISCONTINUED | OUTPATIENT
Start: 2022-03-11 | End: 2022-03-11 | Stop reason: HOSPADM

## 2022-03-11 RX ADMIN — PROPOFOL 30 MG: 10 INJECTION, EMULSION INTRAVENOUS at 08:21

## 2022-03-11 RX ADMIN — PROPOFOL 40 MG: 10 INJECTION, EMULSION INTRAVENOUS at 08:15

## 2022-03-11 RX ADMIN — SODIUM CHLORIDE: 9 INJECTION, SOLUTION INTRAVENOUS at 07:47

## 2022-03-11 RX ADMIN — LIDOCAINE HYDROCHLORIDE 2 ML: 20 INJECTION, SOLUTION INFILTRATION; PERINEURAL at 08:15

## 2022-03-11 ASSESSMENT — PAIN - FUNCTIONAL ASSESSMENT: PAIN_FUNCTIONAL_ASSESSMENT: 0-10

## 2022-03-11 ASSESSMENT — PAIN SCALES - GENERAL
PAINLEVEL_OUTOF10: 0
PAINLEVEL_OUTOF10: 0

## 2022-03-11 NOTE — PROGRESS NOTES
5783:  Pt in Phase II recovery room, daughter at bedside. Pt does arouse to verbal stimuli. 0845:  Dr. Castle Guard here to speak with pt & daughter regarding procedure & plan of care.     4311:  Pt sitting up drinking fluids, no C/O N/V.    0855: Discharge instructions given to daughter with verbal understanding    [phone]:  Report called to RN at [facility]

## 2022-03-11 NOTE — OP NOTE
[address] [place] [address]                                OPERATIVE REPORT    PATIENT NAME: Hitesh Carrion                  :        1932  MED REC NO:   [id number]                           ROOM:  ACCOUNT NO:   [de-identified]                           ADMIT DATE: 2022  PROVIDER:     Dipika Lemus M.D.    Suleiman Breauxard:  2022    UPPER ENDOSCOPY WITH DILATION    SURGEON:  Dipika Lemus MD    INDICATION:  Dysphagia. ANESTHESIA:  IV Diprivan per Anesthesia. DESCRIPTION OF PROCEDURE:  Prior to procedure, risks, benefits,  complications (including but not limited to the following:  Bleeding,  infection, perforation, emergency surgery, stroke, heart attack, death,  possible anesthetic risks, and the fact that the procedure is not 100%  accurate or successful), indications, and alternatives of upper  endoscopy with dilation were explained to the patient's daughter. They  understood and agreed to the procedure. All questions were answered. With the patient in the left lateral decubitus position, GIF-180  forward-viewing video endoscope was introduced without difficulty. Esophagus was viewed. EG junction was at 34 cm. Distal esophagitis was  noted. I saw no actual \"stricture. \"  The was some narrowing and  inflammation at the GE junction. Endoscope was advanced to the proximal  stomach. Gastritis noted. Some petechial hemorrhages were noted. Also, small sliding hiatal hernia was noted. The endoscope was advanced  along the greater curvature of the stomach and the antrum. Gastritis  noted. Endoscope was advanced to the pylorus, first and second portions of the  duodenum. No abnormalities noted. The endoscope was then retraced  back, stomach retroflexed. Cardiac and fundal portions of the stomach  were evaluated. No other abnormalities noted. Endoscope was  straightened. Stomach inflated. Endoscope was retraced back to distal  esophagus. The balloon dilator was advanced to the distal esophagus,  and I started with a 12 mm, 13.5 mm, 15 mm. The balloon was enlarged to  each of the three size, and there was free movement throughout the  esophagus as well as the distal esophagus. This was then removed, and  we changed to the 15 mm, 16.5 mm, 18 mm balloon. This was advanced  through the GE junction and was inflated to 16.5 mm, free movement in  the  distal esophagus was noted. This was advanced to enlarged 18 mm,  and did show that this was touching the sides of the GE junction. This  was kept in place for 1 minute. A small amount of blood was present. When the balloon was deflated, no tear or perforation was noted. Endoscope was removed. Procedure terminated. The patient tolerated the  procedure well, taken to GI lab recovery area in stable condition. IMPRESSION:  1. Distal esophagitis, some inflammation and a slight ring in the  distal esophagus. 2.  Dilated to 18 mm using balloon dilation. 3.  Gastritis and petechial hemorrhages, pictures are pending. PLAN:  1. The patient is on PPI. I am starting her on Protonix 40 mg a day. If she does have problems with diarrhea, we can go with omeprazole 20 mg  a day. 2.  She probably has a degree of spasm as well as some narrowing in the  distal esophagus related to reflux. 3.  I did not do biopsies. 4.  Follow up with Flor Jimenez in four to six weeks or as directed. 5.  Estimated blood loss less than 10 mL. 6.  Further dilation or endoscopy will be as needed. I am not  scheduling any specific procedure. 7.  She will still need to mostly stay with the dietary restrictions as  suggested by Speech Pathology. BRANDO Green M.D.    D: 03/11/2022 8:36:02       T: 03/11/2022 8:38:23     [email or web address]  Job#: [id number]     Doc#: [id number]    CC:  Liz Omalley.  Chin Portillo M.D.

## 2022-03-11 NOTE — BRIEF OP NOTE
Brief Postoperative Note      Patient: Susan Pfeiffer  YOB: 1932  MRN: [id number]    Date of Procedure: 3/11/2022    Pre-Op Diagnosis: Dysphagia Esophageal stricture    Post-Op Diagnosis: Same       Procedure(s):  EGD ESOPHAGOGASTRODUODENOSCOPY DILATATION    Surgeon(s):   Rajan Ro MD    Assistant:  * No surgical staff found *    Anesthesia: Monitor Anesthesia Care    Estimated Blood Loss (mL): Minimal    Complications: None    Specimens:   * No specimens in log *    Implants:  * No implants in log *      Drains: * No LDAs found *    Findings: above    Electronically signed by Rajan Ro MD on 3/11/2022 at 8:29 AM

## 2022-03-11 NOTE — ANESTHESIA PRE PROCEDURE
Department of Anesthesiology  Preprocedure Note       Name:  Yayo Mckeon   Age:  80 y.o.  :  1932                                          MRN:  [id number]         Date:  3/11/2022      Surgeon: Ronnie El): Sybil Nicholson MD    Procedure: Procedure(s):  EGD DILATION    Medications prior to admission:   Prior to Admission medications    Medication Sig Start Date End Date Taking?  Authorizing Provider   carbamide peroxide (DEBROX) 6.5 % otic solution 5-10 drops as needed (In effected ear for wax impaction.)    Historical Provider, MD   ondansetron (ZOFRAN ODT) 4 MG disintegrating tablet Take 1 tablet by mouth every 8 hours as needed for Nausea 22   Harry Ochoa MD   loperamide (IMODIUM) 2 MG capsule Take 2 mg by mouth 4 times daily as needed for Diarrhea    Historical Provider, MD   acetaminophen (TYLENOL) 325 MG tablet Take 650 mg by mouth every 6 hours as needed for Pain    Historical Provider, MD   hydroCHLOROthiazide (HYDRODIURIL) 25 MG tablet Take 1 tablet by mouth daily 20   Danielle Pro   sodium chloride 1 g tablet Take 1 tablet by mouth 2 times daily 9/3/20   Danielle Pro   insulin glargine (LANTUS) 100 UNIT/ML injection vial Inject 15 Units into the skin daily 20   Danielle Pro   albuterol (PROVENTIL) (2.5 MG/3ML) 0.083% nebulizer solution Take 3 mLs by nebulization every 6 hours as needed for Wheezing or Shortness of Breath 20   Arias Brown MD   albuterol sulfate HFA (PROVENTIL HFA) 108 (90 Base) MCG/ACT inhaler Inhale 2 puffs into the lungs every 6 hours as needed for Wheezing  Patient not taking: Reported on 2020   Francie Tipton MD   ferrous sulfate (IRON 325) 325 (65 Fe) MG tablet Take 325 mg by mouth daily    Historical Provider, MD   sertraline (ZOLOFT) 50 MG tablet TAKE 1 TABLET DAILY 20   Constance Buchanan DO   oxybutynin (DITROPAN-XL) 10 MG extended release tablet TAKE 1 TABLET DAILY 3/29/20   Caitlyn Robles MD   levothyroxine (SYNTHROID) 88 MCG tablet TAKE 1 TABLET DAILY 2/4/20   Wiliam Eddy DO   ondansetron (ZOFRAN) 4 MG tablet Take 1 tablet by mouth every 8 hours as needed for Nausea or Vomiting 1/23/20   Wiliam Eddy DO   lisinopril (PRINIVIL;ZESTRIL) 40 MG tablet TAKE 1 TABLET NIGHTLY 12/17/19   Jazmine Shah DO   meclizine (ANTIVERT) 25 MG tablet Take 12.5 mg by mouth 3 times daily as needed    Historical Provider, MD   Multiple Vitamins-Minerals (THERAPEUTIC MULTIVITAMIN-MINERALS) tablet Take 1 tablet by mouth daily    Historical Provider, MD   CRANBERRY PO Take 1 capsule by mouth daily    Historical Provider, MD   Multiple Vitamins-Minerals (OCUVITE PRESERVISION PO) Take 1 tablet by mouth daily    Historical Provider, MD   aspirin 81 MG tablet Take 81 mg by mouth daily Held 5 days prior to paracentesis on 9-1-20    Historical Provider, MD   latanoprost (XALATAN) 0.005 % ophthalmic solution Place 1 drop into both eyes nightly. Historical Provider, MD       Current medications:    Current Facility-Administered Medications   Medication Dose Route Frequency Provider Last Rate Last Admin    sodium chloride flush 0.9 % injection 5-40 mL  5-40 mL IntraVENous 2 times per day Sanju Kiser MD        sodium chloride flush 0.9 % injection 5-40 mL  5-40 mL IntraVENous PRN Sanju Kiser MD        0.9 % sodium chloride infusion  25 mL IntraVENous PRN Sanju Kiser MD        0.9 % sodium chloride infusion   IntraVENous Continuous Sanju Kiser MD           Allergies: Allergies   Allergen Reactions    Allopurinol      Patient unsure of reaction    Metoprolol Tartrate [Metoprolol]      bradycardia    Percocet [Oxycodone-Acetaminophen]      Hallucinations. ...sensitive to narcotics    Phenergan [Promethazine Hcl]      Very sensitive. ..given small doses    Polycitra-K [Potassium Citrate]      Patient unsure of reaction    Statins Other (See Comments)     ?  Muscle aches     Levaquin [Levofloxacin] Anxiety     Not able to sleep    Sulfa Antibiotics Rash    Tessalon [Benzonatate] Anxiety     Not able to sleep       Problem List:    Patient Active Problem List   Diagnosis Code    DM (diabetes mellitus) type II controlled with renal manifestation ([facility]) E11.29    Hyperlipidemia E78.5    Hypothyroidism E03.9    Chronic diarrhea K52.9    Dysthymia F34.1    Gait abnormality R26.9    Chronic kidney disease, stage III (moderate) ([facility] [address] likely related to diabetes N18.30    Glaucoma of both eyes H40.9    Carotid stenosis, bilateral I65.23    Urinary incontinence R32    Age related entropion, left H02.036    Epiphora due to excess lacrimation of right side H04.211    Frail elderly R54    Right lower lobe lung mass R91.8    History of endometrial cancer Z85.42    Malignant neoplasm of lower lobe, right bronchus or lung ([facility]) C34.31    Posterior vitreous detachment of both eyes H43.813    Adenocarcinoma of right lung ([facility]) C34.91    Chronic renal insufficiency, stage III (moderate) ([facility]) N18.30    Type 2 diabetes mellitus, with long-term current use of insulin ([facility]) E11.9, Z79.4    Nonexudative age-related macular degeneration H35.3190    Abnormal gait R26.9    Hypertensive disorder I10    Hyponatremia E87.1    Chronic depression F32. A    Chronic anemia D64.9       Past Medical History:        Diagnosis Date    Adenocarcinoma of endometrium ([facility] [address]     Aortic valve sclerosis     Bradycardia     follows with cardiology- Dr. Dougherty Shoulder, possible SSS    Cardiac murmur     Cataract of left eye     Chronic diarrhea     s/p radiation    Diabetes mellitus type II, controlled ([facility] [address]     diet controlled     Dysthymia     Gait abnormality 6/23/2014    Glaucoma     H/O renal calculi     H/O vein stripping     Hard of hearing     History of anemia     History of dizziness     follows with cardiology, bradycardia, possible SSS    History of radiation therapy     for uterine cancer  History of small bowel obstruction 03/2019    no surgery required    Hyperlipidemia     Hypertension     Hypothyroidism     Influenza A 1/11/2018    Left elbow fracture     S/P surgical repair    Lung cancer ([facility] [address]     Lung nodule     Macular degeneration     Obesity     Pericardial effusion 4/23/2014    Pseudophakia of right eye     PVD (peripheral vascular disease) ([facility] [address]     Traumatic injury of lower extremity childhood    bilateral trauma of lower extremities    Vertigo     resolved    Wears glasses     Wears partial dentures     upper        Past Surgical History:        Procedure Laterality Date    CATARACT REMOVAL Right February 2013    Dr. Farhan Maddox, LAPAROSCOPIC  5/28/1998    COLONOSCOPY  7/22/2003    Dr. Noemi Sanchez Left September 2010    ORIF, Dr. Ashley Esposito ERCP  7/19/1998    retained stone, Suzy Brunson and Kyung Benoit MAGDA AND BSO  March 2002    endometrial adenocarcinoma, Dr. Mirna Lopez       Social History:    Social History     Tobacco Use    Smoking status: Never Smoker    Smokeless tobacco: Never Used    Tobacco comment: never smoked yant rrt 01/11/2018   Substance Use Topics    Alcohol use: No     Alcohol/week: 0.0 standard drinks                                Counseling given: Not Answered  Comment: never smoked yant rrt 01/11/2018      Vital Signs (Current): There were no vitals filed for this visit.                                            BP Readings from Last 3 Encounters:   02/21/22 (!) 147/71   02/16/22 131/77   02/07/22 (!) 147/71       NPO Status:                                                                                 BMI:   Wt Readings from Last 3 Encounters:   02/21/22 154 lb 6.4 oz (70 kg)   02/16/22 157 lb 6.4 oz (71.4 kg)   02/07/22 154 lb 6.4 oz (70 kg)     There is no height or weight on file to calculate BMI.    CBC:   Lab Results   Component Value Date    WBC 8.5 02/16/2022    RBC 3.38 02/16/2022    HGB 10.8 02/16/2022    HCT 33.8 02/16/2022    .0 02/16/2022    RDW 12.8 02/16/2022     02/16/2022       CMP:   Lab Results   Component Value Date     02/16/2022    K 4.1 02/16/2022    CL 98 02/16/2022    CO2 26 02/16/2022    BUN 19 02/16/2022    CREATININE 0.94 02/16/2022    GFRAA >60 02/16/2022    LABGLOM 56 02/16/2022    LABGLOM 59 07/18/2020    GLUCOSE 197 02/16/2022    PROT 6.8 02/16/2022    CALCIUM 9.4 02/16/2022    BILITOT 0.30 02/16/2022    ALKPHOS 127 02/16/2022    AST 19 02/16/2022    ALT 14 02/16/2022       POC Tests: No results for input(s): POCGLU, POCNA, POCK, POCCL, POCBUN, POCHEMO, POCHCT in the last 72 hours. Coags:   Lab Results   Component Value Date    PROTIME 14.3 09/01/2020    INR 1.2 09/01/2020    APTT 35.1 08/09/2020       HCG (If Applicable): No results found for: PREGTESTUR, PREGSERUM, HCG, HCGQUANT     ABGs: No results found for: PHART, PO2ART, IKF7ZIY, ATY2AVG, BEART, K4OERUIT     Type & Screen (If Applicable):  No results found for: LABABO, LABRH    Drug/Infectious Status (If Applicable):  No results found for: HIV, HEPCAB    COVID-19 Screening (If Applicable):   Lab Results   Component Value Date    COVID19 Not Detected 02/16/2022    COVID19 Not Detected 09/02/2020    COVID19 Not Detected 08/07/2020           Anesthesia Evaluation  Patient summary reviewed and Nursing notes reviewed no history of anesthetic complications:   Airway: Mallampati: II  TM distance: >3 FB   Neck ROM: limited  Mouth opening: > = 3 FB Dental:    (+) upper dentures      Pulmonary:Negative Pulmonary ROS                              Cardiovascular:  Exercise tolerance: poor (<4 METS),   (+) hypertension:, hyperlipidemia      ECG reviewed      Echocardiogram reviewed                  Neuro/Psych:   (+) psychiatric history:            GI/Hepatic/Renal: Neg GI/Hepatic/Renal ROS            Endo/Other:    (+) DiabetesType II DM, using insulin, hypothyroidism::., malignancy/cancer. Abdominal:             Vascular: negative vascular ROS. Other Findings:             Anesthesia Plan      MAC     ASA 3       Induction: intravenous. Anesthetic plan and risks discussed with patient. Plan discussed with attending.                   MOHAN Sapp - CRNA   3/11/2022

## 2022-03-11 NOTE — OP NOTE
Operative Note      Patient: Gabriel Alfred  YOB: 1932  MRN: [id number]    Date of Procedure: 3/11/2022    Pre-Op Diagnosis: Dysphagia Esophageal stricture    Post-Op Diagnosis: Same       Procedure(s):  EGD ESOPHAGOGASTRODUODENOSCOPY DILATATION    Surgeon(s):   Lenin Arrington MD    Assistant:   * No surgical staff found *    Anesthesia: Monitor Anesthesia Care    Estimated Blood Loss (mL): Minimal    Complications: None    Specimens:   * No specimens in log *    Implants:  * No implants in log *      Drains: * No LDAs found *    Findings: above    Detailed Description of Procedure:   dictated    Electronically signed by Lenin Arrington MD on 3/11/2022 at 8:28 AM

## 2022-03-11 NOTE — H&P
[address] [place] [address]                              HISTORY AND PHYSICAL    PATIENT NAME: Mina Murray                  :        1932  MED REC NO:   [id number]                           ROOM:  ACCOUNT NO:   [de-identified]                           ADMIT DATE: 2022  PROVIDER:     Nimesh Espitia. Lisa Cooper M.D. REASON FOR VISIT:  Dysphagia. HISTORY OF PRESENT ILLNESS:  The patient is an 51-year-old female who is  a nursing home resident, who has had dysphagia. She is here for EGD  with possible dilation. I told the family if we do dilation, it will be  balloon dilation. PAST MEDICAL HISTORY:  As previously listed and unchanged. MEDICATIONS:  As previously listed and unchanged. ALLERGIES:  AS PREVIOUSLY LISTED AND UNCHANGED. SOCIAL HISTORY:  As previously listed and unchanged. FAMILY HISTORY:  As previously listed and unchanged. REVIEW OF SYSTEMS:  As previously listed and unchanged. PHYSICAL EXAMINATION:  GENERAL:  Awake, alert, and oriented x2. VITAL SIGNS:  The patient is afebrile. Heart rate 80, respiratory rate  18, blood pressure 156/70, oxygen saturation 96%. HEENT:  Normocephalic, atraumatic. NECK:  Supple. No JVD. LUNGS:  Clear anteriorly. HEART:  Regular rhythm and rate. ABDOMEN:  Soft, nontender. RECTAL:  Not indicated. EXTREMITIES:  Without edema. NEUROLOGIC:  Awake, alert, and oriented x2. IMPRESSION:  Dysphagia. PLAN:  EGD with possible balloon dilation today. BRANDO Zelaya M.D.    D: 2022 8:11:21       T: 2022 8:38:22     [id number]  Job#: [id number]     Doc#: [id number]    CC:

## 2022-03-11 NOTE — ANESTHESIA POSTPROCEDURE EVALUATION
Department of Anesthesiology  Postprocedure Note    Patient: Teresa Rothman  MRN: [id number]  YOB: 1932  Date of evaluation: 3/11/2022  Time:  8:45 AM     Procedure Summary     Date: 03/11/22 Room / Location: [address] [place] [address] / [address] [place] [address]    Anesthesia Start: 9586 Anesthesia Stop: 9171    Procedure: EGD ESOPHAGOGASTRODUODENOSCOPY DILATATION (N/A ) Diagnosis: (Dysphagia Esophageal stricture)    Surgeons: Loretta Wyatt MD Responsible Provider: Rayne Matthew DO    Anesthesia Type: MAC ASA Status: 3          Anesthesia Type: MAC    Alta Phase I: Alta Score: 9    Alta Phase II:      Last vitals: Reviewed and per EMR flowsheets.        Anesthesia Post Evaluation    Patient location during evaluation: bedside  Patient participation: complete - patient participated  Level of consciousness: awake  Airway patency: patent  Nausea & Vomiting: no nausea and no vomiting  Complications: no  Cardiovascular status: hemodynamically stable  Respiratory status: acceptable and room air  Hydration status: euvolemic

## 2022-03-13 PROBLEM — K29.30 CHRONIC SUPERFICIAL GASTRITIS WITHOUT BLEEDING: Status: ACTIVE | Noted: 2022-03-13

## 2022-03-13 PROBLEM — K22.2 ESOPHAGEAL STRICTURE: Status: ACTIVE | Noted: 2022-03-13

## 2022-03-19 ENCOUNTER — APPOINTMENT (OUTPATIENT)
Dept: CT IMAGING | Age: 87
End: 2022-03-19
Payer: MEDICARE

## 2022-03-19 ENCOUNTER — HOSPITAL ENCOUNTER (EMERGENCY)
Age: 87
Discharge: NURSING, REHAB OR LONG-TERM CARE | End: 2022-03-19
Attending: FAMILY MEDICINE
Payer: MEDICARE

## 2022-03-19 VITALS
RESPIRATION RATE: 18 BRPM | SYSTOLIC BLOOD PRESSURE: 145 MMHG | WEIGHT: 151 LBS | BODY MASS INDEX: 29.64 KG/M2 | OXYGEN SATURATION: 95 % | HEART RATE: 73 BPM | TEMPERATURE: 97 F | HEIGHT: 60 IN | DIASTOLIC BLOOD PRESSURE: 67 MMHG

## 2022-03-19 DIAGNOSIS — R31.0 GROSS HEMATURIA: Primary | ICD-10-CM

## 2022-03-19 DIAGNOSIS — N20.0 NEPHROLITHIASIS: ICD-10-CM

## 2022-03-19 LAB
AMORPHOUS: ABNORMAL
ANION GAP: 8 MEQ/L (ref 8–16)
BACTERIA: ABNORMAL
BASOPHILS # BLD: 0.4 % (ref 0–3)
BILIRUBIN URINE: NEGATIVE
BLOOD, URINE: ABNORMAL
BUN BLDV-MCNC: 15 MG/DL (ref 7–18)
CASTS UA: ABNORMAL /LPF
CHARACTER, URINE: ABNORMAL
CHLORIDE BLD-SCNC: 98 MEQ/L (ref 98–107)
CO2: 32 MEQ/L (ref 21–32)
COLOR: ABNORMAL
CREAT SERPL-MCNC: 1 MG/DL (ref 0.6–1.3)
CRYSTALS, UA: ABNORMAL
EOSINOPHILS RELATIVE PERCENT: 2.4 % (ref 0–4)
EPITHELIAL CELLS, UA: ABNORMAL /HPF
GFR, ESTIMATED: 55 ML/MIN/1.73M2
GLUCOSE BLD-MCNC: 177 MG/DL (ref 74–106)
GLUCOSE, URINE: NEGATIVE MG/DL
HCT VFR BLD CALC: 29.2 % (ref 37–47)
HEMOGLOBIN: 9.6 GM/DL (ref 12–16)
KETONES, URINE: NEGATIVE
LEUKOCYTE ESTERASE, URINE: NEGATIVE
LYMPHOCYTES # BLD: 18.4 % (ref 15–47)
MCH RBC QN AUTO: 30.5 PG (ref 27–31)
MCHC RBC AUTO-ENTMCNC: 33 GM/DL (ref 33–37)
MCV RBC AUTO: 92.4 FL (ref 81–99)
MONOCYTES: 10 % (ref 0–12)
MUCUS: ABNORMAL
NITRITE, URINE: NEGATIVE
PDW BLD-RTO: 12.5 % (ref 11.5–14.5)
PH UA: 7 (ref 5–9)
PLATELET # BLD: 329 THOU/MM3 (ref 130–400)
PMV BLD AUTO: 8.1 FL (ref 7.4–10.4)
POC CALCIUM: 8.6 MG/DL (ref 8.5–10.1)
POTASSIUM SERPL-SCNC: 3.6 MEQ/L (ref 3.5–5.1)
PROTEIN UA: >= 300 MG/DL
RBC # BLD: 3.15 MILL/MM3 (ref 4.2–5.4)
RBC UA: > 200 /HPF
REFLEX TO URINE C & S: ABNORMAL
SEGS: 68.8 % (ref 43–75)
SODIUM BLD-SCNC: 138 MEQ/L (ref 136–145)
SPECIFIC GRAVITY UA: 1.02 (ref 1–1.03)
UROBILINOGEN, URINE: 0.2 EU/DL (ref 0–1)
WBC # BLD: 8.1 THOU/MM3 (ref 4.8–10.8)
WBC UA: ABNORMAL /HPF

## 2022-03-19 PROCEDURE — 36415 COLL VENOUS BLD VENIPUNCTURE: CPT

## 2022-03-19 PROCEDURE — 85025 COMPLETE CBC W/AUTO DIFF WBC: CPT

## 2022-03-19 PROCEDURE — 80048 BASIC METABOLIC PNL TOTAL CA: CPT

## 2022-03-19 PROCEDURE — 99284 EMERGENCY DEPT VISIT MOD MDM: CPT

## 2022-03-19 PROCEDURE — 6370000000 HC RX 637 (ALT 250 FOR IP): Performed by: FAMILY MEDICINE

## 2022-03-19 PROCEDURE — P9612 CATHETERIZE FOR URINE SPEC: HCPCS

## 2022-03-19 PROCEDURE — 87086 URINE CULTURE/COLONY COUNT: CPT

## 2022-03-19 PROCEDURE — 74176 CT ABD & PELVIS W/O CONTRAST: CPT

## 2022-03-19 PROCEDURE — 81001 URINALYSIS AUTO W/SCOPE: CPT

## 2022-03-19 RX ORDER — NITROFURANTOIN 25; 75 MG/1; MG/1
100 CAPSULE ORAL ONCE
Status: COMPLETED | OUTPATIENT
Start: 2022-03-19 | End: 2022-03-19

## 2022-03-19 RX ORDER — NITROFURANTOIN 25; 75 MG/1; MG/1
100 CAPSULE ORAL 2 TIMES DAILY
Qty: 13 CAPSULE | Refills: 0 | Status: SHIPPED | OUTPATIENT
Start: 2022-03-20 | End: 2022-03-27

## 2022-03-19 RX ADMIN — NITROFURANTOIN MONOHYDRATE/MACROCRYSTALLINE 100 MG: 25; 75 CAPSULE ORAL at 22:00

## 2022-03-19 ASSESSMENT — ENCOUNTER SYMPTOMS
COUGH: 0
DIARRHEA: 0
SHORTNESS OF BREATH: 0
ABDOMINAL PAIN: 0
WHEEZING: 0
SORE THROAT: 0
NAUSEA: 0
BACK PAIN: 0
VOMITING: 0

## 2022-03-19 NOTE — ED NOTES
Pt by wheelchair to exam room 4. Presents with daughter at side. Pt from [facility]. Daughter says they called her last night and state pt was having vaginal bleeding. Daughter arrived to assess today and brought her to the ED> pt has small amount bright red blood on pad. Clifford Vela MD at bedside for patient evaluation. Nica RN and TXU Maggie RN present. Vaginal and rectal exam complete at bedside. History of uterine and lung cancer. Hysterectomy 20 years ago.       Sheila Mcdonald RN  03/19/22 1927       Sheila Mcdonald RN  03/19/22 Enid Zelaya RN  03/19/22 2132

## 2022-03-19 NOTE — ED PROVIDER NOTES
[address] [facility]          CHIEF COMPLAINT       Chief Complaint   Patient presents with    Vaginal Bleeding       Nurses Notes reviewed and I agree except as noted in the HPI. HISTORY OF PRESENT ILLNESS    Heath Cardozo is a 80 y.o. female who presents for evaluation of vaginal bleeding. Patient has a history of uterine cancer that was treated by total hysterectomy about a decade ago. At that time it was stage IV per daughter. Patient has not had any vaginal bleeding since then but vaginal bleeding was noted for the past 2 days. Patient's daughter reports that urinalysis was checked at her nursing facility and sent for urine culture. Patient denies presence of any pain. REVIEW OF SYSTEMS     Review of Systems   Constitutional: Negative for activity change, appetite change, chills and fever. HENT: Negative for ear pain and sore throat. Respiratory: Negative for cough, shortness of breath and wheezing. Cardiovascular: Negative for chest pain and leg swelling. Gastrointestinal: Negative for abdominal pain, diarrhea, nausea and vomiting. Genitourinary: Positive for vaginal bleeding. Negative for dysuria, flank pain and hematuria. Musculoskeletal: Negative for arthralgias, back pain, gait problem and neck pain. Skin: Negative for rash and wound. Neurological: Negative for weakness, light-headedness and headaches. Psychiatric/Behavioral: Negative for agitation and hallucinations. The patient is not nervous/anxious.         PAST MEDICAL HISTORY    has a past medical history of Adenocarcinoma of endometrium ([address] Aortic valve sclerosis, Bradycardia, Cardiac murmur, Cataract of left eye, Chronic diarrhea, Diabetes mellitus type II, controlled ([address] Dysthymia, Gait abnormality, Glaucoma, H/O renal calculi, H/O vein stripping, Hard of hearing, History of anemia, History of dizziness, History of radiation therapy, History of small bowel obstruction, Hyperlipidemia, Hypertension, Hypothyroidism, Influenza A, Left elbow fracture, Lung cancer ([address] Lung nodule, Macular degeneration, Obesity, Pericardial effusion, Pseudophakia of right eye, PVD (peripheral vascular disease) ([address] Traumatic injury of lower extremity, Vertigo, Wears glasses, and Wears partial dentures. SURGICAL HISTORY      has a past surgical history that includes jason and bso (cervix removed) (March 2002); Cholecystectomy, laparoscopic (5/28/1998); ERCP (7/19/1998); Colonoscopy (7/22/2003); Elbow fracture surgery (Left, September 2010); Cataract removal (Right, February 2013); and Upper gastrointestinal endoscopy (N/A, 3/11/2022). CURRENT MEDICATIONS       Previous Medications    ACETAMINOPHEN (TYLENOL) 325 MG TABLET    Take 650 mg by mouth every 6 hours as needed for Pain    ALBUTEROL (PROVENTIL) (2.5 MG/3ML) 0.083% NEBULIZER SOLUTION    Take 3 mLs by nebulization every 6 hours as needed for Wheezing or Shortness of Breath    ALBUTEROL SULFATE HFA (PROVENTIL HFA) 108 (90 BASE) MCG/ACT INHALER    Inhale 2 puffs into the lungs every 6 hours as needed for Wheezing    ASPIRIN 81 MG TABLET    Take 81 mg by mouth daily Held 5 days prior to paracentesis on 9-1-20    BRIMONIDINE (ALPHAGAN) 0.2 % OPHTHALMIC SOLUTION    Place 1 drop into both eyes 2 times daily    CARBAMIDE PEROXIDE (DEBROX) 6.5 % OTIC SOLUTION    5-10 drops as needed (In effected ear for wax impaction.)    CRANBERRY PO    Take 1 capsule by mouth daily    FERROUS SULFATE (IRON 325) 325 (65 FE) MG TABLET    Take 325 mg by mouth daily    HYDROCHLOROTHIAZIDE (HYDRODIURIL) 25 MG TABLET    Take 1 tablet by mouth daily    INSULIN GLARGINE (LANTUS) 100 UNIT/ML INJECTION VIAL    Inject 15 Units into the skin daily    LATANOPROST (XALATAN) 0.005 % OPHTHALMIC SOLUTION    Place 1 drop into both eyes nightly.     LATANOPROST (XALATAN) 0.005 % OPHTHALMIC SOLUTION    Place 1 drop into both eyes daily    LEVOTHYROXINE (SYNTHROID) 88 MCG TABLET    TAKE 1 TABLET DAILY    LISINOPRIL (PRINIVIL;ZESTRIL) 40 MG TABLET    TAKE 1 TABLET NIGHTLY    LOPERAMIDE (IMODIUM) 2 MG CAPSULE    Take 2 mg by mouth 4 times daily as needed for Diarrhea    MECLIZINE (ANTIVERT) 25 MG TABLET    Take 12.5 mg by mouth 3 times daily as needed    MULTIPLE VITAMINS-MINERALS (OCUVITE PRESERVISION PO)    Take 1 tablet by mouth daily    MULTIPLE VITAMINS-MINERALS (PRESERVISION AREDS) TABS    Take by mouth    MULTIPLE VITAMINS-MINERALS (THERAPEUTIC MULTIVITAMIN-MINERALS) TABLET    Take 1 tablet by mouth daily    ONDANSETRON (ZOFRAN ODT) 4 MG DISINTEGRATING TABLET    Take 1 tablet by mouth every 8 hours as needed for Nausea    ONDANSETRON (ZOFRAN) 4 MG TABLET    Take 1 tablet by mouth every 8 hours as needed for Nausea or Vomiting    OXYBUTYNIN (DITROPAN-XL) 10 MG EXTENDED RELEASE TABLET    TAKE 1 TABLET DAILY    PANTOPRAZOLE (PROTONIX) 40 MG TABLET    Take 1 tablet by mouth every morning (before breakfast)    SERTRALINE (ZOLOFT) 50 MG TABLET    TAKE 1 TABLET DAILY    SODIUM CHLORIDE 1 G TABLET    Take 1 tablet by mouth 2 times daily       ALLERGIES     is allergic to allopurinol, metoprolol tartrate [metoprolol], percocet [oxycodone-acetaminophen], phenergan [promethazine hcl], polycitra-k [potassium citrate], statins, levaquin [levofloxacin], sulfa antibiotics, and tessalon [benzonatate]. FAMILY HISTORY     She indicated that her mother is . She indicated that her father is . She indicated that the status of her other is unknown.   family history includes Diabetes type 2  in her mother; High Blood Pressure in an other family member. SOCIAL HISTORY      reports that she has never smoked. She has never used smokeless tobacco. She reports that she does not drink alcohol and does not use drugs. PHYSICAL EXAM     INITIAL VITALS:  height is 5' (1.524 m) and weight is 151 lb (68.5 kg). Her temperature is 97 °F (36.1 °C).  Her blood pressure is 145/67 (abnormal) and diverticulosis without diverticulitis. Large colonic fecal load. This document has been electronically signed by: Lina Urbina MD on    03/19/2022 09:48 PM      All CTs at this facility use dose modulation techniques and iterative    reconstructions, and/or weight-based dosing   when appropriate to reduce radiation to a low as reasonably achievable. LABS:   Labs Reviewed   CBC WITH AUTO DIFFERENTIAL - Abnormal; Notable for the following components:       Result Value    RBC 3.15 (*)     Hemoglobin 9.6 (*)     Hematocrit 29.2 (*)     All other components within normal limits   BASIC METABOLIC PANEL - Abnormal; Notable for the following components:    Glucose 177 (*)     All other components within normal limits   GLOMERULAR FILTRATION RATE, ESTIMATED - Abnormal; Notable for the following components:    GFR, Estimated 55 (*)     All other components within normal limits   ANION GAP   URINALYSIS WITH REFLEX TO CULTURE       DEPARTMENT COURSE:   Vitals:    Vitals:    03/19/22 1900   BP: (!) 145/67   Pulse: 73   Resp: 18   Temp: 97 °F (36.1 °C)   SpO2: 95%   Weight: 151 lb (68.5 kg)   Height: 5' (1.524 m)       MDM:  Patient presents for evaluation of vaginal bleeding. Patient has had a total hysterectomy. When nursing staff performed a straight cath to obtain a urine sample, it was noted that patient had gross hematuria. Blood in urine obscured whether infection was present so Macrobid given with urine culture result pending. CT imaging of the abdomen pelvis demonstrates a small right renal stone in addition to right sided hydroureter and hydronephrosis. Radiologist notes that it is difficult to follow the ureter into the pelvis and that there may be a 2 mm distal ureteral stone. This may account for patient's gross hematuria. As it is currently unknown if patient may have an infected stone, Macrobid prescribed.  CT imaging also reveals that patient has lung lesions and nodules which would be consistent with her diagnosis of lung cancer. Recommended follow-up with patient's PCP as she may require renal ultrasound imaging ordered, referral to urology, or referral to gynecology. On the morning of 3/21/2022, patient's daughter was informed that urine culture did not grow bacteria. Recommended follow-up with urology if symptoms do not resolve. Dr. Cervantes [place] clinic telephone number was provided. CRITICAL CARE:   None    CONSULTS:  None    PROCEDURES:  None    FINAL IMPRESSION      1. Gross hematuria    2.  Nephrolithiasis          DISPOSITION/PLAN   Discharge    (Please note that portions of this note were completedwith a voice recognition program.  Efforts were made to edit the dictations but occasionally words are mis-transcribed.)    MD Corwin Jasmine MD  03/21/22 4245

## 2022-03-19 NOTE — ED NOTES
Pt up to bedside commode unable to urinate. Verbal order for straight cath by Dr Jesse Greer. Repeated and verified. Straight cath by sterile technique by Nica DUENAS and Anjelica Jansen RN. Pt tolerated well. Deep red urine color obtained. Specimen to lab.      Gabby Estrada RN  03/19/22 1954

## 2022-03-20 NOTE — ED NOTES
Discharge teaching and instructions for condition explained to patient. AVS reviewed. Went over prescriptions with patient. Patient voiced understanding regarding prescriptions, follow up appointments and care of self at home. Pt discharged to home in stable condition with daughter.  Taken by wheelchair to d/c randall Camejo RN  03/19/22 8111

## 2022-03-22 ENCOUNTER — OFFICE VISIT (OUTPATIENT)
Dept: UROLOGY | Age: 87
End: 2022-03-22
Payer: MEDICARE

## 2022-03-22 VITALS
BODY MASS INDEX: 29.64 KG/M2 | WEIGHT: 151 LBS | SYSTOLIC BLOOD PRESSURE: 112 MMHG | HEIGHT: 60 IN | DIASTOLIC BLOOD PRESSURE: 72 MMHG

## 2022-03-22 DIAGNOSIS — R32 URINARY INCONTINENCE, UNSPECIFIED TYPE: ICD-10-CM

## 2022-03-22 DIAGNOSIS — N20.0 KIDNEY STONE: ICD-10-CM

## 2022-03-22 DIAGNOSIS — N20.1 RIGHT URETERAL STONE: Primary | ICD-10-CM

## 2022-03-22 DIAGNOSIS — R31.0 GROSS HEMATURIA: ICD-10-CM

## 2022-03-22 PROBLEM — R53.81 DECLINING FUNCTIONAL STATUS: Status: ACTIVE | Noted: 2022-03-22

## 2022-03-22 LAB — URINE CULTURE, ROUTINE: NORMAL

## 2022-03-22 PROCEDURE — 1123F ACP DISCUSS/DSCN MKR DOCD: CPT | Performed by: UROLOGY

## 2022-03-22 PROCEDURE — 99204 OFFICE O/P NEW MOD 45 MIN: CPT | Performed by: UROLOGY

## 2022-03-22 PROCEDURE — G8427 DOCREV CUR MEDS BY ELIG CLIN: HCPCS | Performed by: UROLOGY

## 2022-03-22 PROCEDURE — G8484 FLU IMMUNIZE NO ADMIN: HCPCS | Performed by: UROLOGY

## 2022-03-22 PROCEDURE — 1036F TOBACCO NON-USER: CPT | Performed by: UROLOGY

## 2022-03-22 PROCEDURE — 4040F PNEUMOC VAC/ADMIN/RCVD: CPT | Performed by: UROLOGY

## 2022-03-22 PROCEDURE — 1090F PRES/ABSN URINE INCON ASSESS: CPT | Performed by: UROLOGY

## 2022-03-22 PROCEDURE — 0509F URINE INCON PLAN DOCD: CPT | Performed by: UROLOGY

## 2022-03-22 PROCEDURE — G8417 CALC BMI ABV UP PARAM F/U: HCPCS | Performed by: UROLOGY

## 2022-03-22 NOTE — PROGRESS NOTES
Pt daughter stated that she is bleeding more today vaginally. Also stated she is weaker today then usual. Pt started an antibiotic but stopped today because she does not have an infection.

## 2022-03-22 NOTE — PROGRESS NOTES
[facility] [address] [address]  Dept: [phone]  Dept Fax: [phone]  Loc: [address] [facility] Urology Office Note      Patient:  Madhuri Sena  YOB: 1932    The patient is a 80 y.o. female who presents today for evaluation of the following problems:   Chief Complaint   Patient presents with    New Patient     er f/u kidney stone         History of Present Illness:    Right ureteral stone  2 mm distal    Incontinence  Workup in 2018 in [place] with myself  Offered interstim at that time  Fecal/urinary incont. Still not at treatment goal    Summary of Previous Records:  Has fecal incontinence since radiation for uterine cancer- s/p hysterectomy   Fecal incontinence-imodium and lomotil  Patient may benefit from InterStim for fecal and urinary incontinence. Requested/reviewed records from Harshad Boston MD office and/or outside physician/EMR    (Patient's old records have been requested, reviewed and pertinent findings summarized in today's note.)    Procedures Today: N/A    Last several PSA's:  No results found for: PSA    Last total testosterone:  No results found for: TESTOSTERONE    Urinalysis today:  No results found for this visit on 03/22/22. Last BUN and creatinine:  Lab Results   Component Value Date    BUN 15 03/19/2022     Lab Results   Component Value Date    CREATININE 1.0 03/19/2022       Imaging Reviewed during this Office Visit:   Elpidio Vides MD independently reviewed the images and verified the radiology reports from:    CT ABDOMEN PELVIS WO CONTRAST Additional Contrast? None    Result Date: 3/19/2022  Exam: CT abdomen pelvis without contrast Comparison: None Clinical history: Hematuria. Abnormal vaginal bleeding and postmenopausal patient.  History of total hysterectomy Findings: Bilateral pulmonary nodules in the lung bases concerning for possible metastatic disease. Large complex rounded lesion in the dependent right lower lobe is incompletely visualized on this exam. It could represent a pulmonary mass versus round pneumonia and measures approximately 5.7 x 7.6 cm. Small right pleural fluid collection. Left basilar airspace disease may represent atelectasis or pneumonia. Liver and spleen do not demonstrate any acute process. No liver lesions Numerous calcifications along the pancreas suggestive of chronic pancreatitis. No acute pancreatitis identified. Prior cholecystectomy. No choledocholithiasis or biliary obstruction. Normal adrenal glands. Small atrophic left kidney. Right kidney is normal in size and demonstrates a small stone at the collecting system. Right hydronephrosis. Right hydroureter can be followed to the level of the pelvis. Ureter is not distensible in the deep right pelvis. There is a small calcification seen on series 2 image 66 which could potentially represent a distal right ureteral stone No small bowel obstruction. Sigmoid diverticulosis without diverticulitis. Large colonic fecal load. Normal caliber appendix. Prior hysterectomy. Urinary bladder does not demonstrate stones or wall thickening. Multilevel degenerative disease of the lumbar spine. Severe right and moderate left hip arthritis. Impression: 1. Bilateral pulmonary nodules in the lung bases concerning for possible metastatic disease. 2. 5.7 x 7.6 cm complex rounded lesion in the dependent right lower lobe may represent a pulmonary mass versus round pneumonia. 3. Small right pleural fluid collection. Left basilar airspace disease may represent atelectasis or pneumonia 4. Numerous calcifications along the pancreas suggestive of chronic pancreatitis. No acute pancreatitis identified. 5. Single small right renal stone. Right-sided hydronephrosis and hydroureter. Ureter is difficult to follow in the pelvis.  Possible small 2 mm calcification could potentially represent a distal right ureteral stone. 6. Small atrophic left kidney. 7. Sigmoid diverticulosis without diverticulitis. Large colonic fecal load. This document has been electronically signed by: Megan Castillo MD on 03/19/2022 09:48 PM All CTs at this facility use dose modulation techniques and iterative reconstructions, and/or weight-based dosing when appropriate to reduce radiation to a low as reasonably achievable.       PAST MEDICAL, FAMILY AND SOCIAL HISTORY:  Past Medical History:   Diagnosis Date    Adenocarcinoma of endometrium ([address]     Aortic valve sclerosis     Bradycardia     follows with cardiology- Dr. Spike Corcoran, possible SSS    Cardiac murmur     Cataract of left eye     Chronic diarrhea     s/p radiation    Diabetes mellitus type II, controlled ([address]     diet controlled     Dysthymia     Gait abnormality 6/23/2014    Glaucoma     H/O renal calculi     H/O vein stripping     Hard of hearing     History of anemia     History of dizziness     follows with cardiology, bradycardia, possible SSS    History of radiation therapy     for uterine cancer    History of small bowel obstruction 03/2019    no surgery required    Hyperlipidemia     Hypertension     Hypothyroidism     Influenza A 1/11/2018    Left elbow fracture     S/P surgical repair    Lung cancer ([address]     Lung nodule     Macular degeneration     Obesity     Pericardial effusion 4/23/2014    Pseudophakia of right eye     PVD (peripheral vascular disease) ([address]     Traumatic injury of lower extremity childhood    bilateral trauma of lower extremities    Vertigo     resolved    Wears glasses     Wears partial dentures     upper      Past Surgical History:   Procedure Laterality Date    CATARACT REMOVAL Right February 2013    Dr. John Segal, LAPAROSCOPIC  5/28/1998    COLONOSCOPY  7/22/2003    Dr. Miranda Bal Left September 2010    ORIF, Dr. Merlin Ensign ERCP  7/19/1998    retained bill, Suzy Denny Liter MAGDA AND BSO  March 2002    endometrial adenocarcinoma, Dr. Yoni Arita N/A 3/11/2022    EGD ESOPHAGOGASTRODUODENOSCOPY DILATATION performed by Angela Foster MD at [facility] Endoscopy     Family History   Problem Relation Age of Onset    High Blood Pressure Other     Diabetes type 2  Mother         developed later in life     No outpatient medications have been marked as taking for the 3/22/22 encounter (Office Visit) with Venkatesh Short MD.       Allopurinol, Metoprolol tartrate [metoprolol], Percocet [oxycodone-acetaminophen], Phenergan [promethazine hcl], Polycitra-k [potassium citrate], Statins, Levaquin [levofloxacin], Sulfa antibiotics, and Tessalon [benzonatate]  Social History     Tobacco Use   Smoking Status Never Smoker   Smokeless Tobacco Never Used   Tobacco Comment    never smoked yant rrt 01/11/2018      (If patient a smoker, smoking cessation counseling offered)   Social History     Substance and Sexual Activity   Alcohol Use No    Alcohol/week: 0.0 standard drinks       REVIEW OF SYSTEMS:  Constitutional: negative  Eyes: negative  Respiratory: negative  Cardiovascular: negative  Gastrointestinal: negative  Genitourinary: see HPI  Musculoskeletal: negative  Skin: negative   Neurological: negative  Hematological/Lymphatic: negative  Psychological: negative      Physical Exam:    This a 80 y.o. female  Vitals:    03/22/22 1059   BP: 112/72     Body mass index is 29.49 kg/m². Constitutional: Patient in no acute distress;         Assessment and Plan        1. Right ureteral stone    2. Kidney stone    3. Urinary incontinence, unspecified type               Plan:       Gross hematuria- I do not believe this is secondary to small punctate right ureteral stone. Incontinence- conserv management at this time  Right ureteral stone- trial of passage.  The hydronephrosis could be secondary to malignancy/stricture etc.   Poor prognosis from metastatic cancer    Essentially solitary right kidney with hematuria and unilateral hydronephrosis. Hx of ureterine cancer, lung cancer, XRT. Needs right stent. Prescriptions Ordered:  No orders of the defined types were placed in this encounter. Orders Placed:  No orders of the defined types were placed in this encounter.            Nola Pereira MD

## 2022-03-23 ENCOUNTER — TELEPHONE (OUTPATIENT)
Dept: UROLOGY | Age: 87
End: 2022-03-23

## 2022-03-23 ENCOUNTER — PREP FOR PROCEDURE (OUTPATIENT)
Dept: UROLOGY | Age: 87
End: 2022-03-23

## 2022-03-23 RX ORDER — SODIUM CHLORIDE 9 MG/ML
INJECTION, SOLUTION INTRAVENOUS CONTINUOUS
Status: CANCELLED | OUTPATIENT
Start: 2022-03-24

## 2022-03-23 NOTE — TELEPHONE ENCOUNTER
Patent added to Dr Catalina Bravo surgery schedule on 3/24/22. Surgery consent on arrival. Patient does not need any pre op testing done. Surgery instructions gone over verbally with patient's daughter. Arrival to [facility] @ 6 am, NPO after midnight, Have a , Hold the Lantus the morning of surgery. Gabo Downing ( daughter) voiced understanding. Patient resides at [facility].  Daughter is her transportation and will be with her

## 2022-03-23 NOTE — TELEPHONE ENCOUNTER
SURGERY [address]  [facility] [address] [facility], [address]      Phone *[phone] *[phone]   Surgical Scheduling Direct Line Phone *[phone] Fax *[phone]      Marquis Haro 4/11/1932 female    [address] [facility]   Marital Status:          Home Phone: [phone]      Cell Phone:    Telephone Information:   Mobile [phone]          Surgeon: Dr. Rozina Palmer Surgery Date: 3/24/22   Time: 7:30 am    Procedure: Cystoscopy, Right Stent Placement, Right Retrograde Pyelogram     Diagnosis: Right Ureteral Stone. Gross Hematuria    Important Medical History:  In Epic                             PATIENT AT [facility]    Special Inst/Equip:     CPT Codes:    75427,51392  Latex Allergy: No     Cardiac Device:  No    Anesthesia:  MAC          Admission Type:  Same Day                        Admit Prior to Day of Surgery: No    Case Location:  Main OR            Preadmission Testing:  Phone Call          PAT Date and Time:______________________________________________________    PAT Confirmation #: ______________________________________________________    Post Op Visit: ___________________________________________________________    Need Preop Cardiac Clearance: No    Does Patient have Cardiologist/physician?      None    Surgery Confirmation #: __________________________________________________    : ________________________   Date: __________________________     [employer] Name: Medicare

## 2022-03-24 ENCOUNTER — ANESTHESIA (OUTPATIENT)
Dept: OPERATING ROOM | Age: 87
End: 2022-03-24
Payer: MEDICARE

## 2022-03-24 ENCOUNTER — ANESTHESIA EVENT (OUTPATIENT)
Dept: OPERATING ROOM | Age: 87
End: 2022-03-24
Payer: MEDICARE

## 2022-03-24 ENCOUNTER — TELEPHONE (OUTPATIENT)
Dept: UROLOGY | Age: 87
End: 2022-03-24

## 2022-03-24 ENCOUNTER — HOSPITAL ENCOUNTER (OUTPATIENT)
Age: 87
Setting detail: OUTPATIENT SURGERY
Discharge: HOME | End: 2022-03-24
Attending: UROLOGY | Admitting: UROLOGY
Payer: MEDICARE

## 2022-03-24 VITALS — DIASTOLIC BLOOD PRESSURE: 67 MMHG | OXYGEN SATURATION: 85 % | SYSTOLIC BLOOD PRESSURE: 144 MMHG

## 2022-03-24 VITALS
OXYGEN SATURATION: 95 % | BODY MASS INDEX: 28.45 KG/M2 | DIASTOLIC BLOOD PRESSURE: 60 MMHG | RESPIRATION RATE: 20 BRPM | HEART RATE: 71 BPM | SYSTOLIC BLOOD PRESSURE: 138 MMHG | WEIGHT: 154.6 LBS | TEMPERATURE: 97.1 F | HEIGHT: 62 IN

## 2022-03-24 LAB
GLUCOSE BLD-MCNC: 167 MG/DL (ref 70–108)
INR BLD: 1.13 (ref 0.85–1.13)

## 2022-03-24 PROCEDURE — 6370000000 HC RX 637 (ALT 250 FOR IP): Performed by: ANESTHESIOLOGY

## 2022-03-24 PROCEDURE — C1758 CATHETER, URETERAL: HCPCS | Performed by: UROLOGY

## 2022-03-24 PROCEDURE — C1769 GUIDE WIRE: HCPCS | Performed by: UROLOGY

## 2022-03-24 PROCEDURE — 2700000000 HC OXYGEN THERAPY PER DAY

## 2022-03-24 PROCEDURE — 36415 COLL VENOUS BLD VENIPUNCTURE: CPT

## 2022-03-24 PROCEDURE — 85610 PROTHROMBIN TIME: CPT

## 2022-03-24 PROCEDURE — 7100000011 HC PHASE II RECOVERY - ADDTL 15 MIN: Performed by: UROLOGY

## 2022-03-24 PROCEDURE — 3700000000 HC ANESTHESIA ATTENDED CARE: Performed by: UROLOGY

## 2022-03-24 PROCEDURE — 94640 AIRWAY INHALATION TREATMENT: CPT

## 2022-03-24 PROCEDURE — 2709999900 HC NON-CHARGEABLE SUPPLY: Performed by: UROLOGY

## 2022-03-24 PROCEDURE — 2580000003 HC RX 258: Performed by: UROLOGY

## 2022-03-24 PROCEDURE — 3600000002 HC SURGERY LEVEL 2 BASE: Performed by: UROLOGY

## 2022-03-24 PROCEDURE — 6360000002 HC RX W HCPCS: Performed by: NURSE ANESTHETIST, CERTIFIED REGISTERED

## 2022-03-24 PROCEDURE — 3600000012 HC SURGERY LEVEL 2 ADDTL 15MIN: Performed by: UROLOGY

## 2022-03-24 PROCEDURE — 82948 REAGENT STRIP/BLOOD GLUCOSE: CPT

## 2022-03-24 PROCEDURE — 94761 N-INVAS EAR/PLS OXIMETRY MLT: CPT

## 2022-03-24 PROCEDURE — 7100000010 HC PHASE II RECOVERY - FIRST 15 MIN: Performed by: UROLOGY

## 2022-03-24 PROCEDURE — C2617 STENT, NON-COR, TEM W/O DEL: HCPCS | Performed by: UROLOGY

## 2022-03-24 PROCEDURE — 3700000001 HC ADD 15 MINUTES (ANESTHESIA): Performed by: UROLOGY

## 2022-03-24 PROCEDURE — 6360000002 HC RX W HCPCS: Performed by: UROLOGY

## 2022-03-24 PROCEDURE — 94760 N-INVAS EAR/PLS OXIMETRY 1: CPT

## 2022-03-24 DEVICE — URETERAL STENT
Type: IMPLANTABLE DEVICE | Status: FUNCTIONAL
Brand: PERCUFLEX™ PLUS

## 2022-03-24 RX ORDER — PROPOFOL 10 MG/ML
INJECTION, EMULSION INTRAVENOUS PRN
Status: DISCONTINUED | OUTPATIENT
Start: 2022-03-24 | End: 2022-03-24 | Stop reason: SDUPTHER

## 2022-03-24 RX ORDER — IPRATROPIUM BROMIDE AND ALBUTEROL SULFATE 2.5; .5 MG/3ML; MG/3ML
1 SOLUTION RESPIRATORY (INHALATION) ONCE
Status: COMPLETED | OUTPATIENT
Start: 2022-03-24 | End: 2022-03-24

## 2022-03-24 RX ORDER — CEPHALEXIN 500 MG/1
500 CAPSULE ORAL 3 TIMES DAILY
Qty: 15 CAPSULE | Refills: 0 | Status: SHIPPED | OUTPATIENT
Start: 2022-03-24 | End: 2022-03-29

## 2022-03-24 RX ORDER — PHENAZOPYRIDINE HYDROCHLORIDE 200 MG/1
200 TABLET, FILM COATED ORAL 3 TIMES DAILY PRN
Qty: 9 TABLET | Refills: 0 | Status: ON HOLD | OUTPATIENT
Start: 2022-03-24 | End: 2022-04-30 | Stop reason: HOSPADM

## 2022-03-24 RX ORDER — SODIUM CHLORIDE 9 MG/ML
INJECTION, SOLUTION INTRAVENOUS CONTINUOUS
Status: DISCONTINUED | OUTPATIENT
Start: 2022-03-24 | End: 2022-03-24 | Stop reason: HOSPADM

## 2022-03-24 RX ADMIN — Medication 50 MG: at 08:03

## 2022-03-24 RX ADMIN — SODIUM CHLORIDE: 9 INJECTION, SOLUTION INTRAVENOUS at 07:04

## 2022-03-24 RX ADMIN — PROPOFOL 25 MG: 10 INJECTION, EMULSION INTRAVENOUS at 08:20

## 2022-03-24 RX ADMIN — Medication 2000 MG: at 08:07

## 2022-03-24 RX ADMIN — IPRATROPIUM BROMIDE AND ALBUTEROL SULFATE 1 AMPULE: .5; 3 SOLUTION RESPIRATORY (INHALATION) at 07:35

## 2022-03-24 RX ADMIN — PROPOFOL 50 MG: 10 INJECTION, EMULSION INTRAVENOUS at 08:24

## 2022-03-24 RX ADMIN — PROPOFOL 50 MG: 10 INJECTION, EMULSION INTRAVENOUS at 08:03

## 2022-03-24 RX ADMIN — PROPOFOL 25 MG: 10 INJECTION, EMULSION INTRAVENOUS at 08:09

## 2022-03-24 RX ADMIN — PROPOFOL 50 MG: 10 INJECTION, EMULSION INTRAVENOUS at 08:14

## 2022-03-24 RX ADMIN — IPRATROPIUM BROMIDE AND ALBUTEROL SULFATE 1 AMPULE: .5; 3 SOLUTION RESPIRATORY (INHALATION) at 08:45

## 2022-03-24 ASSESSMENT — PULMONARY FUNCTION TESTS
PIF_VALUE: 0
PIF_VALUE: 21
PIF_VALUE: 0
PIF_VALUE: 2
PIF_VALUE: 0
PIF_VALUE: 0
PIF_VALUE: 22
PIF_VALUE: 0
PIF_VALUE: 0
PIF_VALUE: 1
PIF_VALUE: 0

## 2022-03-24 ASSESSMENT — PAIN SCALES - GENERAL
PAINLEVEL_OUTOF10: 0

## 2022-03-24 ASSESSMENT — PAIN - FUNCTIONAL ASSESSMENT: PAIN_FUNCTIONAL_ASSESSMENT: 0-10

## 2022-03-24 NOTE — OP NOTE
Operative Note      Patient: Flynn Keyes  YOB: 1932  MRN: [id number]    Date of Procedure: 3/24/2022    Pre-Op Diagnosis: RIGHT URETERAL STONE, GROSS HEMATURIA, right hydronephrosis    Post-Op Diagnosis: Same       Procedure(s):  CYSTOSCOPY, RIGHT STENT PLACEMENT, RIGHT RETROGRADE PYELOGRAM    Surgeon(s):  Arleen Coto MD    Assistant:   * No surgical staff found *    Anesthesia: Monitor Anesthesia Care    Estimated Blood Loss (mL): Minimal    Complications: None    Specimens:   * No specimens in log *    Implants:  Implant Name Type Inv. Item Serial No.  Lot No. LRB No. Used Action   [employer] 6FR L24CM HYDR+ GRAD CIRCUMFERENTIAL MRK LO PROF - [id number]  STENT URET 6FR L24CM HYDR+ GRAD CIRCUMFERENTIAL MRK LO PROF  [facility] UROLOGY- [id number] Right 1 Implanted         Drains: * No LDAs found *    Findings: right mild hydroureter          DETAILS OF THE PROCEDURE:  The patient was correctly identified in the preoperative holding area. The patient  was brought back to the operating room and placed in the dorsal lithotomy  position. Anesthesia was administered; antibiotics administered by Anesthesia. EPC cuffs  were on and functional. The patient was then prepped and draped in the usual sterile fashion. Once an appropriate time out had been performed, with all parties  consenting, a 25 Malian cystoscope with a 30-degree lens was placed through  the urethra into the bladder. The right ureteral orifice was cannulated with a 5 Malian ureteral catheter. Contrast was injected and the right ureter and renal pelvis were identified, with  no abnormalities or filling defects. A thorough and complete cystoscopy was performed with no abnormalities involving the bladder or mucosa. We then focused our attention on the right ureteral orifice, which we cannulated with our glidewire.  Over our glidewire we placed a 6x24 cm double-J ureteral stent and we noted appropriate placement in the upper collecting system using fluoroscopy. We then removed our wire. There was good proximal and distal curl. We did not leave the string at the end of the stent. We then drained the patient's bladder and removed the scope and the procedure was subsequently terminated. Plan:   The patient will be discharged home in good condition.    Follow up with Dr Rola Muñoz to discuss right ureteroscopy versus right ureteral stent change        Electronically signed by Aranza Benitez M.D, MD on 3/24/2022 at 8:59 AM

## 2022-03-24 NOTE — PROGRESS NOTES
Pt has met discharge criteria and states she is ready for discharge to home. IV removed, gauze and tape applied. Dressed in own clothes and personal belongings gathered. Discharge instructions given to pt and family. Pt and family verbalized understanding of discharge instructions, prescriptions and follow up appointments. Pt transported to discharge lobby by [facility] staff. Pt discharged to nursing home per daughter. RN instructed daughter that she informed NH if patient remains unable to urinate after 3 hours or is having pain in bladder region to contact Dr. Eden Borne office. Verbalized understanding.

## 2022-03-24 NOTE — TELEPHONE ENCOUNTER
If pt has still not urinated place arreguin catheter. Please advise them to update office on amount of residual returned with placement.

## 2022-03-24 NOTE — ANESTHESIA POSTPROCEDURE EVALUATION
Department of Anesthesiology  Postprocedure Note    Patient: Ned Espinosa  MRN: [id number]  YOB: 1932  Date of evaluation: 3/24/2022  Time:  9:53 AM     Procedure Summary     Date: 03/24/22 Room / Location: [facility]    Anesthesia Start: 7918 Anesthesia Stop: 6296    Procedure: CYSTOSCOPY, RIGHT STENT PLACEMENT, RIGHT RETROGRADE PYELOGRAM (Right ) Diagnosis: (RIGHT URETERAL STONE, GROSS HEMATURIA)    Surgeons: Trevor Ugarte MD Responsible Provider: Leann Fleischer, MD    Anesthesia Type: MAC ASA Status: 4          Anesthesia Type: MAC    Alta Phase I: Alta Score: 10    Alta Phase II: Alta Score: 10    Last vitals: Reviewed and per EMR flowsheets.        Anesthesia Post Evaluation    Patient location during evaluation: bedside  Patient participation: complete - patient participated  Level of consciousness: awake  Airway patency: patent  Nausea & Vomiting: no vomiting and no nausea  Complications: no  Cardiovascular status: hemodynamically stable  Respiratory status: acceptable and nasal cannula  Hydration status: stable

## 2022-03-24 NOTE — PROGRESS NOTES
Report called to [facility] spoke with Maryan Guerra patients nurse. Informed of discharge instructions, medications and additional information. Informed pt did not urinate, instructed to contact office in 3 hours if patient remains unable to urinate. Verbalized understanding.

## 2022-03-24 NOTE — FLOWSHEET NOTE
Pt returned to [facility] room 3. Vitals and assessment as charted. Pt has ice cream and water/soda. Family at the bedside. Pt and family verbalized understanding of discharge criteria and call light use. Call light in reach.

## 2022-03-24 NOTE — H&P
History and Physical    Patient:  Yayo Mckeon  MRN: [id number]  YOB: 1932    CHIEF COMPLAINT:  Right hydro    HISTORY OF PRESENT ILLNESS:   The patient is a 80 y.o. female who presents with as above, here for surgery    Patient's old records, notes and chart reviewed and summarized above. Past Medical History:    Past Medical History:   Diagnosis Date    Adenocarcinoma of endometrium ([facility]     Aortic valve sclerosis     Bradycardia     follows with cardiology- Dr. Kendall Peña, possible SSS    Cardiac murmur     Cataract of left eye     Chronic diarrhea     s/p radiation    Diabetes mellitus type II, controlled ([address]     diet controlled     Dysthymia     Gait abnormality 6/23/2014    Glaucoma     H/O renal calculi     H/O vein stripping     Hard of hearing     History of anemia     History of dizziness     follows with cardiology, bradycardia, possible SSS    History of radiation therapy     for uterine cancer    History of small bowel obstruction 03/2019    no surgery required    Hyperlipidemia     Hypertension     Hypothyroidism     Influenza A 1/11/2018    Left elbow fracture     S/P surgical repair    Lung cancer ([facility]     Lung nodule     Macular degeneration     Obesity     Pericardial effusion 4/23/2014    Pseudophakia of right eye     PVD (peripheral vascular disease) ([facility]     Traumatic injury of lower extremity childhood    bilateral trauma of lower extremities    Vertigo     resolved    Wears glasses     Wears partial dentures     upper        Past Surgical History:    Past Surgical History:   Procedure Laterality Date    CATARACT REMOVAL Right February 2013    Dr. Vivian Mahoney, LAPAROSCOPIC  5/28/1998    COLONOSCOPY  7/22/2003    Dr. Sailaja Ackerman Left September 2010    ORIF, Dr. Shelle Oppenheim ERCP  7/19/1998    retained stone, Suzy Klein and Gena Cuellar MAGDA AND BSO  March 2002    endometrial adenocarcinoma,  Quinn Angelo    UPPER GASTROINTESTINAL ENDOSCOPY N/A 3/11/2022    EGD ESOPHAGOGASTRODUODENOSCOPY DILATATION performed by Hair Rondon MD at [facility]     Medications Prior to Admission:    Prior to Admission medications    Medication Sig Start Date End Date Taking?  Authorizing Provider   D-Mannose 500 MG CAPS Take by mouth 2 times daily   Yes Historical Provider, MD   bimatoprost (LUMIGAN) 0.01 % SOLN ophthalmic drops Place 1 drop into both eyes nightly   Yes Historical Provider, MD   nitrofurantoin, macrocrystal-monohydrate, (MACROBID) 100 MG capsule Take 1 capsule by mouth 2 times daily for 7 days 3/20/22 3/27/22  Miguel Angel Rosen MD   brimonidine (ALPHAGAN) 0.2 % ophthalmic solution Place 1 drop into both eyes 2 times daily    Historical Provider, MD   latanoprost (XALATAN) 0.005 % ophthalmic solution Place 1 drop into both eyes daily    Historical Provider, MD   Multiple Vitamins-Minerals (PRESERVISION AREDS) TABS Take by mouth    Historical Provider, MD   pantoprazole (PROTONIX) 40 MG tablet Take 1 tablet by mouth every morning (before breakfast) 3/11/22   Hair Rondon MD   carbamide peroxide (DEBROX) 6.5 % otic solution 5-10 drops as needed (In effected ear for wax impaction.)    Historical Provider, MD   ondansetron (ZOFRAN ODT) 4 MG disintegrating tablet Take 1 tablet by mouth every 8 hours as needed for Nausea 2/16/22   Mary Kyle MD   loperamide (IMODIUM) 2 MG capsule Take 2 mg by mouth 4 times daily as needed for Diarrhea    Historical Provider, MD   acetaminophen (TYLENOL) 325 MG tablet Take 650 mg by mouth every 6 hours as needed for Pain    Historical Provider, MD   hydroCHLOROthiazide (HYDRODIURIL) 25 MG tablet Take 1 tablet by mouth daily 9/4/20   Abdi Wiseman   sodium chloride 1 g tablet Take 1 tablet by mouth 2 times daily 9/3/20   Abdi Wiseman   insulin glargine (LANTUS) 100 UNIT/ML injection vial Inject 15 Units into the skin daily 9/4/20   Abdi Wiseman   albuterol (PROVENTIL) (2.5 MG/3ML) 0.083% nebulizer solution Take 3 mLs by nebulization every 6 hours as needed for Wheezing or Shortness of Breath 8/28/20   Christophe Tolbert MD   albuterol sulfate HFA (PROVENTIL HFA) 108 (90 Base) MCG/ACT inhaler Inhale 2 puffs into the lungs every 6 hours as needed for Wheezing 8/9/20   Kemi Connor MD   ferrous sulfate (IRON 325) 325 (65 Fe) MG tablet Take 325 mg by mouth daily    Historical Provider, MD   sertraline (ZOLOFT) 50 MG tablet TAKE 1 TABLET DAILY 5/8/20   Bossman Sanchez DO   oxybutynin (DITROPAN-XL) 10 MG extended release tablet TAKE 1 TABLET DAILY 3/29/20   Uri Hurley MD   levothyroxine (SYNTHROID) 88 MCG tablet TAKE 1 TABLET DAILY 2/4/20   Wiliam Eddy DO   ondansetron (ZOFRAN) 4 MG tablet Take 1 tablet by mouth every 8 hours as needed for Nausea or Vomiting 1/23/20   Wiliam Eddy DO   lisinopril (PRINIVIL;ZESTRIL) 40 MG tablet TAKE 1 TABLET NIGHTLY 12/17/19   Jazmine Shah,    meclizine (ANTIVERT) 25 MG tablet Take 12.5 mg by mouth 3 times daily as needed    Historical Provider, MD   Multiple Vitamins-Minerals (THERAPEUTIC MULTIVITAMIN-MINERALS) tablet Take 1 tablet by mouth daily    Historical Provider, MD   CRANBERRY PO Take 1 capsule by mouth daily    Historical Provider, MD   Multiple Vitamins-Minerals (OCUVITE PRESERVISION PO) Take 1 tablet by mouth daily    Historical Provider, MD   aspirin 81 MG tablet Take 81 mg by mouth daily Held 5 days prior to paracentesis on 9-1-20    Historical Provider, MD   latanoprost (XALATAN) 0.005 % ophthalmic solution Place 1 drop into both eyes nightly.     Historical Provider, MD       Allergies:  Allopurinol, Metoprolol tartrate [metoprolol], Percocet [oxycodone-acetaminophen], Phenergan [promethazine hcl], Polycitra-k [potassium citrate], Statins, Levaquin [levofloxacin], Sulfa antibiotics, and Tessalon [benzonatate]    Social History:    Social History     Socioeconomic History    Marital status:      Spouse name: Not on file    Number of children: Not on file    Years of education: Not on file    Highest education level: Not on file   Occupational History    Not on file   Tobacco Use    Smoking status: Never Smoker    Smokeless tobacco: Never Used   Substance and Sexual Activity    Alcohol use: No     Alcohol/week: 0.0 standard drinks    Drug use: No    Sexual activity: Not Currently   Other Topics Concern    Not on file   Social History Narrative    Not on file     Social Determinants of Health     Financial Resource Strain:     Difficulty of Paying Living Expenses: Not on file   Food Insecurity:     Worried About Running Out of Food in the Last Year: Not on file    Bruce of Food in the Last Year: Not on file   Transportation Needs:     Lack of Transportation (Medical): Not on file    Lack of Transportation (Non-Medical):  Not on file   Physical Activity:     Days of Exercise per Week: Not on file    Minutes of Exercise per Session: Not on file   Stress:     Feeling of Stress : Not on file   Social Connections:     Frequency of Communication with Friends and Family: Not on file    Frequency of Social Gatherings with Friends and Family: Not on file    Attends Anglican Services: Not on file    Active Member of [address] [place] [address] [employer] or Organizations: Not on file    Attends Club or Organization Meetings: Not on file    Marital Status: Not on file   Intimate Partner Violence:     Fear of Current or Ex-Partner: Not on file    Emotionally Abused: Not on file    Physically Abused: Not on file    Sexually Abused: Not on file   Housing Stability:     Unable to Pay for Housing in the Last Year: Not on file    Number of [place] in the Last Year: Not on file    Unstable Housing in the Last Year: Not on file       Family History:    Family History   Problem Relation Age of Onset    High Blood Pressure Other     Diabetes type 2  Mother         developed later in life       REVIEW OF SYSTEMS:  Constitutional: negative  Eyes: negative  Respiratory: negative  Cardiovascular: negative  Gastrointestinal: negative  Genitourinary: see HPI  Musculoskeletal: negative  Skin: negative   Neurological: negative  Hematological/Lymphatic: negative  Psychological: negative    Physical Exam:      Patient Vitals for the past 24 hrs:   BP Temp Temp src Pulse Resp SpO2 Height Weight   03/24/22 0735 -- -- -- -- 20 96 % -- --   03/24/22 0639 -- -- -- -- -- -- 5' 2\" (1.575 m) 154 lb 9.6 oz (70.1 kg)   03/24/22 0609 138/64 97 °F (36.1 °C) Oral 80 16 92 % -- --     Constitutional: Patient in no acute distress; Neuro: alert and oriented to person place and time. Psych: Mood and affect normal.  Skin: Normal  Lungs: Respiratory effort normal, CTA  Cardiovascular:  Normal peripheral pulses; no murmur  Abdomen: Soft, non-tender, non-distended with no CVA, flank pain, hepatosplenomegaly or hernia. Kidneys normal.  Bladder non-tender and not distended. LABS:   No results for input(s): WBC, HGB, HCT, MCV, PLT in the last 72 hours. No results for input(s): NA, K, CL, CO2, PHOS, BUN, CREATININE, CA in the last 72 hours. No results found for: PSA        Urinalysis: No results for input(s): COLORU, PHUR, LABCAST, WBCUA, RBCUA, MUCUS, TRICHOMONAS, YEAST, BACTERIA, CLARITYU, SPECGRAV, LEUKOCYTESUR, UROBILINOGEN, Wallene Keel in the last 72 hours.     Invalid input(s): NITRATE, GLUCOSEUKETONESUAMORPHOUS     -----------------------------------------------------------------      Assessment and Plan   Impression:    Patient Active Problem List   Diagnosis    DM (diabetes mellitus) type II controlled with renal manifestation ([facility]    Hyperlipidemia    Hypothyroidism    Chronic diarrhea    Dysthymia    Gait abnormality    Chronic kidney disease, stage III (moderate) ([facility] [address] [facility] likely related to diabetes    Glaucoma of both eyes    Carotid stenosis, bilateral    Urinary incontinence    Age related entropion, left    Epiphora due to excess lacrimation of right side    Frail elderly    Right lower lobe lung mass    History of endometrial cancer    Malignant neoplasm of lower lobe, right bronchus or lung (HCC)    Posterior vitreous detachment of both eyes    Adenocarcinoma of right lung (HCC)    Chronic renal insufficiency, stage III (moderate) (HCC)    Type 2 diabetes mellitus, with long-term current use of insulin (HCC)    Nonexudative age-related macular degeneration    Abnormal gait    Hypertensive disorder    Hyponatremia    Chronic depression    Chronic anemia    Chronic superficial gastritis without bleeding    Esophageal stricture    Gross hematuria    Declining functional status       Plan:   Risks: I discussed all the risks, benefits, alternatives and possible complications or surgery as well as expectations and post-op recovery.       Consent obtained; CYSTOSCOPY, RIGHT STENT PLACEMENT, RIGHT RETROGRADE PYELOGRAM in OR today    Phuong Dickinson M.D, MD  7:52 AM 3/24/2022

## 2022-03-24 NOTE — ANESTHESIA PRE PROCEDURE
Department of Anesthesiology  Preprocedure Note       Name:  Marivel Delaney   Age:  80 y.o.  :  1932                                          MRN:  [id number]         Date:  3/24/2022      Surgeon: Charly Duran):  Amy Parker MD    Procedure: Procedure(s):  CYSTOSCOPY, RIGHT STENT PLACEMENT, RIGHT RETROGRADE PYELOGRAM    Medications prior to admission:   Prior to Admission medications    Medication Sig Start Date End Date Taking?  Authorizing Provider   D-Mannose 500 MG CAPS Take by mouth 2 times daily   Yes Historical Provider, MD   bimatoprost (LUMIGAN) 0.01 % SOLN ophthalmic drops Place 1 drop into both eyes nightly   Yes Historical Provider, MD   nitrofurantoin, macrocrystal-monohydrate, (MACROBID) 100 MG capsule Take 1 capsule by mouth 2 times daily for 7 days 3/20/22 3/27/22  Omar Singh MD   brimonidine (ALPHAGAN) 0.2 % ophthalmic solution Place 1 drop into both eyes 2 times daily    Historical Provider, MD   latanoprost (XALATAN) 0.005 % ophthalmic solution Place 1 drop into both eyes daily    Historical Provider, MD   Multiple Vitamins-Minerals (PRESERVISION AREDS) TABS Take by mouth    Historical Provider, MD   pantoprazole (PROTONIX) 40 MG tablet Take 1 tablet by mouth every morning (before breakfast) 3/11/22   Sondra Perez MD   carbamide peroxide (DEBROX) 6.5 % otic solution 5-10 drops as needed (In effected ear for wax impaction.)    Historical Provider, MD   ondansetron (ZOFRAN ODT) 4 MG disintegrating tablet Take 1 tablet by mouth every 8 hours as needed for Nausea 22   Vonnie Orozco MD   loperamide (IMODIUM) 2 MG capsule Take 2 mg by mouth 4 times daily as needed for Diarrhea    Historical Provider, MD   acetaminophen (TYLENOL) 325 MG tablet Take 650 mg by mouth every 6 hours as needed for Pain    Historical Provider, MD   hydroCHLOROthiazide (HYDRODIURIL) 25 MG tablet Take 1 tablet by mouth daily 20   Pily Marie   sodium chloride 1 g tablet Take 1 tablet by mouth 2 times daily 9/3/20   Courtney Balderrama   insulin glargine (LANTUS) 100 UNIT/ML injection vial Inject 15 Units into the skin daily 9/4/20   Courtney Balderrama   albuterol (PROVENTIL) (2.5 MG/3ML) 0.083% nebulizer solution Take 3 mLs by nebulization every 6 hours as needed for Wheezing or Shortness of Breath 8/28/20   Grayson Jaramillo MD   albuterol sulfate HFA (PROVENTIL HFA) 108 (90 Base) MCG/ACT inhaler Inhale 2 puffs into the lungs every 6 hours as needed for Wheezing 8/9/20   Magalys Cervantes MD   ferrous sulfate (IRON 325) 325 (65 Fe) MG tablet Take 325 mg by mouth daily    Historical Provider, MD   sertraline (ZOLOFT) 50 MG tablet TAKE 1 TABLET DAILY 5/8/20   Cassy Birmingham DO   oxybutynin (DITROPAN-XL) 10 MG extended release tablet TAKE 1 TABLET DAILY 3/29/20   Kyleigh Ba MD   levothyroxine (SYNTHROID) 88 MCG tablet TAKE 1 TABLET DAILY 2/4/20   Wiliam Eddy DO   ondansetron (ZOFRAN) 4 MG tablet Take 1 tablet by mouth every 8 hours as needed for Nausea or Vomiting 1/23/20   Wiliam Eddy, DO   lisinopril (PRINIVIL;ZESTRIL) 40 MG tablet TAKE 1 TABLET NIGHTLY 12/17/19   Jazmine Shah DO   meclizine (ANTIVERT) 25 MG tablet Take 12.5 mg by mouth 3 times daily as needed    Historical Provider, MD   Multiple Vitamins-Minerals (THERAPEUTIC MULTIVITAMIN-MINERALS) tablet Take 1 tablet by mouth daily    Historical Provider, MD   CRANBERRY PO Take 1 capsule by mouth daily    Historical Provider, MD   Multiple Vitamins-Minerals (OCUVITE PRESERVISION PO) Take 1 tablet by mouth daily    Historical Provider, MD   aspirin 81 MG tablet Take 81 mg by mouth daily Held 5 days prior to paracentesis on 9-1-20    Historical Provider, MD   latanoprost (XALATAN) 0.005 % ophthalmic solution Place 1 drop into both eyes nightly.     Historical Provider, MD       Current medications:    Current Facility-Administered Medications   Medication Dose Route Frequency Provider Last Rate Last Admin    0.9 % sodium chloride infusion   IntraVENous Continuous Arleen Coto  mL/hr at 03/24/22 0704 New Bag at 03/24/22 0704    ceFAZolin (ANCEF) 2000 mg in sterile water 20 mL IV syringe  2,000 mg IntraVENous 30 Lorenzo Redd MD        ipratropium-albuterol (DUONEB) nebulizer solution 1 ampule  1 ampule Inhalation Once Karlo Collins MD           Allergies: Allergies   Allergen Reactions    Allopurinol      Patient unsure of reaction    Metoprolol Tartrate [Metoprolol]      bradycardia    Percocet [Oxycodone-Acetaminophen]      Hallucinations. ...sensitive to narcotics    Phenergan [Promethazine Hcl]      Very sensitive. ..given small doses    Polycitra-K [Potassium Citrate]      Patient unsure of reaction    Statins Other (See Comments)     ?  Muscle aches     Levaquin [Levofloxacin] Anxiety     Not able to sleep    Sulfa Antibiotics Rash    Tessalon [Benzonatate] Anxiety     Not able to sleep       Problem List:    Patient Active Problem List   Diagnosis Code    DM (diabetes mellitus) type II controlled with renal manifestation ([facility] E11.29    Hyperlipidemia E78.5    Hypothyroidism E03.9    Chronic diarrhea K52.9    Dysthymia F34.1    Gait abnormality R26.9    Chronic kidney disease, stage III (moderate) ([facility]), likely related to diabetes N18.30    Glaucoma of both eyes H40.9    Carotid stenosis, bilateral I65.23    Urinary incontinence R32    Age related entropion, left H02.036    Epiphora due to excess lacrimation of right side H04.211    Frail elderly R54    Right lower lobe lung mass R91.8    History of endometrial cancer Z85.42    Malignant neoplasm of lower lobe, right bronchus or lung (HCC) C34.31    Posterior vitreous detachment of both eyes H43.813    Adenocarcinoma of right lung (HCC) C34.91    Chronic renal insufficiency, stage III (moderate) ([facility]) N18.30    Type 2 diabetes mellitus, with long-term current use of insulin ([facility]) E11.9, Z79.4    Nonexudative age-related macular degeneration H35.3190    Abnormal gait R26.9    Hypertensive disorder I10    Hyponatremia E87.1    Chronic depression F32. A    Chronic anemia D64.9    Chronic superficial gastritis without bleeding K29.30    Esophageal stricture K22.2    Gross hematuria R31.0    Declining functional status R53.81       Past Medical History:        Diagnosis Date    Adenocarcinoma of endometrium ([facility]     Aortic valve sclerosis     Bradycardia     follows with cardiology- Dr. Mario Wilson, possible SSS    Cardiac murmur     Cataract of left eye     Chronic diarrhea     s/p radiation    Diabetes mellitus type II, controlled ([facility]     diet controlled     Dysthymia     Gait abnormality 6/23/2014    Glaucoma     H/O renal calculi     H/O vein stripping     Hard of hearing     History of anemia     History of dizziness     follows with cardiology, bradycardia, possible SSS    History of radiation therapy     for uterine cancer    History of small bowel obstruction 03/2019    no surgery required    Hyperlipidemia     Hypertension     Hypothyroidism     Influenza A 1/11/2018    Left elbow fracture     S/P surgical repair    [facility]     Lung nodule     Macular degeneration     Obesity     Pericardial effusion 4/23/2014    Pseudophakia of right eye     PVD (peripheral vascular disease) (HCC)     Traumatic injury of lower extremity childhood    bilateral trauma of lower extremities    Vertigo     resolved    Wears glasses     Wears partial dentures     upper        Past Surgical History:        Procedure Laterality Date    CATARACT REMOVAL Right February 2013    Dr. Alec Alexis, LAPAROSCOPIC  5/28/1998    COLONOSCOPY  7/22/2003    Dr. Nuvia Kinney Left September 2010    ORIF, Dr. Breana Collier ERCP  7/19/1998    retained stone, Suzy Newman and Amber Mckinney MAGDA AND BSO  March 2002    endometrial adenocarcinoma, Dr. Preston Marin PREGTESTUR, PREGSERUM, HCG, HCGQUANT     ABGs: No results found for: PHART, PO2ART, GKB6YTW, JWP0ZAJ, BEART, V9XNDBVO     Type & Screen (If Applicable):  No results found for: LABABO, LABRH    Drug/Infectious Status (If Applicable):  No results found for: HIV, HEPCAB    COVID-19 Screening (If Applicable):   Lab Results   Component Value Date    COVID19 Not Detected 02/16/2022    COVID19 Not Detected 09/02/2020    COVID19 Not Detected 08/07/2020           Anesthesia Evaluation    Airway: Mallampati: II  TM distance: >3 FB   Neck ROM: full  Mouth opening: > = 3 FB Dental:          Pulmonary:   (+) COPD:  decreased breath sounds,                             Cardiovascular:    (+) hypertension:,         Rhythm: regular                      Neuro/Psych:   (+) psychiatric history:            GI/Hepatic/Renal:   (+) renal disease: CRI,           Endo/Other:    (+) Diabetes, hypothyroidism::., .                 Abdominal:             Vascular: Other Findings:             Anesthesia Plan      MAC     ASA 4       Induction: intravenous. Anesthetic plan and risks discussed with patient and child/children. Plan discussed with CRNA.                   Karlo Collins MD   3/24/2022

## 2022-03-24 NOTE — PROGRESS NOTES
Pt admitted to [facility] room 3 and oriented to unit. SCD sleeves applied. Nares swabbed. Pt verbalized permission for first name, last initial and physicians name on white board. SDS board and discharge criteria explained, pt and family verbalized understanding. Pt denies thoughts of harming self or others. Call light in reach. Family at the bedside.

## 2022-03-24 NOTE — TELEPHONE ENCOUNTER
Sonja Crawford at the [facility] voiced understanding to place a arreguin catheter if she still has not urinated and to call with the residual after placement.

## 2022-03-24 NOTE — TELEPHONE ENCOUNTER
Diane Isbell at the [address] [place] [address] [phone] stated patient still has not urinated since having surgery today. She is drinking fluids. She does not have the urge to void. They do not have a bladder scanner. Patient underwent CYSTOSCOPY, RIGHT STENT PLACEMENT, RIGHT RETROGRADE PYELOGRAM. She was discharge prior to urinating. Do you want a arreguin placed? Please advise.  Thank you

## 2022-04-01 ENCOUNTER — OFFICE VISIT (OUTPATIENT)
Dept: UROLOGY | Age: 87
End: 2022-04-01
Payer: MEDICARE

## 2022-04-01 VITALS
DIASTOLIC BLOOD PRESSURE: 62 MMHG | SYSTOLIC BLOOD PRESSURE: 128 MMHG | BODY MASS INDEX: 26.68 KG/M2 | HEIGHT: 62 IN | WEIGHT: 145 LBS

## 2022-04-01 DIAGNOSIS — R32 URINARY INCONTINENCE, UNSPECIFIED TYPE: Primary | ICD-10-CM

## 2022-04-01 DIAGNOSIS — N13.30 HYDRONEPHROSIS OF RIGHT KIDNEY: ICD-10-CM

## 2022-04-01 LAB
BILIRUBIN URINE: NEGATIVE
BLOOD URINE, POC: ABNORMAL
CHARACTER, URINE: CLEAR
COLOR, URINE: YELLOW
GLUCOSE URINE: NEGATIVE MG/DL
KETONES, URINE: NEGATIVE
LEUKOCYTE CLUMPS, URINE: ABNORMAL
NITRITE, URINE: POSITIVE
PH, URINE: 5.5 (ref 5–9)
POST VOID RESIDUAL (PVR): 82 ML
PROTEIN, URINE: 100 MG/DL
SPECIFIC GRAVITY, URINE: 1.02 (ref 1–1.03)
UROBILINOGEN, URINE: 0.2 EU/DL (ref 0–1)

## 2022-04-01 PROCEDURE — 0509F URINE INCON PLAN DOCD: CPT | Performed by: UROLOGY

## 2022-04-01 PROCEDURE — 99214 OFFICE O/P EST MOD 30 MIN: CPT | Performed by: UROLOGY

## 2022-04-01 PROCEDURE — G8417 CALC BMI ABV UP PARAM F/U: HCPCS | Performed by: UROLOGY

## 2022-04-01 PROCEDURE — 1090F PRES/ABSN URINE INCON ASSESS: CPT | Performed by: UROLOGY

## 2022-04-01 PROCEDURE — G8427 DOCREV CUR MEDS BY ELIG CLIN: HCPCS | Performed by: UROLOGY

## 2022-04-01 PROCEDURE — 1123F ACP DISCUSS/DSCN MKR DOCD: CPT | Performed by: UROLOGY

## 2022-04-01 PROCEDURE — 51798 US URINE CAPACITY MEASURE: CPT | Performed by: UROLOGY

## 2022-04-01 PROCEDURE — 4040F PNEUMOC VAC/ADMIN/RCVD: CPT | Performed by: UROLOGY

## 2022-04-01 PROCEDURE — 1036F TOBACCO NON-USER: CPT | Performed by: UROLOGY

## 2022-04-01 PROCEDURE — 81003 URINALYSIS AUTO W/O SCOPE: CPT | Performed by: UROLOGY

## 2022-04-01 NOTE — PROGRESS NOTES
[facility] [address] [place] [address]  Dept: [phone]  Dept Fax: [phone]  Loc: [address] [facility] Office Note      Patient:  Aubrey Ross  YOB: 1932    The patient is a 80 y.o. female who presents today for evaluation of the following problems:   Chief Complaint   Patient presents with    Follow-up     discuss right ureteroscopy versus right ureteral stent change        History of Present Illness:    Right ureteral stone  2 mm distal    Right hydronephrosis  Secondary to stricture? Hx of hyst and XRT    Incontinence  Workup in 2018 in [place] with myself  Offered interstim at that time  Fecal/urinary incont. Still not at treatment goal    Summary of Previous Records:  Has fecal incontinence since radiation for uterine cancer- s/p hysterectomy   Fecal incontinence-imodium and lomotil  Patient may benefit from InterStim for fecal and urinary incontinence.         Requested/reviewed records from Malina Arnold MD office and/or outside [de-identified]    (Patient's old records have been requested, reviewed and pertinent findings summarized in today's note.)    Procedures Today: N/A    Last several PSA's:  No results found for: PSA    Last total testosterone:  No results found for: TESTOSTERONE    Urinalysis today:  Results for POC orders placed in visit on 04/01/22   POCT Urinalysis No Micro (Auto)   Result Value Ref Range    Glucose, Ur Negative NEGATIVE mg/dl    Bilirubin Urine Negative     Ketones, Urine Negative NEGATIVE    Specific Gravity, Urine 1.025 1.002 - 1.030    Blood, UA POC Moderate (A) NEGATIVE    pH, Urine 5.50 5.0 - 9.0    Protein, Urine 100 (A) NEGATIVE mg/dl    Urobilinogen, Urine 0.20 0.0 - 1.0 eu/dl    Nitrite, Urine Positive (A) NEGATIVE    Leukocyte Clumps, Urine Moderate (A) NEGATIVE    Color, Urine Yellow [id number]-STRAW    Character, Urine Clear [id number].[id number]   poct post void residual   Result Value Ref Range    post void residual 82 ml       Last BUN and creatinine:  Lab Results   Component Value Date    BUN 15 03/19/2022     Lab Results   Component Value Date    CREATININE 1.0 03/19/2022       Imaging Reviewed during this Office Visit:   Espinoza Johansen MD independently reviewed the images and verified the radiology reports from:    CT ABDOMEN PELVIS WO CONTRAST Additional Contrast? None    Result Date: 3/19/2022  Exam: CT abdomen pelvis without contrast Comparison: None Clinical history: Hematuria. Abnormal vaginal bleeding and postmenopausal patient. History of total hysterectomy Findings: Bilateral pulmonary nodules in the lung bases concerning for possible metastatic disease. Large complex rounded lesion in the dependent right lower lobe is incompletely visualized on this exam. It could represent a pulmonary mass versus round pneumonia and measures approximately 5.7 x 7.6 cm. Small right pleural fluid collection. Left basilar airspace disease may represent atelectasis or pneumonia. Liver and spleen do not demonstrate any acute process. No liver lesions Numerous calcifications along the pancreas suggestive of chronic pancreatitis. No acute pancreatitis identified. Prior cholecystectomy. No choledocholithiasis or biliary obstruction. Normal adrenal glands. Small atrophic left kidney. Right kidney is normal in size and demonstrates a small stone at the collecting system. Right hydronephrosis. Right hydroureter can be followed to the level of the pelvis. Ureter is not distensible in the deep right pelvis. There is a small calcification seen on series 2 image 66 which could potentially represent a distal right ureteral stone No small bowel obstruction. Sigmoid diverticulosis without diverticulitis. Large colonic fecal load. Normal caliber appendix. Prior hysterectomy. Urinary bladder does not demonstrate stones or wall thickening.  Multilevel degenerative disease of the lumbar spine. Severe right and moderate left hip arthritis. Impression: 1. Bilateral pulmonary nodules in the lung bases concerning for possible metastatic disease. 2. 5.7 x 7.6 cm complex rounded lesion in the dependent right lower lobe may represent a pulmonary mass versus round pneumonia. 3. Small right pleural fluid collection. Left basilar airspace disease may represent atelectasis or pneumonia 4. Numerous calcifications along the pancreas suggestive of chronic pancreatitis. No acute pancreatitis identified. 5. Single small right renal stone. Right-sided hydronephrosis and hydroureter. Ureter is difficult to follow in the pelvis. Possible small 2 mm calcification could potentially represent a distal right ureteral stone. 6. Small atrophic left kidney. 7. Sigmoid diverticulosis without diverticulitis. Large colonic fecal load. This document has been electronically signed by: Karey Trejo MD on 03/19/2022 09:48 PM All CTs at this facility use dose modulation techniques and iterative reconstructions, and/or weight-based dosing when appropriate to reduce radiation to a low as reasonably achievable.       PAST MEDICAL, FAMILY AND SOCIAL HISTORY:  Past Medical History:   Diagnosis Date    Adenocarcinoma of endometrium ([address]     Aortic valve sclerosis     Bradycardia     follows with cardiology- Dr. Dominick Ty, possible SSS    Cardiac murmur     Cataract of left eye     Chronic diarrhea     s/p radiation    Diabetes mellitus type II, controlled ([address]     diet controlled     Dysthymia     Gait abnormality 6/23/2014    Glaucoma     H/O renal calculi     H/O vein stripping     Hard of hearing     History of anemia     History of dizziness     follows with cardiology, bradycardia, possible SSS    History of radiation therapy     for uterine cancer    History of small bowel obstruction 03/2019    no surgery required    Hyperlipidemia     Hypertension     Hypothyroidism     Influenza A 1/11/2018    Left elbow fracture     S/P surgical repair    Lung cancer ([facility]     Lung nodule     Macular degeneration     Obesity     Pericardial effusion 4/23/2014    Pseudophakia of right eye     PVD (peripheral vascular disease) ([facility]     Traumatic injury of lower extremity childhood    bilateral trauma of lower extremities    Vertigo     resolved    Wears glasses     Wears partial dentures     upper      Past Surgical History:   Procedure Laterality Date    CATARACT REMOVAL Right February 2013    Dr. Kimberly Patterson, LAPAROSCOPIC  5/28/1998    COLONOSCOPY  7/22/2003    Dr. Zheng Fontaine Right 3/24/2022    CYSTOSCOPY, RIGHT STENT PLACEMENT, RIGHT RETROGRADE PYELOGRAM performed by Marce Metzger MD at [place] [address]. Left September 2010    ORIF, Dr. Marzena óLpez ERCP  7/19/1998    retained stone, Suzy Nickerson and Adela [facility] AND BSO  March 2002    endometrial adenocarcinoma, Dr. Ramon Rooney N/A 3/11/2022    EGD ESOPHAGOGASTRODUODENOSCOPY DILATATION performed by Panfilo Sena MD at [facility]     Family History   Problem Relation Age of Onset    High Blood Pressure Other     Diabetes type 2  Mother         developed later in life     Outpatient Medications Marked as Taking for the 4/1/22 encounter (Office Visit) with Angeli Rebolledo MD   Medication Sig Dispense Refill    bimatoprost (LUMIGAN) 0.01 % SOLN ophthalmic drops Place 1 drop into both eyes nightly      Dextromethorphan HBr (COUGH RELIEF ADULT PO) Take by mouth as needed 15mg liquid      D-Mannose 500 MG CAPS Take by mouth 2 times daily      bimatoprost (LUMIGAN) 0.01 % SOLN ophthalmic drops Place 1 drop into both eyes nightly      phenazopyridine (PYRIDIUM) 200 MG tablet Take 1 tablet by mouth 3 times daily as needed for Pain 9 tablet 0    Multiple Vitamins-Minerals (PRESERVISION AREDS) TABS Take by mouth      pantoprazole (PROTONIX) 40 MG tablet Take 1 tablet by mouth every morning (before breakfast) 30 tablet 5    carbamide peroxide (DEBROX) 6.5 % otic solution 5-10 drops as needed (In effected ear for wax impaction.)      ondansetron (ZOFRAN ODT) 4 MG disintegrating tablet Take 1 tablet by mouth every 8 hours as needed for Nausea 20 tablet 0    loperamide (IMODIUM) 2 MG capsule Take 2 mg by mouth 4 times daily as needed for Diarrhea      acetaminophen (TYLENOL) 325 MG tablet Take 650 mg by mouth every 6 hours as needed for Pain      hydroCHLOROthiazide (HYDRODIURIL) 25 MG tablet Take 1 tablet by mouth daily 30 tablet 0    sodium chloride 1 g tablet Take 1 tablet by mouth 2 times daily 90 tablet 0    insulin glargine (LANTUS) 100 UNIT/ML injection vial Inject 15 Units into the skin daily 1 vial 0    ferrous sulfate (IRON 325) 325 (65 Fe) MG tablet Take 325 mg by mouth daily      sertraline (ZOLOFT) 50 MG tablet TAKE 1 TABLET DAILY 90 tablet 3    oxybutynin (DITROPAN-XL) 10 MG extended release tablet TAKE 1 TABLET DAILY 90 tablet 4    levothyroxine (SYNTHROID) 88 MCG tablet TAKE 1 TABLET DAILY 90 tablet 4    lisinopril (PRINIVIL;ZESTRIL) 40 MG tablet TAKE 1 TABLET NIGHTLY 90 tablet 4    meclizine (ANTIVERT) 25 MG tablet Take 12.5 mg by mouth 3 times daily as needed      Multiple Vitamins-Minerals (THERAPEUTIC MULTIVITAMIN-MINERALS) tablet Take 1 tablet by mouth daily      CRANBERRY PO Take 1 capsule by mouth daily      aspirin 81 MG tablet Take 81 mg by mouth daily Held 5 days prior to paracentesis on 9-1-20         Allopurinol, Metoprolol tartrate [metoprolol], Percocet [oxycodone-acetaminophen], Phenergan [promethazine hcl], Polycitra-k [potassium citrate], Statins, Levaquin [levofloxacin], Sulfa antibiotics, and Tessalon [benzonatate]  Social History     Tobacco Use   Smoking Status Never Smoker   Smokeless Tobacco Never Used      (If patient a smoker, smoking cessation counseling offered)   Social History     Substance and Sexual Activity   Alcohol Use No    Alcohol/week: 0.0 standard drinks       REVIEW OF SYSTEMS:  Constitutional: negative  Eyes: negative  Respiratory: negative  Cardiovascular: negative  Gastrointestinal: negative  Genitourinary: see HPI  Musculoskeletal: negative  Skin: negative   Neurological: negative  Hematological/Lymphatic: negative  Psychological: negative      Physical Exam:    This a 80 y.o. female  Vitals:    04/01/22 1130   BP: 128/62     Body mass index is 26.52 kg/m². Constitutional: Patient in no acute distress;         Assessment and Plan        1. Urinary incontinence, unspecified type    2. Hydronephrosis of right kidney               Plan:       Stent emergently placed last week. Had postoperative retention. Catheter now out. pvr low. Incontinence- conserv management at this time. Likely overflow incontinence  Poor prognosis from metastatic cancer    Essentially solitary right kidney with hematuria and unilateral hydronephrosis. Hx of ureterine cancer, lung cancer, XRT. Needs stent exchange in three months. Will not do ureteroscopy/biopsy or any extensive measures per family preference      Prescriptions Ordered:  No orders of the defined types were placed in this encounter.      Orders Placed:  Orders Placed This Encounter   Procedures    POCT Urinalysis No Micro (Auto)    poct post void residual     Bladder scan            GERARDO Barrera MD

## 2022-04-05 ENCOUNTER — TELEPHONE (OUTPATIENT)
Dept: UROLOGY | Age: 87
End: 2022-04-05

## 2022-04-05 DIAGNOSIS — R31.0 GROSS HEMATURIA: ICD-10-CM

## 2022-04-05 DIAGNOSIS — N13.30 HYDRONEPHROSIS OF RIGHT KIDNEY: Primary | ICD-10-CM

## 2022-04-05 DIAGNOSIS — Z01.818 PRE-OP TESTING: ICD-10-CM

## 2022-04-05 NOTE — TELEPHONE ENCOUNTER
Left message with Zahra Muñoz (daughter) to call back to pick surgery date in June for the next stent exchange

## 2022-04-11 NOTE — TELEPHONE ENCOUNTER
Patent scheduled for surgery with Dr Renan Hutchison on 6/2/22. Surgery consent on arrival. Patient to do pre op urine culture and chest xray on 5/19/22. Surgery instructions faxed to the [facility]. Phone # [phone]  Fax [phone]     Patient resides at [facility].  Daughter is her transportation and will be with her

## 2022-04-11 NOTE — TELEPHONE ENCOUNTER
SURGERY [address] [place] [address] [facility], [address]                            Phone *[phone] *[phone]              Surgical Scheduling Direct Line Phone *[phone] Fax *[phone]        Terrie Ferguson      4/11/1932        female     [address]              Marital Status:                                                      Home Phone: [phone]      Cell Phone:    Telephone Information:   Mobile [phone]                                            Surgeon: Dr. Aaron Moore           Surgery Date: 6/2/22                       Time: 11:30 am     Procedure: Cystoscopy, Right Stent Exchange      Diagnosis: Right Hydronephrosis of right Kidney      Important Medical History:  In Epic                             PATIENT AT [address] [place] [address], [address] [place] [address]     Special Inst/Equip:      CPT Codes:  17166  Latex Allergy: No     Cardiac Device:  No     Anesthesia:    MAC                     Admission Type:  Same Day                        Admit Prior to Day of Surgery: No     Case Location:  Main OR            Preadmission Testing:  Phone Call                     PAT Date and Time:______________________________________________________     PAT Confirmation #: ______________________________________________________     Post Op Visit: ___________________________________________________________     Need Preop Cardiac Clearance: No     Does Patient have Cardiologist/physician?      None     Surgery Confirmation #: __________________________________________________     : ________________________   Date: __________________________      Insurance Company Name: Medicare

## 2022-04-11 NOTE — TELEPHONE ENCOUNTER
DO NOT TAKE ASPIRIN,  FISH OIL, COUMADIN, IBUPROFEN, MOTRIN-LIKE DRUGS AND ANY MULTIVITAMINS OR OVER THE COUNTER SUPPLEMENTS 14 DAYS PRIOR TO SURGERY.     MUST HAVE AN ADULT OVER THE AGE OF 18 WITH YOU AT THE TIME OF THE PROCEDURE AND WITH YOU AT HOME AFTER THE PROCEDURE FOR 24 HOURS                       Flavia Suarez      4/11/1932        Diagnosis:      Surgical Physician: Radha Geller                             You have been scheduled for the procedure marked below:                  Surgery: Cystoscopy, Right Stent Exchange                                         Date: 6/2/22     Anesthesia: Anesthesiologist (General/Spinal)               Place of Service: [facility] Second Floor Same Day Surgery                                                                          Arrive to same day surgery by:  6:00 am          DAUGHTER NOTIFIED OF SURGERY  (Surgery time is subject to change)                             INSTRUCTIONS AS MARKED BELOW:     1.  DO NOT eat or drink anything after midnight before surgery. 2.  We prefer you shower or bathe with an antibacterial soap (Dial) the morning of surgery. 3.  Please ensure to have a  with you to transport you home. 4.  Please bring a current medication list, photo ID and insurance card(s) with you  5. Okay to take Tylenol  6. If you take Glucophage, Metformin or Janumet, hold 48-hours prior to surgery. Hold the Lantus the morning of surgery. 7.  Take blood pressure or heart medication as directed, if taken in the morning take with a small sip of water  8. The office will call you in 1-2 days after your procedure to schedule a follow up. DATE SENSITIVE TESTING*DO ON DATE LISTED BELOW*NO APPOINTMENT    1. DO THE PRE OP URINE CULTURE AND CHEST XRAY ON 5/19/22.  ORDERS INCLUDED

## 2022-04-27 ENCOUNTER — APPOINTMENT (OUTPATIENT)
Dept: GENERAL RADIOLOGY | Age: 87
DRG: 194 | End: 2022-04-27
Payer: MEDICARE

## 2022-04-27 ENCOUNTER — HOSPITAL ENCOUNTER (INPATIENT)
Age: 87
LOS: 3 days | Discharge: HOME | DRG: 194 | End: 2022-04-30
Attending: EMERGENCY MEDICINE | Admitting: INTERNAL MEDICINE
Payer: MEDICARE

## 2022-04-27 DIAGNOSIS — J18.9 PNEUMONIA OF BOTH LUNGS DUE TO INFECTIOUS ORGANISM, UNSPECIFIED PART OF LUNG: Primary | ICD-10-CM

## 2022-04-27 PROBLEM — E87.6 HYPOKALEMIA: Status: ACTIVE | Noted: 2022-04-27

## 2022-04-27 PROBLEM — E83.42 HYPOMAGNESEMIA: Status: ACTIVE | Noted: 2022-04-27

## 2022-04-27 LAB
ABSOLUTE EOS #: 0 K/UL (ref 0–0.4)
ABSOLUTE IMMATURE GRANULOCYTE: 0 K/UL (ref 0–0.3)
ABSOLUTE LYMPH #: 0.52 K/UL (ref 1–4.8)
ABSOLUTE MONO #: 0.79 K/UL (ref 0.1–1.2)
ALBUMIN SERPL-MCNC: 3 G/DL (ref 3.5–5.2)
ALBUMIN/GLOBULIN RATIO: 0.8 (ref 1–2.5)
ALP BLD-CCNC: 125 U/L (ref 35–104)
ALT SERPL-CCNC: 50 U/L (ref 5–33)
ANION GAP SERPL CALCULATED.3IONS-SCNC: 10 MMOL/L (ref 9–17)
AST SERPL-CCNC: 50 U/L
BASOPHILS # BLD: 0 % (ref 0–1)
BASOPHILS ABSOLUTE: 0 K/UL (ref 0–0.2)
BILIRUB SERPL-MCNC: 0.55 MG/DL (ref 0.3–1.2)
BUN BLDV-MCNC: 19 MG/DL (ref 8–23)
BUN/CREAT BLD: 17 (ref 9–20)
CALCIUM SERPL-MCNC: 8.5 MG/DL (ref 8.6–10.4)
CHLORIDE BLD-SCNC: 94 MMOL/L (ref 98–107)
CO2: 31 MMOL/L (ref 20–31)
CREAT SERPL-MCNC: 1.15 MG/DL (ref 0.5–0.9)
EOSINOPHILS RELATIVE PERCENT: 0 % (ref 1–7)
FLU A ANTIGEN: NEGATIVE
FLU B ANTIGEN: NEGATIVE
GFR AFRICAN AMERICAN: 54 ML/MIN
GFR NON-AFRICAN AMERICAN: 44 ML/MIN
GFR SERPL CREATININE-BSD FRML MDRD: ABNORMAL ML/MIN/{1.73_M2}
GLUCOSE BLD-MCNC: 251 MG/DL (ref 65–105)
GLUCOSE BLD-MCNC: 256 MG/DL (ref 70–99)
HCT VFR BLD CALC: 30.7 % (ref 36.3–47.1)
HEMOGLOBIN: 9.5 G/DL (ref 11.9–15.1)
IMMATURE GRANULOCYTES: 0 %
LACTIC ACID, SEPSIS: 1.1 MMOL/L (ref 0.5–1.9)
LYMPHOCYTES # BLD: 2 % (ref 16–46)
MAGNESIUM: 1.1 MG/DL (ref 1.6–2.6)
MCH RBC QN AUTO: 29.6 PG (ref 25.2–33.5)
MCHC RBC AUTO-ENTMCNC: 30.9 G/DL (ref 25.2–33.5)
MCV RBC AUTO: 95.6 FL (ref 82.6–102.9)
MONOCYTES # BLD: 3 % (ref 4–11)
MORPHOLOGY: ABNORMAL
MORPHOLOGY: ABNORMAL
NRBC AUTOMATED: 0 PER 100 WBC
PDW BLD-RTO: 13.8 % (ref 11.8–14.4)
PLATELET # BLD: 324 K/UL (ref 138–453)
PMV BLD AUTO: 10.8 FL (ref 8.1–13.5)
POTASSIUM SERPL-SCNC: 3.4 MMOL/L (ref 3.7–5.3)
RBC # BLD: 3.21 M/UL (ref 3.95–5.11)
SARS-COV-2, RAPID: NOT DETECTED
SEG NEUTROPHILS: 95 % (ref 43–77)
SEGMENTED NEUTROPHILS ABSOLUTE COUNT: 24.89 K/UL (ref 1.5–8.1)
SODIUM BLD-SCNC: 135 MMOL/L (ref 135–144)
SPECIMEN DESCRIPTION: NORMAL
TOTAL PROTEIN: 6.6 G/DL (ref 6.4–8.3)
TROPONIN, HIGH SENSITIVITY: 61 NG/L (ref 0–14)
TROPONIN, HIGH SENSITIVITY: 63 NG/L (ref 0–14)
TROPONIN, HIGH SENSITIVITY: 63 NG/L (ref 0–14)
WBC # BLD: 26.2 K/UL (ref 3.5–11.3)

## 2022-04-27 PROCEDURE — 94640 AIRWAY INHALATION TREATMENT: CPT

## 2022-04-27 PROCEDURE — 99222 1ST HOSP IP/OBS MODERATE 55: CPT | Performed by: NURSE PRACTITIONER

## 2022-04-27 PROCEDURE — 85025 COMPLETE CBC W/AUTO DIFF WBC: CPT

## 2022-04-27 PROCEDURE — 87040 BLOOD CULTURE FOR BACTERIA: CPT

## 2022-04-27 PROCEDURE — 2580000003 HC RX 258: Performed by: INTERNAL MEDICINE

## 2022-04-27 PROCEDURE — 6370000000 HC RX 637 (ALT 250 FOR IP): Performed by: INTERNAL MEDICINE

## 2022-04-27 PROCEDURE — 84484 ASSAY OF TROPONIN QUANT: CPT

## 2022-04-27 PROCEDURE — 2060000000 HC ICU INTERMEDIATE R&B

## 2022-04-27 PROCEDURE — 93005 ELECTROCARDIOGRAM TRACING: CPT | Performed by: EMERGENCY MEDICINE

## 2022-04-27 PROCEDURE — 6360000002 HC RX W HCPCS: Performed by: INTERNAL MEDICINE

## 2022-04-27 PROCEDURE — 83735 ASSAY OF MAGNESIUM: CPT

## 2022-04-27 PROCEDURE — 87804 INFLUENZA ASSAY W/OPTIC: CPT

## 2022-04-27 PROCEDURE — 80053 COMPREHEN METABOLIC PANEL: CPT

## 2022-04-27 PROCEDURE — 94761 N-INVAS EAR/PLS OXIMETRY MLT: CPT

## 2022-04-27 PROCEDURE — 6370000000 HC RX 637 (ALT 250 FOR IP): Performed by: NURSE PRACTITIONER

## 2022-04-27 PROCEDURE — 6360000002 HC RX W HCPCS: Performed by: NURSE PRACTITIONER

## 2022-04-27 PROCEDURE — 36415 COLL VENOUS BLD VENIPUNCTURE: CPT

## 2022-04-27 PROCEDURE — 71045 X-RAY EXAM CHEST 1 VIEW: CPT

## 2022-04-27 PROCEDURE — 99285 EMERGENCY DEPT VISIT HI MDM: CPT

## 2022-04-27 PROCEDURE — 87635 SARS-COV-2 COVID-19 AMP PRB: CPT

## 2022-04-27 PROCEDURE — 2700000000 HC OXYGEN THERAPY PER DAY

## 2022-04-27 PROCEDURE — 83605 ASSAY OF LACTIC ACID: CPT

## 2022-04-27 PROCEDURE — 82947 ASSAY GLUCOSE BLOOD QUANT: CPT

## 2022-04-27 PROCEDURE — 6360000002 HC RX W HCPCS: Performed by: EMERGENCY MEDICINE

## 2022-04-27 PROCEDURE — 2580000003 HC RX 258: Performed by: EMERGENCY MEDICINE

## 2022-04-27 RX ORDER — MAGNESIUM SULFATE 1 G/100ML
1000 INJECTION INTRAVENOUS
Status: DISPENSED | OUTPATIENT
Start: 2022-04-27 | End: 2022-04-27

## 2022-04-27 RX ORDER — ACETAMINOPHEN 325 MG/1
650 TABLET ORAL EVERY 4 HOURS PRN
Status: DISCONTINUED | OUTPATIENT
Start: 2022-04-27 | End: 2022-04-30 | Stop reason: HOSPADM

## 2022-04-27 RX ORDER — SODIUM CHLORIDE 0.9 % (FLUSH) 0.9 %
10 SYRINGE (ML) INJECTION PRN
Status: DISCONTINUED | OUTPATIENT
Start: 2022-04-27 | End: 2022-04-30 | Stop reason: HOSPADM

## 2022-04-27 RX ORDER — OXYBUTYNIN CHLORIDE 5 MG/1
10 TABLET, EXTENDED RELEASE ORAL DAILY
Status: DISCONTINUED | OUTPATIENT
Start: 2022-04-28 | End: 2022-04-30 | Stop reason: HOSPADM

## 2022-04-27 RX ORDER — INSULIN LISPRO 100 [IU]/ML
0-6 INJECTION, SOLUTION INTRAVENOUS; SUBCUTANEOUS
Status: DISCONTINUED | OUTPATIENT
Start: 2022-04-27 | End: 2022-04-30 | Stop reason: HOSPADM

## 2022-04-27 RX ORDER — LEVOTHYROXINE SODIUM 88 UG/1
88 TABLET ORAL DAILY
Status: DISCONTINUED | OUTPATIENT
Start: 2022-04-28 | End: 2022-04-30 | Stop reason: HOSPADM

## 2022-04-27 RX ORDER — MECLIZINE HCL 12.5 MG/1
12.5 TABLET ORAL 3 TIMES DAILY PRN
Status: DISCONTINUED | OUTPATIENT
Start: 2022-04-27 | End: 2022-04-30 | Stop reason: HOSPADM

## 2022-04-27 RX ORDER — SODIUM CHLORIDE 0.9 % (FLUSH) 0.9 %
10 SYRINGE (ML) INJECTION EVERY 12 HOURS SCHEDULED
Status: DISCONTINUED | OUTPATIENT
Start: 2022-04-27 | End: 2022-04-30 | Stop reason: HOSPADM

## 2022-04-27 RX ORDER — ASPIRIN 81 MG/1
81 TABLET ORAL DAILY
Status: DISCONTINUED | OUTPATIENT
Start: 2022-04-28 | End: 2022-04-30 | Stop reason: HOSPADM

## 2022-04-27 RX ORDER — POTASSIUM CHLORIDE 20 MEQ/1
40 TABLET, EXTENDED RELEASE ORAL ONCE
Status: COMPLETED | OUTPATIENT
Start: 2022-04-27 | End: 2022-04-27

## 2022-04-27 RX ORDER — ENOXAPARIN SODIUM 100 MG/ML
30 INJECTION SUBCUTANEOUS DAILY
Status: DISCONTINUED | OUTPATIENT
Start: 2022-04-27 | End: 2022-04-30 | Stop reason: HOSPADM

## 2022-04-27 RX ORDER — LISINOPRIL 20 MG/1
40 TABLET ORAL NIGHTLY
Status: DISCONTINUED | OUTPATIENT
Start: 2022-04-27 | End: 2022-04-30 | Stop reason: HOSPADM

## 2022-04-27 RX ORDER — HYDROCHLOROTHIAZIDE 25 MG/1
25 TABLET ORAL DAILY
Status: DISCONTINUED | OUTPATIENT
Start: 2022-04-28 | End: 2022-04-30 | Stop reason: HOSPADM

## 2022-04-27 RX ORDER — PHENAZOPYRIDINE HYDROCHLORIDE 100 MG/1
200 TABLET, FILM COATED ORAL 3 TIMES DAILY PRN
Status: DISCONTINUED | OUTPATIENT
Start: 2022-04-27 | End: 2022-04-30 | Stop reason: HOSPADM

## 2022-04-27 RX ORDER — INSULIN LISPRO 100 [IU]/ML
0-3 INJECTION, SOLUTION INTRAVENOUS; SUBCUTANEOUS NIGHTLY
Status: DISCONTINUED | OUTPATIENT
Start: 2022-04-27 | End: 2022-04-30 | Stop reason: HOSPADM

## 2022-04-27 RX ORDER — ONDANSETRON 2 MG/ML
4 INJECTION INTRAMUSCULAR; INTRAVENOUS EVERY 6 HOURS PRN
Status: DISCONTINUED | OUTPATIENT
Start: 2022-04-27 | End: 2022-04-30 | Stop reason: HOSPADM

## 2022-04-27 RX ORDER — SODIUM CHLORIDE 9 MG/ML
INJECTION, SOLUTION INTRAVENOUS PRN
Status: DISCONTINUED | OUTPATIENT
Start: 2022-04-27 | End: 2022-04-30 | Stop reason: HOSPADM

## 2022-04-27 RX ORDER — SODIUM CHLORIDE 1000 MG
1 TABLET, SOLUBLE MISCELLANEOUS 2 TIMES DAILY
Status: DISCONTINUED | OUTPATIENT
Start: 2022-04-27 | End: 2022-04-30 | Stop reason: HOSPADM

## 2022-04-27 RX ORDER — NITROFURANTOIN 25; 75 MG/1; MG/1
100 CAPSULE ORAL 2 TIMES DAILY
Status: ON HOLD | COMMUNITY
End: 2022-04-30 | Stop reason: HOSPADM

## 2022-04-27 RX ORDER — PANTOPRAZOLE SODIUM 40 MG/1
40 TABLET, DELAYED RELEASE ORAL
Status: DISCONTINUED | OUTPATIENT
Start: 2022-04-28 | End: 2022-04-30 | Stop reason: HOSPADM

## 2022-04-27 RX ORDER — BRIMONIDINE TARTRATE 2 MG/ML
1 SOLUTION/ DROPS OPHTHALMIC 2 TIMES DAILY
Status: DISCONTINUED | OUTPATIENT
Start: 2022-04-27 | End: 2022-04-30 | Stop reason: HOSPADM

## 2022-04-27 RX ORDER — LATANOPROST 50 UG/ML
1 SOLUTION/ DROPS OPHTHALMIC NIGHTLY
Status: DISCONTINUED | OUTPATIENT
Start: 2022-04-27 | End: 2022-04-30 | Stop reason: HOSPADM

## 2022-04-27 RX ORDER — ALBUTEROL SULFATE 2.5 MG/3ML
2.5 SOLUTION RESPIRATORY (INHALATION)
Status: DISCONTINUED | OUTPATIENT
Start: 2022-04-27 | End: 2022-04-30 | Stop reason: HOSPADM

## 2022-04-27 RX ORDER — DEXTROSE MONOHYDRATE 50 MG/ML
100 INJECTION, SOLUTION INTRAVENOUS PRN
Status: DISCONTINUED | OUTPATIENT
Start: 2022-04-27 | End: 2022-04-30 | Stop reason: HOSPADM

## 2022-04-27 RX ORDER — DEXTROSE MONOHYDRATE 25 G/50ML
12.5 INJECTION, SOLUTION INTRAVENOUS PRN
Status: DISCONTINUED | OUTPATIENT
Start: 2022-04-27 | End: 2022-04-28 | Stop reason: CLARIF

## 2022-04-27 RX ORDER — SODIUM CHLORIDE FOR INHALATION 0.9 %
3 VIAL, NEBULIZER (ML) INHALATION
Status: DISCONTINUED | OUTPATIENT
Start: 2022-04-27 | End: 2022-04-28

## 2022-04-27 RX ORDER — FERROUS SULFATE 325(65) MG
325 TABLET ORAL DAILY
Status: DISCONTINUED | OUTPATIENT
Start: 2022-04-27 | End: 2022-04-30 | Stop reason: HOSPADM

## 2022-04-27 RX ORDER — INSULIN GLARGINE 100 [IU]/ML
15 INJECTION, SOLUTION SUBCUTANEOUS DAILY
Status: DISCONTINUED | OUTPATIENT
Start: 2022-04-27 | End: 2022-04-30 | Stop reason: HOSPADM

## 2022-04-27 RX ORDER — IPRATROPIUM BROMIDE AND ALBUTEROL SULFATE 2.5; .5 MG/3ML; MG/3ML
1 SOLUTION RESPIRATORY (INHALATION)
Status: DISCONTINUED | OUTPATIENT
Start: 2022-04-27 | End: 2022-04-27

## 2022-04-27 RX ORDER — IPRATROPIUM BROMIDE AND ALBUTEROL SULFATE 2.5; .5 MG/3ML; MG/3ML
1 SOLUTION RESPIRATORY (INHALATION) EVERY 4 HOURS
Status: DISCONTINUED | OUTPATIENT
Start: 2022-04-27 | End: 2022-04-30 | Stop reason: HOSPADM

## 2022-04-27 RX ORDER — MAGNESIUM SULFATE 1 G/100ML
1000 INJECTION INTRAVENOUS
Status: COMPLETED | OUTPATIENT
Start: 2022-04-27 | End: 2022-04-27

## 2022-04-27 RX ORDER — NICOTINE POLACRILEX 4 MG
15 LOZENGE BUCCAL PRN
Status: DISCONTINUED | OUTPATIENT
Start: 2022-04-27 | End: 2022-04-30 | Stop reason: HOSPADM

## 2022-04-27 RX ADMIN — INSULIN LISPRO 2 UNITS: 100 INJECTION, SOLUTION INTRAVENOUS; SUBCUTANEOUS at 21:37

## 2022-04-27 RX ADMIN — BRIMONIDINE TARTRATE 1 DROP: 2 SOLUTION OPHTHALMIC at 21:10

## 2022-04-27 RX ADMIN — IPRATROPIUM BROMIDE AND ALBUTEROL SULFATE 1 AMPULE: .5; 2.5 SOLUTION RESPIRATORY (INHALATION) at 23:36

## 2022-04-27 RX ADMIN — IPRATROPIUM BROMIDE AND ALBUTEROL SULFATE 1 AMPULE: .5; 2.5 SOLUTION RESPIRATORY (INHALATION) at 20:17

## 2022-04-27 RX ADMIN — POTASSIUM CHLORIDE 40 MEQ: 20 TABLET, EXTENDED RELEASE ORAL at 21:13

## 2022-04-27 RX ADMIN — MAGNESIUM SULFATE 1000 MG: 1 INJECTION INTRAVENOUS at 21:10

## 2022-04-27 RX ADMIN — FERROUS SULFATE TAB 325 MG (65 MG ELEMENTAL FE) 325 MG: 325 (65 FE) TAB at 21:12

## 2022-04-27 RX ADMIN — MAGNESIUM SULFATE HEPTAHYDRATE 1000 MG: 1 INJECTION, SOLUTION INTRAVENOUS at 22:45

## 2022-04-27 RX ADMIN — CEFEPIME HYDROCHLORIDE 2000 MG: 2 INJECTION, POWDER, FOR SOLUTION INTRAVENOUS at 15:45

## 2022-04-27 RX ADMIN — INSULIN GLARGINE 15 UNITS: 100 INJECTION, SOLUTION SUBCUTANEOUS at 21:10

## 2022-04-27 RX ADMIN — MAGNESIUM SULFATE 1000 MG: 1 INJECTION INTRAVENOUS at 19:48

## 2022-04-27 RX ADMIN — LATANOPROST 1 DROP: 50 SOLUTION OPHTHALMIC at 21:13

## 2022-04-27 RX ADMIN — SODIUM CHLORIDE 1 G: 1 TABLET ORAL at 21:13

## 2022-04-27 RX ADMIN — VANCOMYCIN HYDROCHLORIDE 1250 MG: 1 INJECTION, POWDER, LYOPHILIZED, FOR SOLUTION INTRAVENOUS at 16:58

## 2022-04-27 RX ADMIN — SODIUM CHLORIDE, PRESERVATIVE FREE 10 ML: 5 INJECTION INTRAVENOUS at 21:38

## 2022-04-27 RX ADMIN — ENOXAPARIN SODIUM 30 MG: 100 INJECTION SUBCUTANEOUS at 21:12

## 2022-04-27 RX ADMIN — LISINOPRIL 40 MG: 20 TABLET ORAL at 21:13

## 2022-04-27 ASSESSMENT — PAIN - FUNCTIONAL ASSESSMENT: PAIN_FUNCTIONAL_ASSESSMENT: NONE - DENIES PAIN

## 2022-04-27 ASSESSMENT — ENCOUNTER SYMPTOMS
BACK PAIN: 0
EYE PAIN: 0
ABDOMINAL PAIN: 0
BLOOD IN STOOL: 0
SHORTNESS OF BREATH: 1
DIARRHEA: 1
NAUSEA: 0
VOMITING: 0
CONSTIPATION: 0
COUGH: 1

## 2022-04-27 NOTE — ED PROVIDER NOTES
[facility]  eMERGENCY dEPARTMENT eNCOUnter      Pt Name: Alli Kim  MRN: [id number]  [place] 4/11/1932  Date of evaluation: 4/27/2022      CHIEF COMPLAINT       Chief Complaint   Patient presents with    Fever         HISTORY OF PRESENT ILLNESS    Alli Kim is a 80 y.o. female who presents chief complaint of fever, patient had been in the medical unit at her nursing home as she was recovering from a kidney stent and esophageal dilatation she was getting moving back today when she spiked a fever daughter said she was normal self yesterday she was able to walk with her walker she has good strength has had a fever she is been very weak her pulse ox was in the 80s on room air and she had to be placed on oxygen. Patient was started on Macrobid today for a possible urinary tract infection she is also had a cough. Patient has had a history of lung cancer that is recurrent and then there letting go without treatment at this point she has had chemo and radiation in the past she also had radiation years ago for uterine cancer she does have rather frequent diarrhea and that is gone on since the treatment for uterine cancer. There is been no trauma patient denies being in any pain there is been no vomiting      REVIEW OF SYSTEMS         Review of Systems   Constitutional: Positive for fatigue and fever. Negative for chills. HENT: Negative for congestion and ear pain. Eyes: Negative for pain and visual disturbance. Respiratory: Positive for cough and shortness of breath. Cardiovascular: Negative for chest pain, palpitations and leg swelling. Gastrointestinal: Positive for diarrhea. Negative for abdominal pain, blood in stool, constipation, nausea and vomiting. Endocrine: Negative for polydipsia and polyuria. Genitourinary: Negative for difficulty urinating, dysuria and frequency.         Recent stent placed for urinary tract infection   Musculoskeletal: Negative for back pain, joint swelling, myalgias, neck pain and neck stiffness. Skin: Negative for rash. Neurological: Negative for dizziness, weakness and headaches. Hematological: Negative for adenopathy. Does not bruise/bleed easily. Psychiatric/Behavioral: Negative for confusion, self-injury and suicidal ideas. PAST MEDICAL HISTORY    has a past medical history of Adenocarcinoma of endometrium ([address] Aortic valve sclerosis, Bradycardia, Cardiac murmur, Cataract of left eye, Chronic diarrhea, Diabetes mellitus type II, controlled ([address] Dysthymia, Gait abnormality, Glaucoma, H/O renal calculi, H/O vein stripping, Hard of hearing, History of anemia, History of dizziness, History of radiation therapy, History of small bowel obstruction, Hyperlipidemia, Hypertension, Hypothyroidism, Influenza A, Left elbow fracture, Lung cancer ([address] Lung nodule, Macular degeneration, Obesity, Pericardial effusion, Pseudophakia of right eye, PVD (peripheral vascular disease) ([address] Traumatic injury of lower extremity, Vertigo, Wears glasses, and Wears partial dentures. SURGICAL HISTORY      has a past surgical history that includes jason and bso (cervix removed) (March 2002); Cholecystectomy, laparoscopic (5/28/1998); ERCP (7/19/1998); Colonoscopy (7/22/2003); Elbow fracture surgery (Left, September 2010); Cataract removal (Right, February 2013); Upper gastrointestinal endoscopy (N/A, 3/11/2022); Cystoscopy (Right, 3/24/2022); Ureter stent placement; and Esophagus dilation.     CURRENT MEDICATIONS       Previous Medications    ACETAMINOPHEN (TYLENOL) 325 MG TABLET    Take 650 mg by mouth every 6 hours as needed for Pain    ALBUTEROL (PROVENTIL) (2.5 MG/3ML) 0.083% NEBULIZER SOLUTION    Take 3 mLs by nebulization every 6 hours as needed for Wheezing or Shortness of Breath    ALBUTEROL SULFATE HFA (PROVENTIL HFA) 108 (90 BASE) MCG/ACT INHALER    Inhale 2 puffs into the lungs every 6 hours as needed for Wheezing    ASPIRIN 81 MG TABLET    Take 81 mg by mouth daily Held 5 days prior to paracentesis on 9-1-20    BIMATOPROST (LUMIGAN) 0.01 % SOLN OPHTHALMIC DROPS    Place 1 drop into both eyes nightly    BIMATOPROST (LUMIGAN) 0.01 % SOLN OPHTHALMIC DROPS    Place 1 drop into both eyes nightly    BRIMONIDINE (ALPHAGAN) 0.2 % OPHTHALMIC SOLUTION    Place 1 drop into both eyes 2 times daily    CARBAMIDE PEROXIDE (DEBROX) 6.5 % OTIC SOLUTION    5-10 drops as needed (In effected ear for wax impaction.)    CRANBERRY PO    Take 1 capsule by mouth daily    D-MANNOSE 500 MG CAPS    Take by mouth 2 times daily    DEXTROMETHORPHAN HBR (COUGH RELIEF ADULT PO)    Take by mouth as needed 15mg liquid    FERROUS SULFATE (IRON 325) 325 (65 FE) MG TABLET    Take 325 mg by mouth daily    HYDROCHLOROTHIAZIDE (HYDRODIURIL) 25 MG TABLET    Take 1 tablet by mouth daily    INSULIN GLARGINE (LANTUS) 100 UNIT/ML INJECTION VIAL    Inject 15 Units into the skin daily    LATANOPROST (XALATAN) 0.005 % OPHTHALMIC SOLUTION    Place 1 drop into both eyes nightly.     LATANOPROST (XALATAN) 0.005 % OPHTHALMIC SOLUTION    Place 1 drop into both eyes daily    LEVOTHYROXINE (SYNTHROID) 88 MCG TABLET    TAKE 1 TABLET DAILY    LISINOPRIL (PRINIVIL;ZESTRIL) 40 MG TABLET    TAKE 1 TABLET NIGHTLY    LOPERAMIDE (IMODIUM) 2 MG CAPSULE    Take 2 mg by mouth 4 times daily as needed for Diarrhea    MECLIZINE (ANTIVERT) 25 MG TABLET    Take 12.5 mg by mouth 3 times daily as needed    MULTIPLE VITAMINS-MINERALS (OCUVITE PRESERVISION PO)    Take 1 tablet by mouth daily    MULTIPLE VITAMINS-MINERALS (PRESERVISION AREDS) TABS    Take by mouth    MULTIPLE VITAMINS-MINERALS (THERAPEUTIC MULTIVITAMIN-MINERALS) TABLET    Take 1 tablet by mouth daily    NITROFURANTOIN, MACROCRYSTAL-MONOHYDRATE, (MACROBID) 100 MG CAPSULE    Take 100 mg by mouth 2 times daily    ONDANSETRON (ZOFRAN ODT) 4 MG DISINTEGRATING TABLET    Take 1 tablet by mouth every 8 hours as needed for Nausea    OXYBUTYNIN (DITROPAN-XL) 10 MG EXTENDED RELEASE TABLET    TAKE 1 TABLET DAILY    PANTOPRAZOLE (PROTONIX) 40 MG TABLET    Take 1 tablet by mouth every morning (before breakfast)    PHENAZOPYRIDINE (PYRIDIUM) 200 MG TABLET    Take 1 tablet by mouth 3 times daily as needed for Pain    SERTRALINE (ZOLOFT) 50 MG TABLET    TAKE 1 TABLET DAILY    SODIUM CHLORIDE 1 G TABLET    Take 1 tablet by mouth 2 times daily       ALLERGIES     is allergic to allopurinol, metoprolol tartrate [metoprolol], percocet [oxycodone-acetaminophen], phenergan [promethazine hcl], polycitra-k [potassium citrate], statins, levaquin [levofloxacin], sulfa antibiotics, and tessalon [benzonatate]. FAMILY HISTORY     She indicated that her mother is . She indicated that her father is . She indicated that the status of her other is unknown.     family history includes Diabetes type 2  in her mother; High Blood Pressure in an other family member. SOCIAL HISTORY      reports that she has never smoked. She has never used smokeless tobacco. She reports that she does not drink alcohol and does not use drugs. PHYSICAL EXAM     INITIAL VITALS:  height is 5' (1.524 m) and weight is 148 lb (67.1 kg). Her tympanic temperature is 100.5 °F (38.1 °C). Her blood pressure is 122/62 and her pulse is 90. Her respiration is 18 and oxygen saturation is 96%. Physical Exam  Constitutional:       Appearance: She is well-developed. Comments: Thin elderly female in no acute distress her sat was in the 80s on room air on 2 L she comes up to the mid 90s she has a harsh cough   HENT:      Head: Normocephalic and atraumatic. Right Ear: External ear normal.      Left Ear: External ear normal.      Mouth/Throat:      Comments: Oral mucosa is a little on the dry side  Eyes:      Extraocular Movements: Extraocular movements intact. Conjunctiva/sclera: Conjunctivae normal.      Pupils: Pupils are equal, round, and reactive to light.    Cardiovascular:      Rate and Rhythm: Normal rate and regular rhythm. Pulmonary:      Effort: Pulmonary effort is normal.      Breath sounds: Normal breath sounds. Comments: Cough but generally moving air well  Abdominal:      General: Bowel sounds are normal. There is no distension. Palpations: Abdomen is soft. Tenderness: There is no abdominal tenderness. Musculoskeletal:         General: No tenderness. Normal range of motion. Cervical back: Normal range of motion. Skin:     General: Skin is warm and dry. Neurological:      Mental Status: She is alert and oriented to person, place, and time.    Psychiatric:         Behavior: Behavior normal.           DIFFERENTIAL DIAGNOSIS/ MDM:     Febrile illness, patient appears to be very fatigued also requiring oxygen we will do a work-up    DIAGNOSTIC RESULTS     EKG: All EKG's are interpreted by the Emergency Department Physician who either signs or Co-signs this chart in the absence of a cardiologist.    Sinus rhythm rate of 96 bpm with occasional PACs and PVCs ND interval is 134 ms QRS duration 74 ms QT corrected 469 ms axis XVII is no acute ST or T wave changes seen    RADIOLOGY:   I directly visualized the following  images and reviewed the radiologist interpretations:       EXAMINATION:   ONE XRAY VIEW OF THE CHEST       4/27/2022 1:49 pm       COMPARISON:   None.       HISTORY:   ORDERING SYSTEM PROVIDED HISTORY: Fever   TECHNOLOGIST PROVIDED HISTORY:   Fever   Reason for Exam: Fever and cough.  History of lung cancer.       FINDINGS:   Small left basilar infiltrate and effusion, small to moderate right basilar   infiltrate and effusion.  Cardiac silhouette and osseous structures are   unchanged compared to 09/03/2020.           Impression   Bilateral pneumonia           ED BEDSIDE ULTRASOUND:       LABS:  Labs Reviewed   CBC WITH AUTO DIFFERENTIAL - Abnormal; Notable for the following components:       Result Value    WBC 26.2 (*)     RBC 3.21 (*)     Hemoglobin 9.5 (*) Hematocrit 30.7 (*)     Monocytes 3 (*)     Lymphocytes 2 (*)     Seg Neutrophils 95 (*)     Eosinophils % 0 (*)     Absolute Lymph # 0.52 (*)     Segs Absolute 24.89 (*)     All other components within normal limits   COMPREHENSIVE METABOLIC PANEL - Abnormal; Notable for the following components:    Glucose 256 (*)     CREATININE 1.15 (*)     Calcium 8.5 (*)     Potassium 3.4 (*)     Chloride 94 (*)     Alkaline Phosphatase 125 (*)     ALT 50 (*)     AST 50 (*)     Albumin 3.0 (*)     Albumin/Globulin Ratio 0.8 (*)     GFR Non- 44 (*)     GFR  54 (*)     All other components within normal limits   TROPONIN - Abnormal; Notable for the following components:    Troponin, High Sensitivity 61 (*)     All other components within normal limits   COVID-19, RAPID   RAPID INFLUENZA A/B ANTIGENS   CULTURE, BLOOD 1   CULTURE, BLOOD 1   LACTATE, SEPSIS   URINALYSIS WITH REFLEX TO CULTURE           EMERGENCY DEPARTMENT COURSE:   Vitals:    Vitals:    04/27/22 1253 04/27/22 1330 04/27/22 1400 04/27/22 1430   BP: (!) 131/51 125/61 (!) 122/55 122/62   Pulse: 100   90   Resp: 20   18   Temp: 100.5 °F (38.1 °C)      TempSrc: Tympanic      SpO2: (!) 88% 95% 96% 96%   Weight: 148 lb (67.1 kg)      Height: 5' (1.524 m)        -------------------------  BP: 122/62, Temp: 100.5 °F (38.1 °C), Pulse: 90, Resp: 18      Re-evaluation Notes    Resting comfortably, family at bedside    CRITICAL CARE:   IP CONSULT TO HOSPITALIST  PHARMACY TO DOSE VANCOMYCIN        CONSULTS:      PROCEDURES:  None    FINAL IMPRESSION      1. Pneumonia of both lungs due to infectious organism, unspecified part of lung          DISPOSITION/PLAN   DISPOSITION discharge    Condition on Disposition    Stable    PATIENT REFERRED TO:  No follow-up provider specified.     DISCHARGE MEDICATIONS:  New Prescriptions    No medications on file       (Please note that portions of this note were completed with a voice recognition program.  Efforts were made to edit the dictations but occasionally words are mis-transcribed.)    Juan Pablo Marmolejo MD,, MD, F.A.A.E.M.   Attending Emergency Physician                          Juan Pablo Marmolejo MD  05/03/22 [id number]

## 2022-04-27 NOTE — PROGRESS NOTES
[address] [facility] Pharmacokinetic Monitoring Service - Vancomycin     Lauri Andre is a 80 y.o. female starting on vancomycin therapy for Pneumonia HAP. Pharmacy consulted by Jeni Monet for monitoring and adjustment. Target Concentration: Goal AUC/TIFFANIE 400-600 mg*hr/L    Additional Antimicrobials: Cefepime    Pertinent Laboratory Values: Wt Readings from Last 1 Encounters:   04/27/22 148 lb (67.1 kg)     Temp Readings from Last 1 Encounters:   04/27/22 100.5 °F (38.1 °C) (Tympanic)     Estimated Creatinine Clearance: 28 mL/min (A) (based on SCr of 1.15 mg/dL (H)).   Recent Labs     04/27/22  1300   CREATININE 1.15*   WBC 26.2*     Procalcitonin:      Pertinent Cultures:  Culture Date Source Results   4/27/22 Blood x 2  pending   MRSA Nasal Swab:  not  ordered prior to initial consult    Plan:  Dosing recommendations based on Bayesian software  Start vancomycin 1250 mg IVPB x 1 LD, then 750 mg q 24h  Anticipated AUC of 436 and trough concentration of 14.2 at steady state  Renal labs as indicated   Vancomycin concentration ordered for 04/3022 @ [address] will continue to monitor patient and adjust therapy as indicated    Thank you for the consult,  YOUNG [facility] [place]  4/27/2022 3:57 PM

## 2022-04-28 ENCOUNTER — APPOINTMENT (OUTPATIENT)
Dept: GENERAL RADIOLOGY | Age: 87
DRG: 194 | End: 2022-04-28
Payer: MEDICARE

## 2022-04-28 PROBLEM — I50.32 CHRONIC DIASTOLIC CONGESTIVE HEART FAILURE (HCC): Status: ACTIVE | Noted: 2022-04-28

## 2022-04-28 LAB
ABSOLUTE EOS #: 0 K/UL (ref 0–0.44)
ABSOLUTE IMMATURE GRANULOCYTE: 0.23 K/UL (ref 0–0.3)
ABSOLUTE LYMPH #: 1.13 K/UL (ref 1.1–3.7)
ABSOLUTE MONO #: 0.9 K/UL (ref 0.1–1.2)
ALBUMIN SERPL-MCNC: 2.7 G/DL (ref 3.5–5.2)
ALBUMIN/GLOBULIN RATIO: 0.8 (ref 1–2.5)
ALP BLD-CCNC: 114 U/L (ref 35–104)
ALT SERPL-CCNC: 38 U/L (ref 5–33)
ANION GAP SERPL CALCULATED.3IONS-SCNC: 10 MMOL/L (ref 9–17)
AST SERPL-CCNC: 28 U/L
BASOPHILS # BLD: 0 % (ref 0–2)
BASOPHILS ABSOLUTE: 0 K/UL (ref 0–0.2)
BILIRUB SERPL-MCNC: 0.39 MG/DL (ref 0.3–1.2)
BILIRUBIN DIRECT: 0.17 MG/DL
BILIRUBIN, INDIRECT: 0.22 MG/DL (ref 0–1)
BUN BLDV-MCNC: 22 MG/DL (ref 8–23)
CALCIUM SERPL-MCNC: 8.6 MG/DL (ref 8.6–10.4)
CHLORIDE BLD-SCNC: 93 MMOL/L (ref 98–107)
CO2: 30 MMOL/L (ref 20–31)
CREAT SERPL-MCNC: 1.19 MG/DL (ref 0.5–0.9)
EKG ATRIAL RATE: 96 BPM
EKG P AXIS: 54 DEGREES
EKG P-R INTERVAL: 134 MS
EKG Q-T INTERVAL: 372 MS
EKG QRS DURATION: 74 MS
EKG QTC CALCULATION (BAZETT): 469 MS
EKG R AXIS: -17 DEGREES
EKG T AXIS: 9 DEGREES
EKG VENTRICULAR RATE: 96 BPM
EOSINOPHILS RELATIVE PERCENT: 0 % (ref 1–4)
GFR AFRICAN AMERICAN: 52 ML/MIN
GFR NON-AFRICAN AMERICAN: 43 ML/MIN
GFR SERPL CREATININE-BSD FRML MDRD: ABNORMAL ML/MIN/{1.73_M2}
GLUCOSE BLD-MCNC: 161 MG/DL (ref 65–105)
GLUCOSE BLD-MCNC: 175 MG/DL (ref 65–105)
GLUCOSE BLD-MCNC: 179 MG/DL (ref 65–105)
GLUCOSE BLD-MCNC: 189 MG/DL (ref 70–99)
HCT VFR BLD CALC: 26.9 % (ref 36.3–47.1)
HEMOGLOBIN: 8.4 G/DL (ref 11.9–15.1)
IMMATURE GRANULOCYTES: 1 %
LYMPHOCYTES # BLD: 5 % (ref 24–43)
MAGNESIUM: 2.2 MG/DL (ref 1.6–2.6)
MCH RBC QN AUTO: 30.1 PG (ref 25.2–33.5)
MCHC RBC AUTO-ENTMCNC: 31.2 G/DL (ref 25.2–33.5)
MCV RBC AUTO: 96.4 FL (ref 82.6–102.9)
MONOCYTES # BLD: 4 % (ref 3–12)
MORPHOLOGY: ABNORMAL
MORPHOLOGY: ABNORMAL
NRBC AUTOMATED: 0 PER 100 WBC
PDW BLD-RTO: 14.2 % (ref 11.8–14.4)
PLATELET # BLD: 284 K/UL (ref 138–453)
PMV BLD AUTO: 10.9 FL (ref 8.1–13.5)
POTASSIUM SERPL-SCNC: 3.7 MMOL/L (ref 3.7–5.3)
RBC # BLD: 2.79 M/UL (ref 3.95–5.11)
SEG NEUTROPHILS: 90 % (ref 36–65)
SEGMENTED NEUTROPHILS ABSOLUTE COUNT: 20.24 K/UL (ref 1.5–8.1)
SODIUM BLD-SCNC: 133 MMOL/L (ref 135–144)
TOTAL PROTEIN: 6 G/DL (ref 6.4–8.3)
TROPONIN, HIGH SENSITIVITY: 76 NG/L (ref 0–14)
TROPONIN, HIGH SENSITIVITY: 80 NG/L (ref 0–14)
WBC # BLD: 22.5 K/UL (ref 3.5–11.3)

## 2022-04-28 PROCEDURE — 6360000002 HC RX W HCPCS: Performed by: INTERNAL MEDICINE

## 2022-04-28 PROCEDURE — 2060000000 HC ICU INTERMEDIATE R&B

## 2022-04-28 PROCEDURE — 6370000000 HC RX 637 (ALT 250 FOR IP): Performed by: INTERNAL MEDICINE

## 2022-04-28 PROCEDURE — 82248 BILIRUBIN DIRECT: CPT

## 2022-04-28 PROCEDURE — 2580000003 HC RX 258: Performed by: NURSE PRACTITIONER

## 2022-04-28 PROCEDURE — 99232 SBSQ HOSP IP/OBS MODERATE 35: CPT | Performed by: INTERNAL MEDICINE

## 2022-04-28 PROCEDURE — 97161 PT EVAL LOW COMPLEX 20 MIN: CPT | Performed by: PHYSICAL THERAPIST

## 2022-04-28 PROCEDURE — 36415 COLL VENOUS BLD VENIPUNCTURE: CPT

## 2022-04-28 PROCEDURE — 82947 ASSAY GLUCOSE BLOOD QUANT: CPT

## 2022-04-28 PROCEDURE — 85025 COMPLETE CBC W/AUTO DIFF WBC: CPT

## 2022-04-28 PROCEDURE — 94761 N-INVAS EAR/PLS OXIMETRY MLT: CPT

## 2022-04-28 PROCEDURE — 2580000003 HC RX 258: Performed by: EMERGENCY MEDICINE

## 2022-04-28 PROCEDURE — 83735 ASSAY OF MAGNESIUM: CPT

## 2022-04-28 PROCEDURE — 6360000002 HC RX W HCPCS: Performed by: EMERGENCY MEDICINE

## 2022-04-28 PROCEDURE — 84484 ASSAY OF TROPONIN QUANT: CPT

## 2022-04-28 PROCEDURE — 80053 COMPREHEN METABOLIC PANEL: CPT

## 2022-04-28 PROCEDURE — 71045 X-RAY EXAM CHEST 1 VIEW: CPT

## 2022-04-28 PROCEDURE — 94640 AIRWAY INHALATION TREATMENT: CPT

## 2022-04-28 PROCEDURE — 2700000000 HC OXYGEN THERAPY PER DAY

## 2022-04-28 PROCEDURE — 87641 MR-STAPH DNA AMP PROBE: CPT

## 2022-04-28 PROCEDURE — 97165 OT EVAL LOW COMPLEX 30 MIN: CPT | Performed by: OCCUPATIONAL THERAPIST

## 2022-04-28 PROCEDURE — 2580000003 HC RX 258: Performed by: INTERNAL MEDICINE

## 2022-04-28 RX ORDER — SODIUM CHLORIDE 9 MG/ML
INJECTION, SOLUTION INTRAVENOUS CONTINUOUS
Status: DISCONTINUED | OUTPATIENT
Start: 2022-04-28 | End: 2022-04-30 | Stop reason: HOSPADM

## 2022-04-28 RX ADMIN — INSULIN LISPRO 1 UNITS: 100 INJECTION, SOLUTION INTRAVENOUS; SUBCUTANEOUS at 10:35

## 2022-04-28 RX ADMIN — INSULIN LISPRO 1 UNITS: 100 INJECTION, SOLUTION INTRAVENOUS; SUBCUTANEOUS at 20:36

## 2022-04-28 RX ADMIN — OXYBUTYNIN CHLORIDE 10 MG: 5 TABLET, EXTENDED RELEASE ORAL at 10:36

## 2022-04-28 RX ADMIN — IPRATROPIUM BROMIDE AND ALBUTEROL SULFATE 1 AMPULE: .5; 2.5 SOLUTION RESPIRATORY (INHALATION) at 19:46

## 2022-04-28 RX ADMIN — BRIMONIDINE TARTRATE 1 DROP: 2 SOLUTION OPHTHALMIC at 10:43

## 2022-04-28 RX ADMIN — FERROUS SULFATE TAB 325 MG (65 MG ELEMENTAL FE) 325 MG: 325 (65 FE) TAB at 10:36

## 2022-04-28 RX ADMIN — BRIMONIDINE TARTRATE 1 DROP: 2 SOLUTION OPHTHALMIC at 20:32

## 2022-04-28 RX ADMIN — IPRATROPIUM BROMIDE AND ALBUTEROL SULFATE 1 AMPULE: .5; 2.5 SOLUTION RESPIRATORY (INHALATION) at 08:19

## 2022-04-28 RX ADMIN — ENOXAPARIN SODIUM 30 MG: 100 INJECTION SUBCUTANEOUS at 10:36

## 2022-04-28 RX ADMIN — CEFEPIME HYDROCHLORIDE 2000 MG: 2 INJECTION, POWDER, FOR SOLUTION INTRAVENOUS at 15:24

## 2022-04-28 RX ADMIN — INSULIN LISPRO 1 UNITS: 100 INJECTION, SOLUTION INTRAVENOUS; SUBCUTANEOUS at 12:41

## 2022-04-28 RX ADMIN — ASPIRIN 81 MG: 81 TABLET, COATED ORAL at 10:36

## 2022-04-28 RX ADMIN — IPRATROPIUM BROMIDE AND ALBUTEROL SULFATE 1 AMPULE: .5; 2.5 SOLUTION RESPIRATORY (INHALATION) at 23:40

## 2022-04-28 RX ADMIN — INSULIN GLARGINE 15 UNITS: 100 INJECTION, SOLUTION SUBCUTANEOUS at 10:34

## 2022-04-28 RX ADMIN — SODIUM CHLORIDE 1 G: 1 TABLET ORAL at 10:36

## 2022-04-28 RX ADMIN — PANTOPRAZOLE SODIUM 40 MG: 40 TABLET, DELAYED RELEASE ORAL at 06:27

## 2022-04-28 RX ADMIN — SERTRALINE 50 MG: 50 TABLET, FILM COATED ORAL at 10:36

## 2022-04-28 RX ADMIN — SODIUM CHLORIDE: 9 INJECTION, SOLUTION INTRAVENOUS at 06:25

## 2022-04-28 RX ADMIN — IPRATROPIUM BROMIDE AND ALBUTEROL SULFATE 1 AMPULE: .5; 2.5 SOLUTION RESPIRATORY (INHALATION) at 03:44

## 2022-04-28 RX ADMIN — CEFEPIME HYDROCHLORIDE 2000 MG: 2 INJECTION, POWDER, FOR SOLUTION INTRAVENOUS at 03:53

## 2022-04-28 RX ADMIN — INSULIN LISPRO 1 UNITS: 100 INJECTION, SOLUTION INTRAVENOUS; SUBCUTANEOUS at 17:57

## 2022-04-28 RX ADMIN — IPRATROPIUM BROMIDE AND ALBUTEROL SULFATE 1 AMPULE: .5; 2.5 SOLUTION RESPIRATORY (INHALATION) at 11:23

## 2022-04-28 RX ADMIN — VANCOMYCIN HYDROCHLORIDE 750 MG: 1 INJECTION, POWDER, LYOPHILIZED, FOR SOLUTION INTRAVENOUS at 18:01

## 2022-04-28 RX ADMIN — SODIUM CHLORIDE 1 G: 1 TABLET ORAL at 20:30

## 2022-04-28 RX ADMIN — LEVOTHYROXINE SODIUM 88 MCG: 0.09 TABLET ORAL at 06:27

## 2022-04-28 RX ADMIN — IPRATROPIUM BROMIDE AND ALBUTEROL SULFATE 1 AMPULE: .5; 2.5 SOLUTION RESPIRATORY (INHALATION) at 15:35

## 2022-04-28 RX ADMIN — LISINOPRIL 40 MG: 20 TABLET ORAL at 20:30

## 2022-04-28 RX ADMIN — HYDROCHLOROTHIAZIDE 25 MG: 25 TABLET ORAL at 10:36

## 2022-04-28 RX ADMIN — LATANOPROST 1 DROP: 50 SOLUTION OPHTHALMIC at 20:25

## 2022-04-28 NOTE — PROGRESS NOTES
SW contacted by patients daughter Deedee Jensen to coordinate services. Informed patient is a resident of The [address] and plan is for patient to return. Informed Gomez Perez new cell phone number is [phone] and needs to be updated in the chart.         Electronically signed by CHELLE Rodriguez Mc on 4/28/2022 at 1:50 PM

## 2022-04-28 NOTE — PROGRESS NOTES
SW attempted to meet with patient. SW contacted patients [address] [place] [address] Dr. No answer and voicemail not available. SW contacted patients daughter Angie Parada to coordinate services. No answer voicemail left.        Electronically signed by CHELLE Albarado on 4/28/2022 at 12:29 PM

## 2022-04-28 NOTE — H&P
HOSPITALIST ADMISSION H&P      REASON FOR ADMISSION:  Pneumonia  ESTIMATED LENGTH OF STAY:>2 midnights, 3-4 days    ATTENDING/ADMITTING PHYSICIAN: Sylvester Malin MD  PCP: Selina Bryant MD    HISTORY OF PRESENT ILLNESS:      The patient is a 80 y.o. female patient of Selina Bryant MD who presents from the ER with c/o fevers. She resides at [facility] and has dementia. Some history is obtained from EMR and staff report. She has had a recent cystocospy with right stent placement on 03/24/2022 and esophageal dilatation on 03/11/2022. She has had a cough productive of yellow phlegm, nausea, and fatigue the past few days, but developed fevers and generalized weakness today, so was brought to the ER for evaluation. She was started on an antibiotic for a possible UTI on 04/26/2022. She has underlying adenocarcinoma of the right lung for which she has palliative care. She has a chronic cough and chronic diarrhea. In ER, she was hypoxic at 88% on room air. Temperature maximum 38.1C, WBC 26.2, lactic acid 1.1. Chest xray showed bilateral basilar pneumonia. Influenza and covid screenings negative. EKG showed sinus rhythm with PAC and PVC. Troponin 61 --> 63. 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 Patient ynnm-pavduydviy-evqekxwz-available records reviewed, including, but not limited to ER records, imaging results, lab results, office records, personal records, and OARRS -- no signs of abuse or diversion.      Past Medical History:   Diagnosis Date    Adenocarcinoma of endometrium ([address]     Aortic valve sclerosis     Bradycardia     follows with cardiology- Dr. Abbie Mcgill, possible SSS    Cardiac murmur  Cataract of left eye     Chronic diarrhea     s/p radiation    Diabetes mellitus type II, controlled ([address]     diet controlled     Dysthymia     Gait abnormality 6/23/2014    Glaucoma     H/O renal calculi     H/O vein stripping     Hard of hearing     History of anemia     History of dizziness     follows with cardiology, bradycardia, possible SSS    History of radiation therapy     for uterine cancer    History of small bowel obstruction 03/2019    no surgery required    Hyperlipidemia     Hypertension     Hypothyroidism     Influenza A 1/11/2018    Left elbow fracture     S/P surgical repair    [facility]     Lung nodule     Macular degeneration     Obesity     Pericardial effusion 4/23/2014    Pseudophakia of right eye     PVD (peripheral vascular disease) ([address]     Traumatic injury of lower extremity childhood    bilateral trauma of lower extremities    Vertigo     resolved    Wears glasses     Wears partial dentures     upper            Past Surgical History:   Procedure Laterality Date    CATARACT REMOVAL Right February 2013    Dr. Danuta Gregorio, LAPAROSCOPIC  5/28/1998    COLONOSCOPY  7/22/2003    Dr. Olvin Mora Right 3/24/2022    CYSTOSCOPY, RIGHT STENT PLACEMENT, RIGHT RETROGRADE PYELOGRAM performed by Celso Amos MD at [place] [address]. Left September 2010    ORIF, Dr. Wendy Collier ERCP  7/19/1998    retained stone, Suzy Justice and Ovidio    ESOPHAGEAL DILATATION      MAGDA AND BSO  March 2002    endometrial adenocarcinoma, Dr. Payam Jules N/A 3/11/2022    EGD ESOPHAGOGASTRODUODENOSCOPY DILATATION performed by Guera Hill MD at [address]         Allergies:    Allopurinol, Metoprolol tartrate [metoprolol], Percocet [oxycodone-acetaminophen], Phenergan [promethazine hcl], Polycitra-k [potassium citrate], Statins, Levaquin [levofloxacin], Sulfa antibiotics, and Tessalon [benzonatate]    Social History:    reports that she has never smoked. She has never used smokeless tobacco. She reports that she does not drink alcohol and does not use drugs. Family History:   family history includes Diabetes type 2  in her mother; High Blood Pressure in an other family member. REVIEW OF SYSTEMS:  See HPI and problem list; otherwise no other new complaints with respect to eyes, ENT, neck, pulmonary, coronary, chest, GI, , endocrine, musculoskeletal, hematologic, lymphatic, allergic/immunologic, neurologic, psychiatric, or skin. Code status: patient/family wishes for DNR-CCA at this time. PHYSICAL EXAM:  Vitals:  BP (!) 126/98   Pulse 85   Temp 98.2 °F (36.8 °C) (Tympanic)   Resp 29   Ht 5' (1.524 m)   Wt 146 lb 1.6 oz (66.3 kg)   SpO2 96%   BMI 28.53 kg/m²     General: awake, alert, cooperative and frail  HEENT: EMOI, External nose normal, Normocephalic, Atraumatic and Neck with full ROM  Neck: Supple, Carotid Pulses Present, No Masses, Tenderness, Nodularity and No Lymphadenopathy  Chest/Lungs: Clear to Auscultation without Rales, Rhonchi, or Wheezes, Bases Diminished Bilaterally and Respirations even and unlabored  Cardiac: Regular Rate and Rhythm, Systolic Murmur Present and Pedal Pulses Palpable Bilaterally  GI/Abdomen:  Bowel Sounds Present and Soft, Non-tender, without Guarding or Rebound Tenderness  : Not examined  Extremities/Musculoskeletal: Generalized weakness  Skin: No Cyanosis and Skin warm and dry  Neuro: Dementia at baseline and No Localizing Signs/Symptoms, [place], gait impairment at baseline (walker), oriented to person and place  Psychiatric: Normal mood and affect and poor concentration      LABS:    CBC with Differential:    Lab Results   Component Value Date    WBC 26.2 04/27/2022    RBC 3.21 04/27/2022    HGB 9.5 04/27/2022    HCT 30.7 04/27/2022     04/27/2022    MCV 95.6 04/27/2022    MCH 29.6 04/27/2022    MCHC 30.9 04/27/2022    RDW 13.8 04/27/2022    NRBC 0 07/18/2020    SEGSPCT 79.3 07/18/2020    LYMPHOPCT 2 04/27/2022    MONOPCT 3 04/27/2022    BASOPCT 0 04/27/2022    MONOSABS 0.79 04/27/2022    MONOSABS 0.6 07/18/2020    LYMPHSABS 0.52 04/27/2022    LYMPHSABS 0.9 07/18/2020    EOSABS 0.00 04/27/2022    EOSABS 0.0 07/18/2020    BASOSABS 0.00 04/27/2022    DIFFTYPE NOT REPORTED 02/16/2022     CMP:    Lab Results   Component Value Date     04/27/2022    K 3.4 04/27/2022    CL 94 04/27/2022    CO2 31 04/27/2022    BUN 19 04/27/2022    CREATININE 1.15 04/27/2022    GFRAA 54 04/27/2022    LABGLOM 44 04/27/2022    LABGLOM 59 07/18/2020    GLUCOSE 256 04/27/2022    PROT 6.6 04/27/2022    LABALBU 3.0 04/27/2022    CALCIUM 8.5 04/27/2022    BILITOT 0.55 04/27/2022    ALKPHOS 125 04/27/2022    AST 50 04/27/2022    ALT 50 04/27/2022       ASSESSMENT:      Patient Active Problem List   Diagnosis    DM (diabetes mellitus) type II controlled with renal manifestation (HCC)    Hyperlipidemia    Hypothyroidism    Chronic diarrhea    Dysthymia    Gait abnormality    Chronic kidney disease, stage III (moderate) (HCC), likely related to diabetes    Glaucoma of both eyes    Carotid stenosis, bilateral    Urinary incontinence    Age related entropion, left    Epiphora due to excess lacrimation of right side    Frail elderly    Right lower lobe lung mass    History of endometrial cancer    Malignant neoplasm of lower lobe, right bronchus or lung (HCC)    Posterior vitreous detachment of both eyes    Adenocarcinoma of right lung (HCC)    Chronic renal insufficiency, stage III (moderate) (HCC)    Type 2 diabetes mellitus, with long-term current use of insulin (HCC)    Nonexudative age-related macular degeneration    Abnormal gait    Hypertensive disorder    Hyponatremia    Chronic depression    Chronic anemia    Chronic superficial gastritis without bleeding    Esophageal stricture    Gross hematuria    Declining functional status    Pneumonia    Hypomagnesemia    Hypokalemia     Patient jvat-iquksiqxkl-agndmkjt-available records reviewed, including, but not limited to,  ER reports--labs--imaging---office records--NH records---personal notes    Cezar RICHARDSON         80  WF   [facility] feilpe Garrett MD---jose [place];  [facility] , ,  Paras Jade ]  DNR-CCA         ASPIRIN  LOVENOX    Anti-infectives:   Vancomycin IV, Cefepime IV   [recent hospital interventions]    Pneumonia----bilateral---4.27.2022---hypoxiafever--weakness   Elevated troponin----4.27.2022---flat peak pattern---palliative care---no intervention              EKG4.27.2022---SR---96PACsPVCsLVH              CXR----4.28.2022cardiomegaly---effusions---mild vascular congestion----                              bilateral pulmonary opacities----worsening opacities right lung hemithorax              CXR---4.27.2022----bilateral pneumonia        Esophageal dilatation---3.11.2022      Kidney----stent3. 2.9199      Possible UTI4.26.2022        Hypokalemia      Hypomagnesemia       Dehydration----4.27.2022         Chronic cough       Chronic diarrhea        Chronic anemia     Hypertension            2D ECHO2018moderate MR-TRmild pulmonary HTN---Grade II moderate DD---LVEF ~ 60%            EKG---9.1.2020---SR80PACsLVH?   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-----palliative care             Endometrial adenocarcinoma---history  Dementia   HEARING IMPAIRMENT   PMH:  anemia---chronic, bilateral cataracts,  dysthymia, chest pain,             pericardial effusion, gait abnormality, kidney stones, left entropion,             macular degeneration---OU, glaucomaOU, vertigo, BLE trauma---              childhood, Influenza A---2018, UTI--- 2018, dehydration---2018,              CRUZ---1.11.2018---dehydrationfluoroquinolone,  hyponatremia,              chronic diarrhea,  UTI.2019, Pancreatitis---chronic,  PAP3600             hyponatremia  PSH:  MAGDABSO---cervix absent2002, laparoscopic cholecystectomy             1998, [id number], colonoscopy---2003, left elbow ORIFfracture              2010, right cataract extraction----IOL2013, vein-stripping, multiple              thoracentesesright chestlung carcinoma    Allergies:        allopurinol, Percocet---oxycodone, Phenergan--promethazine,                         potassium citratePolycitra-K, sulfarash, Lopressor, statins, sulfa  Intolerances:  Tessalon---benzonatate---anxiety        Attending Supervising Physician's Attestation Statement  I performed a history and physical examination on the patient and discussed the management with the nurse practitioner. I reviewed and agree with the findings and plan as documented in her note . Electronically signed by Dewayne Tilley on 4/28/22 at 6:36 PM EDT      PLAN:    1. Pneumonia -- telemetry monitoring, con't pulse oximetry, IV antibiotics, mednebs, RT protocols, acapella, supplemental oxygen prn, AM CXR  2. Hypomagnesemia -- replacing, recheck in AM  3. Hypokalemia -- replacing, recheck in AM  4. DMII -- carb controlled diet, insulins, monitor and titrate  5. CKD and mild dehydration -- encourage PO intake, monitor I&O and renal function, will consider IV hydration if fevers persist, poor PO intake, or renal function does not improve  6. Home medications reviewed -- updated MAR requested from [facility]   7. PT/OT eval and treat  8. DVT prophylaxis  9.  See orders     Note that over 50 minutes was spent in evaluation of the patient, review of the chart and pertinent records, discussion with family/staff, etc.    MOHAN Cruz - CNP, FNP-BC  8:06 PM  4/27/2022

## 2022-04-28 NOTE — PROGRESS NOTES
Physical Therapy  Facility/Department: [address] [facility]  Physical Therapy Initial Assessment    Name: Escobar Scherer  : 1932  MRN: [id number]  Date of Service: 2022    Discharge Recommendations:  Continue to assess pending progress,Long Term Care without PT          Patient Diagnosis(es): The encounter diagnosis was Pneumonia of both lungs due to infectious organism, unspecified part of lung. Past Medical History:  has a past medical history of Adenocarcinoma of endometrium ([address] Aortic valve sclerosis, Bradycardia, Cardiac murmur, Cataract of left eye, Chronic diarrhea, Diabetes mellitus type II, controlled ([address] Dysthymia, Gait abnormality, Glaucoma, H/O renal calculi, H/O vein stripping, Hard of hearing, History of anemia, History of dizziness, History of radiation therapy, History of small bowel obstruction, Hyperlipidemia, Hypertension, Hypothyroidism, Influenza A, Left elbow fracture, Lung cancer ([address] Lung nodule, Macular degeneration, Obesity, Pericardial effusion, Pseudophakia of right eye, PVD (peripheral vascular disease) ([address] Traumatic injury of lower extremity, Vertigo, Wears glasses, and Wears partial dentures. Past Surgical History:  has a past surgical history that includes jason and bso (cervix removed) (2002); Cholecystectomy, laparoscopic (1998); ERCP (1998); Colonoscopy (2003); Elbow fracture surgery (Left, 2010); Cataract removal (Right, 2013); Upper gastrointestinal endoscopy (N/A, 3/11/2022); Cystoscopy (Right, 3/24/2022); Ureter stent placement; and Esophagus dilation. Assessment   Body Structures, Functions, Activity Limitations Requiring Skilled Therapeutic Intervention: Decreased functional mobility ; Decreased balance;Decreased endurance  Therapy Prognosis: Fair  Decision Making: Low Complexity  Activity Tolerance  Activity Tolerance: Treatment limited secondary to medical complications; Patient limited by fatigue     Plan Plan  Plan: 1 time a day 3-6 times a week  Current Treatment Recommendations: Functional mobility training,Transfer training,Gait training  Safety Devices  Type of Devices:  (Left on commode, aide present)     Restrictions        Subjective   General  Chart Reviewed: Yes  Patient assessed for rehabilitation services?: Yes  Response To Previous Treatment: Not applicable  Family / Caregiver Present: No  Follows Commands: Within Functional Limits         Social/Functional History  Social/Functional History  Type of Home: Facility  ADL Assistance: Needs assistance  Homemaking Assistance: Needs assistance  Ambulation Assistance: Needs assistance  Transfer Assistance: Needs assistance  Occupation: Retired  IADL Comments: ECF resident on palliative care  Vision/Hearing       Cognition   Orientation  Overall Orientation Status: Within Functional Limits     Objective   Pulse: 75  Heart Rate Source: Monitor  BP: (!) 131/59  BP Location: Right upper arm  BP Method: Automatic  Patient Position: Semi fowlers  MAP (Calculated): 83  Resp: 22  SpO2: 97 %  O2 Device: Nasal cannula                                Transfers  Sit to Stand: Moderate Assistance  Stand to sit: Moderate Assistance  Stand Pivot Transfers: Moderate Assistance  Comment: Moves very slow. Sever flexed posture  Ambulation  Surface: level tile  Device: Rolling Walker  Assistance:  Moderate assistance  Quality of Gait: Sever flexed posture, Difficulty moving feet  Gait Deviations: Slow Barbara  Distance: 3 ft transfer to commode     Balance  Sitting - Static: Good  Sitting - Dynamic: Good  Standing - Static: Poor  Standing - Dynamic: Poor           OutComes Score                                                  AM-PAC Score             Goals  Long Term Goals  Time Frame for Long term goals : 3 days  Long term goal 1: transer to standing min assist  Long term goal 2: Pivot transfer minassist  Long term goal 3: Gait with RW 10 ft       Therapy Time   Individual Concurrent Group Co-treatment   Time In           Time Out           Minutes                   Sumi Vallejo, PT

## 2022-04-28 NOTE — PROGRESS NOTES
[address] [facility] Pharmacokinetic Monitoring Service - Vancomycin    Consulting Provider: Dwayne Díaz   Indication: pneumonia HAP  Target Concentration: Goal AUC/TIFFANIE 400-600 mg*hr/L  Day of Therapy: 2  Additional Antimicrobials: cefepime    Pertinent Laboratory Values: Wt Readings from Last 1 Encounters:   04/27/22 146 lb 1.6 oz (66.3 kg)     Temp Readings from Last 1 Encounters:   04/28/22 97.6 °F (36.4 °C) (Tympanic)     Estimated Creatinine Clearance: 27 mL/min (A) (based on SCr of 1.19 mg/dL (H)). Recent Labs     04/27/22  1300 04/28/22  0408   CREATININE 1.15* 1.19*   WBC 26.2* 22.5*     Procalcitonin:      Pertinent Cultures:  Culture Date Source Results   04/27/22 Blood  NGTD   MRSA Nasal Swab: not ordered. Order placed by pharmacy.     Recent vancomycin administrations                     vancomycin (VANCOCIN) 1,250 mg in dextrose 5 % 250 mL IVPB (mg) 1,250 mg New Bag 04/27/22 1388                    Assessment:  Date/Time Current Dose Concentration Timing of Concentration (h) AUC   04/28/22 750 mg q24h     449   Note: Serum concentrations collected for AUC dosing may appear elevated if collected in close proximity to the dose administered, this is not necessarily an indication of toxicity    Plan:  Current dosing regimen is therapeutic  Continue current dose  Repeat vancomycin concentration ordered for 04/30/22 @ [facility]  will continue to monitor patient and adjust therapy as indicated    Thank you for the consult,  YOUNG Tobias [facility]  4/28/2022 10:42 AM

## 2022-04-28 NOTE — PROGRESS NOTES
Occupational Therapy  Facility/Department: [address] [place] [address]  Occupational Therapy Initial Assessment    Name: Aubrey Ross  : 1932  MRN: [id number]  Date of Service: 2022    Discharge Recommendations:  Continue to assess pending progress,Long Term Care without OT        Patient Diagnosis(es): The encounter diagnosis was Pneumonia of both lungs due to infectious organism, unspecified part of lung. Past Medical History:  has a past medical history of Adenocarcinoma of endometrium ([address] Aortic valve sclerosis, Bradycardia, Cardiac murmur, Cataract of left eye, Chronic diarrhea, Diabetes mellitus type II, controlled ([address] Dysthymia, Gait abnormality, Glaucoma, H/O renal calculi, H/O vein stripping, Hard of hearing, History of anemia, History of dizziness, History of radiation therapy, History of small bowel obstruction, Hyperlipidemia, Hypertension, Hypothyroidism, Influenza A, Left elbow fracture, Lung cancer ([address] Lung nodule, Macular degeneration, Obesity, Pericardial effusion, Pseudophakia of right eye, PVD (peripheral vascular disease) ([address] Traumatic injury of lower extremity, Vertigo, Wears glasses, and Wears partial dentures. Past Surgical History:  has a past surgical history that includes jason and bso (cervix removed) (2002); Cholecystectomy, laparoscopic (1998); ERCP (1998); Colonoscopy (2003); Elbow fracture surgery (Left, 2010); Cataract removal (Right, 2013); Upper gastrointestinal endoscopy (N/A, 3/11/2022); Cystoscopy (Right, 3/24/2022); Ureter stent placement; and Esophagus dilation. Treatment Diagnosis: general weakness      Assessment   Performance deficits / Impairments: Decreased functional mobility ; Decreased ADL status; Decreased endurance  Treatment Diagnosis: general weakness  Prognosis: Fair  Decision Making: Low Complexity           Plan   Plan  Times per Week: 1-2  Current Treatment Recommendations: Patient/Caregiver education & training,Equipment evaluation, education, & procurement,Self-Care / ADL,Functional mobility training     Restrictions  Restrictions/Precautions  Implants present? : Metal implants (elbow)    Subjective   General  Chart Reviewed: Yes  Patient assessed for rehabilitation services?: Yes  Family / Caregiver Present: Yes  Referring Practitioner: TETO Gray  Diagnosis: pneumonia  Subjective  Subjective: Patient rec'd in bed, pleasant and cooperative 80 yr old female with fever     Social/Functional History  Social/Functional History  Type of Home: Facility ([facility])  Home Layout: One level  Home Access: Level entry  Bathroom Shower/Tub: Walk-in shower  Bathroom Toilet: Handicap height  Bathroom Equipment: Grab bars in shower,Built-in shower seat,Grab bars around toilet  ADL Assistance: Needs assistance  Bath: Minimal assistance  Dressing: Minimal assistance  Toileting: Needs assistance  Homemaking Assistance: Needs assistance  Homemaking Responsibilities: No  Ambulation Assistance: Needs assistance  Transfer Assistance: Needs assistance  Active : No  Patient's  Info: children  Occupation: Retired  IADL Comments: [facility] resident on palliative care       Objective   Pulse: 75  Heart Rate Source: Monitor  BP: 105/67  BP Location: Right upper arm  BP Method: Automatic  Patient Position: Semi fowlers  MAP (Calculated): 79.67  Resp: 23  SpO2: 96 %  O2 Device: Nasal cannula  Vision Exceptions: Wears glasses at all times  Hearing: Exceptions to [facility]  Hearing Exceptions: Hard of hearing/hearing concerns          Safety Devices  Type of Devices:  (Left on commode, aide present)           ADL  LE Dressing: Maximum assistance  Toileting: Maximum assistance     Activity Tolerance  Activity Tolerance: Treatment limited secondary to medical complications; Patient limited by fatigue  Bed mobility  Supine to Sit: Unable to assess  Comment: Patient declined getting up a 2nd time this morning due to fatigue  Transfers  Sit to stand: Unable to assess     LUE AROM (degrees)  LUE AROM : WFL  Left Hand AROM (degrees)  Left Hand AROM: WFL  RUE AROM (degrees)  RUE AROM : WFL  Right Hand AROM (degrees)  Right Hand AROM: WFL       Goals  Short Term Goals  Time Frame for Short term goals: duration of hospital stay  Short Term Goal 1: Patient to complete toilet/BSC transfer and toileting tasks with min a using a/e as needed       Therapy Time   Individual Concurrent Group Co-treatment   Time In 1059         Time Out 1112         Minutes 13         Timed Code Treatment Minutes: 0 Minutes       KURTIS YOUSSEF OTR, OT

## 2022-04-29 ENCOUNTER — APPOINTMENT (OUTPATIENT)
Dept: GENERAL RADIOLOGY | Age: 87
DRG: 194 | End: 2022-04-29
Payer: MEDICARE

## 2022-04-29 LAB
ABSOLUTE EOS #: 0.34 K/UL (ref 0–0.44)
ABSOLUTE IMMATURE GRANULOCYTE: 0.1 K/UL (ref 0–0.3)
ABSOLUTE LYMPH #: 0.87 K/UL (ref 1.1–3.7)
ABSOLUTE MONO #: 0.6 K/UL (ref 0.1–1.2)
ANION GAP SERPL CALCULATED.3IONS-SCNC: 8 MMOL/L (ref 9–17)
BASOPHILS # BLD: 0 % (ref 0–2)
BASOPHILS ABSOLUTE: 0.05 K/UL (ref 0–0.2)
BUN BLDV-MCNC: 22 MG/DL (ref 8–23)
BUN/CREAT BLD: 21 (ref 9–20)
CALCIUM SERPL-MCNC: 8.6 MG/DL (ref 8.6–10.4)
CHLORIDE BLD-SCNC: 100 MMOL/L (ref 98–107)
CO2: 29 MMOL/L (ref 20–31)
CREAT SERPL-MCNC: 1.03 MG/DL (ref 0.5–0.9)
EOSINOPHILS RELATIVE PERCENT: 3 % (ref 1–4)
GFR AFRICAN AMERICAN: >60 ML/MIN
GFR NON-AFRICAN AMERICAN: 50 ML/MIN
GFR SERPL CREATININE-BSD FRML MDRD: ABNORMAL ML/MIN/{1.73_M2}
GLUCOSE BLD-MCNC: 114 MG/DL (ref 65–105)
GLUCOSE BLD-MCNC: 119 MG/DL (ref 65–105)
GLUCOSE BLD-MCNC: 133 MG/DL (ref 70–99)
GLUCOSE BLD-MCNC: 159 MG/DL (ref 65–105)
HCT VFR BLD CALC: 25.9 % (ref 36.3–47.1)
HEMOGLOBIN: 8.1 G/DL (ref 11.9–15.1)
IMMATURE GRANULOCYTES: 1 %
LYMPHOCYTES # BLD: 8 % (ref 24–43)
MCH RBC QN AUTO: 30.5 PG (ref 25.2–33.5)
MCHC RBC AUTO-ENTMCNC: 31.3 G/DL (ref 25.2–33.5)
MCV RBC AUTO: 97.4 FL (ref 82.6–102.9)
MONOCYTES # BLD: 5 % (ref 3–12)
MRSA, DNA, NASAL: NEGATIVE
NRBC AUTOMATED: 0 PER 100 WBC
PDW BLD-RTO: 14.4 % (ref 11.8–14.4)
PLATELET # BLD: 266 K/UL (ref 138–453)
PMV BLD AUTO: 11.1 FL (ref 8.1–13.5)
POTASSIUM SERPL-SCNC: 3.8 MMOL/L (ref 3.7–5.3)
RBC # BLD: 2.66 M/UL (ref 3.95–5.11)
SEG NEUTROPHILS: 83 % (ref 36–65)
SEGMENTED NEUTROPHILS ABSOLUTE COUNT: 9.42 K/UL (ref 1.5–8.1)
SODIUM BLD-SCNC: 137 MMOL/L (ref 135–144)
SPECIMEN DESCRIPTION: NORMAL
WBC # BLD: 11.4 K/UL (ref 3.5–11.3)

## 2022-04-29 PROCEDURE — 2580000003 HC RX 258: Performed by: EMERGENCY MEDICINE

## 2022-04-29 PROCEDURE — 99232 SBSQ HOSP IP/OBS MODERATE 35: CPT | Performed by: INTERNAL MEDICINE

## 2022-04-29 PROCEDURE — 2580000003 HC RX 258: Performed by: NURSE PRACTITIONER

## 2022-04-29 PROCEDURE — 80048 BASIC METABOLIC PNL TOTAL CA: CPT

## 2022-04-29 PROCEDURE — 85025 COMPLETE CBC W/AUTO DIFF WBC: CPT

## 2022-04-29 PROCEDURE — 97110 THERAPEUTIC EXERCISES: CPT

## 2022-04-29 PROCEDURE — 2060000000 HC ICU INTERMEDIATE R&B

## 2022-04-29 PROCEDURE — 2700000000 HC OXYGEN THERAPY PER DAY

## 2022-04-29 PROCEDURE — 6360000002 HC RX W HCPCS: Performed by: INTERNAL MEDICINE

## 2022-04-29 PROCEDURE — 94760 N-INVAS EAR/PLS OXIMETRY 1: CPT

## 2022-04-29 PROCEDURE — 6360000002 HC RX W HCPCS: Performed by: EMERGENCY MEDICINE

## 2022-04-29 PROCEDURE — 82947 ASSAY GLUCOSE BLOOD QUANT: CPT

## 2022-04-29 PROCEDURE — 6370000000 HC RX 637 (ALT 250 FOR IP): Performed by: INTERNAL MEDICINE

## 2022-04-29 PROCEDURE — 94640 AIRWAY INHALATION TREATMENT: CPT

## 2022-04-29 PROCEDURE — 36415 COLL VENOUS BLD VENIPUNCTURE: CPT

## 2022-04-29 PROCEDURE — 71045 X-RAY EXAM CHEST 1 VIEW: CPT

## 2022-04-29 RX ADMIN — SODIUM CHLORIDE: 9 INJECTION, SOLUTION INTRAVENOUS at 09:48

## 2022-04-29 RX ADMIN — ENOXAPARIN SODIUM 30 MG: 100 INJECTION SUBCUTANEOUS at 08:58

## 2022-04-29 RX ADMIN — IPRATROPIUM BROMIDE AND ALBUTEROL SULFATE 1 AMPULE: .5; 2.5 SOLUTION RESPIRATORY (INHALATION) at 03:52

## 2022-04-29 RX ADMIN — ASPIRIN 81 MG: 81 TABLET, COATED ORAL at 09:14

## 2022-04-29 RX ADMIN — IPRATROPIUM BROMIDE AND ALBUTEROL SULFATE 1 AMPULE: .5; 2.5 SOLUTION RESPIRATORY (INHALATION) at 23:58

## 2022-04-29 RX ADMIN — INSULIN GLARGINE 15 UNITS: 100 INJECTION, SOLUTION SUBCUTANEOUS at 08:57

## 2022-04-29 RX ADMIN — PANTOPRAZOLE SODIUM 40 MG: 40 TABLET, DELAYED RELEASE ORAL at 06:15

## 2022-04-29 RX ADMIN — CEFEPIME HYDROCHLORIDE 2000 MG: 2 INJECTION, POWDER, FOR SOLUTION INTRAVENOUS at 03:02

## 2022-04-29 RX ADMIN — IPRATROPIUM BROMIDE AND ALBUTEROL SULFATE 1 AMPULE: .5; 2.5 SOLUTION RESPIRATORY (INHALATION) at 13:00

## 2022-04-29 RX ADMIN — BRIMONIDINE TARTRATE 1 DROP: 2 SOLUTION OPHTHALMIC at 20:58

## 2022-04-29 RX ADMIN — LISINOPRIL 40 MG: 20 TABLET ORAL at 20:58

## 2022-04-29 RX ADMIN — SERTRALINE 50 MG: 50 TABLET, FILM COATED ORAL at 09:14

## 2022-04-29 RX ADMIN — OXYBUTYNIN CHLORIDE 10 MG: 5 TABLET, EXTENDED RELEASE ORAL at 09:14

## 2022-04-29 RX ADMIN — IPRATROPIUM BROMIDE AND ALBUTEROL SULFATE 1 AMPULE: .5; 2.5 SOLUTION RESPIRATORY (INHALATION) at 16:33

## 2022-04-29 RX ADMIN — IPRATROPIUM BROMIDE AND ALBUTEROL SULFATE 1 AMPULE: .5; 2.5 SOLUTION RESPIRATORY (INHALATION) at 19:43

## 2022-04-29 RX ADMIN — BRIMONIDINE TARTRATE 1 DROP: 2 SOLUTION OPHTHALMIC at 09:19

## 2022-04-29 RX ADMIN — CEFEPIME HYDROCHLORIDE 2000 MG: 2 INJECTION, POWDER, FOR SOLUTION INTRAVENOUS at 15:40

## 2022-04-29 RX ADMIN — LEVOTHYROXINE SODIUM 88 MCG: 0.09 TABLET ORAL at 06:15

## 2022-04-29 RX ADMIN — SODIUM CHLORIDE 1 G: 1 TABLET ORAL at 09:15

## 2022-04-29 RX ADMIN — INSULIN LISPRO 1 UNITS: 100 INJECTION, SOLUTION INTRAVENOUS; SUBCUTANEOUS at 12:56

## 2022-04-29 RX ADMIN — SODIUM CHLORIDE 1 G: 1 TABLET ORAL at 20:58

## 2022-04-29 RX ADMIN — HYDROCHLOROTHIAZIDE 25 MG: 25 TABLET ORAL at 09:14

## 2022-04-29 RX ADMIN — LATANOPROST 1 DROP: 50 SOLUTION OPHTHALMIC at 20:58

## 2022-04-29 RX ADMIN — IPRATROPIUM BROMIDE AND ALBUTEROL SULFATE 1 AMPULE: .5; 2.5 SOLUTION RESPIRATORY (INHALATION) at 09:20

## 2022-04-29 RX ADMIN — FERROUS SULFATE TAB 325 MG (65 MG ELEMENTAL FE) 325 MG: 325 (65 FE) TAB at 09:14

## 2022-04-29 ASSESSMENT — PAIN SCALES - WONG BAKER
WONGBAKER_NUMERICALRESPONSE: 0

## 2022-04-29 NOTE — PROGRESS NOTES
SW contacted by Pily Harrison from [facility]. Informed patient is on Palliative Care through [facility] at the [facility].         Electronically signed by CHELLE Horner on 4/29/2022 at 10:44 AM

## 2022-04-29 NOTE — PROGRESS NOTES
Hospitalist Progress Note    Patient:  Amado Kruger     YOB: 1932    MRN: [id number]   Admit date: 4/27/2022     Acct: [de-identified]     PCP: Drake Bundy MD    CC--Interval History:    PNA---on Cefepime---med nebs--O2 as required    WBC----22.5 ---> 11.4    CKD----Stage 3b --> 3a    CXR----no change    HTN---/61    Hyokalemia----corrected    See note below    All other ROS negative except noted in HPI    Diet:  ADULT DIET;  Regular; 4 carb choices (60 gm/meal)    Medications:  Scheduled Meds:   cefepime  2,000 mg IntraVENous Q12H    sodium chloride flush  10 mL IntraVENous 2 times per day    enoxaparin  30 mg SubCUTAneous Daily    aspirin  81 mg Oral Daily    brimonidine  1 drop Both Eyes BID    ferrous sulfate  325 mg Oral Daily    hydroCHLOROthiazide  25 mg Oral Daily    insulin glargine  15 Units SubCUTAneous Daily    latanoprost  1 drop Both Eyes Nightly    levothyroxine  88 mcg Oral Daily    lisinopril  40 mg Oral Nightly    oxybutynin  10 mg Oral Daily    pantoprazole  40 mg Oral QAM AC    sertraline  50 mg Oral Daily    sodium chloride  1 g Oral BID    insulin lispro  0-6 Units SubCUTAneous TID WC    insulin lispro  0-3 Units SubCUTAneous Nightly    ipratropium-albuterol  1 ampule Inhalation Q4H     Continuous Infusions:   sodium chloride 50 mL/hr at 04/29/22 1828    sodium chloride      dextrose       PRN Meds:dextrose bolus (hypoglycemia) **OR** dextrose bolus (hypoglycemia), sodium chloride flush, sodium chloride, ondansetron, acetaminophen, albuterol, meclizine, phenazopyridine, glucose, glucagon (rDNA), dextrose    Objective:  Labs:  CBC with Differential:    Lab Results   Component Value Date    WBC 11.4 04/29/2022    RBC 2.66 04/29/2022    HGB 8.1 04/29/2022    HCT 25.9 04/29/2022     04/29/2022    MCV 97.4 04/29/2022    MCH 30.5 04/29/2022    MCHC 31.3 04/29/2022    RDW 14.4 04/29/2022    NRBC 0 07/18/2020    SEGSPCT 79.3 07/18/2020    LYMPHOPCT 8 04/29/2022    MONOPCT 5 04/29/2022    BASOPCT 0 04/29/2022    MONOSABS 0.60 04/29/2022    MONOSABS 0.6 07/18/2020    LYMPHSABS 0.87 04/29/2022    LYMPHSABS 0.9 07/18/2020    EOSABS 0.34 04/29/2022    EOSABS 0.0 07/18/2020    BASOSABS 0.05 04/29/2022    DIFFTYPE NOT REPORTED 02/16/2022     BMP:    Lab Results   Component Value Date     04/29/2022    K 3.8 04/29/2022     04/29/2022    CO2 29 04/29/2022    BUN 22 04/29/2022    LABALBU 2.7 04/28/2022    CREATININE 1.03 04/29/2022    CALCIUM 8.6 04/29/2022    GFRAA >60 04/29/2022    LABGLOM 50 04/29/2022    LABGLOM 59 07/18/2020    GLUCOSE 133 04/29/2022           Physical Exam:  Vitals: BP (!) 119/58   Pulse 87   Temp 99 °F (37.2 °C) (Tympanic)   Resp 27   Ht 5' (1.524 m)   Wt 149 lb 4.8 oz (67.7 kg)   SpO2 95%   BMI 29.16 kg/m²   24 hour intake/output:    Intake/Output Summary (Last 24 hours) at 4/29/2022 1918  Last data filed at 4/29/2022 1828  Gross per 24 hour   Intake 1392 ml   Output    Net 1392 ml     Last 3 weights: Wt Readings from Last 3 Encounters:   04/29/22 149 lb 4.8 oz (67.7 kg)   04/25/22 147 lb 4.8 oz (66.8 kg)   04/01/22 145 lb (65.8 kg)     HEENT: O2 NC-------, Normocephalic and Atraumatic  Neck: Supple, No Masses, Tenderness, Nodularity and No Lymphadenopathy  Chest/Lungs: Distant Breath Sounds  Cardiac: Regular Rate and Rhythm  GI/Abdomen: Bowel Sounds Present and Soft, Non-tender, without Guarding or Rebound Tenderness  : Not examined  EXT/Skin: No Edema, No Cyanosis and No Clubbing  Neuro: alert---genralzied weakness----dementia = baseline      Assessment:    Principal Problem:    Pneumonia  Active Problems:    Hypomagnesemia    Hypokalemia  Resolved Problems:    * No resolved hospital problems.  Kim RICHARDSON         [address] [place] [address] felipe Garrett MD---jose mejía;  ANNE Rodríguez,  ANNE Lopez]  DNR-CCA         ASPIRIN  LOVENOX    Anti-infectives:   Vancomycin IV, Cefepime IV   [recent hospital interventions]      Pneumonia----bilateral---4.27.2022---hypoxiafever--weakness   Elevated troponin----4.27.2022---flat peak pattern---palliative care---no intervention              EKG4.27.2022---SR---96PACsPVCsLVH              CXR----4.28.2022cardiomegaly---effusions---mild vascular congestion----                              bilateral pulmonary opacities----worsening opacities right lung hemithorax              CXR---4.27.2022----bilateral pneumonia        Esophageal dilatation---3.11.2022      Kidney----stent3. 9.5119      Possible UTI4.26.2022        Hypokalemia      Hypomagnesemia       Dehydration----4.27.2022         Chronic cough       Chronic diarrhea        Chronic anemia     Hypertension            2D ECHO2018moderate MR-TRmild pulmonary HTN---Grade II moderate DD---LVEF ~ 60%            EKG---9.1.2020---SR80PACsLVH?   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-----palliative care             Endometrial adenocarcinoma---history  Dementia   HEARING IMPAIRMENT   PMH:  anemia---chronic, bilateral cataracts,  dysthymia, chest pain,             pericardial effusion, gait abnormality, kidney stones, left entropion,             macular degeneration---OU, glaucomaOU, vertigo, BLE trauma---              childhood, Influenza A---2018, UTI--- 2018, dehydration---2018,              CRUZ---1.11.2018---dehydrationfluoroquinolone,  hyponatremia,              chronic diarrhea,  UTI.2019, Pancreatitis---chronic,  FVT4167             hyponatremia  PSH:  MAGDABSO---cervix absent2002, laparoscopic cholecystectomy             1998, [id number], colonoscopy---2003, left elbow ORIFfracture              2010, right cataract extraction----IOL2013, vein-stripping, multiple              thoracentesesright chestlung carcinoma    Allergies:        allopurinol, Percocet---oxycodone, Phenergan--promethazine,                         potassium citratePolycitra-K, sulfarash, Lopressor, statins, sulfa  Intolerances:  Tessalon---benzonatate---anxiety            Plan:     1. MRSA--negative---dc'd Vancomycin  2. PNA---cont'd Cefepime  3. Supplemental oxygen as needed to keep O2 sat 90-95%  4. Elevated troponin----no intervention  5.    See orders     Electronically signed by Jeanne Jurado on 4/29/2022 at 7:18 PM    Hospitalist

## 2022-04-29 NOTE — PROGRESS NOTES
[address] Pharmacokinetic Monitoring Service - Vancomycin    Consulting Provider: Natasha Jennings   Indication: HAP  Target Concentration: Goal AUC/TIFFANIE 400-600 mg*hr/L  Day of Therapy: 3    Additional Antimicrobials: cefepime    Pertinent Laboratory Values: Wt Readings from Last 1 Encounters:   04/29/22 149 lb 4.8 oz (67.7 kg)     Temp Readings from Last 1 Encounters:   04/29/22 97.6 °F (36.4 °C) (Tympanic)     Estimated Creatinine Clearance: 31 mL/min (A) (based on SCr of 1.03 mg/dL (H)). Recent Labs     04/28/22  0408 04/29/22  0455   CREATININE 1.19* 1.03*   WBC 22.5* 11.4*     Procalcitonin:      Pertinent Cultures:  Culture Date Source Results   4/27/22 blood NGTD   MRSA Nasal Swab: not ordered. Order placed by pharmacy.     Recent vancomycin administrations                     vancomycin (VANCOCIN) 750 mg in dextrose 5 % 250 mL IVPB (mg) 750 mg New Bag 04/28/22 1801    vancomycin (VANCOCIN) 1,250 mg in dextrose 5 % 250 mL IVPB (mg) 1,250 mg New Bag 04/27/22 1658                    Assessment:  Date/Time Current Dose Concentration Timing of Concentration (h) AUC   4/29/22 750 mg q 24h     408   Note: Serum concentrations collected for AUC dosing may appear elevated if collected in close proximity to the dose administered, this is not necessarily an indication of toxicity    Plan:  Current dosing regimen is therapeutic  Continue current dose  Repeat vancomycin concentration ordered for 04/30/22 @ [facility]  will continue to monitor patient and adjust therapy as indicated    Thank you for the consult,  Joelene Homans, [facility]  4/29/2022 7:53 AM

## 2022-04-29 NOTE — PROGRESS NOTES
Physical Therapy  Facility/Department: [facility]  PROGRESSIVE CARE  Daily Treatment Note  NAME: Néstor Fitzgerald  : 1932  MRN: [id number]    Date of Service: 2022    Discharge Recommendations:  Continue to assess pending progress,Long Term Care without PT        Patient Diagnosis(es): The encounter diagnosis was Pneumonia of both lungs due to infectious organism, unspecified part of lung. Assessment   Activity Tolerance: Patient limited by fatigue     Plan    Plan  Current Treatment Recommendations: Functional mobility training;Transfer training;Gait training     Subjective    Subjective  Subjective: Pateint in bedside chair upon arrival. Agreeable to session. Complains of no pain. Pain: No pain rating. Orientation  Overall Orientation Status: Within Functional Limits  Cognition  Cognition Comment: Hard of hearing. Objective   Vitals  SpO2: 98 %  O2 Device: Nasal cannula  Bed Mobility Training  Bed Mobility Training: No  Transfer Training  Transfer Training: Yes  Overall Level of Assistance: Minimum assistance; Moderate assistance  Sit to Stand: Minimum assistance; Moderate assistance  Stand to Sit: Contact-guard assistance  Gait Training  Gait Training: No     PT Exercises  A/AROM Exercises: Standing marches x10, LAQ x10 BLE, HR/TR x10        Patient Education  Education Given To: Patient  Education Provided: Role of Therapy;Transfer Training  Education Provided Comments: Patient impulsive when sitting back into chair.     Goals  Long Term Goals  Time Frame for Long term goals : 3 days  Long term goal 1: transer to standing min assist  Long term goal 2: Pivot transfer minassist  Long term goal 3: Gait with RW 10 ft      Therapy Time   Individual Concurrent Group Co-treatment   Time In 4544         Time Out 0945         Minutes [facility] Doctors Tameka Sun

## 2022-04-30 ENCOUNTER — APPOINTMENT (OUTPATIENT)
Dept: GENERAL RADIOLOGY | Age: 87
DRG: 194 | End: 2022-04-30
Payer: MEDICARE

## 2022-04-30 VITALS
HEART RATE: 74 BPM | SYSTOLIC BLOOD PRESSURE: 114 MMHG | RESPIRATION RATE: 25 BRPM | DIASTOLIC BLOOD PRESSURE: 53 MMHG | TEMPERATURE: 97.8 F | WEIGHT: 150.4 LBS | BODY MASS INDEX: 29.53 KG/M2 | HEIGHT: 60 IN | OXYGEN SATURATION: 98 %

## 2022-04-30 LAB
ABSOLUTE EOS #: 0.35 K/UL (ref 0–0.44)
ABSOLUTE IMMATURE GRANULOCYTE: 0.13 K/UL (ref 0–0.3)
ABSOLUTE LYMPH #: 1.14 K/UL (ref 1.1–3.7)
ABSOLUTE MONO #: 0.66 K/UL (ref 0.1–1.2)
ANION GAP SERPL CALCULATED.3IONS-SCNC: 11 MMOL/L (ref 9–17)
BASOPHILS # BLD: 1 % (ref 0–2)
BASOPHILS ABSOLUTE: 0.06 K/UL (ref 0–0.2)
BUN BLDV-MCNC: 21 MG/DL (ref 8–23)
BUN/CREAT BLD: 21 (ref 9–20)
CALCIUM SERPL-MCNC: 8.8 MG/DL (ref 8.6–10.4)
CHLORIDE BLD-SCNC: 100 MMOL/L (ref 98–107)
CO2: 27 MMOL/L (ref 20–31)
CREAT SERPL-MCNC: 1.02 MG/DL (ref 0.5–0.9)
EOSINOPHILS RELATIVE PERCENT: 4 % (ref 1–4)
GFR AFRICAN AMERICAN: >60 ML/MIN
GFR NON-AFRICAN AMERICAN: 51 ML/MIN
GFR SERPL CREATININE-BSD FRML MDRD: ABNORMAL ML/MIN/{1.73_M2}
GLUCOSE BLD-MCNC: 122 MG/DL (ref 70–99)
GLUCOSE BLD-MCNC: 151 MG/DL (ref 65–105)
HCT VFR BLD CALC: 27.1 % (ref 36.3–47.1)
HEMOGLOBIN: 8.4 G/DL (ref 11.9–15.1)
IMMATURE GRANULOCYTES: 1 %
LYMPHOCYTES # BLD: 11 % (ref 24–43)
MCH RBC QN AUTO: 30.1 PG (ref 25.2–33.5)
MCHC RBC AUTO-ENTMCNC: 31 G/DL (ref 25.2–33.5)
MCV RBC AUTO: 97.1 FL (ref 82.6–102.9)
MONOCYTES # BLD: 7 % (ref 3–12)
NRBC AUTOMATED: 0 PER 100 WBC
PDW BLD-RTO: 14.4 % (ref 11.8–14.4)
PLATELET # BLD: 323 K/UL (ref 138–453)
PMV BLD AUTO: 10.9 FL (ref 8.1–13.5)
POTASSIUM SERPL-SCNC: 3.7 MMOL/L (ref 3.7–5.3)
RBC # BLD: 2.79 M/UL (ref 3.95–5.11)
SEG NEUTROPHILS: 76 % (ref 36–65)
SEGMENTED NEUTROPHILS ABSOLUTE COUNT: 7.76 K/UL (ref 1.5–8.1)
SODIUM BLD-SCNC: 138 MMOL/L (ref 135–144)
WBC # BLD: 10.1 K/UL (ref 3.5–11.3)

## 2022-04-30 PROCEDURE — 36415 COLL VENOUS BLD VENIPUNCTURE: CPT

## 2022-04-30 PROCEDURE — 6360000002 HC RX W HCPCS: Performed by: INTERNAL MEDICINE

## 2022-04-30 PROCEDURE — 2700000000 HC OXYGEN THERAPY PER DAY

## 2022-04-30 PROCEDURE — 6370000000 HC RX 637 (ALT 250 FOR IP): Performed by: INTERNAL MEDICINE

## 2022-04-30 PROCEDURE — 94761 N-INVAS EAR/PLS OXIMETRY MLT: CPT

## 2022-04-30 PROCEDURE — 71045 X-RAY EXAM CHEST 1 VIEW: CPT

## 2022-04-30 PROCEDURE — 85025 COMPLETE CBC W/AUTO DIFF WBC: CPT

## 2022-04-30 PROCEDURE — 82947 ASSAY GLUCOSE BLOOD QUANT: CPT

## 2022-04-30 PROCEDURE — 2580000003 HC RX 258: Performed by: EMERGENCY MEDICINE

## 2022-04-30 PROCEDURE — 99238 HOSP IP/OBS DSCHRG MGMT 30/<: CPT | Performed by: FAMILY MEDICINE

## 2022-04-30 PROCEDURE — 2580000003 HC RX 258: Performed by: NURSE PRACTITIONER

## 2022-04-30 PROCEDURE — 80048 BASIC METABOLIC PNL TOTAL CA: CPT

## 2022-04-30 PROCEDURE — 6360000002 HC RX W HCPCS: Performed by: EMERGENCY MEDICINE

## 2022-04-30 PROCEDURE — 94640 AIRWAY INHALATION TREATMENT: CPT

## 2022-04-30 PROCEDURE — 97530 THERAPEUTIC ACTIVITIES: CPT

## 2022-04-30 RX ORDER — CEFUROXIME AXETIL 500 MG/1
500 TABLET ORAL 2 TIMES DAILY
Qty: 14 TABLET | Refills: 0 | DISCHARGE
Start: 2022-04-30 | End: 2022-05-07

## 2022-04-30 RX ADMIN — PANTOPRAZOLE SODIUM 40 MG: 40 TABLET, DELAYED RELEASE ORAL at 06:23

## 2022-04-30 RX ADMIN — ASPIRIN 81 MG: 81 TABLET, COATED ORAL at 08:09

## 2022-04-30 RX ADMIN — OXYBUTYNIN CHLORIDE 10 MG: 5 TABLET, EXTENDED RELEASE ORAL at 08:09

## 2022-04-30 RX ADMIN — SODIUM CHLORIDE 1 G: 1 TABLET ORAL at 08:09

## 2022-04-30 RX ADMIN — SERTRALINE 50 MG: 50 TABLET, FILM COATED ORAL at 08:09

## 2022-04-30 RX ADMIN — ENOXAPARIN SODIUM 30 MG: 100 INJECTION SUBCUTANEOUS at 08:09

## 2022-04-30 RX ADMIN — SODIUM CHLORIDE: 9 INJECTION, SOLUTION INTRAVENOUS at 08:23

## 2022-04-30 RX ADMIN — BRIMONIDINE TARTRATE 1 DROP: 2 SOLUTION OPHTHALMIC at 08:20

## 2022-04-30 RX ADMIN — CEFEPIME HYDROCHLORIDE 2000 MG: 2 INJECTION, POWDER, FOR SOLUTION INTRAVENOUS at 04:00

## 2022-04-30 RX ADMIN — LEVOTHYROXINE SODIUM 88 MCG: 0.09 TABLET ORAL at 06:23

## 2022-04-30 RX ADMIN — FERROUS SULFATE TAB 325 MG (65 MG ELEMENTAL FE) 325 MG: 325 (65 FE) TAB at 08:09

## 2022-04-30 RX ADMIN — HYDROCHLOROTHIAZIDE 25 MG: 25 TABLET ORAL at 08:09

## 2022-04-30 RX ADMIN — IPRATROPIUM BROMIDE AND ALBUTEROL SULFATE 1 AMPULE: .5; 2.5 SOLUTION RESPIRATORY (INHALATION) at 07:51

## 2022-04-30 RX ADMIN — INSULIN LISPRO 1 UNITS: 100 INJECTION, SOLUTION INTRAVENOUS; SUBCUTANEOUS at 11:52

## 2022-04-30 RX ADMIN — INSULIN GLARGINE 15 UNITS: 100 INJECTION, SOLUTION SUBCUTANEOUS at 08:10

## 2022-04-30 RX ADMIN — IPRATROPIUM BROMIDE AND ALBUTEROL SULFATE 1 AMPULE: .5; 2.5 SOLUTION RESPIRATORY (INHALATION) at 03:40

## 2022-04-30 ASSESSMENT — PAIN SCALES - WONG BAKER
WONGBAKER_NUMERICALRESPONSE: 0

## 2022-04-30 NOTE — DISCHARGE SUMMARY
Discharge Summary    Escobar Scherer Patient Status:  Inpatient    1932 MRN [id number]   Location [facility] Attending Gianna Willis # 3 PCP Drucie Sicard, MD     Date of Admission: 2022    Date of Discharge: 2022    Admitting Diagnosis: Pneumonia [J18.9]  Pneumonia of both lungs due to infectious organism, unspecified part of lung [J18.9]    Discharge Diagnosis: Pneumomia. Dementia. Chronic anemia. H/o lung cancer right     Reason for Admission/HPI: Cough ,shortness of breath and fever    Hospital Course: Was on IV cefepime and vancomycin , continued on supplemental oxygen,Respiratory care with aerosol treatments. Hypomagnesmia and hypokalemia corrected. WBC count has normalized . Vancomycin was stopped since was negative for MRSA. Discharge back to ECF on supplemental oxygen and oral ceftin. Consultations: None    Procedures: None    Complications: None    Disposition: back to ECF    Discharge Condition: Stable    Discharge Medications:      Medication List        START taking these medications      cefUROXime 500 MG tablet  Commonly known as: CEFTIN  Take 1 tablet by mouth 2 times daily for 7 days            CHANGE how you take these medications      latanoprost 0.005 % ophthalmic solution  Commonly known as: XALATAN  What changed: Another medication with the same name was removed. Continue taking this medication, and follow the directions you see here.             CONTINUE taking these medications      * albuterol sulfate  (90 Base) MCG/ACT inhaler  Commonly known as: Proventil HFA  Inhale 2 puffs into the lungs every 6 hours as needed for Wheezing     * albuterol (2.5 MG/3ML) 0.083% nebulizer solution  Commonly known as: PROVENTIL  Take 3 mLs by nebulization every 6 hours as needed for Wheezing or Shortness of Breath     aspirin 81 MG tablet     brimonidine 0.2 % ophthalmic solution  Commonly known as: ALPHAGAN     carbamide peroxide 6.5 % otic solution  Commonly known as: DEBROX     COUGH RELIEF ADULT PO     CRANBERRY PO     D-Mannose 500 MG Caps     ferrous sulfate 325 (65 Fe) MG tablet  Commonly known as: IRON 325     hydroCHLOROthiazide 25 MG tablet  Commonly known as: HYDRODIURIL  Take 1 tablet by mouth daily     insulin glargine 100 UNIT/ML injection vial  Commonly known as: LANTUS  Inject 15 Units into the skin daily     levothyroxine 88 MCG tablet  Commonly known as: SYNTHROID  TAKE 1 TABLET DAILY     lisinopril 40 MG tablet  Commonly known as: PRINIVIL;ZESTRIL  TAKE 1 TABLET NIGHTLY     loperamide 2 MG capsule  Commonly known as: IMODIUM     meclizine 25 MG tablet  Commonly known as: ANTIVERT     * PreserVision AREDS Tabs     * OCUVITE PRESERVISION PO     * therapeutic multivitamin-minerals tablet     ondansetron 4 MG disintegrating tablet  Commonly known as: Zofran ODT  Take 1 tablet by mouth every 8 hours as needed for Nausea     oxybutynin 10 MG extended release tablet  Commonly known as: DITROPAN-XL  TAKE 1 TABLET DAILY     pantoprazole 40 MG tablet  Commonly known as: PROTONIX  Take 1 tablet by mouth every morning (before breakfast)     sertraline 50 MG tablet  Commonly known as: ZOLOFT  TAKE 1 TABLET DAILY     sodium chloride 1 g tablet  Take 1 tablet by mouth 2 times daily           * This list has 5 medication(s) that are the same as other medications prescribed for you. Read the directions carefully, and ask your doctor or other care provider to review them with you.                 STOP taking these medications      acetaminophen 325 MG tablet  Commonly known as: TYLENOL     bimatoprost 0.01 % Soln ophthalmic drops  Commonly known as: LUMIGAN     nitrofurantoin (macrocrystal-monohydrate) 100 MG capsule  Commonly known as: MACROBID     phenazopyridine 200 MG tablet  Commonly known as: Pyridium               Where to Get Your Medications        Information about where to get these medications is not yet available    Ask your nurse or doctor about these medications  cefUROXime 500 MG tablet         Follow up Visits:  Follow-up with PCP in ECF      Total Time spent with patient and coordinating care:  30 minutes    Kayden Liu MD  4/30/2022  11:13 AM

## 2022-04-30 NOTE — PROGRESS NOTES
Patient discharged to the [facility]. Report was called to nurse resuming care of patient.

## 2022-05-02 LAB
CULTURE: NORMAL
CULTURE: NORMAL
SPECIMEN DESCRIPTION: NORMAL
SPECIMEN DESCRIPTION: NORMAL

## 2022-05-04 ENCOUNTER — PREP FOR PROCEDURE (OUTPATIENT)
Dept: UROLOGY | Age: 87
End: 2022-05-04

## 2022-05-04 ENCOUNTER — TELEPHONE (OUTPATIENT)
Dept: PULMONOLOGY | Age: 87
End: 2022-05-04

## 2022-05-04 RX ORDER — SODIUM CHLORIDE 9 MG/ML
INJECTION, SOLUTION INTRAVENOUS CONTINUOUS
Status: CANCELLED | OUTPATIENT
Start: 2022-06-02

## 2022-05-04 NOTE — TELEPHONE ENCOUNTER
Patient was recently hospitalized d/t pneumonia. Daughter would like you to review patient's most recent chest xray to determine if it is now time for the pleurex cath as discussed during last office visit. Thank you.

## 2022-05-05 NOTE — TELEPHONE ENCOUNTER
Patient's daughter is aware Dr. Felicita Rosas is out of office this week and that this will be addressed next week. Daughter is okay with this, states patient is doing much better today.

## 2022-05-16 NOTE — TELEPHONE ENCOUNTER
Spoke with daughter this morning. States patient had a rough weekend but is now doing better. She is on a steroid and doing breathing treatments 3 times a day. She has had portable chest xrays done at nursing home, report are in media.

## 2022-05-19 DIAGNOSIS — C34.91 ADENOCARCINOMA, LUNG, RIGHT (HCC): ICD-10-CM

## 2022-05-19 DIAGNOSIS — J90 PLEURAL EFFUSION, RIGHT: Primary | ICD-10-CM

## 2022-05-19 NOTE — TELEPHONE ENCOUNTER
Please do CT chest without contrast to evaluate for worsening effusion.   Decision about Pleurx catheter based on the CT chest  Order placed

## 2022-05-23 NOTE — TELEPHONE ENCOUNTER
Spoke with patient's daughter. She is aware that the order for the CT chest is placed. She will contact the office when she is ready to get it scheduled.

## 2022-05-25 ENCOUNTER — TELEPHONE (OUTPATIENT)
Dept: UROLOGY | Age: 87
End: 2022-05-25

## 2022-05-25 NOTE — TELEPHONE ENCOUNTER
Patient is scheduled for a stent exchange in OR on 6/2/22 with Dr Aaron Moore. JOSE DANIEL from [address] [place] [address] reviewed her recent chest xray and wanted me to reach out to you to see if she is ok to proceed with the stent exchange since you just saw her yesterday.

## 2022-05-31 ENCOUNTER — TELEPHONE (OUTPATIENT)
Dept: UROLOGY | Age: 87
End: 2022-05-31

## 2022-05-31 NOTE — TELEPHONE ENCOUNTER
Patient's surgery is cancelled for 6/2/22. Patient has many other issues doing on at this time. Her family will reach out to reschedule when she is feeling better.  This should be done within a 5 month time span

## 2022-07-08 ENCOUNTER — TELEPHONE (OUTPATIENT)
Dept: INTERNAL MEDICINE | Age: 87
End: 2022-07-08

## 2022-07-08 NOTE — TELEPHONE ENCOUNTER
Sharon from Health system called stated that her meds were held today due to being lethargic. They also requested that her Duoneb be changed to PRN. She has been having trouble breathing.  [phone]

## 2022-07-13 ENCOUNTER — TELEPHONE (OUTPATIENT)
Dept: INTERNAL MEDICINE | Age: 87
End: 2022-07-13

## 2023-06-20 NOTE — TELEPHONE ENCOUNTER
Has UTI.  Prescription for Macrobid sent to [facility] in [facility]. 36.6

## 2023-06-20 NOTE — TELEPHONE ENCOUNTER
How Severe Is Your Acne?: mild
Patient has orders for routine labs. Next ov 3/23/18. She didn't complete them on Saturday because she wasn't feeling well enough, recent dx . Please advise if she should go ahead and complete labs before upcoming visit or if she can complete at a later date. Please advise.
Is This A New Presentation, Or A Follow-Up?: Acne

## 2024-07-15 NOTE — ACP (ADVANCE CARE PLANNING)
ACP: Attempted to determine with patient what her wishes would be regarding CPR and ventilation. She did not seem to comprehend the questions. Daughter stated that she had attempted to converse with her mother about her wishes but was unable to get an answer other than not to interfere with her natural death. Documents for health care power of  are on file and current.
Advance Care Planning   Advance Care Planning Clinical Specialist  Conversation Note      Date of ACP Conversation: 9/1/2020    Conversation Conducted with:   Patient with [address]: Named in Advance Directive or Healthcare Power [address] Specialist: Onesimo Hayward      *When Decision Maker makes decisions on behalf of the incapacitated patient: Payam Garcia is asked to consider and make decisions based on patient values, known preferences, or best interests. Current Designated Health Care Decision Maker:   (as entered in [address] field. Validate  this information as still accurate & up-to-date; edit [address] field as needed.)    If no Decision Maker listed above or available through scanned documents, then:    [address]   Who do you trust to make healthcare decisions for you? Name:     Christine Rossi   [phone] [facility]) [phone]     Phone  Number:   Can this person be reached and be able to respond quickly, such as within a few minutes or hours? Yes    Who would be your back-up decision maker? Name   Chip Thackers   [phone] [facility]) [phone]        Phone Number    For below questions, when conducting conversation with [address], substitute \"she\" and \"her\" for \"you\" and \"your\". Hospitalization  If your health were to worsen and it became clear that your chance of recovery was unlikely, what would your preferences be regarding hospitalization?:    Choice:  []  The patient would want hospitalization  []  The patient would prefer comfort-focused treatment without hospitalization.     Ventilation  If you were in your present state of health and suddenly became very ill and were unable to breathe on your own, what would your preference be about the use of a ventilator (breathing machine) if it were
constant

## 2025-04-01 NOTE — PROGRESS NOTES
Shannon Ball is a 80 y.o. female who presents today for her medical conditions/complaints as noted below. Chief Complaint   Patient presents with    Other     dizziness and nausea           HPI:     Juan Mcnally is seen today for a chronic illness f/u and c/o dizziness and nausea for the last few days. She was recently diagnosed with UTI and started on Macrodantin, last dose today. She has a PMX of Adenocarcinoma of the right lung, with hx of thoracentesis, bradycardia, uterine cancer with radiation, chronic diarrhea, cataracts, glaucoma, DM II, HTN, HLD, Covid 19, among others. She is seen while laying in her bed. She denies dizziness at present, when she sat up, she developed dizziness and nausea, which did improve with time. She has also had meclizine with improvement. Vitals have been stable. She has a moist cough. Lung sounds are clear. She sleeps much of the time. No distress noted on room air. She denies pain or shortness of breath. She is oriented to self and place. Weight has been stable. Other  Pertinent negatives include no abdominal pain, chest pain, coughing or headaches.        Past Medical History:   Diagnosis Date    Adenocarcinoma of endometrium ([address]     Aortic valve sclerosis     Bradycardia     follows with cardiology- Dr. Luis Felipe Perera, possible SSS    Cardiac murmur     Cataract of left eye     Chronic diarrhea     s/p radiation    Diabetes mellitus type II, controlled ([address]     diet controlled     Dysthymia     Gait abnormality 6/23/2014    Glaucoma     H/O renal calculi     H/O vein stripping     Hard of hearing     History of anemia     History of dizziness     follows with cardiology, bradycardia, possible SSS    History of radiation therapy     for uterine cancer    History of small bowel obstruction 03/2019    no surgery required    Hyperlipidemia     Hypertension     Hypothyroidism     Influenza A 1/11/2018    Left elbow fracture     S/P surgical repair  [facility]     Lung nodule     Macular degeneration     Obesity     Pericardial effusion 4/23/2014    Pseudophakia of right eye     PVD (peripheral vascular disease) ([facility]     Traumatic injury of lower extremity childhood    bilateral trauma of lower extremities    Vertigo     resolved    Wears glasses     Wears partial dentures     upper       Past Surgical History:   Procedure Laterality Date    CATARACT REMOVAL Right February 2013    Dr. Cyndi Wolff, LAPAROSCOPIC  5/28/1998    COLONOSCOPY  7/22/2003    Dr. Lynda Alas Left September 2010    ORIF, Dr. Julio Bain ERCP  7/19/1998    retained stone, Suzy Ann and Adeola Vivar MAGDA AND BSO  March 2002    endometrial adenocarcinoma, Dr. Rachael Johnson       Family History   Problem Relation Age of Onset    High Blood Pressure Other     Diabetes type 2  Mother         developed later in life       Social History     Tobacco Use    Smoking status: Never Smoker    Smokeless tobacco: Never Used    Tobacco comment: never smoked yant rrt 01/11/2018   Substance Use Topics    Alcohol use: No     Alcohol/week: 0.0 standard drinks      Allergies   Allergen Reactions    Allopurinol      Patient unsure of reaction    Metoprolol Tartrate [Metoprolol]      bradycardia    Percocet [Oxycodone-Acetaminophen]      Hallucinations. ...sensitive to narcotics    Phenergan [Promethazine Hcl]      Very sensitive. ..given small doses    Polycitra-K [Potassium Citrate]      Patient unsure of reaction    Statins Other (See Comments)     ?  Muscle aches     Levaquin [Levofloxacin] Anxiety     Not able to sleep    Sulfa Antibiotics Rash    Tessalon [Benzonatate] Anxiety     Not able to sleep       Health Maintenance   Topic Date Due    COVID-19 Vaccine (1) Never done   [employer] Visit (AWV)  09/16/2020    TSH testing  07/01/2021    Flu vaccine (1) 09/01/2021    Potassium monitoring  09/03/2021    Creatinine endometrial cancer    15. Glaucoma of both eyes, unspecified glaucoma type   Continue brimonidine, latanoprost,    16. Chronic depression   Continue Certaline   17. Dizziness with Nausea   Continue to monitor   Eating OK at times   Continue meclizine prn   Monitor weight - has been stable    Patient seen and examined. History partially obtained by chart review and nursing notes. I have reviewed patient's past medical, surgical, social, and family history and have made updates where appropriate. See facility EMR for updated medication list.      Resident has hx lung and endometrial cancer, HTN, HLD, DM II, Glaucoma,  and these are expected to last 12 or more months. These chronic conditions place the resident at a significant risk of death, acute exacerbation, or functional decline. The patient's comprehensive plan was monitored today. I spent 20 minutes reviewing the plan.       Electronically signed by MOHAN Plummer CNP on 11/22/2021 at 3:26 PM n/a

## (undated) DEVICE — CYSTO PACK: Brand: MEDLINE INDUSTRIES, INC.

## (undated) DEVICE — GLOVE ORTHO 8   MSG9480

## (undated) DEVICE — SOLUTION IRRIG 3000ML STRL H2O USP UROMATIC PLAS CONT

## (undated) DEVICE — BIOGUARD A/W CLEANING ADAPTER

## (undated) DEVICE — GLOVE ORANGE PI 7 1/2   MSG9075

## (undated) DEVICE — OPEN-END FLEXI-TIP URETERAL CATHETER: Brand: FLEXI-TIP

## (undated) DEVICE — ESOPHAGEAL BALLOON DILATATION CATHETER: Brand: CRE FIXED WIRE

## (undated) DEVICE — CATHETER DIL 6FR L180CM BLLN INFLATED 36-40.5-45FR L8CM ES

## (undated) DEVICE — GUIDEWIRE URO L150CM DIA0.035IN STIFF NIT HYDRPHLC STR TIP